# Patient Record
Sex: FEMALE | Race: BLACK OR AFRICAN AMERICAN | NOT HISPANIC OR LATINO | Employment: OTHER | URBAN - METROPOLITAN AREA
[De-identification: names, ages, dates, MRNs, and addresses within clinical notes are randomized per-mention and may not be internally consistent; named-entity substitution may affect disease eponyms.]

---

## 2017-01-09 DIAGNOSIS — E03.4 HYPOTHYROIDISM DUE TO ACQUIRED ATROPHY OF THYROID: ICD-10-CM

## 2017-01-09 DIAGNOSIS — I10 ESSENTIAL HYPERTENSION: ICD-10-CM

## 2017-01-10 RX ORDER — LEVOTHYROXINE SODIUM 100 UG/1
TABLET ORAL
Qty: 90 TABLET | Refills: 2 | Status: SHIPPED | OUTPATIENT
Start: 2017-01-10 | End: 2017-02-02 | Stop reason: SDUPTHER

## 2017-01-10 RX ORDER — METOPROLOL SUCCINATE 25 MG/1
25 TABLET, EXTENDED RELEASE ORAL DAILY
Qty: 90 TABLET | Refills: 2 | Status: SHIPPED | OUTPATIENT
Start: 2017-01-10 | End: 2017-02-02 | Stop reason: SDUPTHER

## 2017-02-02 ENCOUNTER — TELEPHONE (OUTPATIENT)
Dept: INTERNAL MEDICINE | Facility: CLINIC | Age: 72
End: 2017-02-02

## 2017-02-02 DIAGNOSIS — E03.4 HYPOTHYROIDISM DUE TO ACQUIRED ATROPHY OF THYROID: ICD-10-CM

## 2017-02-02 DIAGNOSIS — E78.5 HYPERLIPIDEMIA, UNSPECIFIED HYPERLIPIDEMIA TYPE: ICD-10-CM

## 2017-02-02 DIAGNOSIS — E55.9 VITAMIN D DEFICIENCY: ICD-10-CM

## 2017-02-02 DIAGNOSIS — Z29.9 PREVENTIVE MEASURE: Primary | ICD-10-CM

## 2017-02-02 DIAGNOSIS — I10 ESSENTIAL HYPERTENSION: ICD-10-CM

## 2017-02-02 RX ORDER — METOPROLOL SUCCINATE 25 MG/1
25 TABLET, EXTENDED RELEASE ORAL DAILY
Qty: 90 TABLET | Refills: 2 | Status: SHIPPED | OUTPATIENT
Start: 2017-02-02 | End: 2017-11-07 | Stop reason: SDUPTHER

## 2017-02-02 RX ORDER — VALSARTAN 160 MG/1
TABLET ORAL
Qty: 90 TABLET | Refills: 2 | Status: SHIPPED | OUTPATIENT
Start: 2017-02-02 | End: 2017-11-07 | Stop reason: SDUPTHER

## 2017-02-02 RX ORDER — HYDROCHLOROTHIAZIDE 12.5 MG/1
12.5 TABLET ORAL DAILY
Qty: 90 TABLET | Refills: 3 | Status: SHIPPED | OUTPATIENT
Start: 2017-02-02 | End: 2017-11-07 | Stop reason: SDUPTHER

## 2017-02-02 RX ORDER — ERGOCALCIFEROL 1.25 MG/1
50000 CAPSULE ORAL
Qty: 4 CAPSULE | Refills: 6 | Status: SHIPPED | OUTPATIENT
Start: 2017-02-02 | End: 2017-10-31 | Stop reason: SDUPTHER

## 2017-02-02 RX ORDER — SIMVASTATIN 40 MG/1
TABLET, FILM COATED ORAL
Qty: 90 TABLET | Refills: 2 | Status: SHIPPED | OUTPATIENT
Start: 2017-02-02 | End: 2017-10-30 | Stop reason: SDUPTHER

## 2017-02-02 RX ORDER — LEVOTHYROXINE SODIUM 100 UG/1
TABLET ORAL
Qty: 90 TABLET | Refills: 2 | Status: SHIPPED | OUTPATIENT
Start: 2017-02-02 | End: 2017-11-07 | Stop reason: SDUPTHER

## 2017-02-02 NOTE — TELEPHONE ENCOUNTER
----- Message from Liam Lea sent at 2/2/2017 11:08 AM CST -----  Contact: self/ [phone] home  Type: Rx    Name of medication(s): Hard copies of all medications    Is this a refill? New rx? Refill     Who prescribed medication? Dr. Powell    Pharmacy Name, Phone, & Location: Mailed to home address    Comments: Pt states that all of her prescriptions have gone up and is too expensive, so she would like to request hard copies of all of them to be mailed to her home and she will handle sending them to the pharmacy when it is time.  She would like a call back to discuss.  Please call and advise.    Thank you

## 2017-03-07 ENCOUNTER — TELEPHONE (OUTPATIENT)
Dept: INTERNAL MEDICINE | Facility: CLINIC | Age: 72
End: 2017-03-07

## 2017-03-07 DIAGNOSIS — Z12.39 BREAST SCREENING, UNSPECIFIED: Primary | ICD-10-CM

## 2017-03-07 DIAGNOSIS — Z12.31 ENCOUNTER FOR SCREENING MAMMOGRAM FOR MALIGNANT NEOPLASM OF BREAST: ICD-10-CM

## 2017-03-07 NOTE — TELEPHONE ENCOUNTER
----- Message from Yaneth Nieves sent at 3/7/2017 10:56 AM CST -----  Contact: Self/ [phone]   Type: Orders Request    What orders/ testing are being requested? Mammo     Is there a future appointment scheduled for the patient with PCP? Yes     When? 04/05/2017     Comments: pt is calling to have a order for a Mammo. Please call and advise     Thank you

## 2017-03-14 ENCOUNTER — LAB VISIT (OUTPATIENT)
Dept: LAB | Facility: HOSPITAL | Age: 72
End: 2017-03-14
Attending: INTERNAL MEDICINE
Payer: MEDICARE

## 2017-03-14 DIAGNOSIS — E78.00 PURE HYPERCHOLESTEROLEMIA: ICD-10-CM

## 2017-03-14 DIAGNOSIS — I10 ESSENTIAL HYPERTENSION: ICD-10-CM

## 2017-03-14 LAB
ALBUMIN SERPL BCP-MCNC: 3.6 G/DL
ALP SERPL-CCNC: 65 U/L
ALT SERPL W/O P-5'-P-CCNC: 14 U/L
ANION GAP SERPL CALC-SCNC: 7 MMOL/L
AST SERPL-CCNC: 26 U/L
BILIRUB DIRECT SERPL-MCNC: 0.2 MG/DL
BILIRUB SERPL-MCNC: 0.4 MG/DL
BUN SERPL-MCNC: 13 MG/DL
CALCIUM SERPL-MCNC: 9.6 MG/DL
CHLORIDE SERPL-SCNC: 104 MMOL/L
CHOLEST/HDLC SERPL: 3.2 {RATIO}
CO2 SERPL-SCNC: 30 MMOL/L
CREAT SERPL-MCNC: 0.9 MG/DL
EST. GFR  (AFRICAN AMERICAN): >60 ML/MIN/1.73 M^2
EST. GFR  (NON AFRICAN AMERICAN): >60 ML/MIN/1.73 M^2
GLUCOSE SERPL-MCNC: 86 MG/DL
HDL/CHOLESTEROL RATIO: 31.4 %
HDLC SERPL-MCNC: 169 MG/DL
HDLC SERPL-MCNC: 53 MG/DL
LDLC SERPL CALC-MCNC: 96.2 MG/DL
NONHDLC SERPL-MCNC: 116 MG/DL
POTASSIUM SERPL-SCNC: 4.2 MMOL/L
PROT SERPL-MCNC: 7.5 G/DL
SODIUM SERPL-SCNC: 141 MMOL/L
TRIGL SERPL-MCNC: 99 MG/DL

## 2017-03-14 PROCEDURE — 80048 BASIC METABOLIC PNL TOTAL CA: CPT

## 2017-03-14 PROCEDURE — 80061 LIPID PANEL: CPT

## 2017-03-14 PROCEDURE — 80076 HEPATIC FUNCTION PANEL: CPT

## 2017-03-14 PROCEDURE — 36415 COLL VENOUS BLD VENIPUNCTURE: CPT

## 2017-03-17 ENCOUNTER — HOSPITAL ENCOUNTER (OUTPATIENT)
Dept: RADIOLOGY | Facility: HOSPITAL | Age: 72
Discharge: HOME | End: 2017-03-17
Attending: INTERNAL MEDICINE
Payer: MEDICARE

## 2017-03-17 DIAGNOSIS — Z12.31 ENCOUNTER FOR SCREENING MAMMOGRAM FOR MALIGNANT NEOPLASM OF BREAST: ICD-10-CM

## 2017-03-17 DIAGNOSIS — Z12.39 BREAST SCREENING, UNSPECIFIED: ICD-10-CM

## 2017-03-17 PROCEDURE — 77067 SCR MAMMO BI INCL CAD: CPT | Mod: 26,,, | Performed by: RADIOLOGY

## 2017-03-17 PROCEDURE — 77067 SCR MAMMO BI INCL CAD: CPT | Mod: TC

## 2017-03-17 PROCEDURE — 77063 BREAST TOMOSYNTHESIS BI: CPT | Mod: 26,,, | Performed by: RADIOLOGY

## 2017-03-27 ENCOUNTER — OFFICE VISIT (OUTPATIENT)
Dept: SPORTS MEDICINE | Facility: CLINIC | Age: 72
End: 2017-03-27
Payer: MEDICARE

## 2017-03-27 VITALS
HEIGHT: 68 IN | WEIGHT: 205 LBS | SYSTOLIC BLOOD PRESSURE: 132 MMHG | DIASTOLIC BLOOD PRESSURE: 76 MMHG | HEART RATE: 65 BPM | BODY MASS INDEX: 31.07 KG/M2

## 2017-03-27 DIAGNOSIS — M67.911 DISORDER OF RIGHT ROTATOR CUFF: Primary | ICD-10-CM

## 2017-03-27 PROCEDURE — 1160F RVW MEDS BY RX/DR IN RCRD: CPT | Mod: S$GLB,,, | Performed by: ORTHOPAEDIC SURGERY

## 2017-03-27 PROCEDURE — 3075F SYST BP GE 130 - 139MM HG: CPT | Mod: S$GLB,,, | Performed by: ORTHOPAEDIC SURGERY

## 2017-03-27 PROCEDURE — 99214 OFFICE O/P EST MOD 30 MIN: CPT | Mod: S$GLB,,, | Performed by: ORTHOPAEDIC SURGERY

## 2017-03-27 PROCEDURE — 3078F DIAST BP <80 MM HG: CPT | Mod: S$GLB,,, | Performed by: ORTHOPAEDIC SURGERY

## 2017-03-27 PROCEDURE — 1159F MED LIST DOCD IN RCRD: CPT | Mod: S$GLB,,, | Performed by: ORTHOPAEDIC SURGERY

## 2017-03-27 PROCEDURE — 99999 PR PBB SHADOW E&M-EST. PATIENT-LVL III: CPT | Mod: PBBFAC,,, | Performed by: ORTHOPAEDIC SURGERY

## 2017-03-27 PROCEDURE — 1157F ADVNC CARE PLAN IN RCRD: CPT | Mod: S$GLB,,, | Performed by: ORTHOPAEDIC SURGERY

## 2017-03-27 PROCEDURE — 1125F AMNT PAIN NOTED PAIN PRSNT: CPT | Mod: S$GLB,,, | Performed by: ORTHOPAEDIC SURGERY

## 2017-03-27 RX ORDER — MELOXICAM 15 MG/1
15 TABLET ORAL DAILY
Qty: 30 TABLET | Refills: 2 | Status: SHIPPED | OUTPATIENT
Start: 2017-03-27 | End: 2017-06-13 | Stop reason: SDUPTHER

## 2017-03-27 NOTE — PROGRESS NOTES
CC: right shoulder pain referred by Dr. Powell     71 y.o. Female retired  reports that the pain is severe and not responding to any conservative care.  Had injection last visit in 11/2016 with relief until about a month ago.    She reports that the pain is worse with overhead activity. It also bothers her at night.    Is affecting ADLs.     Review of Systems   Constitution: Negative. Negative for chills, fever and night sweats.   HENT: Negative for congestion and headaches.    Eyes: Negative for blurred vision, left vision loss and right vision loss.   Cardiovascular: Negative for chest pain and syncope.   Respiratory: Negative for cough and shortness of breath.    Endocrine: Negative for polydipsia, polyphagia and polyuria.   Hematologic/Lymphatic: Negative for bleeding problem. Does not bruise/bleed easily.   Skin: Negative for dry skin, itching and rash.   Musculoskeletal: Negative for falls and muscle weakness.   Gastrointestinal: Negative for abdominal pain and bowel incontinence.   Genitourinary: Negative for bladder incontinence and nocturia.   Neurological: Negative for disturbances in coordination, loss of balance and seizures.   Psychiatric/Behavioral: Negative for depression. The patient does not have insomnia.    Allergic/Immunologic: Negative for hives and persistent infections.     PAST MEDICAL HISTORY:   Past Medical History:   Diagnosis Date    Back pain     Cataract     Central retinal vein occlusion of left eye     Degeneration of lumbar or lumbosacral intervertebral disc 4/16/2013    High cholesterol     Hypertension     Hypothyroidism     Impingement syndrome of left shoulder 7/29/2013    Obesity     Pneumonia     Posterior vitreous detachment of both eyes 1/17/2013    S/P partial hysterectomy 1/23/2013     PAST SURGICAL HISTORY:   Past Surgical History:   Procedure Laterality Date    COLONOSCOPY  01/22/2008    HYSTERECTOMY      PARTIAL HYSTERECTOMY       THYROIDECTOMY, PARTIAL      TONSILLECTOMY       FAMILY HISTORY:   Family History   Problem Relation Age of Onset    Diabetes Mother     Glaucoma Mother     Heart disease Mother     Blindness Mother     Heart attack Mother     Glaucoma Brother     Stroke Brother     Liver disease Brother     Heart disease Brother     Arthritis Brother     Alcohol abuse Brother     Drug abuse Brother     Glaucoma Brother     Diabetes Brother     Pemphigus vulgaris Brother     Blindness Brother     No Known Problems Father     Hypertension Maternal Grandmother     Diabetes Maternal Grandmother     No Known Problems Paternal Grandmother     No Known Problems Paternal Grandfather     Heart disease Brother     Diabetes Brother      Bilateral Knee amputation    Heart disease Brother     Liver disease Brother     Heart disease Son      Stent placement    Gout Son     No Known Problems Son     No Known Problems Maternal Grandfather     No Known Problems Maternal Aunt     No Known Problems Maternal Uncle     No Known Problems Paternal Aunt     No Known Problems Paternal Uncle     No Known Problems Sister     Amblyopia Neg Hx     Macular degeneration Neg Hx     Retinal detachment Neg Hx     Strabismus Neg Hx     Thyroid disease Neg Hx     Cataracts Neg Hx     Cancer Neg Hx      SOCIAL HISTORY:   Social History     Social History    Marital status:      Spouse name: N/A    Number of children: N/A    Years of education: N/A     Occupational History    Not on file.     Social History Main Topics    Smoking status: Never Smoker    Smokeless tobacco: Never Used    Alcohol use 1.8 oz/week     3 Cans of beer per week      Comment: Occasionally; beer once a week    Drug use: No    Sexual activity: Yes     Partners: Male      Comment: maybe once month     Other Topics Concern    Not on file     Social History Narrative       MEDICATIONS:   Current Outpatient Prescriptions:     aspirin 81 mg  "Tab, Take 81 mg by mouth. , Disp: , Rfl:     ergocalciferol (ERGOCALCIFEROL) 50,000 unit Cap, Take 1 capsule (50,000 Units total) by mouth every 7 days., Disp: 4 capsule, Rfl: 6    folic acid-vit B6-vit B12 (FOLBEE) 2.5-25-1 mg Tab, Take 1 tablet by mouth once daily., Disp: 90 each, Rfl: 3    hydrochlorothiazide (HYDRODIURIL) 12.5 MG Tab, Take 1 tablet (12.5 mg total) by mouth once daily. For Blood Pressure, Disp: 90 tablet, Rfl: 3    levothyroxine (SYNTHROID) 100 MCG tablet, TAKE ONE TABLET BY MOUTH BEFORE BREAKFAST FOR THYROID, Disp: 90 tablet, Rfl: 2    metoprolol succinate (TOPROL-XL) 25 MG 24 hr tablet, Take 1 tablet (25 mg total) by mouth once daily. For Blood Pressure, Disp: 90 tablet, Rfl: 2    simvastatin (ZOCOR) 40 MG tablet, TAKE 1 TABLET EVERY EVENING FOR CHOLESTEROL, Disp: 90 tablet, Rfl: 2    timolol maleate 0.5% (TIMOPTIC-XE) 0.5 % SolG, Place 1 drop into both eyes once daily., Disp: 10 mL, Rfl: 3    valsartan (DIOVAN) 160 MG tablet, TAKE 1 TABLET ONE TIME DAILY FOR BLOOD PRESSURE, Disp: 90 tablet, Rfl: 2  ALLERGIES:   Review of patient's allergies indicates:   Allergen Reactions    Tramadol Nausea And Vomiting       VITAL SIGNS:   /76  Pulse 65  Ht 5' 8" (1.727 m)  Wt 93 kg (205 lb)  BMI 31.17 kg/m2     PHYSICAL EXAMINATION:  General:  The patient is alert and oriented x 3.  Mood is pleasant.  Observation of ears, eyes and nose reveal no gross abnormalities.  No labored breathing observed.  Gait is coordinated. Patient can toe walk and heel walk without difficulty.      right Shoulder / Upper Extremity Exam    OBSERVATION:     Swelling  none  Deformity  none   Discoloration  none   Scapular winging none   Scars   none  Atrophy  none    TENDERNESS / CREPITUS (T/C):          T/C      T/C   Clavicle   -/-  SUPRAspinatus    -/-     AC Jt.    -/-  INFRAspinatus  -/-    SC Jt.    -/-  Deltoid    -/-      G. Tuberosity  -/-  LH BICEP groove  +/-   Acromion:  -/-  Midline " Neck   -/-     Scapular Spine -/-  Trapezium   -/-   SMA Scapula  -/-  GH jt. line - post  -/-     Scapulothoracic  -/-         ROM: (* = with pain)  Right shoulder   Left shoulder        AROM (PROM)   AROM (PROM)   FE    170° (175°)     170° (175°)     ER at 0°    60°  (65°)    60°  (65°)   ER at 90° ABD  90°  (90°)    90°  (90°)   IR at 90°  ABD   NA  (40°)     NA  (40°)      IR (spine level)   T10     T10    STRENGTH: (* = with pain) RIGHT SHOULDER  LEFT SHOULDER   SCAPTION   5/5    At 0: 4/5 at 30: 5-/5    IR    5/5    5/5   ER    5/5    5/5   BICEPS   5/5    5/5   Deltoid    5/5    5/5     SIGNS:  Painful side       NEER   +    OKENROYS  neg    TORRES   +    SPEEDS  neg     DROP ARM   neg   BELLY PRESS neg   Superior escape none    LIFT-OFF  neg   X-Body ADD    neg    MOVING VALGUS neg        STABILITY TESTING    RIGHT SHOULDER   LEFT SHOULDER     Translation     Anterior  up face     up face    Posterior  up face    up face    Sulcus   < 10mm    < 10 mm     Signs   Apprehension   neg      neg       Relocation   no change     no change      Jerk test  neg     neg    EXTREMITY NEURO-VASCULAR EXAM    Sensation grossly intact to light touch all dermatomal regions.    DTR 2+ Biceps, Triceps, BR and Negative Jean Pauls sign   Grossly intact motor function at Elbow, Wrist and Hand   Distal pulses radial and ulnar 2+, brisk cap refill, symmetric.      NECK:  Painless FROM and spinous processes non-tender. Negative Spurlings sign.      OTHER FINDINGS:  + scapular dyskinesia    XRAYS:  Shoulder trauma series right,  were obtained and reviewed  No convincing fracture or dislocation is noted. The osseous structures appear well mineralized and well aligned    MRI R Shoulder  1.  Tendinosis of the distal supraspinatus with a small interstitial tear at the myotendinous junction.  2.  Subacromial subdeltoid bursitis.  3.  Mild tendinosis of the distal subscapularis.  4.  Diminutive intra-articular long head of the  biceps which may indicate tear.  5.  Arthrosis of the acromioclavicular joint as above.        ASSESSMENT:   right:  1. Shoulder pain, impingement   2.  Scapular dyskinesia    PLAN:    1. New MRI right shoulder to evaluate cuff.  2. If no progression of tear then will reinject    All questions were answered, pt will contact us for questions or concerns in the interim.

## 2017-03-27 NOTE — MR AVS SNAPSHOT
[facility]  [address]  [facility] [address]  Phone: [phone]                  Elena Block   3/27/2017 11:45 AM   Appointment    Description:  Female : 1945   Provider:  Judy Canela MD   Department:  [facility]                To Do List           Future Appointments        Provider Department Dept Phone    3/27/2017 11:45 AM Judy Canela MD [facility] Medicine [phone]    2017 10:30 AM Sandra Lyon MD [facility] [address] [phone]    2017 10:30 AM HERZOG, VISUAL GONZALEZ [facility] - Ophthalmology [phone]    2017 11:00 AM MARTI Martin [facility] - Optometry [phone]      Goals (5 Years of Data)     None      [facility]     [facility] Line -  Assistance  Registered nurses in the [facility] provide clinical advisement, health education, appointment booking, and other advisory services.  Call for this free service at [phone].             Medications           Message regarding Medications     Verify the changes and/or additions to your medication regime listed below are the same as discussed with your clinician today.  If any of these changes or additions are incorrect, please notify your healthcare provider.             Verify that the below list of medications is an accurate representation of the medications you are currently taking.  If none reported, the list may be blank. If incorrect, please contact your healthcare provider. Carry this list with you in case of emergency.           Current Medications     aspirin 81 mg Tab Take 81 mg by mouth.     ergocalciferol (ERGOCALCIFEROL) 50,000 unit Cap Take 1 capsule (50,000 Units total) by mouth every 7 days.    folic acid-vit B6-vit B12 (FOLBEE) 2.5-25-1 mg Tab Take 1 tablet by mouth once daily.    hydrochlorothiazide (HYDRODIURIL) 12.5 MG Tab Take 1 tablet (12.5 mg total) by mouth once daily. For Blood  Pressure    levothyroxine (SYNTHROID) 100 MCG tablet TAKE ONE TABLET BY MOUTH BEFORE BREAKFAST FOR THYROID    metoprolol succinate (TOPROL-XL) 25 MG 24 hr tablet Take 1 tablet (25 mg total) by mouth once daily. For Blood Pressure    simvastatin (ZOCOR) 40 MG tablet TAKE 1 TABLET EVERY EVENING FOR CHOLESTEROL    timolol maleate 0.5% (TIMOPTIC-XE) 0.5 % SolG Place 1 drop into both eyes once daily.    valsartan (DIOVAN) 160 MG tablet TAKE 1 TABLET ONE TIME DAILY FOR BLOOD PRESSURE           Clinical Reference Information           Allergies as of 3/27/2017     Tramadol      Immunizations Administered on Date of Encounter - 3/27/2017     None      Language Assistance Services     ATTENTION: Language assistance services are available, free of charge. Please call [phone].      ATENCIÓN: Si cleveland omalley, tiene a haynes disposición servicios gratuitos de asistencia lingüística. Llame al [phone].     GINA Ý: N?u b?n nói Ti?ng Vi?t, có các d?ch v? h? tr? ngôn ng? mi?n phí dành cho b?n. G?i s? [phone].         [facility] complies with applicable Federal civil rights laws and does not discriminate on the basis of race, color, national origin, age, disability, or sex.

## 2017-04-05 ENCOUNTER — OFFICE VISIT (OUTPATIENT)
Dept: INTERNAL MEDICINE | Facility: CLINIC | Age: 72
End: 2017-04-05
Payer: MEDICARE

## 2017-04-05 ENCOUNTER — TELEPHONE (OUTPATIENT)
Dept: INTERNAL MEDICINE | Facility: CLINIC | Age: 72
End: 2017-04-05

## 2017-04-05 VITALS
BODY MASS INDEX: 31.3 KG/M2 | HEIGHT: 68 IN | WEIGHT: 206.5 LBS | DIASTOLIC BLOOD PRESSURE: 66 MMHG | HEART RATE: 62 BPM | SYSTOLIC BLOOD PRESSURE: 132 MMHG | OXYGEN SATURATION: 98 %

## 2017-04-05 DIAGNOSIS — E78.5 HYPERLIPIDEMIA, UNSPECIFIED HYPERLIPIDEMIA TYPE: ICD-10-CM

## 2017-04-05 DIAGNOSIS — I10 ESSENTIAL HYPERTENSION: Primary | ICD-10-CM

## 2017-04-05 DIAGNOSIS — E55.9 VITAMIN D DEFICIENCY: ICD-10-CM

## 2017-04-05 DIAGNOSIS — E03.4 HYPOTHYROIDISM DUE TO ACQUIRED ATROPHY OF THYROID: ICD-10-CM

## 2017-04-05 DIAGNOSIS — Z79.899 HIGH RISK MEDICATION USE: ICD-10-CM

## 2017-04-05 DIAGNOSIS — M71.9 DISORDER OF BURSAE AND TENDONS IN RIGHT SHOULDER REGION: ICD-10-CM

## 2017-04-05 DIAGNOSIS — M67.911 DISORDER OF BURSAE AND TENDONS IN RIGHT SHOULDER REGION: ICD-10-CM

## 2017-04-05 PROCEDURE — 3075F SYST BP GE 130 - 139MM HG: CPT | Mod: S$GLB,,, | Performed by: INTERNAL MEDICINE

## 2017-04-05 PROCEDURE — 99999 PR PBB SHADOW E&M-EST. PATIENT-LVL IV: CPT | Mod: PBBFAC,,, | Performed by: INTERNAL MEDICINE

## 2017-04-05 PROCEDURE — 3078F DIAST BP <80 MM HG: CPT | Mod: S$GLB,,, | Performed by: INTERNAL MEDICINE

## 2017-04-05 PROCEDURE — 1157F ADVNC CARE PLAN IN RCRD: CPT | Mod: S$GLB,,, | Performed by: INTERNAL MEDICINE

## 2017-04-05 PROCEDURE — 99214 OFFICE O/P EST MOD 30 MIN: CPT | Mod: S$GLB,,, | Performed by: INTERNAL MEDICINE

## 2017-04-05 PROCEDURE — 1159F MED LIST DOCD IN RCRD: CPT | Mod: S$GLB,,, | Performed by: INTERNAL MEDICINE

## 2017-04-05 PROCEDURE — 99499 UNLISTED E&M SERVICE: CPT | Mod: S$GLB,,, | Performed by: INTERNAL MEDICINE

## 2017-04-05 PROCEDURE — 1160F RVW MEDS BY RX/DR IN RCRD: CPT | Mod: S$GLB,,, | Performed by: INTERNAL MEDICINE

## 2017-04-05 PROCEDURE — 1126F AMNT PAIN NOTED NONE PRSNT: CPT | Mod: S$GLB,,, | Performed by: INTERNAL MEDICINE

## 2017-04-05 RX ORDER — OMEPRAZOLE 20 MG/1
20 CAPSULE, DELAYED RELEASE ORAL DAILY
Qty: 30 CAPSULE | Refills: 11 | Status: SHIPPED | OUTPATIENT
Start: 2017-04-05 | End: 2017-11-28 | Stop reason: SDUPTHER

## 2017-04-05 NOTE — TELEPHONE ENCOUNTER
----- Message from Bella Bravo sent at 4/5/2017 11:01 AM CDT -----  Contact[facility]  Refill    ergocalciferol (ERGOCALCIFEROL) 50,000 unit Cap   Sig - Route: Take 1 capsule (50,000 Units total) by mouth every 7 days. - Oral    [id number] [place], [place] - [address] [phone] (Phone) [phone] (Fax)    Thanks!

## 2017-04-05 NOTE — MR AVS SNAPSHOT
[facility] [address]  [address]  [address]  [facility] [address]  Phone: [phone]                  Elena Block   2017 10:30 AM   Office Visit    Description:  Female : 1945   Provider:  Sandra Lyon MD   Department:  [facility] [address]           Reason for Visit     Follow-up           Diagnoses this Visit        Comments    Essential hypertension    -  Primary     Hyperlipidemia, unspecified hyperlipidemia type         Disorder of bursae and tendons in right shoulder region         High risk medication use         Hypothyroidism due to acquired atrophy of thyroid         Vitamin D deficiency                To Do List           Future Appointments        Provider Department Dept Phone    2017 6:00 AM [facility] MRI1 [facility] [phone]    2017 10:30 AM HERZOG, VISUAL GONZALEZ [facility] - Ophthalmology [phone]    2017 11:00 AM Tim BARI Hamlin, OD [facility] - Optometry [phone]      Goals (5 Years of Data)     None      Follow-Up and Disposition     Return in about 6 months (around 10/5/2017).       These Medications        Disp Refills Start End    omeprazole (PRILOSEC) 20 MG capsule 30 capsule 11 2017    Take 1 capsule (20 mg total) by mouth once daily. 1 cap daily for stomach ulcer prevention while on Meloxicam - Oral    Pharmacy: [id number] - [place], [place] - [address] Ph #: [phone]         [facility]     [facility] Care Line - 24/ Assistance  Unless otherwise directed by your provider, please contact [facility], our nurse care line that is available for 24/7 assistance.     Registered nurses in the [facility] provide: appointment scheduling, clinical advisement, health education, and other advisory services.  Call: [phone] (toll free)               Medications           Message regarding Medications     Verify  the changes and/or additions to your medication regime listed below are the same as discussed with your clinician today.  If any of these changes or additions are incorrect, please notify your healthcare provider.        START taking these NEW medications        Refills    omeprazole (PRILOSEC) 20 MG capsule 11    Sig: Take 1 capsule (20 mg total) by mouth once daily. 1 cap daily for stomach ulcer prevention while on Meloxicam    Class: Normal    Route: Oral           Verify that the below list of medications is an accurate representation of the medications you are currently taking.  If none reported, the list may be blank. If incorrect, please contact your healthcare provider. Carry this list with you in case of emergency.           Current Medications     aspirin 81 mg Tab Take 81 mg by mouth.     ergocalciferol (ERGOCALCIFEROL) 50,000 unit Cap Take 1 capsule (50,000 Units total) by mouth every 7 days.    folic acid-vit B6-vit B12 (FOLBEE) 2.5-25-1 mg Tab Take 1 tablet by mouth once daily.    hydrochlorothiazide (HYDRODIURIL) 12.5 MG Tab Take 1 tablet (12.5 mg total) by mouth once daily. For Blood Pressure    levothyroxine (SYNTHROID) 100 MCG tablet TAKE ONE TABLET BY MOUTH BEFORE BREAKFAST FOR THYROID    meloxicam (MOBIC) 15 MG tablet Take 1 tablet (15 mg total) by mouth once daily.    metoprolol succinate (TOPROL-XL) 25 MG 24 hr tablet Take 1 tablet (25 mg total) by mouth once daily. For Blood Pressure    omeprazole (PRILOSEC) 20 MG capsule Take 1 capsule (20 mg total) by mouth once daily. 1 cap daily for stomach ulcer prevention while on Meloxicam    simvastatin (ZOCOR) 40 MG tablet TAKE 1 TABLET EVERY EVENING FOR CHOLESTEROL    timolol maleate 0.5% (TIMOPTIC-XE) 0.5 % SolG Place 1 drop into both eyes once daily.    valsartan (DIOVAN) 160 MG tablet TAKE 1 TABLET ONE TIME DAILY FOR BLOOD PRESSURE           Clinical Reference Information           Your Vitals Were     BP Pulse Height Weight SpO2 BMI    132/66 62  "5' 8" (1.727 m) 93.7 kg (206 lb 8 oz) 98% 31.4 kg/m2      Blood Pressure          Most Recent Value    BP  132/66      Allergies as of 4/5/2017     Tramadol      Immunizations Administered on Date of Encounter - 4/5/2017     None      Orders Placed During Today's Visit     Future Labs/Procedures Expected by Expires    Vitamin D  10/2/2017 (Approximate) 11/11/2017    CBC auto differential  10/6/2017 12/4/2017    Comprehensive metabolic panel  10/6/2017 4/5/2018    Lipid panel  10/6/2017 4/5/2018    TSH  10/6/2017 4/5/2018      Language Assistance Services     ATTENTION: Language assistance services are available, free of charge. Please call [phone].      ATENCIÓN: Si kimmiela shahram, tiene a haynes disposición servicios gratuitos de asistencia lingüística. Llame al [phone].     CHÚ Ý: N?u b?n nói Ti?ng Vi?t, có các d?ch v? h? tr? ngôn ng? mi?n phí dành cho b?n. G?i s? [phone].         [facility] [address] complies with applicable Federal civil rights laws and does not discriminate on the basis of race, color, national origin, age, disability, or sex.        "

## 2017-04-06 NOTE — PROGRESS NOTES
Ms. Block is a 71-year-old female, known to myself, who presents to clinic   today for routine followup of hypertension and hyperlipidemia.    HISTORY OF PRESENT ILLNESS:  Ms. Block presents for followup of the above.  She   notes that she had seen Dr. Canela about her shoulder and received an injection   back in November, which seemed to help.  She did have some recurrence of the   discomfort in the shoulder and went back to see Dr. Canela last month.  He   recommended that she consider repeat MRI, but she notes that since that time she   started Mobic 15 mg daily and is doing much better, she questions if she can   stay with this for now.  She would like to avoid surgery if possible.  She does   note her range of motion of the shoulder is seemingly improved and her pain   better.  Otherwise doing well, trying to stay active with regular exercise, no   chest pain, no shortness of breath.    PAST MEDICAL, SURGICAL, SOCIAL HISTORY:  Please see as thoroughly stated in EPIC   chart, which has been reviewed.    PHYSICAL EXAMINATION:  VITAL SIGNS:  Weight 206 pounds, blood pressure 140/80, pulse 62, recheck blood   pressure per M.D. is 132/66 sitting and of the left side.  GENERAL:  The patient looks comfortable.  HEENT:  Grossly normal.  NECK:  Supple.  Negative for masses or thyromegaly.  LUNGS:  Clear bilaterally.  HEART:  Regular rate and rhythm without arrhythmias.  BREASTS:  On gross visualization are normal.  On palpation, no masses or   discharge from the nipples.  ABDOMEN:  Soft, nontender, no masses or organomegaly.  EXTREMITIES:  Negative for edema.  Rheumatologic examination is with right   shoulder showing good range of motion with the exception of some mild subjective   pain with internal abduction.  No point tenderness with palpation of the   shoulder.    WORKUP:  Lab work done on 03/14/2017 showing basic chemistry unremarkable with   normal renal function.  Liver functions normal.  Cholesterol 169 with LDL  96.    ASSESSMENT AND PLAN:  1.  Essential hypertension, under excellent control on therapy, which includes   hydrochlorothiazide 12.5 mg one a day along with Toprol XL 25 mg daily and   Diovan 160 mg daily.  A. Continue above therapy and return to clinic in six months.  2.  Hyperlipidemia, controlled on Zocor 40 mg daily.  A. Return to clinic in six months with one week prior fasting liver and lipid   panel or see the patient sooner if needed.  3.  Right shoulder tendinitis with partial small tear by MRI back in November   with the patient deferring on repeat MRI or surgery at this point.  A. I have recommended the patient okay to continue on the meloxicam 15 mg daily,   but I have recommended Prilosec 20 mg daily for GI prophylaxis.  B. I have discussed range of motion exercises for shoulder daily.  C. If no improvement or worsening at any point, we will refer back to   Orthopedics with prior right shoulder MRI.  4.  Health maintenance.  Screening:  Last mammogram in March 2017 and normal.  Last bone density study in March 2016 and normal.  Last colonoscopy in January 2008 showing diverticular disease.  The patient is status post a partial hysterectomy with last gynecologic check in   October 2014.  A. Gynecologic check with return to clinic.  B. Full fasting lab work with return to clinic in six months or see the patient   sooner if needed.      FMS/HN  dd: 04/05/2017 20:32:32 (CDT)  td: 04/06/2017 11:32:38 (CDT)  Doc ID   #[id number]  Job ID #[id number]    CC:     This office note has been dictated.

## 2017-05-01 ENCOUNTER — OFFICE VISIT (OUTPATIENT)
Dept: OPTOMETRY | Facility: CLINIC | Age: 72
End: 2017-05-01
Payer: MEDICARE

## 2017-05-01 ENCOUNTER — CLINICAL SUPPORT (OUTPATIENT)
Dept: OPHTHALMOLOGY | Facility: CLINIC | Age: 72
End: 2017-05-01
Payer: MEDICARE

## 2017-05-01 DIAGNOSIS — Z13.5 SCREENING FOR OTHER EYE CONDITIONS: ICD-10-CM

## 2017-05-01 DIAGNOSIS — H52.203 HYPEROPIA WITH ASTIGMATISM AND PRESBYOPIA, BILATERAL: ICD-10-CM

## 2017-05-01 DIAGNOSIS — I10 ESSENTIAL HYPERTENSION: ICD-10-CM

## 2017-05-01 DIAGNOSIS — H40.013 OPEN ANGLE WITH BORDERLINE FINDINGS, LOW RISK, BILATERAL: ICD-10-CM

## 2017-05-01 DIAGNOSIS — H40.053 OCULAR HYPERTENSION, BILATERAL: ICD-10-CM

## 2017-05-01 DIAGNOSIS — H40.053 OCULAR HYPERTENSION, BILATERAL: Primary | ICD-10-CM

## 2017-05-01 DIAGNOSIS — H25.13 NUCLEAR SCLEROSIS, BILATERAL: ICD-10-CM

## 2017-05-01 DIAGNOSIS — H52.03 HYPEROPIA WITH ASTIGMATISM AND PRESBYOPIA, BILATERAL: ICD-10-CM

## 2017-05-01 DIAGNOSIS — H34.8192 CENTRAL VEIN OCCLUSION OF RETINA: ICD-10-CM

## 2017-05-01 DIAGNOSIS — H52.4 HYPEROPIA WITH ASTIGMATISM AND PRESBYOPIA, BILATERAL: ICD-10-CM

## 2017-05-01 DIAGNOSIS — H26.9 CORTICAL CATARACT OF BOTH EYES: ICD-10-CM

## 2017-05-01 PROCEDURE — 92083 EXTENDED VISUAL FIELD XM: CPT | Mod: S$GLB,,, | Performed by: OPTOMETRIST

## 2017-05-01 PROCEDURE — 92015 DETERMINE REFRACTIVE STATE: CPT | Mod: S$GLB,,, | Performed by: OPTOMETRIST

## 2017-05-01 PROCEDURE — 99999 PR PBB SHADOW E&M-EST. PATIENT-LVL III: CPT | Mod: PBBFAC,,, | Performed by: OPTOMETRIST

## 2017-05-01 PROCEDURE — 99499 UNLISTED E&M SERVICE: CPT | Mod: S$GLB,,, | Performed by: OPTOMETRIST

## 2017-05-01 PROCEDURE — 92014 COMPRE OPH EXAM EST PT 1/>: CPT | Mod: S$GLB,,, | Performed by: OPTOMETRIST

## 2017-05-01 NOTE — PATIENT INSTRUCTIONS
Nuclear sclerosis/peripheral cortical cataract in both eyes.  No need for cataract surgery.  Astigmatic refractive error in each eye, with good (and stable VA) in each eye.   Updated spectacle lens Rx issued.  Recommend full-time wear.      Prior diagnosis of bilateral ocular hypertension, and glaucoma suspect based on the finding of larger-than-average cup-to-disc ratio in each eye.  Using Timolol Maleate 0.5% gel-forming ophthalmic solution in both eyes every morning.  IOP, adjusted for central corneal thickness, within normal range, although high normal, in each eye.  Repeat  Schreiber Visual field done on today, without evidence of overtly glaucomatous defect in either eye.    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  Has been followed by Dr. Paz.  Recheck in six months, with repeat HVF (24-2 LUKAS Standard) at that time.   Orders for future HVF test entered.     In the interim, continue to use one drop Timolol Maleate 0.5% gel-forming ophthalmic solution in each eye every morning.   .

## 2017-05-01 NOTE — MR AVS SNAPSHOT
[facility]  [address]  [facility] [address]  Phone: [phone]  Fax: [phone]                  Elena Block   2017 11:00 AM   Office Visit    Description:  Female : 1945   Provider:  Tim Hamlin OD   Department:  [facility]           Reason for Visit     Concerns About Ocular Health           Diagnoses this Visit        Comments    Ocular hypertension, bilateral    -  Primary     Open angle with borderline findings, low risk, bilateral                To Do List           Future Appointments        Provider Department Dept Phone    5/15/2017 9:00 AM [facility], [facility] [phone]      Goals (5 Years of Data)     None      [facility]     [facility] Care Line -  Assistance  Unless otherwise directed by your provider, please contact [facility], our nurse care line that is available for  assistance.     Registered nurses in the [facility] On Call Center provide: appointment scheduling, clinical advisement, health education, and other advisory services.  Call: [phone] (toll free)               Medications           Message regarding Medications     Verify the changes and/or additions to your medication regime listed below are the same as discussed with your clinician today.  If any of these changes or additions are incorrect, please notify your healthcare provider.             Verify that the below list of medications is an accurate representation of the medications you are currently taking.  If none reported, the list may be blank. If incorrect, please contact your healthcare provider. Carry this list with you in case of emergency.           Current Medications     aspirin 81 mg Tab Take 81 mg by mouth.     ergocalciferol (ERGOCALCIFEROL) 50,000 unit Cap Take 1 capsule (50,000 Units total) by mouth every 7 days.    folic acid-vit B6-vit B12 (FOLBEE) 2.5-25-1 mg Tab Take 1 tablet by mouth once daily.     hydrochlorothiazide (HYDRODIURIL) 12.5 MG Tab Take 1 tablet (12.5 mg total) by mouth once daily. For Blood Pressure    levothyroxine (SYNTHROID) 100 MCG tablet TAKE ONE TABLET BY MOUTH BEFORE BREAKFAST FOR THYROID    meloxicam (MOBIC) 15 MG tablet Take 1 tablet (15 mg total) by mouth once daily.    metoprolol succinate (TOPROL-XL) 25 MG 24 hr tablet Take 1 tablet (25 mg total) by mouth once daily. For Blood Pressure    omeprazole (PRILOSEC) 20 MG capsule Take 1 capsule (20 mg total) by mouth once daily. 1 cap daily for stomach ulcer prevention while on Meloxicam    simvastatin (ZOCOR) 40 MG tablet TAKE 1 TABLET EVERY EVENING FOR CHOLESTEROL    timolol maleate 0.5% (TIMOPTIC-XE) 0.5 % SolG Place 1 drop into both eyes once daily.    valsartan (DIOVAN) 160 MG tablet TAKE 1 TABLET ONE TIME DAILY FOR BLOOD PRESSURE           Clinical Reference Information           Allergies as of 5/1/2017     Tramadol      Immunizations Administered on Date of Encounter - 5/1/2017     None      Orders Placed During Today's Visit     Future Labs/Procedures Expected by Expires    Schreiber Visual Field - OU - Extended - Both Eyes  11/1/2017 5/1/2018      Instructions    Nuclear sclerosis/peripheral cortical cataract in both eyes.  No need for cataract surgery.  Astigmatic refractive error in each eye, with good (and stable VA) in each eye.   Updated spectacle lens Rx issued.  Recommend full-time wear.      Prior diagnosis of bilateral ocular hypertension, and glaucoma suspect based on the finding of larger-than-average cup-to-disc ratio in each eye.  Using Timolol Maleate 0.5% gel-forming ophthalmic solution in both eyes every morning.  IOP, adjusted for central corneal thickness, within normal range, although high normal, in each eye.  Repeat  Schreiber Visual field done on today, without evidence of overtly glaucomatous defect in either eye.    History of central retinal vein occlusion in the left eye, with collateral vessels at disc  (secondarily).  Has been followed by Dr. Paz.  Recheck in six months, with repeat HVF (24-2 LUKAS Standard) at that time.   Orders for future HVF test entered.     In the interim, continue to use one drop Timolol Maleate 0.5% gel-forming ophthalmic solution in each eye every morning.   .            Language Assistance Services     ATTENTION: Language assistance services are available, free of charge. Please call [phone].      ATENCIÓN: Si kimmiela shahram, tiene a haynes disposición servicios gratuitos de asistencia lingüística. Llame al [phone].     GINA Ý: N?u b?n nói Ti?ng Vi?t, có các d?ch v? h? tr? ngôn ng? mi?n phí dành cho b?n. G?i s? [phone].         [facility] complies with applicable Federal civil rights laws and does not discriminate on the basis of race, color, national origin, age, disability, or sex.

## 2017-05-01 NOTE — PROGRESS NOTES
HPI     Concerns About Ocular Health    Additional comments: 6 month f/u, with repeat HVF test (24-2) done today.              Comments   Patient in for progress check.  Patient is in for a 6 month f/u   Being followed for (diagnosis):  Ocular Hypertension     Date last seen:  10/06/2016    Doctor last seen:  Dr. Hamlin     Prescribed eye medications(s) using:  Timolol 0.5 gel forming 1 x a day OU       OTC eye medication(s) using:  No    Signs/symptoms of condition resolved/better/stable/worse?:   Stable     Allergies/Medications reviewed today?  Yes         Blood pressure 142/80  Pulse = 62 BPM         Last edited by Tim Hamlin, OD on 5/1/2017 11:43 AM. (History)            Assessment /Plan     For exam results, see Encounter Report.    1. Ocular hypertension, bilateral  Schreiber Visual Field - OU - Extended - Both Eyes   2. Open angle with borderline findings, low risk, bilateral  Schreiber Visual Field - OU - Extended - Both Eyes   3. Central vein occlusion of retina     4. Nuclear sclerosis, bilateral     5. Cortical cataract of both eyes     6. Essential hypertension     7. Screening for other eye conditions     8. Hyperopia with astigmatism and presbyopia, bilateral                    Nuclear sclerosis/peripheral cortical cataract in both eyes.  No need for cataract surgery.  Astigmatic refractive error in each eye, with good (and stable VA) in each eye.   Updated spectacle lens Rx issued.  Recommend full-time wear.      Prior diagnosis of bilateral ocular hypertension, and glaucoma suspect based on the finding of larger-than-average cup-to-disc ratio in each eye.  Using Timolol Maleate 0.5% gel-forming ophthalmic solution in both eyes every morning.  IOP, adjusted for central corneal thickness, within normal range, although high normal, in each eye.  Repeat  Schreiber Visual field done on today, without evidence of overtly glaucomatous defect in either eye.    History of central retinal vein  occlusion in the left eye, with collateral vessels at disc (secondarily).  Has been followed by Dr. Paz.  Recheck in six months, with repeat HVF (24-2 LUKAS Standard) at that time.   Orders for future HVF test entered.     In the interim, continue to use one drop Timolol Maleate 0.5% gel-forming ophthalmic solution in each eye every morning.   .

## 2017-05-11 ENCOUNTER — TELEPHONE (OUTPATIENT)
Dept: OPTOMETRY | Facility: CLINIC | Age: 72
End: 2017-05-11

## 2017-05-15 ENCOUNTER — OFFICE VISIT (OUTPATIENT)
Dept: INTERNAL MEDICINE | Facility: CLINIC | Age: 72
End: 2017-05-15
Payer: MEDICARE

## 2017-05-15 VITALS
DIASTOLIC BLOOD PRESSURE: 68 MMHG | BODY MASS INDEX: 31.52 KG/M2 | RESPIRATION RATE: 18 BRPM | HEART RATE: 68 BPM | HEIGHT: 68 IN | SYSTOLIC BLOOD PRESSURE: 136 MMHG | WEIGHT: 208 LBS

## 2017-05-15 DIAGNOSIS — Z00.00 ENCOUNTER FOR PREVENTIVE HEALTH EXAMINATION: Primary | ICD-10-CM

## 2017-05-15 DIAGNOSIS — E78.5 HYPERLIPIDEMIA, UNSPECIFIED HYPERLIPIDEMIA TYPE: ICD-10-CM

## 2017-05-15 DIAGNOSIS — E89.0 POST-SURGICAL HYPOTHYROIDISM: ICD-10-CM

## 2017-05-15 DIAGNOSIS — I10 ESSENTIAL HYPERTENSION: ICD-10-CM

## 2017-05-15 DIAGNOSIS — H40.053 OCULAR HYPERTENSION, BILATERAL: ICD-10-CM

## 2017-05-15 DIAGNOSIS — E66.9 OBESITY (BMI 30.0-34.9): ICD-10-CM

## 2017-05-15 PROBLEM — E66.811 OBESITY (BMI 30.0-34.9): Status: ACTIVE | Noted: 2017-05-15

## 2017-05-15 PROCEDURE — 3075F SYST BP GE 130 - 139MM HG: CPT | Mod: S$GLB,,, | Performed by: NURSE PRACTITIONER

## 2017-05-15 PROCEDURE — G0439 PPPS, SUBSEQ VISIT: HCPCS | Mod: S$GLB,,, | Performed by: NURSE PRACTITIONER

## 2017-05-15 PROCEDURE — 3078F DIAST BP <80 MM HG: CPT | Mod: S$GLB,,, | Performed by: NURSE PRACTITIONER

## 2017-05-15 PROCEDURE — 99499 UNLISTED E&M SERVICE: CPT | Mod: S$GLB,,, | Performed by: NURSE PRACTITIONER

## 2017-05-15 PROCEDURE — 99999 PR PBB SHADOW E&M-EST. PATIENT-LVL III: CPT | Mod: PBBFAC,,, | Performed by: NURSE PRACTITIONER

## 2017-05-15 NOTE — PATIENT INSTRUCTIONS
Counseling and Referral of Other Preventative  (Italic type indicates deductible and co-insurance are waived)    Patient Name: Elena Block  Today's Date: 5/15/2017      SERVICE LIMITATIONS RECOMMENDATION    Vaccines    · Pneumococcal (once after 65)    · Influenza (annually)    · Hepatitis B (if medium/high risk)    · Prevnar 13  · Tdap  · Zoster      Hepatitis B medium/high risk factors:       - End-stage renal disease       - Hemophiliacs who received Factor VII or         IX concentrates       - Clients of institutions for the mentally             retarded       - Persons who live in the same house as          a HepB carrier       - Homosexual men       - Illicit injectable drug abusers     Pneumococcal: Done, no repeat [id number]/23/2013     Influenza: Done, repeat in one year09/09/2016     Hepatitis B: N/A     Prevnar 13: Done, no repeat [id number]/10/2015  Tdap: 10/01/2014  Zoster: 09/12/2013    Mammogram (biennial age 50-74)  Annually (age 40 or over)  Last done 03/17/2017, recommend to repeat every 1  years    Pap (up to age 70 and after 70 if unknown history or abnormal study last 10 years)    N/A     The USPSTF recommends against screening for cervical cancer in women older than age 65 years who have had adequate prior screening and are not otherwise at high risk for cervical cancer.      Colorectal cancer screening (to age 75)    · Fecal occult blood test (annual)  · Flexible sigmoidoscopy (5y)  · Screening colonoscopy (10y)  · Barium enema   Last done 01/22/2008, recommend to repeat every 10  years    Diabetes self-management training (no USPSTF recommendations)  Requires referral by treating physician for patient with diabetes or renal disease. 10 hours of initial DSMT sessions of no less than 30 minutes each in a continuous 12-month period. 2 hours of follow-up DSMT in subsequent years.  N/A    Bone mass measurements (age 65 & older, biennial)  Requires diagnosis related to osteoporosis or  estrogen deficiency. Biennial benefit unless patient has history of long-term glucocorticoid  Last done 03/21/2016, recommend to repeat as clinically indicated    Glaucoma screening (no USPSTF recommendation)  Diabetes mellitus, family history   , age 50 or over    American, age 65 or over  Last done 05/01/2017, recommend to repeat every 1  years    Medical nutrition therapy for diabetes or renal disease (no recommended schedule)  Requires referral by treating physician for patient with diabetes or renal disease or kidney transplant within the past 3 years.  Can be provided in same year as diabetes self-management training (DSMT), and CMS recommends medical nutrition therapy take place after DSMT. Up to 3 hours for initial year and 2 hours in subsequent years.  N/A    Cardiovascular screening blood tests (every 5 years)  · Fasting lipid panel  Order as a panel if possible  Last done 03/14/2017, recommend to repeat every 5  years    Diabetes screening tests (at least every 3 years, Medicare covers annually or at 6-month intervals for prediabetic patients)  · Fasting blood sugar (FBS) or glucose tolerance test (GTT)  Patient must be diagnosed with one of the following:       - Hypertension       - Dyslipidemia       - Obesity (BMI 30kg/m2)       - Previous elevated impaired FBS or GTT       ... or any two of the following:       - Overweight (BMI 25 but <30)       - Family history of diabetes       - Age 65 or older       - History of gestational diabetes or birth of baby weighing more than 9 pounds  Last done 03/14/2017, recommend to repeat every 1  years    Abdominal aortic aneurysm screening (once)  · Sonogram   Limited to patients who meet one of the following criteria:       - Men who are 65-75 years old and have smoked more than 100 cigarette in their lifetime       - Anyone with a family history of abdominal aortic aneurysm       - Anyone recommended for screening by the USPSTF  N/A     HIV screening (annually for increased risk patients)  · HIV-1 and HIV-2 by EIA, or JOSE, rapid antibody test or oral mucosa transudate  Patients must be at increased risk for HIV infection per USPSTF guidelines or pregnant. Tests covered annually for patient at increased risk or as requested by the patient. Pregnant patients may receive up to 3 tests during pregnancy.  Risks discussed, screening is not recommended    Smoking cessation counseling (up to 8 sessions per year)  Patients must be asymptomatic of tobacco-related conditions to receive as a preventative service.  Non-smoker    Subsequent annual wellness visit  At least 12 months since last AWV  Return in one year     The following information is provided to all patients.  This information is to help you find resources for any of the problems found today that may be affecting your health:                Living healthy guide: www.[facility].[place].[facility]      Understanding Diabetes: www.diabetes.org      Eating healthy: www.cdc.gov/healthyweight      [facility] home safety checklist: www.cdc.gov/steadi/patient.html      Agency on Aging: www.goea.[place].[facility]      Alcoholics anonymous (AA): www.aa.org      Physical Activity: www.pamela.nih.gov/kz3nsgs      Tobacco use: www.quitwithusla.org     Eating Heart-Healthy Food: Using the DASH Plan    Eating for your heart doesnt have to be hard or boring. You just need to know how to make healthier choices. The DASH eating plan has been developed to help you do just that. DASH stands for Dietary Approaches to Stop Hypertension. It is a plan that has been proven to be healthier for your heart and to lower your risk for high blood pressure. It can also help lower your risk for cancer, heart disease, osteoporosis, and diabetes.  Choosing from each food group  Choose foods from each of the food groups below each day. Try to get the recommended number of servings for each food group. The serving numbers are based on a diet of 2,000  calories a day. Talk to your doctor if youre unsure about your calorie needs. Along with getting the correct servings, the DASH plan also recommends a sodium intake less than 2,300 mg per day.        Grains  Servings: 6 to 8 a day  A serving is:  · 1 slice bread  · 1 ounce dry cereal  · Half a cup cooked rice, pasta or cereal  Best choices: Whole grains and any grains high in fiber. Vegetables  Servings: 4 to 5 a day  A serving is:  · 1 cup raw leafy vegetable  · Half a cup cut-up raw or cooked vegetable  · Half a cup vegetable juice  Best choices: Fresh or frozen vegetables prepared without added salt or fat.   Fruits  Servings: 4 to 5 a day  A serving is:  · 1 medium fruit  · One-quarter cup dried fruit  · Half a cup fresh, frozen, or canned fruit  · Half a cup of 100% fruit juices  Best choices: A variety of fresh fruits of different colors. Whole fruits are a better choice than fruit juices. Low-fat or fat-free dairy  Servings: 2 to 3 a day  A serving is:  · 1 cup milk  · 1 cup yogurt  · One and a half ounces cheese  Best choices: Skim or 1% milk, low-fat or fat-free yogurt or buttermilk, and low-fat cheeses.         Lean meats, poultry, fish  Servings: 6 or fewer a day  A serving is:  · 1 ounce cooked meats, poultry, or fish  · 1 egg  Best choices: Lean poultry and fish. Trim away visible fat. Broil, grill, roast, or boil instead of frying. Remove skin from poultry before eating. Limit how much red meat you eat.  Nuts, seeds, beans  Servings: 4 to 5 a week  A serving is:  · One-third cup nuts (one and a half ounces)  · 2 tablespoons nut butter or seeds  · Half a cup cooked dry beans or legumes  Best choices: Dry roasted nuts with no salt added, lentils, kidney beans, garbanzo beans, and whole mendoza beans.   Fats and oils  Servings: 2 to 3 a day  A serving is:  · 1 teaspoon vegetable oil  · 1 teaspoon soft margarine  · 1 tablespoon mayonnaise  · 2 tablespoons salad dressing  Best choices: Nut and vegetable  oils (nontropical vegetable oils), such as olive and canola oil. Sweets  Servings: 5 a week or fewer  A serving is:  · 1 tablespoon sugar, maple syrup, or honey  · 1 tablespoon jam or jelly  · 1 half-ounce jelly beans (about 15)  · 1 cup lemonade  Best choices: Dried fruit can be a satisfying sweet. Choose low-fat sweets. And watch your serving sizes!      For more on the DASH eating plan, visit:  www.nhlbi.nih.gov/health/health-topics/topics/dash   Date Last Reviewed: 6/1/2016 © 2000-2016 [employer]. [address] [place] [address], [place] [address]. All rights reserved. This information is not intended as a substitute for professional medical care. Always follow your healthcare professional's instructions.        Aerobic Exercise for a Healthy Heart  Exercise is a lot more than an energy booster and a stress reliever. It also strengthens your heart muscle, lowers your blood pressure and cholesterol, and burns calories. It can also improve your resting muscle tone, and your mood.     Remember, some activity is better than none.    Choose an aerobic activity  Choose an activity that makes your heart and lungs work harder than they do when you rest or walk normally. This aerobic exercise can improve the way your heart and other muscles use oxygen. Make it fun by exercising with a friend and choosing an activity you enjoy. Here are some ideas:  · Walking  · Swimming  · Bicycling  · Stair climbing  · Dancing  · Jogging  · Gardening  Exercise regularly  If you havent been exercising regularly,  get your doctors OK first. Then start slowly.  Here are some tips:  · Begin exercising 3 times a week for 5 to 10 minutes at a time.  · When you feel comfortable, add a few minutes each session.  · Slowly build up to exercising 3 to 4 times each week. Each session should last for 40 minutes, on average, and involve moderate- to vigorous-intensity physical activity.  · If you have been given nitroglycerin, be sure to  carry it when you exercise.  · If you get chest pain (angina) when youre exercising, stop what youre doing, take your nitroglycerin, and call your doctor.  Date Last Reviewed: 6/2/2016 © 2000-2016 [employer]. [address] [place] [address], [place] [address]. All rights reserved. This information is not intended as a substitute for professional medical care. Always follow your healthcare professional's instructions.

## 2017-05-15 NOTE — MR AVS SNAPSHOT
[facility]  [address]  [place] [address]  Phone: [phone]  Fax: [phone]                  Elena Block   5/15/2017 9:00 AM   Office Visit    Description:  Female : 1945   Provider:  CANDIDO FORD   Department:  [facility]           Reason for Visit     Health Risk Assessment           Diagnoses this Visit        Comments    Encounter for preventive health examination    -  Primary            To Do List           Goals (5 Years of Data)     None      Follow-Up and Disposition     Return in about 5 months (around 10/15/2017) for Follow up with PCP, SOONER IF NEEDED, HRA VISIT IN 1 YEAR.      [facility]     [facility] On Call Nurse Care Line -  Assistance  Unless otherwise directed by your provider, please contact [facility], our nurse care line that is available for  assistance.     Registered nurses in the [facility] Call Center provide: appointment scheduling, clinical advisement, health education, and other advisory services.  Call: [phone] (toll free)               Medications           Message regarding Medications     Verify the changes and/or additions to your medication regime listed below are the same as discussed with your clinician today.  If any of these changes or additions are incorrect, please notify your healthcare provider.             Verify that the below list of medications is an accurate representation of the medications you are currently taking.  If none reported, the list may be blank. If incorrect, please contact your healthcare provider. Carry this list with you in case of emergency.           Current Medications     aspirin 81 mg Tab Take 81 mg by mouth.     ergocalciferol (ERGOCALCIFEROL) 50,000 unit Cap Take 1 capsule (50,000 Units total) by mouth every 7 days.    folic acid-vit B6-vit B12 (FOLBEE) 2.5-25-1 mg Tab Take 1 tablet by mouth once daily.    hydrochlorothiazide (HYDRODIURIL) 12.5 MG  "Tab Take 1 tablet (12.5 mg total) by mouth once daily. For Blood Pressure    levothyroxine (SYNTHROID) 100 MCG tablet TAKE ONE TABLET BY MOUTH BEFORE BREAKFAST FOR THYROID    meloxicam (MOBIC) 15 MG tablet Take 1 tablet (15 mg total) by mouth once daily.    metoprolol succinate (TOPROL-XL) 25 MG 24 hr tablet Take 1 tablet (25 mg total) by mouth once daily. For Blood Pressure    omeprazole (PRILOSEC) 20 MG capsule Take 1 capsule (20 mg total) by mouth once daily. 1 cap daily for stomach ulcer prevention while on Meloxicam    simvastatin (ZOCOR) 40 MG tablet TAKE 1 TABLET EVERY EVENING FOR CHOLESTEROL    timolol maleate 0.5% (TIMOPTIC-XE) 0.5 % SolG Place 1 drop into both eyes once daily.    valsartan (DIOVAN) 160 MG tablet TAKE 1 TABLET ONE TIME DAILY FOR BLOOD PRESSURE           Clinical Reference Information           Your Vitals Were     BP Pulse Resp Height Weight BMI    136/68 (BP Location: Left arm, Patient Position: Sitting, BP Method: Manual) 68 18 5' 8" (1.727 m) 94.3 kg (208 lb 0.1 oz) 31.63 kg/m2      Blood Pressure          Most Recent Value    BP  136/68      Allergies as of 5/15/2017     Tramadol      Immunizations Administered on Date of Encounter - 5/15/2017     None      Instructions      Counseling and Referral of Other Preventative  (Italic type indicates deductible and co-insurance are waived)    Patient Name: Elena Block  Today's Date: 5/15/2017      SERVICE LIMITATIONS RECOMMENDATION    Vaccines    · Pneumococcal (once after 65)    · Influenza (annually)    · Hepatitis B (if medium/high risk)    · Prevnar 13  · Tdap  · Zoster      Hepatitis B medium/high risk factors:       - End-stage renal disease       - Hemophiliacs who received Factor VII or         IX concentrates       - Clients of institutions for the mentally             retarded       - Persons who live in the same house as          a HepB carrier       - Homosexual men       - Illicit injectable drug abusers     Pneumococcal: Done, " no repeat [id number]/23/2013     Influenza: Done, repeat in one year09/09/2016     Hepatitis B: N/A     Prevnar 13: Done, no repeat [id number]/10/2015  Tdap: 10/01/2014  Zoster: 09/12/2013    Mammogram (biennial age 50-74)  Annually (age 40 or over)  Last done 03/17/2017, recommend to repeat every 1  years    Pap (up to age 70 and after 70 if unknown history or abnormal study last 10 years)    N/A     The USPSTF recommends against screening for cervical cancer in women older than age 65 years who have had adequate prior screening and are not otherwise at high risk for cervical cancer.      Colorectal cancer screening (to age 75)    · Fecal occult blood test (annual)  · Flexible sigmoidoscopy (5y)  · Screening colonoscopy (10y)  · Barium enema   Last done 01/22/2008, recommend to repeat every 10  years    Diabetes self-management training (no USPSTF recommendations)  Requires referral by treating physician for patient with diabetes or renal disease. 10 hours of initial DSMT sessions of no less than 30 minutes each in a continuous 12-month period. 2 hours of follow-up DSMT in subsequent years.  N/A    Bone mass measurements (age 65 & older, biennial)  Requires diagnosis related to osteoporosis or estrogen deficiency. Biennial benefit unless patient has history of long-term glucocorticoid  Last done 03/21/2016, recommend to repeat as clinically indicated    Glaucoma screening (no USPSTF recommendation)  Diabetes mellitus, family history   , age 50 or over    American, age 65 or over  Last done 05/01/2017, recommend to repeat every 1  years    Medical nutrition therapy for diabetes or renal disease (no recommended schedule)  Requires referral by treating physician for patient with diabetes or renal disease or kidney transplant within the past 3 years.  Can be provided in same year as diabetes self-management training (DSMT), and CMS recommends medical nutrition therapy take place after DSMT. Up  to 3 hours for initial year and 2 hours in subsequent years.  N/A    Cardiovascular screening blood tests (every 5 years)  · Fasting lipid panel  Order as a panel if possible  Last done 03/14/2017, recommend to repeat every 5  years    Diabetes screening tests (at least every 3 years, Medicare covers annually or at 6-month intervals for prediabetic patients)  · Fasting blood sugar (FBS) or glucose tolerance test (GTT)  Patient must be diagnosed with one of the following:       - Hypertension       - Dyslipidemia       - Obesity (BMI 30kg/m2)       - Previous elevated impaired FBS or GTT       ... or any two of the following:       - Overweight (BMI 25 but <30)       - Family history of diabetes       - Age 65 or older       - History of gestational diabetes or birth of baby weighing more than 9 pounds  Last done 03/14/2017, recommend to repeat every 1  years    Abdominal aortic aneurysm screening (once)  · Sonogram   Limited to patients who meet one of the following criteria:       - Men who are 65-75 years old and have smoked more than 100 cigarette in their lifetime       - Anyone with a family history of abdominal aortic aneurysm       - Anyone recommended for screening by the USPSTF  N/A    HIV screening (annually for increased risk patients)  · HIV-1 and HIV-2 by EIA, or JOSE, rapid antibody test or oral mucosa transudate  Patients must be at increased risk for HIV infection per USPSTF guidelines or pregnant. Tests covered annually for patient at increased risk or as requested by the patient. Pregnant patients may receive up to 3 tests during pregnancy.  Risks discussed, screening is not recommended    Smoking cessation counseling (up to 8 sessions per year)  Patients must be asymptomatic of tobacco-related conditions to receive as a preventative service.  Non-smoker    Subsequent annual wellness visit  At least 12 months since last AWV  Return in one year     The following information is provided to all  patients.  This information is to help you find resources for any of the problems found today that may be affecting your health:                Living healthy guide: www.[facility].[place].[facility]      Understanding Diabetes: www.diabetes.org      Eating healthy: www.cdc.gov/healthyweight      CDC home safety checklist: www.cdc.gov/steadi/patient.html      Agency on Aging: www.goea.[place].[facility]      Alcoholics anonymous (AA): www.aa.org      Physical Activity: www.pamela.nih.gov/nm1rdcx      Tobacco use: www.quitwithusla.org     Eating Heart-Healthy Food: Using the DASH Plan    Eating for your heart doesnt have to be hard or boring. You just need to know how to make healthier choices. The DASH eating plan has been developed to help you do just that. DASH stands for Dietary Approaches to Stop Hypertension. It is a plan that has been proven to be healthier for your heart and to lower your risk for high blood pressure. It can also help lower your risk for cancer, heart disease, osteoporosis, and diabetes.  Choosing from each food group  Choose foods from each of the food groups below each day. Try to get the recommended number of servings for each food group. The serving numbers are based on a diet of 2,000 calories a day. Talk to your doctor if youre unsure about your calorie needs. Along with getting the correct servings, the DASH plan also recommends a sodium intake less than 2,300 mg per day.        Grains  Servings: 6 to 8 a day  A serving is:  · 1 slice bread  · 1 ounce dry cereal  · Half a cup cooked rice, pasta or cereal  Best choices: Whole grains and any grains high in fiber. Vegetables  Servings: 4 to 5 a day  A serving is:  · 1 cup raw leafy vegetable  · Half a cup cut-up raw or cooked vegetable  · Half a cup vegetable juice  Best choices: Fresh or frozen vegetables prepared without added salt or fat.   Fruits  Servings: 4 to 5 a day  A serving is:  · 1 medium fruit  · One-quarter cup dried fruit  · Half a cup  fresh, frozen, or canned fruit  · Half a cup of 100% fruit juices  Best choices: A variety of fresh fruits of different colors. Whole fruits are a better choice than fruit juices. Low-fat or fat-free dairy  Servings: 2 to 3 a day  A serving is:  · 1 cup milk  · 1 cup yogurt  · One and a half ounces cheese  Best choices: Skim or 1% milk, low-fat or fat-free yogurt or buttermilk, and low-fat cheeses.         Lean meats, poultry, fish  Servings: 6 or fewer a day  A serving is:  · 1 ounce cooked meats, poultry, or fish  · 1 egg  Best choices: Lean poultry and fish. Trim away visible fat. Broil, grill, roast, or boil instead of frying. Remove skin from poultry before eating. Limit how much red meat you eat.  Nuts, seeds, beans  Servings: 4 to 5 a week  A serving is:  · One-third cup nuts (one and a half ounces)  · 2 tablespoons nut butter or seeds  · Half a cup cooked dry beans or legumes  Best choices: Dry roasted nuts with no salt added, lentils, kidney beans, garbanzo beans, and whole mendoza beans.   Fats and oils  Servings: 2 to 3 a day  A serving is:  · 1 teaspoon vegetable oil  · 1 teaspoon soft margarine  · 1 tablespoon mayonnaise  · 2 tablespoons salad dressing  Best choices: Nut and vegetable oils (nontropical vegetable oils), such as olive and canola oil. Sweets  Servings: 5 a week or fewer  A serving is:  · 1 tablespoon sugar, maple syrup, or honey  · 1 tablespoon jam or jelly  · 1 half-ounce jelly beans (about 15)  · 1 cup lemonade  Best choices: Dried fruit can be a satisfying sweet. Choose low-fat sweets. And watch your serving sizes!      For more on the DASH eating plan, visit:  www.nhlbi.nih.gov/health/health-topics/topics/dash   Date Last Reviewed: 6/1/2016  © 7995-8348 [employer]. [address] [place] [address], [place] [address]. All rights reserved. This information is not intended as a substitute for professional medical care. Always follow your healthcare professional's  instructions.        Aerobic Exercise for a Healthy Heart  Exercise is a lot more than an energy booster and a stress reliever. It also strengthens your heart muscle, lowers your blood pressure and cholesterol, and burns calories. It can also improve your resting muscle tone, and your mood.     Remember, some activity is better than none.    Choose an aerobic activity  Choose an activity that makes your heart and lungs work harder than they do when you rest or walk normally. This aerobic exercise can improve the way your heart and other muscles use oxygen. Make it fun by exercising with a friend and choosing an activity you enjoy. Here are some ideas:  · Walking  · Swimming  · Bicycling  · Stair climbing  · Dancing  · Jogging  · Gardening  Exercise regularly  If you havent been exercising regularly,  get your doctors OK first. Then start slowly.  Here are some tips:  · Begin exercising 3 times a week for 5 to 10 minutes at a time.  · When you feel comfortable, add a few minutes each session.  · Slowly build up to exercising 3 to 4 times each week. Each session should last for 40 minutes, on average, and involve moderate- to vigorous-intensity physical activity.  · If you have been given nitroglycerin, be sure to carry it when you exercise.  · If you get chest pain (angina) when youre exercising, stop what youre doing, take your nitroglycerin, and call your doctor.  Date Last Reviewed: 6/2/2016  © 3050-7186 [employer]. [address] [place] [address]. All rights reserved. This information is not intended as a substitute for professional medical care. Always follow your healthcare professional's instructions.             Language Assistance Services     ATTENTION: Language assistance services are available, free of charge. Please call [phone].      ATENCIÓN: Si habla español, tiene a haynes disposición servicios gratuitos de asistencia lingüística. Llame al [phone].     GINA Ý:  N?u b?n nói Ti?ng Vi?t, có các d?ch v? h? tr? ngôn ng? mi?n phí dành cho b?n. G?i s? [phone].         [facility] complies with applicable Federal civil rights laws and does not discriminate on the basis of race, color, national origin, age, disability, or sex.

## 2017-05-15 NOTE — PROGRESS NOTES
"Elena Block presented for a  Medicare AWV and comprehensive Health Risk Assessment today. The following components were reviewed and updated:    · Medical history  · Family History  · Social history  · Allergies and Current Medications  · Health Risk Assessment  · Health Maintenance  · Care Team     ** See Completed Assessments for Annual Wellness Visit within the encounter summary.**       The following assessments were completed:  · Living Situation  · Depression Screening  · Timed Get Up and Go  · Whisper Test  · Cognitive Function Screening      · Nutrition Screening  · ADL Screening  · PAQ Screening    Vitals:    05/15/17 0855   BP: 136/68   BP Location: Left arm   Patient Position: Sitting   BP Method: Manual   Pulse: 68   Resp: 18   Weight: 94.3 kg (208 lb 0.1 oz)   Height: 5' 8" (1.727 m)     Body mass index is 31.63 kg/(m^2).  Physical Exam   Constitutional: She is oriented to person, place, and time. She appears well-developed and well-nourished.   HENT:   Head: Normocephalic and atraumatic.   Mouth/Throat: Oropharynx is clear and moist.   Eyes: Pupils are equal, round, and reactive to light.   Neck: Normal range of motion. Neck supple.   Cardiovascular: Normal rate, regular rhythm, normal heart sounds and intact distal pulses.  Exam reveals no gallop and no friction rub.    No murmur heard.  Pulmonary/Chest: Effort normal and breath sounds normal. No respiratory distress. She has no wheezes.   Abdominal: Soft. Bowel sounds are normal. She exhibits no distension. There is no tenderness.   Musculoskeletal: Normal range of motion.   Neurological: She is alert and oriented to person, place, and time.   Skin: Skin is warm and dry.   Psychiatric: She has a normal mood and affect. Her behavior is normal.   Nursing note and vitals reviewed.        Diagnoses and health risks identified today and associated recommendations/orders:    1. Encounter for preventive health examination      2. Essential " hypertension  Stable and controlled. Continue current treatment plan as previously prescribed by PCP.       3. Hyperlipidemia, unspecified hyperlipidemia type  Stable and controlled. Continue current treatment plan as previously prescribed by PCP.       4. Post-surgical hypothyroidism  Stable and controlled. Continue current treatment plan as previously prescribed by PCP.       5. Obesity (BMI 30.0-34.9)  Stable and controlled.   Discussed with patient importance of proper diet and exercise.  Continue current treatment plan as previously prescribed by PCP.       6. Ocular hypertension, bilateral  Stable and controlled. Continue current treatment plan as previously prescribed by Optometry.     Health Maintenance: Due for colonoscopy since 2015 but states that she plans to schedule for next year.        Provided Elena with a 5-10 year written screening schedule and personal prevention plan. Recommendations were developed using the USPSTF age appropriate recommendations. Education, counseling, and referrals were provided as needed. After Visit Summary printed and given to patient which includes a list of additional screenings\tests needed.    Return in about 5 months (around 10/15/2017) for Follow up with PCP, SOONER IF NEEDED, HRA VISIT IN 1 YEAR.    MARKO CarsonC

## 2017-06-13 DIAGNOSIS — M67.911 DISORDER OF RIGHT ROTATOR CUFF: ICD-10-CM

## 2017-06-13 RX ORDER — MELOXICAM 15 MG/1
15 TABLET ORAL DAILY
Qty: 30 TABLET | Refills: 3 | Status: SHIPPED | OUTPATIENT
Start: 2017-06-13 | End: 2019-02-18

## 2017-07-10 ENCOUNTER — OFFICE VISIT (OUTPATIENT)
Dept: INTERNAL MEDICINE | Facility: CLINIC | Age: 72
End: 2017-07-10
Payer: MEDICARE

## 2017-07-10 VITALS
HEIGHT: 68 IN | TEMPERATURE: 98 F | DIASTOLIC BLOOD PRESSURE: 80 MMHG | WEIGHT: 207.19 LBS | HEART RATE: 80 BPM | SYSTOLIC BLOOD PRESSURE: 130 MMHG | BODY MASS INDEX: 31.4 KG/M2

## 2017-07-10 DIAGNOSIS — J00 COMMON COLD VIRUS: Primary | ICD-10-CM

## 2017-07-10 DIAGNOSIS — I10 ESSENTIAL HYPERTENSION: ICD-10-CM

## 2017-07-10 PROCEDURE — 1159F MED LIST DOCD IN RCRD: CPT | Mod: S$GLB,,, | Performed by: INTERNAL MEDICINE

## 2017-07-10 PROCEDURE — 1126F AMNT PAIN NOTED NONE PRSNT: CPT | Mod: S$GLB,,, | Performed by: INTERNAL MEDICINE

## 2017-07-10 PROCEDURE — 99213 OFFICE O/P EST LOW 20 MIN: CPT | Mod: S$GLB,,, | Performed by: INTERNAL MEDICINE

## 2017-07-10 PROCEDURE — 99999 PR PBB SHADOW E&M-EST. PATIENT-LVL III: CPT | Mod: PBBFAC,,, | Performed by: INTERNAL MEDICINE

## 2017-07-10 PROCEDURE — 99499 UNLISTED E&M SERVICE: CPT | Mod: S$GLB,,, | Performed by: INTERNAL MEDICINE

## 2017-07-10 RX ORDER — BENZONATATE 100 MG/1
100-200 CAPSULE ORAL EVERY 8 HOURS PRN
Qty: 30 CAPSULE | Refills: 0 | Status: SHIPPED | OUTPATIENT
Start: 2017-07-10 | End: 2017-07-13

## 2017-07-10 NOTE — PROGRESS NOTES
Subjective:       Patient ID: Elena Block is a 71 y.o. female.    Chief Complaint: Cough    Patient of Dr. Lyon presents for an urgent visit c/o upper respiratory symptoms. Onset of illness 4 days ago with a scratchy sore throat, mild headache and body aches, followed by runny nose and cough producing scant clear mucus. No fever, chills, sweats, facial pain/pressure, purulent nasal discharge, earache, severe throat pain, pleuritic chest pain, wheezing, SOB, N/V or diarrhea. Using Claritin and Robitussin DM with temporary relief of her symptoms. Here concerned that it isn't going away yet!    PMH: includes Hypertension. No lung disease, no allergic rhinitis.   Social: Non-smoker.   Allergies: Tramadol.  Medications: reviewed.          Review of Systems    Objective:    /80, Pulse 80, Temp 98.3  Physical Exam   Constitutional: She is oriented to person, place, and time. She appears well-developed and well-nourished. No distress.   Rare brief cough during the visit, otherwise not ill-appearing.    HENT:   Right Ear: External ear normal.   Left Ear: External ear normal.   Nose: Nose normal.   Mouth/Throat: Oropharynx is clear and moist. No oropharyngeal exudate.   Eyes: Conjunctivae are normal. Right eye exhibits no discharge. Left eye exhibits no discharge.   Neck: Normal range of motion. Neck supple.   Cardiovascular: Normal rate, regular rhythm and normal heart sounds.    Pulmonary/Chest: Effort normal and breath sounds normal. No accessory muscle usage. No tachypnea. No respiratory distress. She has no decreased breath sounds. She has no wheezes. She has no rhonchi. She has no rales.   Lymphadenopathy:     She has no cervical adenopathy.   Neurological: She is alert and oriented to person, place, and time.   Skin: Skin is warm and dry. She is not diaphoretic.       Assessment:       1. Common cold virus    2. Essential hypertension        Plan:       Common cold virus        -     Continue  antihistamine for nasal symptoms, this will help reduce the cough due to postnasal drip  -     benzonatate (TESSALON) 100 MG capsule; Take 1-2 capsules (100-200 mg total) by mouth every 8 (eight) hours as needed for Cough.  Dispense: 30 capsule; Refill: 0  -     Mucinex for thick secretions.  -     Expect 7-14 days for resolution.    Essential hypertension        -     Avoid decongestants.

## 2017-07-12 ENCOUNTER — HOSPITAL ENCOUNTER (EMERGENCY)
Facility: HOSPITAL | Age: 72
Discharge: HOME | End: 2017-07-12
Attending: EMERGENCY MEDICINE | Admitting: EMERGENCY MEDICINE
Payer: MEDICARE

## 2017-07-12 VITALS — TEMPERATURE: 100 F | RESPIRATION RATE: 22 BRPM | HEART RATE: 98 BPM | WEIGHT: 200 LBS | BODY MASS INDEX: 30.41 KG/M2

## 2017-07-12 DIAGNOSIS — J20.9 ACUTE PURULENT BRONCHITIS: Primary | ICD-10-CM

## 2017-07-12 DIAGNOSIS — R05.9 COUGH: ICD-10-CM

## 2017-07-12 PROCEDURE — 99284 EMERGENCY DEPT VISIT MOD MDM: CPT | Mod: 25

## 2017-07-12 PROCEDURE — 63600175 PHARM REV CODE 636 W HCPCS: Performed by: EMERGENCY MEDICINE

## 2017-07-12 PROCEDURE — 25000242 PHARM REV CODE 250 ALT 637 W/ HCPCS: Performed by: EMERGENCY MEDICINE

## 2017-07-12 PROCEDURE — 99284 EMERGENCY DEPT VISIT MOD MDM: CPT | Mod: ,,, | Performed by: EMERGENCY MEDICINE

## 2017-07-12 PROCEDURE — 96372 THER/PROPH/DIAG INJ SC/IM: CPT

## 2017-07-12 PROCEDURE — 25000003 PHARM REV CODE 250: Performed by: EMERGENCY MEDICINE

## 2017-07-12 PROCEDURE — 94640 AIRWAY INHALATION TREATMENT: CPT

## 2017-07-12 RX ORDER — ALBUTEROL SULFATE 90 UG/1
2 AEROSOL, METERED RESPIRATORY (INHALATION)
Qty: 6.7 G | Refills: 0 | Status: SHIPPED | OUTPATIENT
Start: 2017-07-12 | End: 2018-05-15 | Stop reason: ALTCHOICE

## 2017-07-12 RX ORDER — AZITHROMYCIN 250 MG/1
250 TABLET, FILM COATED ORAL DAILY
Qty: 4 TABLET | Refills: 0 | Status: SHIPPED | OUTPATIENT
Start: 2017-07-12 | End: 2017-07-16

## 2017-07-12 RX ORDER — IPRATROPIUM BROMIDE AND ALBUTEROL SULFATE 2.5; .5 MG/3ML; MG/3ML
3 SOLUTION RESPIRATORY (INHALATION)
Status: COMPLETED | OUTPATIENT
Start: 2017-07-12 | End: 2017-07-12

## 2017-07-12 RX ORDER — TRIAMCINOLONE ACETONIDE 40 MG/ML
80 INJECTION, SUSPENSION INTRA-ARTICULAR; INTRAMUSCULAR
Status: COMPLETED | OUTPATIENT
Start: 2017-07-12 | End: 2017-07-12

## 2017-07-12 RX ORDER — AZITHROMYCIN 250 MG/1
500 TABLET, FILM COATED ORAL
Status: COMPLETED | OUTPATIENT
Start: 2017-07-12 | End: 2017-07-12

## 2017-07-12 RX ADMIN — TRIAMCINOLONE ACETONIDE 80 MG: 40 INJECTION, SUSPENSION INTRA-ARTICULAR; INTRAMUSCULAR at 07:07

## 2017-07-12 RX ADMIN — IPRATROPIUM BROMIDE AND ALBUTEROL SULFATE 3 ML: .5; 3 SOLUTION RESPIRATORY (INHALATION) at 07:07

## 2017-07-12 RX ADMIN — AZITHROMYCIN 500 MG: 250 TABLET, FILM COATED ORAL at 07:07

## 2017-07-12 NOTE — ED PROVIDER NOTES
"Encounter Date: 7/12/2017    SCRIBE #1 NOTE: I, Vivi Stockton, am scribing for, and in the presence of, Dr. Hill.       History     Chief Complaint   Patient presents with    Cough     pt was dx with sancho on monday      Time patient was seen by the provider: 6:57 AM    The patient is a 71 y.o. female with hx of:HTN, PNA, and hypothyroidism that presents to the ED with a complaint of a cough productive of green mucus x 6 days. States her throat is "scratchy." She reports a low grade fever of 99.9°F and states her symptoms are worse at night. Denies diarrhea, vomiting, CP, or LE edema. She reports going to Urgent Care 3 days ago, was prescribed benzonatate, and states this has not improved her symptoms. She states her symptoms are getting worse. Patient is not a smoker. No significant sick contact.       The history is provided by the patient and medical records.     Review of patient's allergies indicates:   Allergen Reactions    Tramadol Nausea And Vomiting     Past Medical History:   Diagnosis Date    Back pain     Cataract     Central retinal vein occlusion of left eye     Degeneration of lumbar or lumbosacral intervertebral disc 4/16/2013    High cholesterol     Hypertension     Hypothyroidism     Impingement syndrome of left shoulder 7/29/2013    Obesity     Pneumonia     Posterior vitreous detachment of both eyes 1/17/2013    S/P partial hysterectomy 1/23/2013    Vitamin D insufficiency 2016     Past Surgical History:   Procedure Laterality Date    COLONOSCOPY  01/22/2008    HYSTERECTOMY      Partial     PARTIAL HYSTERECTOMY      THYROIDECTOMY, PARTIAL      TONSILLECTOMY       Family History   Problem Relation Age of Onset    Diabetes Mother     Glaucoma Mother     Heart disease Mother     Blindness Mother     Heart attack Mother     Glaucoma Brother     Stroke Brother     Liver disease Brother     Heart disease Brother     Arthritis Brother     Alcohol abuse Brother     " Drug abuse Brother     Glaucoma Brother     Diabetes Brother     Pemphigus vulgaris Brother     Blindness Brother     No Known Problems Father     Hypertension Maternal Grandmother     Diabetes Maternal Grandmother     Blindness Maternal Grandmother     No Known Problems Paternal Grandmother     No Known Problems Paternal Grandfather     Heart disease Brother     Diabetes Brother      Bilateral Knee amputation    Heart disease Brother     Liver disease Brother     Gout Son     Heart disease Son 48     stent placement    Heart attack Son     Heart attacks under age 50 Son     No Known Problems Son     No Known Problems Maternal Grandfather     No Known Problems Maternal Aunt     No Known Problems Maternal Uncle     No Known Problems Paternal Aunt     No Known Problems Paternal Uncle     Amblyopia Neg Hx     Macular degeneration Neg Hx     Retinal detachment Neg Hx     Strabismus Neg Hx     Thyroid disease Neg Hx     Cataracts Neg Hx     Cancer Neg Hx      Social History   Substance Use Topics    Smoking status: Never Smoker    Smokeless tobacco: Never Used    Alcohol use 0.6 oz/week     1 Cans of beer per week      Comment: Occasionally; beer once a  week (socially)     Review of Systems   Constitutional: Positive for fever (low grade fever).   HENT:        (+) Scratchy throat   Respiratory: Positive for cough.    Cardiovascular: Negative for chest pain and leg swelling.   Gastrointestinal: Negative for diarrhea and vomiting.       Physical Exam     Initial Vitals [07/12/17 0526]   BP Pulse Resp Temp SpO2   -- 98 (!) 22 99.9 °F (37.7 °C) --      MAP       --         Physical Exam    Vitals reviewed.  Constitutional: She appears well-developed and well-nourished.   71 y.o.  woman in no acute distress.   HENT:   Head: Normocephalic and atraumatic.   Mouth/Throat: No oral lesions.   Redundant soft palate noted.   Eyes: Pupils are equal, round, and reactive to light.    Neck: No tracheal deviation present.   Cardiovascular: Normal rate, regular rhythm and intact distal pulses.   Pulmonary/Chest: Breath sounds normal. No stridor. No respiratory distress.   Abdominal: Soft. She exhibits no distension. There is no tenderness.   Obese.   Musculoskeletal: She exhibits no edema.   Moving all 4 extremities.   Neurological: She is alert and oriented to person, place, and time.   Psychiatric: Her behavior is normal. Thought content normal.         ED Course   Procedures  Labs Reviewed - No data to display       X-Rays:   Independently Interpreted Readings:   Chest X-Ray: No anuel evidence of consolidation noted.     Imaging Results          X-Ray Chest PA And Lateral (Final result)  Result time 07/12/17 07:07:16    Final result by Chaparrita Andrew MD (07/12/17 07:07:16)                 Impression:        No radiographic evidence of acute intra-thoracic process.      Electronically signed by: CHAPARRITA ANDREW  Date:     07/12/17  Time:    07:07              Narrative:    Comparison: 2/2/2015    Technique: PA and lateral radiographs of the chest.    Findings: The cardiomediastinal silhouette is within normal limits. There is mild atherosclerotic change of the thoracic aorta. The visualized airway is unremarkable.  The lungs appear symmetrically aerated without definite focal alveolar consolidation. No large pleural effusion or pneumothorax is appreciated. There are age-appropriate degenerative changes of the visualized osseous structures.                              Medical Decision Making:   History:   Old Medical Records: I decided to obtain old medical records.  Differential Diagnosis:   Pleural effusion, bronchitis and PNA.  Independently Interpreted Test(s):   I have ordered and independently interpreted X-rays - see prior notes.  Clinical Tests:   Radiological Study: Ordered and Reviewed            Scribe Attestation:   Scribe #1: I performed the above scribed service and the  documentation accurately describes the services I performed. I attest to the accuracy of the note.    Attending Attestation:           Physician Attestation for Scribe:  Physician Attestation Statement for Scribe #1: I, Dr. Hill, reviewed documentation, as scribed by Vivi Stockton in my presence, and it is both accurate and complete.         Attending ED Notes:   Chest x-ray does not reveal evidence of anuel consolidation or space-occupying lesion in this patient presenting with worsening productive cough associated with an illness for the past 6 days.  She will be administered a nebulizer treatment as well as a single dose of intramuscular Kenalog for her shortness of breath and cough; and I will initiate a 5 day course of azithromycin to be continued as an outpatient.  She'll be discharged with a prescription for a pro-air inhaler to be used every 6 hours while awake for the next 6 hours to diminish her cough response.  I have recommended close outpatient follow-up with her primary care physician to monitor her improvement with therapy.  I have discussed with the patient and accompanying spouse indications to return to the emergency department including worsening shortness of breath, fever, intractable nausea, or other urgent concerns.          ED Course     Clinical Impression:   The primary encounter diagnosis was Acute purulent bronchitis. A diagnosis of Cough was also pertinent to this visit.    Disposition:   Disposition: Discharged  Condition: Stable                        Jamin Hill MD  07/12/17 0726

## 2017-07-12 NOTE — ED TRIAGE NOTES
71 year old female presents to the ED c/o productive cough x several days. Saw PCP and was given prescription for Tessalon Perles. Symptoms have not improved.

## 2017-07-13 ENCOUNTER — TELEPHONE (OUTPATIENT)
Dept: INTERNAL MEDICINE | Facility: CLINIC | Age: 72
End: 2017-07-13

## 2017-07-13 DIAGNOSIS — R05.9 COUGH: Primary | ICD-10-CM

## 2017-07-13 RX ORDER — GUAIFENESIN 1200 MG/1
TABLET, EXTENDED RELEASE ORAL
Qty: 24 TABLET | Status: SHIPPED | OUTPATIENT
Start: 2017-07-13 | End: 2018-05-15 | Stop reason: ALTCHOICE

## 2017-07-13 RX ORDER — CODEINE PHOSPHATE AND GUAIFENESIN 10; 100 MG/5ML; MG/5ML
SOLUTION ORAL
Qty: 118 ML | Refills: 0 | Status: SHIPPED | OUTPATIENT
Start: 2017-07-13 | End: 2018-05-15 | Stop reason: ALTCHOICE

## 2017-07-13 NOTE — TELEPHONE ENCOUNTER
Tione, please call pt to see how she's doing with her Bronchitis dx'd/tx'd in ER yesterday. Would she like to come in for a f/u next week? Let me know/thanks

## 2017-07-13 NOTE — TELEPHONE ENCOUNTER
Spoke with pt and pt state she is doing a little better, but still is feeling bad. Pt would like to be seen next week. Please advise when she will be able to come in.    Thanks Tibarb:)

## 2017-07-13 NOTE — TELEPHONE ENCOUNTER
Spoke with pt who is still congested and has had nocturnal coughing which has been difficult and no better with tessalon. I have recommended Mucinex 1200mg BID and Robitussin with Codeine at Bedtime and will fax into pharmacy now.  She will call in AM and if no better, I will see her tomorrow.  Tione, please fax in the Robitussin with Codeine.  Thanks

## 2017-07-13 NOTE — TELEPHONE ENCOUNTER
Spoke with pt and pt confirmed appointment. Pt states she is taking the albuterol every six hours. Pt states when she gets congested and has trouble breathing can she use the pump again. Even though she has already used the albuterol as directed.    Please advise thanks.

## 2017-07-14 ENCOUNTER — OFFICE VISIT (OUTPATIENT)
Dept: INTERNAL MEDICINE | Facility: CLINIC | Age: 72
End: 2017-07-14
Payer: MEDICARE

## 2017-07-14 VITALS
TEMPERATURE: 98 F | HEART RATE: 69 BPM | DIASTOLIC BLOOD PRESSURE: 70 MMHG | OXYGEN SATURATION: 96 % | HEIGHT: 68 IN | WEIGHT: 207 LBS | SYSTOLIC BLOOD PRESSURE: 124 MMHG | BODY MASS INDEX: 31.37 KG/M2

## 2017-07-14 DIAGNOSIS — I10 ESSENTIAL HYPERTENSION: Primary | ICD-10-CM

## 2017-07-14 DIAGNOSIS — J40 BRONCHITIS: ICD-10-CM

## 2017-07-14 PROCEDURE — 94640 AIRWAY INHALATION TREATMENT: CPT | Mod: S$GLB,,, | Performed by: INTERNAL MEDICINE

## 2017-07-14 PROCEDURE — 1126F AMNT PAIN NOTED NONE PRSNT: CPT | Mod: S$GLB,,, | Performed by: INTERNAL MEDICINE

## 2017-07-14 PROCEDURE — 99999 PR PBB SHADOW E&M-EST. PATIENT-LVL V: CPT | Mod: PBBFAC,,, | Performed by: INTERNAL MEDICINE

## 2017-07-14 PROCEDURE — 99499 UNLISTED E&M SERVICE: CPT | Mod: S$GLB,,, | Performed by: INTERNAL MEDICINE

## 2017-07-14 PROCEDURE — 99213 OFFICE O/P EST LOW 20 MIN: CPT | Mod: 25,S$GLB,, | Performed by: INTERNAL MEDICINE

## 2017-07-14 PROCEDURE — 1159F MED LIST DOCD IN RCRD: CPT | Mod: S$GLB,,, | Performed by: INTERNAL MEDICINE

## 2017-07-14 RX ORDER — ALBUTEROL SULFATE 0.83 MG/ML
2.5 SOLUTION RESPIRATORY (INHALATION)
Status: COMPLETED | OUTPATIENT
Start: 2017-07-14 | End: 2017-07-14

## 2017-07-14 RX ORDER — METHYLPREDNISOLONE 4 MG/1
TABLET ORAL
Qty: 1 PACKAGE | Refills: 0 | Status: SHIPPED | OUTPATIENT
Start: 2017-07-14 | End: 2017-08-04

## 2017-07-14 RX ADMIN — ALBUTEROL SULFATE 2.5 MG: 0.83 SOLUTION RESPIRATORY (INHALATION) at 12:07

## 2017-07-14 NOTE — PROGRESS NOTES
Verified patient name and date of birth.  Patient receiving 1 unit dose of Albuterol 2.5 mg/3 ml neb treatment. Patient tolerated well.

## 2017-07-14 NOTE — PROGRESS NOTES
"Subjective:       Patient ID: Elena Block is a 71 y.o. female.    Chief Complaint: Follow-up (Bronchitis)    HPI Mrs Block presents with c/o cough and chest congestion; "feels that she needs to use the Albuterol inhaler  more often than Q6 hours.  She was seen in clinic 7/10 by Dr Montero and given tessalon/mucinex but worsened  And went to ER 7/12 where she had a neb treatment, steroid shot, and was started on anbx. She has had tough cough but   Did get relief QHS last night with the Robitussin with Codeine.    Current Outpatient Prescriptions   Medication Sig Dispense Refill    albuterol 90 mcg/actuation inhaler Inhale 2 puffs into the lungs every 6 (six) hours while awake. Rescue 6.7 g 0    aspirin 81 mg Tab Take 81 mg by mouth.       azithromycin (Z-DEONNA) 250 MG tablet Take 1 tablet (250 mg total) by mouth once daily. Take first 2 tablets together, then 1 every day until finished. 4 tablet 0    ergocalciferol (ERGOCALCIFEROL) 50,000 unit Cap Take 1 capsule (50,000 Units total) by mouth every 7 days. 4 capsule 6    folic acid-vit B6-vit B12 (FOLBEE) 2.5-25-1 mg Tab Take 1 tablet by mouth once daily. 90 each 3    guaifenesin (MUCINEX) 1,200 mg Ta12 1 cap 2x/day for congestion/cough 24 tablet prn    guaifenesin-codeine 100-10 mg/5 ml (TUSSI-ORGANIDIN NR)  mg/5 mL syrup 1-2 tsp  at bedtime as needed for cough 118 mL 0    hydrochlorothiazide (HYDRODIURIL) 12.5 MG Tab Take 1 tablet (12.5 mg total) by mouth once daily. For Blood Pressure 90 tablet 3    levothyroxine (SYNTHROID) 100 MCG tablet TAKE ONE TABLET BY MOUTH BEFORE BREAKFAST FOR THYROID 90 tablet 2    meloxicam (MOBIC) 15 MG tablet Take 1 tablet (15 mg total) by mouth once daily. 30 tablet 3    metoprolol succinate (TOPROL-XL) 25 MG 24 hr tablet Take 1 tablet (25 mg total) by mouth once daily. For Blood Pressure 90 tablet 2    simvastatin (ZOCOR) 40 MG tablet TAKE 1 TABLET EVERY EVENING FOR CHOLESTEROL 90 tablet 2    timolol maleate " "0.5% (TIMOPTIC-XE) 0.5 % SolG Place 1 drop into both eyes once daily. 10 mL 3    valsartan (DIOVAN) 160 MG tablet TAKE 1 TABLET ONE TIME DAILY FOR BLOOD PRESSURE 90 tablet 2    methylPREDNISolone (MEDROL DOSEPACK) 4 mg tablet use as directed 1 Package 0    omeprazole (PRILOSEC) 20 MG capsule Take 1 capsule (20 mg total) by mouth once daily. 1 cap daily for stomach ulcer prevention while on Meloxicam 30 capsule 11     Current Facility-Administered Medications   Medication Dose Route Frequency Provider Last Rate Last Dose    albuterol nebulizer solution 2.5 mg  2.5 mg Nebulization 1 time in Clinic/HOD Sandra Lyon MD           Review of Systems   Respiratory: Positive for cough, chest tightness and wheezing.         See HPI-Chest pain is actually "Congestion" not pain   Cardiovascular: Negative for chest pain and leg swelling.       Objective:      Lab Results   Component Value Date    WBC 5.91 08/31/2016    HGB 14.4 08/31/2016    HCT 42.1 08/31/2016     08/31/2016    CHOL 169 03/14/2017    TRIG 99 03/14/2017    HDL 53 03/14/2017    ALT 14 03/14/2017    AST 26 03/14/2017     03/14/2017    K 4.2 03/14/2017     03/14/2017    CREATININE 0.9 03/14/2017    BUN 13 03/14/2017    CO2 30 (H) 03/14/2017    TSH 1.574 08/31/2016    HGBA1C 5.3 05/19/2006     Physical Exam   Constitutional: She appears well-developed and well-nourished.   HENT:   Head: Normocephalic and atraumatic.   + Retropharyngeal erythema/no exudates   Neck: Normal range of motion. Neck supple.   + Shotty anterior cervical lyphadenopathy   Cardiovascular: Normal rate, regular rhythm and normal heart sounds.    Pulmonary/Chest:   + Decreased respiratory excursions with upper airway congestion, no wheezes on ausculation   Musculoskeletal: She exhibits no edema.   Lymphadenopathy:     She has cervical adenopathy.   Skin: Skin is warm and dry.   Psychiatric: She has a normal mood and affect. Her behavior is normal. Judgment and thought " content normal.       Assessment:       1. Essential hypertension    2. Bronchitis        Plan:     Health Maintenance       Date Due Completion Date    Colonoscopy 01/22/2015 1/22/2008    Influenza Vaccine 08/01/2017 9/9/2016    Lipid Panel 03/14/2018 3/14/2017    Mammogram 03/20/2018 3/20/2017    TETANUS VACCINE 10/01/2024 10/1/2014        Elena was seen today for follow-up.    Diagnoses and all orders for this visit:    Essential hypertension    Bronchitis       -      Complete Azithromax        -     Mucinex 1200mg BID, Robitussin with Codiene prn  -     albuterol nebulizer solution 2.5 mg; Take 3 mLs (2.5 mg total) by nebulization one time.  -     methylPREDNISolone (MEDROL DOSEPACK) 4 mg tablet; use as directed  -     RTC prn no bettr/resolution    Addendum  Recheck  Peak Flow=250

## 2017-07-20 ENCOUNTER — TELEPHONE (OUTPATIENT)
Dept: INTERNAL MEDICINE | Facility: CLINIC | Age: 72
End: 2017-07-20

## 2017-07-20 DIAGNOSIS — I10 ESSENTIAL HYPERTENSION: Primary | ICD-10-CM

## 2017-07-20 DIAGNOSIS — E55.9 VITAMIN D DEFICIENCY: ICD-10-CM

## 2017-07-20 NOTE — TELEPHONE ENCOUNTER
----- Message from Elizabeth Sharp sent at 7/20/2017  8:12 AM CDT -----  Lab Orders Needed    I have scheduled the above patients annual physical and lab appointments. Lab orders need to be placed and linked.    Date of Annual Physical: 11/07/2017    Date of Lab appt: 11/01/2017    Thank You

## 2017-09-21 ENCOUNTER — IMMUNIZATION (OUTPATIENT)
Dept: INTERNAL MEDICINE | Facility: CLINIC | Age: 72
End: 2017-09-21
Payer: MEDICARE

## 2017-09-21 PROCEDURE — 90662 IIV NO PRSV INCREASED AG IM: CPT | Mod: S$GLB,,, | Performed by: INTERNAL MEDICINE

## 2017-09-21 PROCEDURE — G0008 ADMIN INFLUENZA VIRUS VAC: HCPCS | Mod: S$GLB,,, | Performed by: INTERNAL MEDICINE

## 2017-10-30 DIAGNOSIS — E78.5 HYPERLIPIDEMIA, UNSPECIFIED HYPERLIPIDEMIA TYPE: ICD-10-CM

## 2017-10-30 RX ORDER — SIMVASTATIN 40 MG/1
TABLET, FILM COATED ORAL
Qty: 90 TABLET | Refills: 2 | Status: SHIPPED | OUTPATIENT
Start: 2017-10-30 | End: 2018-06-13 | Stop reason: SDUPTHER

## 2017-10-31 DIAGNOSIS — E55.9 VITAMIN D DEFICIENCY: ICD-10-CM

## 2017-10-31 RX ORDER — ERGOCALCIFEROL 1.25 MG/1
CAPSULE ORAL
Qty: 4 CAPSULE | Refills: 6 | Status: SHIPPED | OUTPATIENT
Start: 2017-10-31 | End: 2018-06-04 | Stop reason: SDUPTHER

## 2017-11-01 ENCOUNTER — LAB VISIT (OUTPATIENT)
Dept: LAB | Facility: HOSPITAL | Age: 72
End: 2017-11-01
Payer: MEDICARE

## 2017-11-01 DIAGNOSIS — I10 ESSENTIAL HYPERTENSION: ICD-10-CM

## 2017-11-01 DIAGNOSIS — E55.9 VITAMIN D DEFICIENCY: ICD-10-CM

## 2017-11-01 LAB
25(OH)D3+25(OH)D2 SERPL-MCNC: 31 NG/ML
ALBUMIN SERPL BCP-MCNC: 3.6 G/DL
ALP SERPL-CCNC: 68 U/L
ALT SERPL W/O P-5'-P-CCNC: 16 U/L
ANION GAP SERPL CALC-SCNC: 10 MMOL/L
AST SERPL-CCNC: 25 U/L
BASOPHILS # BLD AUTO: 0.03 K/UL
BASOPHILS NFR BLD: 0.6 %
BILIRUB SERPL-MCNC: 0.5 MG/DL
BUN SERPL-MCNC: 12 MG/DL
CALCIUM SERPL-MCNC: 9.5 MG/DL
CHLORIDE SERPL-SCNC: 107 MMOL/L
CHOLEST SERPL-MCNC: 175 MG/DL
CHOLEST/HDLC SERPL: 3.3 {RATIO}
CO2 SERPL-SCNC: 25 MMOL/L
CREAT SERPL-MCNC: 0.8 MG/DL
DIFFERENTIAL METHOD: NORMAL
EOSINOPHIL # BLD AUTO: 0.1 K/UL
EOSINOPHIL NFR BLD: 1.3 %
ERYTHROCYTE [DISTWIDTH] IN BLOOD BY AUTOMATED COUNT: 13.2 %
EST. GFR  (AFRICAN AMERICAN): >60 ML/MIN/1.73 M^2
EST. GFR  (NON AFRICAN AMERICAN): >60 ML/MIN/1.73 M^2
GLUCOSE SERPL-MCNC: 91 MG/DL
HCT VFR BLD AUTO: 43 %
HDLC SERPL-MCNC: 53 MG/DL
HDLC SERPL: 30.3 %
HGB BLD-MCNC: 14.4 G/DL
IMM GRANULOCYTES # BLD AUTO: 0.01 K/UL
IMM GRANULOCYTES NFR BLD AUTO: 0.2 %
LDLC SERPL CALC-MCNC: 99.2 MG/DL
LYMPHOCYTES # BLD AUTO: 2.2 K/UL
LYMPHOCYTES NFR BLD: 41.2 %
MCH RBC QN AUTO: 29.4 PG
MCHC RBC AUTO-ENTMCNC: 33.5 G/DL
MCV RBC AUTO: 88 FL
MONOCYTES # BLD AUTO: 0.4 K/UL
MONOCYTES NFR BLD: 8.2 %
NEUTROPHILS # BLD AUTO: 2.6 K/UL
NEUTROPHILS NFR BLD: 48.5 %
NONHDLC SERPL-MCNC: 122 MG/DL
NRBC BLD-RTO: 0 /100 WBC
PLATELET # BLD AUTO: 283 K/UL
PMV BLD AUTO: 9.4 FL
POTASSIUM SERPL-SCNC: 3.8 MMOL/L
PROT SERPL-MCNC: 7.5 G/DL
RBC # BLD AUTO: 4.89 M/UL
SODIUM SERPL-SCNC: 142 MMOL/L
TRIGL SERPL-MCNC: 114 MG/DL
TSH SERPL DL<=0.005 MIU/L-ACNC: 2.35 UIU/ML
WBC # BLD AUTO: 5.39 K/UL

## 2017-11-01 PROCEDURE — 85025 COMPLETE CBC W/AUTO DIFF WBC: CPT

## 2017-11-01 PROCEDURE — 84443 ASSAY THYROID STIM HORMONE: CPT

## 2017-11-01 PROCEDURE — 82306 VITAMIN D 25 HYDROXY: CPT

## 2017-11-01 PROCEDURE — 80053 COMPREHEN METABOLIC PANEL: CPT

## 2017-11-01 PROCEDURE — 80061 LIPID PANEL: CPT

## 2017-11-01 PROCEDURE — 36415 COLL VENOUS BLD VENIPUNCTURE: CPT

## 2017-11-07 ENCOUNTER — OFFICE VISIT (OUTPATIENT)
Dept: INTERNAL MEDICINE | Facility: CLINIC | Age: 72
End: 2017-11-07
Payer: MEDICARE

## 2017-11-07 VITALS
OXYGEN SATURATION: 98 % | SYSTOLIC BLOOD PRESSURE: 140 MMHG | HEART RATE: 65 BPM | HEIGHT: 68 IN | WEIGHT: 206.13 LBS | BODY MASS INDEX: 31.24 KG/M2 | DIASTOLIC BLOOD PRESSURE: 68 MMHG

## 2017-11-07 DIAGNOSIS — E03.4 HYPOTHYROIDISM DUE TO ACQUIRED ATROPHY OF THYROID: ICD-10-CM

## 2017-11-07 DIAGNOSIS — E55.9 VITAMIN D DEFICIENCY: Primary | ICD-10-CM

## 2017-11-07 DIAGNOSIS — Z29.9 PREVENTIVE MEASURE: ICD-10-CM

## 2017-11-07 DIAGNOSIS — J30.9 ALLERGIC RHINITIS, UNSPECIFIED CHRONICITY, UNSPECIFIED SEASONALITY, UNSPECIFIED TRIGGER: ICD-10-CM

## 2017-11-07 DIAGNOSIS — I10 ESSENTIAL HYPERTENSION: ICD-10-CM

## 2017-11-07 PROCEDURE — 99999 PR PBB SHADOW E&M-EST. PATIENT-LVL V: CPT | Mod: PBBFAC,,, | Performed by: INTERNAL MEDICINE

## 2017-11-07 PROCEDURE — 99397 PER PM REEVAL EST PAT 65+ YR: CPT | Mod: S$GLB,,, | Performed by: INTERNAL MEDICINE

## 2017-11-07 PROCEDURE — 99499 UNLISTED E&M SERVICE: CPT | Mod: S$GLB,,, | Performed by: INTERNAL MEDICINE

## 2017-11-07 RX ORDER — LORATADINE 10 MG/1
10 TABLET ORAL DAILY
Qty: 30 TABLET | COMMUNITY
Start: 2017-11-07 | End: 2019-04-02

## 2017-11-07 RX ORDER — HYDROCHLOROTHIAZIDE 12.5 MG/1
12.5 TABLET ORAL DAILY
Qty: 90 TABLET | Refills: 2 | Status: SHIPPED | OUTPATIENT
Start: 2017-11-07 | End: 2018-06-04 | Stop reason: SDUPTHER

## 2017-11-07 RX ORDER — VALSARTAN 160 MG/1
TABLET ORAL
Qty: 90 TABLET | Refills: 2 | Status: SHIPPED | OUTPATIENT
Start: 2017-11-07 | End: 2018-06-13 | Stop reason: SDUPTHER

## 2017-11-07 RX ORDER — LEVOTHYROXINE SODIUM 100 UG/1
TABLET ORAL
Qty: 90 TABLET | Refills: 2 | Status: SHIPPED | OUTPATIENT
Start: 2017-11-07 | End: 2018-11-29 | Stop reason: SDUPTHER

## 2017-11-07 RX ORDER — METOPROLOL SUCCINATE 25 MG/1
25 TABLET, EXTENDED RELEASE ORAL DAILY
Qty: 90 TABLET | Refills: 2 | Status: SHIPPED | OUTPATIENT
Start: 2017-11-07 | End: 2018-05-15 | Stop reason: SDUPTHER

## 2017-11-07 NOTE — PROGRESS NOTES
Ms. Block is a 71-year-old female, known to myself, who presents today for   annual followup of hypertension and health maintenance issues.    HISTORY OF PRESENT ILLNESS:  Ms. Block presents for annual followup of the   above.  She notes she has been doing well with the exception of some mild   allergy symptoms.  She has had a little congestion, little nasal drip, some   sneezing and no purulence.  No fevers or chills.  She has completely recovered   from her episode of bronchitis earlier this summer.  Otherwise, she is doing   fine.  No complaints.  She does want to make sure she is up-to-date on her   immunizations.    PAST MEDICAL, SURGICAL, SOCIAL HISTORY:  Please see as thoroughly stated in EPIC   chart, which has been reviewed.    REVIEW OF SYSTEMS:  HEENT:  Positive for allergy symptoms with congestion and sneezing.  CARDIOPULMONARY:  No chest pain, no shortness of breath.  GASTROINTESTINAL:  No change in bowel habits.  No blood per rectum.  EXTREMITIES:  Negative for pain.  RHEUMATOLOGIC:  Positive occasional left shoulder pain.  Rest of review of systems is noncontributory.    PHYSICAL EXAMINATION:  VITAL SIGNS:  Weight 206 pounds, blood pressure 144/76, pulse 65, recheck blood   pressure per M.D. is 140/68.  HEENT:  Sinuses nontender.  Retropharynx clear.  TMs clear.  NECK:  Supple, negative for masses, thyromegaly, negative for lymphadenopathy.  LUNGS:  Clear.  HEART:  Regular rate and rhythm without arrhythmias, murmurs or gallops.  ABDOMEN:  Soft, nontender.  EXTREMITIES:  Negative edema.  GYNECOLOGIC:  External genitalia within normal limits.  Vaginal vault normal.    Bimanual examination is unremarkable.    WORKUP:  Lab work done on 11/01/2017 showing comprehensive chemistry and CBC to   be unremarkable.  Cholesterol 175 with LDL 99.  TSH is normal.  Vitamin D   improved at 31, still low normal.    ASSESSMENT AND PLAN:  1.  Essential hypertension, stable on therapy, which includes Diovan 160 mg one    a day, hydrochlorothiazide 12.5 mg one a day, and metoprolol XL 25 mg one a day.  A.  Return to clinic in six months.  2.  Hypercholesterolemia, controlled on Zocor 40 mg daily.  A.  Check liver and lipid panel with return to clinic by six months.  3.  Allergic rhinitis.  A.  Claritin 10 mg daily and if no improvement, the patient will let us know.  4.  Hypothyroidism, stable on Synthroid 100 mcg daily.  A.  Annual TSH and physical examination of thyroid.  5.  Vitamin D deficiency, much improved.  A.  For now, continue with ergocalciferol 50,000 unit cap once weekly.  B.  Check vitamin D with return to clinic in six months, at which point, we will   transition over to a multivitamin.  6.  Health maintenance.  Screening:  The patient is up-to-date on mammogram, bone density, status post gynecologic   check today and is not due for colonoscopy until next year.  A.  Return to clinic in six months with fasting prior lab work to include   comprehensive chemistry, lipid panel, vitamin D, go from there or see the   patient sooner if needed.      FMS/HN  dd: 11/07/2017 08:50:41 (CST)  td: 11/08/2017 01:28:01 (CST)  Doc ID   #[id number]  Job ID #[id number]    CC:     This office note has been dictated.

## 2017-11-13 ENCOUNTER — CLINICAL SUPPORT (OUTPATIENT)
Dept: OPHTHALMOLOGY | Facility: CLINIC | Age: 72
End: 2017-11-13
Payer: MEDICARE

## 2017-11-13 ENCOUNTER — OFFICE VISIT (OUTPATIENT)
Dept: OPTOMETRY | Facility: CLINIC | Age: 72
End: 2017-11-13
Payer: MEDICARE

## 2017-11-13 DIAGNOSIS — H25.13 NUCLEAR SCLEROSIS, BILATERAL: ICD-10-CM

## 2017-11-13 DIAGNOSIS — H40.053 OCULAR HYPERTENSION, BILATERAL: Primary | ICD-10-CM

## 2017-11-13 DIAGNOSIS — H26.9 CORTICAL CATARACT OF BOTH EYES: ICD-10-CM

## 2017-11-13 DIAGNOSIS — H52.223 REGULAR ASTIGMATISM OF BOTH EYES: ICD-10-CM

## 2017-11-13 DIAGNOSIS — Z13.5 SCREENING FOR EYE CONDITION: ICD-10-CM

## 2017-11-13 DIAGNOSIS — I10 ESSENTIAL HYPERTENSION: ICD-10-CM

## 2017-11-13 DIAGNOSIS — H40.003 GLAUCOMA SUSPECT OF BOTH EYES: ICD-10-CM

## 2017-11-13 DIAGNOSIS — H40.013 OPEN ANGLE WITH BORDERLINE FINDINGS, LOW RISK, BILATERAL: ICD-10-CM

## 2017-11-13 DIAGNOSIS — H52.4 PRESBYOPIA OF BOTH EYES: ICD-10-CM

## 2017-11-13 DIAGNOSIS — H34.8192 CENTRAL VEIN OCCLUSION OF RETINA: ICD-10-CM

## 2017-11-13 DIAGNOSIS — H40.053 OCULAR HYPERTENSION, BILATERAL: ICD-10-CM

## 2017-11-13 PROCEDURE — 92014 COMPRE OPH EXAM EST PT 1/>: CPT | Mod: S$GLB,,, | Performed by: OPTOMETRIST

## 2017-11-13 PROCEDURE — 92083 EXTENDED VISUAL FIELD XM: CPT | Mod: S$GLB,,, | Performed by: OPTOMETRIST

## 2017-11-13 PROCEDURE — 99499 UNLISTED E&M SERVICE: CPT | Mod: S$GLB,,, | Performed by: OPTOMETRIST

## 2017-11-13 PROCEDURE — 92015 DETERMINE REFRACTIVE STATE: CPT | Mod: S$GLB,,, | Performed by: OPTOMETRIST

## 2017-11-13 PROCEDURE — 99999 PR PBB SHADOW E&M-EST. PATIENT-LVL II: CPT | Mod: PBBFAC,,, | Performed by: OPTOMETRIST

## 2017-11-13 NOTE — PATIENT INSTRUCTIONS
Nuclear sclerosis/peripheral cortical cataract in both eyes.  No need for cataract surgery.  Astigmatic refractive error in each eye, with satisfactory best-corrected VA in each eye.  Updated spectacle lens Rx issued.  Recommend full-time wear.       Prior diagnosis of bilateral ocular hypertension, and glaucoma suspect based on the finding of larger-than-average cup-to-disc ratio in each eye.  Using Timolol Maleate 0.5% gel-forming ophthalmic solution in both eyes every morning.  IOP high-normal in each eye.  Repeat  Schreiber Visual field done today, without evidence of glaucomatous defect in either eye.     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  Has been followed by Dr. Paz.  Recheck in six months, with repeat HVF (24-2 LUKAS Standard) at that time.   Orders for future HVF test entered.      In the interim, continue to use one drop Timolol Maleate 0.5% gel-forming ophthalmic solution in each eye every morning.   .

## 2017-11-28 DIAGNOSIS — Z79.899 HIGH RISK MEDICATION USE: ICD-10-CM

## 2017-11-28 RX ORDER — OMEPRAZOLE 20 MG/1
20 CAPSULE, DELAYED RELEASE ORAL DAILY
Qty: 90 CAPSULE | Refills: 2 | Status: SHIPPED | OUTPATIENT
Start: 2017-11-28 | End: 2019-02-18

## 2018-03-15 ENCOUNTER — TELEPHONE (OUTPATIENT)
Dept: INTERNAL MEDICINE | Facility: CLINIC | Age: 73
End: 2018-03-15

## 2018-03-15 DIAGNOSIS — E78.5 HYPERLIPIDEMIA, UNSPECIFIED HYPERLIPIDEMIA TYPE: Primary | ICD-10-CM

## 2018-03-15 DIAGNOSIS — Z12.39 BREAST CANCER SCREENING: ICD-10-CM

## 2018-03-15 DIAGNOSIS — E55.9 VITAMIN D DEFICIENCY: ICD-10-CM

## 2018-03-15 NOTE — TELEPHONE ENCOUNTER
----- Message from Vivi Cheng sent at 3/15/2018  9:44 AM CDT -----  Contact: Patient [phone] or [phone] cell  Sooner appointment than the  can schedule.  Did you offer to schedule the next available appointment and put the patient on the wait list?:    When is the first available appointment: nothing available  What is the nature of the appointment: follow up  What visit type:   Patient preference of timeframe to be scheduled:    Comments: needs mammogram orders    Please call and advise.    Thank You

## 2018-03-18 NOTE — TELEPHONE ENCOUNTER
Siria,  I can see pt June 13th at 10AM If she can come then.  I' placed fasting lab orders to be scheduled 1 week prior to her RTC Appt. Also, I have placed pt's MMG order so that can be scheduled.  Thanks

## 2018-03-19 ENCOUNTER — TELEPHONE (OUTPATIENT)
Dept: INTERNAL MEDICINE | Facility: CLINIC | Age: 73
End: 2018-03-19

## 2018-03-19 NOTE — TELEPHONE ENCOUNTER
----- Message from Narda Fraser sent at 3/19/2018 10:36 AM CDT -----  Patient is returning a phone call.  Who left a message for the patient: Siria  Does patient know what this is regarding:  Yes. I told her of her appointments and she said ok.

## 2018-03-29 DIAGNOSIS — I10 ESSENTIAL HYPERTENSION: ICD-10-CM

## 2018-03-29 RX ORDER — METOPROLOL SUCCINATE 25 MG/1
25 TABLET, EXTENDED RELEASE ORAL DAILY
Qty: 90 TABLET | Refills: 1 | Status: SHIPPED | OUTPATIENT
Start: 2018-03-29 | End: 2018-06-13 | Stop reason: SDUPTHER

## 2018-04-06 ENCOUNTER — TELEPHONE (OUTPATIENT)
Dept: INTERNAL MEDICINE | Facility: CLINIC | Age: 73
End: 2018-04-06

## 2018-04-06 NOTE — TELEPHONE ENCOUNTER
Spoke with pt who has had a cough productive of clear mucus x 1 week.  She does have allergy sx and stuffiness.  I have recommended ok to take Nyquil gel caps at bedtime x 3 days and then start Claritin 10mg daily.  If no better or worse, will call in anbx.

## 2018-04-06 NOTE — TELEPHONE ENCOUNTER
Spoke to pt and pt states coughing, runny nose, thick clear mucus she wants to know what she can take. Pt also states its been going on for 1 week.

## 2018-04-06 NOTE — TELEPHONE ENCOUNTER
----- Message from Narda Fraser sent at 4/6/2018  8:30 AM CDT -----  Contact: Patient [phone]  She requested a prescription for symptoms:  Coughing and runny nose with mucous.  For 7 days    Pharmacy: [facility]/pharmacy #[id number] - [place], [place] - [address][phone] (phone) [phone] (Fax)

## 2018-05-01 DIAGNOSIS — H40.053 OCULAR HYPERTENSION, BILATERAL: ICD-10-CM

## 2018-05-01 DIAGNOSIS — H40.013 OPEN ANGLE WITH BORDERLINE FINDINGS, LOW RISK, BILATERAL: ICD-10-CM

## 2018-05-01 RX ORDER — TIMOLOL MALEATE 5 MG/ML
1 SOLUTION OPHTHALMIC DAILY
Qty: 10 ML | Refills: 3 | Status: SHIPPED | OUTPATIENT
Start: 2018-05-01 | End: 2019-12-22

## 2018-05-15 ENCOUNTER — OFFICE VISIT (OUTPATIENT)
Dept: INTERNAL MEDICINE | Facility: CLINIC | Age: 73
End: 2018-05-15
Payer: MEDICARE

## 2018-05-15 VITALS
HEIGHT: 68 IN | BODY MASS INDEX: 31.34 KG/M2 | HEART RATE: 62 BPM | SYSTOLIC BLOOD PRESSURE: 158 MMHG | WEIGHT: 206.81 LBS | OXYGEN SATURATION: 98 % | DIASTOLIC BLOOD PRESSURE: 78 MMHG

## 2018-05-15 DIAGNOSIS — H40.053 BILATERAL OCULAR HYPERTENSION: ICD-10-CM

## 2018-05-15 DIAGNOSIS — E89.0 POST-SURGICAL HYPOTHYROIDISM: ICD-10-CM

## 2018-05-15 DIAGNOSIS — Z00.00 ENCOUNTER FOR PREVENTIVE HEALTH EXAMINATION: Primary | ICD-10-CM

## 2018-05-15 DIAGNOSIS — I10 ESSENTIAL HYPERTENSION: ICD-10-CM

## 2018-05-15 DIAGNOSIS — I70.0 ATHEROSCLEROSIS OF AORTA: ICD-10-CM

## 2018-05-15 DIAGNOSIS — H43.813 POSTERIOR VITREOUS DETACHMENT OF BOTH EYES: ICD-10-CM

## 2018-05-15 DIAGNOSIS — E78.5 HYPERLIPIDEMIA, UNSPECIFIED HYPERLIPIDEMIA TYPE: ICD-10-CM

## 2018-05-15 DIAGNOSIS — E66.9 OBESITY (BMI 30.0-34.9): ICD-10-CM

## 2018-05-15 PROCEDURE — 99499 UNLISTED E&M SERVICE: CPT | Mod: S$PBB,,, | Performed by: NURSE PRACTITIONER

## 2018-05-15 PROCEDURE — 99999 PR PBB SHADOW E&M-EST. PATIENT-LVL IV: CPT | Mod: PBBFAC,,, | Performed by: NURSE PRACTITIONER

## 2018-05-15 PROCEDURE — 3078F DIAST BP <80 MM HG: CPT | Mod: CPTII,S$GLB,, | Performed by: NURSE PRACTITIONER

## 2018-05-15 PROCEDURE — G0439 PPPS, SUBSEQ VISIT: HCPCS | Mod: S$GLB,,, | Performed by: NURSE PRACTITIONER

## 2018-05-15 PROCEDURE — 3077F SYST BP >= 140 MM HG: CPT | Mod: CPTII,S$GLB,, | Performed by: NURSE PRACTITIONER

## 2018-05-15 NOTE — PROGRESS NOTES
"Elena Block presented for a  Medicare AWV and comprehensive Health Risk Assessment today. The following components were reviewed and updated:    · Medical history  · Family History  · Social history  · Allergies and Current Medications  · Health Risk Assessment  · Health Maintenance  · Care Team     ** See Completed Assessments for Annual Wellness Visit within the encounter summary.**       The following assessments were completed:  · Living Situation  · CAGE  · Depression Screening  · Timed Get Up and Go  · Whisper Test  · Cognitive Function Screening  ·   ·   · Nutrition Screening  · ADL Screening  · PAQ Screening    Vitals:    05/15/18 0856   BP: (!) 158/78   Pulse: 62   SpO2: 98%   Weight: 93.8 kg (206 lb 12.7 oz)   Height: 5' 8" (1.727 m)     Body mass index is 31.44 kg/m².  Physical Exam   Constitutional: She is oriented to person, place, and time. She appears well-developed.   obese   HENT:   Head: Normocephalic and atraumatic.   Nose: Nose normal.   Eyes: Conjunctivae and EOM are normal.   Cardiovascular: Normal rate, regular rhythm, normal heart sounds and intact distal pulses.    No murmur heard.  Pulmonary/Chest: Effort normal and breath sounds normal.   Musculoskeletal: Normal range of motion.   Neurological: She is alert and oriented to person, place, and time.   Skin: Skin is warm and dry.   Psychiatric: She has a normal mood and affect. Her behavior is normal. Judgment and thought content normal.   Nursing note and vitals reviewed.        Diagnoses and health risks identified today and associated recommendations/orders:    1. Encounter for preventive health examination  Assessment performed. Health maintenance updated. Chart review completed.  We discussed the importance of a colonoscopy. Declined to schedule today. Plans to discuss with PCP at next visit.    2. Atherosclerosis of aorta  Noted on imaging. Stable on current regimen. Followed by PCP.    3. Essential hypertension  Chronic. Not at goal " today. Followed by PCP.    4. Hyperlipidemia, unspecified hyperlipidemia type  Chronic. Stable on current regimen. Followed by PCP.    5. Obesity (BMI 30.0-34.9)  Chronic. Discussed diet and exercise. Followed by PCP.    6. Post-surgical hypothyroidism  Chronic. Stable on current regimen. Followed by PCP.    7. Posterior vitreous detachment of both eyes  Chronic. Stable. Followed by Opthalmology.    8. Bilateral ocular hypertension  Chronic. Stable. Followed by Opthalmology.        Provided Elena with a 5-10 year written screening schedule and personal prevention plan. Recommendations were developed using the USPSTF age appropriate recommendations. Education, counseling, and referrals were provided as needed. After Visit Summary printed and given to patient which includes a list of additional screenings\tests needed.    Follow-up for follow up with Primary Care Provider as instructed, ;sooner if problems, HRA in 1 year.    JYOTSNA Bellamy

## 2018-05-15 NOTE — PATIENT INSTRUCTIONS
Counseling and Referral of Other Preventative  (Italic type indicates deductible and co-insurance are waived)    Patient Name: Elena Block  Today's Date: 5/15/2018    Health Maintenance       Date Due Completion Date    Colonoscopy 01/22/2018 1/22/2008    Mammogram 03/20/2018 3/20/2017    Influenza Vaccine 08/01/2018 9/21/2017    Lipid Panel 11/01/2018 11/1/2017    TETANUS VACCINE 10/01/2024 10/1/2014        No orders of the defined types were placed in this encounter.    The following information is provided to all patients.  This information is to help you find resources for any of the problems found today that may be affecting your health:                Living healthy guide: www.[facility].[place].gov      Understanding Diabetes: www.diabetes.org      Eating healthy: www.cdc.gov/healthyweight      CDC home safety checklist: www.cdc.gov/steadi/patient.html      Agency on Aging: www.goea.[place].[facility]      Alcoholics anonymous (AA): www.aa.org      Physical Activity: www.pamela.nih.gov/fv6zfly      Tobacco use: www.quitwithusla.org

## 2018-05-22 ENCOUNTER — OFFICE VISIT (OUTPATIENT)
Dept: OPTOMETRY | Facility: CLINIC | Age: 73
End: 2018-05-22
Payer: MEDICARE

## 2018-05-22 ENCOUNTER — CLINICAL SUPPORT (OUTPATIENT)
Dept: OPHTHALMOLOGY | Facility: CLINIC | Age: 73
End: 2018-05-22
Payer: MEDICARE

## 2018-05-22 DIAGNOSIS — H26.9 CORTICAL CATARACT OF BOTH EYES: ICD-10-CM

## 2018-05-22 DIAGNOSIS — H25.13 NUCLEAR SCLEROSIS, BILATERAL: ICD-10-CM

## 2018-05-22 DIAGNOSIS — H40.053 OCULAR HYPERTENSION, BILATERAL: Primary | ICD-10-CM

## 2018-05-22 DIAGNOSIS — H40.003 GLAUCOMA SUSPECT OF BOTH EYES: ICD-10-CM

## 2018-05-22 DIAGNOSIS — H34.8122 CENTRAL RETINAL VEIN OCCLUSION OF LEFT EYE, UNSPECIFIED COMPLICATION STATUS: ICD-10-CM

## 2018-05-22 DIAGNOSIS — H53.452 OTHER LOCALIZED VISUAL FIELD DEFECT, LEFT EYE: ICD-10-CM

## 2018-05-22 PROCEDURE — 92083 EXTENDED VISUAL FIELD XM: CPT | Mod: S$GLB,,, | Performed by: OPTOMETRIST

## 2018-05-22 PROCEDURE — 99999 PR PBB SHADOW E&M-EST. PATIENT-LVL II: CPT | Mod: PBBFAC,,, | Performed by: OPTOMETRIST

## 2018-05-22 PROCEDURE — 92014 COMPRE OPH EXAM EST PT 1/>: CPT | Mod: S$GLB,,, | Performed by: OPTOMETRIST

## 2018-05-22 PROCEDURE — 99499 UNLISTED E&M SERVICE: CPT | Mod: S$PBB,,, | Performed by: OPTOMETRIST

## 2018-05-22 NOTE — PATIENT INSTRUCTIONS
Nuclear sclerosis/peripheral cortical cataract in both eyes.    VA with glasses made to the last Rx issued appears comparable to the VA achieved with last refraction in each eye.   No change made to the last spectacle lens Rx issued.      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  Has been followed in retina clinic    Prior diagnosis of bilateral ocular hypertension.   Repeat HVF test done today.  Reviewed test results and discussed with Ms. Block.  Normal visual field in the right eye.  Non-specific visual field defect in the left eye, but note apparent enlargement of blind spot in the left eye, and surperior paracentral visual field defect in the superonasal quadrant of visual field of the left eye.   Currently using Timolol Maleate 0.5% gel-forming ophthalmic solution in each eye every evening for IOP control.    Continue drops in both eyes as noted above.  Return in six months for recheck of HVF test (24-2 LUKAS standard), and follow up visit with me (Dr. Hamlin) on the same date.  Suggest do left eye first on future HVF test.  Orders for future HVF test entered.

## 2018-06-04 ENCOUNTER — LAB VISIT (OUTPATIENT)
Dept: LAB | Facility: HOSPITAL | Age: 73
End: 2018-06-04
Attending: INTERNAL MEDICINE
Payer: MEDICARE

## 2018-06-04 DIAGNOSIS — E55.9 VITAMIN D DEFICIENCY: ICD-10-CM

## 2018-06-04 DIAGNOSIS — E78.5 HYPERLIPIDEMIA, UNSPECIFIED HYPERLIPIDEMIA TYPE: ICD-10-CM

## 2018-06-04 DIAGNOSIS — I10 ESSENTIAL HYPERTENSION: ICD-10-CM

## 2018-06-04 LAB
25(OH)D3+25(OH)D2 SERPL-MCNC: 32 NG/ML
ALBUMIN SERPL BCP-MCNC: 3.8 G/DL
ALP SERPL-CCNC: 68 U/L
ALT SERPL W/O P-5'-P-CCNC: 18 U/L
ANION GAP SERPL CALC-SCNC: 7 MMOL/L
AST SERPL-CCNC: 26 U/L
BILIRUB SERPL-MCNC: 0.4 MG/DL
BUN SERPL-MCNC: 15 MG/DL
CALCIUM SERPL-MCNC: 9.7 MG/DL
CHLORIDE SERPL-SCNC: 107 MMOL/L
CHOLEST SERPL-MCNC: 151 MG/DL
CHOLEST/HDLC SERPL: 3.1 {RATIO}
CO2 SERPL-SCNC: 26 MMOL/L
CREAT SERPL-MCNC: 0.9 MG/DL
EST. GFR  (AFRICAN AMERICAN): >60 ML/MIN/1.73 M^2
EST. GFR  (NON AFRICAN AMERICAN): >60 ML/MIN/1.73 M^2
GLUCOSE SERPL-MCNC: 87 MG/DL
HDLC SERPL-MCNC: 49 MG/DL
HDLC SERPL: 32.5 %
LDLC SERPL CALC-MCNC: 82.6 MG/DL
NONHDLC SERPL-MCNC: 102 MG/DL
POTASSIUM SERPL-SCNC: 4 MMOL/L
PROT SERPL-MCNC: 7.6 G/DL
SODIUM SERPL-SCNC: 140 MMOL/L
TRIGL SERPL-MCNC: 97 MG/DL

## 2018-06-04 PROCEDURE — 80053 COMPREHEN METABOLIC PANEL: CPT

## 2018-06-04 PROCEDURE — 80061 LIPID PANEL: CPT

## 2018-06-04 PROCEDURE — 36415 COLL VENOUS BLD VENIPUNCTURE: CPT

## 2018-06-04 PROCEDURE — 82306 VITAMIN D 25 HYDROXY: CPT

## 2018-06-05 RX ORDER — HYDROCHLOROTHIAZIDE 12.5 MG/1
TABLET ORAL
Qty: 90 TABLET | Refills: 1 | Status: SHIPPED | OUTPATIENT
Start: 2018-06-05 | End: 2019-03-18 | Stop reason: SDUPTHER

## 2018-06-05 RX ORDER — ERGOCALCIFEROL 1.25 MG/1
CAPSULE ORAL
Qty: 4 CAPSULE | Refills: 6 | Status: SHIPPED | OUTPATIENT
Start: 2018-06-05 | End: 2019-02-18 | Stop reason: SDUPTHER

## 2018-06-11 ENCOUNTER — HOSPITAL ENCOUNTER (OUTPATIENT)
Dept: RADIOLOGY | Facility: HOSPITAL | Age: 73
Discharge: HOME | End: 2018-06-11
Attending: INTERNAL MEDICINE
Payer: MEDICARE

## 2018-06-11 DIAGNOSIS — Z12.39 BREAST CANCER SCREENING: ICD-10-CM

## 2018-06-11 PROCEDURE — 77067 SCR MAMMO BI INCL CAD: CPT | Mod: TC

## 2018-06-11 PROCEDURE — 77063 BREAST TOMOSYNTHESIS BI: CPT | Mod: 26,,, | Performed by: RADIOLOGY

## 2018-06-11 PROCEDURE — 77067 SCR MAMMO BI INCL CAD: CPT | Mod: 26,,, | Performed by: RADIOLOGY

## 2018-06-13 ENCOUNTER — OFFICE VISIT (OUTPATIENT)
Dept: INTERNAL MEDICINE | Facility: CLINIC | Age: 73
End: 2018-06-13
Payer: MEDICARE

## 2018-06-13 VITALS
BODY MASS INDEX: 31.34 KG/M2 | DIASTOLIC BLOOD PRESSURE: 70 MMHG | SYSTOLIC BLOOD PRESSURE: 130 MMHG | OXYGEN SATURATION: 99 % | WEIGHT: 206.81 LBS | HEIGHT: 68 IN | HEART RATE: 62 BPM

## 2018-06-13 DIAGNOSIS — E78.5 HYPERLIPIDEMIA, UNSPECIFIED HYPERLIPIDEMIA TYPE: ICD-10-CM

## 2018-06-13 DIAGNOSIS — R07.89 ATYPICAL CHEST PAIN: ICD-10-CM

## 2018-06-13 DIAGNOSIS — I10 ESSENTIAL HYPERTENSION: Primary | ICD-10-CM

## 2018-06-13 DIAGNOSIS — Z12.11 COLON CANCER SCREENING: ICD-10-CM

## 2018-06-13 PROCEDURE — 99499 UNLISTED E&M SERVICE: CPT | Mod: S$PBB,,, | Performed by: INTERNAL MEDICINE

## 2018-06-13 PROCEDURE — 93005 ELECTROCARDIOGRAM TRACING: CPT | Mod: S$GLB,,, | Performed by: INTERNAL MEDICINE

## 2018-06-13 PROCEDURE — 99214 OFFICE O/P EST MOD 30 MIN: CPT | Mod: S$GLB,,, | Performed by: INTERNAL MEDICINE

## 2018-06-13 PROCEDURE — 3075F SYST BP GE 130 - 139MM HG: CPT | Mod: CPTII,S$GLB,, | Performed by: INTERNAL MEDICINE

## 2018-06-13 PROCEDURE — 3078F DIAST BP <80 MM HG: CPT | Mod: CPTII,S$GLB,, | Performed by: INTERNAL MEDICINE

## 2018-06-13 PROCEDURE — 99999 PR PBB SHADOW E&M-EST. PATIENT-LVL III: CPT | Mod: PBBFAC,,, | Performed by: INTERNAL MEDICINE

## 2018-06-13 PROCEDURE — 93010 ELECTROCARDIOGRAM REPORT: CPT | Mod: S$GLB,,, | Performed by: INTERNAL MEDICINE

## 2018-06-13 RX ORDER — VALSARTAN 160 MG/1
TABLET ORAL
Qty: 90 TABLET | Refills: 2 | Status: SHIPPED | OUTPATIENT
Start: 2018-06-13 | End: 2018-08-31 | Stop reason: SDUPTHER

## 2018-06-13 RX ORDER — SIMVASTATIN 40 MG/1
40 TABLET, FILM COATED ORAL NIGHTLY
Qty: 90 TABLET | Refills: 2 | Status: SHIPPED | OUTPATIENT
Start: 2018-06-13 | End: 2018-12-06 | Stop reason: SDUPTHER

## 2018-06-13 RX ORDER — METOPROLOL SUCCINATE 25 MG/1
25 TABLET, EXTENDED RELEASE ORAL DAILY
Qty: 90 TABLET | Refills: 2 | Status: SHIPPED | OUTPATIENT
Start: 2018-06-13 | End: 2018-12-18 | Stop reason: SDUPTHER

## 2018-06-13 RX ORDER — ERGOCALCIFEROL 1.25 MG/1
50000 CAPSULE ORAL
Qty: 12 CAPSULE | Refills: 3 | Status: SHIPPED | OUTPATIENT
Start: 2018-06-13 | End: 2020-04-24

## 2018-06-14 NOTE — PROGRESS NOTES
Ms. Block is a 72-year-old female, known to myself, who presented to clinic   yesterday on June 13th, at which time clinic billing was performed.  Her note is   being dictated today, June 14th.    CHIEF COMPLAINT:  Followup of hypertension, hyperlipidemia and complaints of   recent shoulder/chest pain.    HISTORY OF PRESENT ILLNESS:  Ms. Block presents noting that for the most part   she has been well.  She did, however, have an episode of shoulder and left-sided   breast/chest pain that was disconcerting to her.  This occurred yesterday.  She   notes that the pain started when she was standing cooking.  She notes the pain   was a crampy like quality, seemed to begin in the upper neck/shoulder area   wrapped around her scapula and then radiated towards the left breast.  She notes   the pain lasted most of the day.  She does note that movement seemed to change   the pain.  She had no nausea or vomiting, no diaphoresis.  She took two Aleve   and after about 4-6 hours, noticed some relief.  She notes that she walks in the   park 2 miles every morning and feels good, has no chest pain, no shortness of   breath.  She has had no cough, fevers or chills.    PAST MEDICAL, SURGICAL, SOCIAL HISTORY:  Please see as thoroughly stated in EPIC   chart, which has been reviewed.    PHYSICAL EXAMINATION:  VITAL SIGNS:  Weight 206 pounds, blood pressure 130/70, pulse 62.  GENERAL:  The patient looks well, appears comfortable.  HEENT:  Grossly normal.  NECK:  Supple, negative for masses, thyromegaly, negative for lymphadenopathy.  LUNGS:  Clear bilaterally.  HEART:  Regular rate and rhythm without arrhythmias.  Anterior chest wall shows   no reproducible pain on palpation.  BREASTS:  On gross visualization are normal.  On palpation, no masses or   discharge from the nipples.  ABDOMEN:  Substernal area, nontender, no masses or organomegaly.  EXTREMITIES:  Negative edema.  MUSCULOSKELETAL:  Rheumatologic examination with left shoulder  showing excellent   range of motion and no point tenderness.  Cervical spine is nontender.  There   is no pain with range of motion.    WORKUP:  Lab work on 06/04/2018 showing vitamin D improved to 32, still low   normal.  Cholesterol excellent at 151 with LDL 82.  Comprehensive chemistry is   unremarkable.    ASSESSMENT AND PLAN:  1.  Atypical chest pain, seems musculoskeletal, but should rule out other.  A. EKG and then we will schedule, last exercise stress echo.  2.  Essential hypertension, controlled.  A. Continue hydrochlorothiazide 12.5 mg daily along with Toprol-XL 25 mg one a   day and valsartan 160 mg daily.  3.  Hyperlipidemia, controlled.  A. Continue simvastatin 40 mg daily.  B. Return to clinic by six months with fasting prior chemistry and lipid panel.  4.  Health maintenance.  Screening:  Last mammogram in January 2018 and normal.  Last bone density study in March 2016 and normal.  Last colonoscopy in 2008 and normal.  A. We will order the FIT for colon screening purposes and have recommended the   patient consider repeat colonoscopy, which we will further discuss on return to   clinic as she defers on it at this moment.  B. Phone review after stress test.    ADDENDUM:  EKG shows normal sinus rhythm.  No acute changes.      FMS/HN  dd: 06/14/2018 08:19:20 (CDT)  td: 06/14/2018 23:09:28 (CDT)  Doc ID   #[id number]  Job ID #[id number]    CC:     This office note has been dictated.

## 2018-06-18 ENCOUNTER — PATIENT MESSAGE (OUTPATIENT)
Dept: INTERNAL MEDICINE | Facility: CLINIC | Age: 73
End: 2018-06-18

## 2018-06-18 ENCOUNTER — TELEPHONE (OUTPATIENT)
Dept: INTERNAL MEDICINE | Facility: CLINIC | Age: 73
End: 2018-06-18

## 2018-06-18 ENCOUNTER — LAB VISIT (OUTPATIENT)
Dept: LAB | Facility: HOSPITAL | Age: 73
End: 2018-06-18
Attending: INTERNAL MEDICINE
Payer: MEDICARE

## 2018-06-18 DIAGNOSIS — Z12.11 COLON CANCER SCREENING: ICD-10-CM

## 2018-06-18 LAB — HEMOCCULT STL QL IA: NEGATIVE

## 2018-06-18 PROCEDURE — 82274 ASSAY TEST FOR BLOOD FECAL: CPT

## 2018-06-18 NOTE — TELEPHONE ENCOUNTER
Spoke with Mrs Block ie EKG(6/13)showed NSR/no change from last EKG 2/2015. She has decided to hold off on stress echo unless pain recurs, which it has not.

## 2018-08-12 ENCOUNTER — NURSE TRIAGE (OUTPATIENT)
Dept: ADMINISTRATIVE | Facility: CLINIC | Age: 73
End: 2018-08-12

## 2018-08-12 NOTE — TELEPHONE ENCOUNTER
"    Reason for Disposition   Requesting regular office appointment    Answer Assessment - Initial Assessment Questions  1. REASON FOR CALL or QUESTION: "What is your reason for calling today?" or "How can I best help you?" or "What question do you have that I can help answer?"      appointment    Protocols used: ST INFORMATION ONLY CALL-A-AH      "

## 2018-08-13 ENCOUNTER — TELEPHONE (OUTPATIENT)
Dept: INTERNAL MEDICINE | Facility: CLINIC | Age: 73
End: 2018-08-13

## 2018-08-13 ENCOUNTER — OFFICE VISIT (OUTPATIENT)
Dept: INTERNAL MEDICINE | Facility: CLINIC | Age: 73
End: 2018-08-13
Attending: FAMILY MEDICINE
Payer: MEDICARE

## 2018-08-13 VITALS
HEART RATE: 76 BPM | OXYGEN SATURATION: 96 % | WEIGHT: 208.56 LBS | HEIGHT: 68 IN | DIASTOLIC BLOOD PRESSURE: 60 MMHG | SYSTOLIC BLOOD PRESSURE: 142 MMHG | BODY MASS INDEX: 31.61 KG/M2

## 2018-08-13 DIAGNOSIS — M72.2 PLANTAR FASCIITIS, LEFT: Primary | ICD-10-CM

## 2018-08-13 PROCEDURE — 3077F SYST BP >= 140 MM HG: CPT | Mod: CPTII,S$GLB,, | Performed by: FAMILY MEDICINE

## 2018-08-13 PROCEDURE — 99999 PR PBB SHADOW E&M-EST. PATIENT-LVL IV: CPT | Mod: PBBFAC,,, | Performed by: FAMILY MEDICINE

## 2018-08-13 PROCEDURE — 3078F DIAST BP <80 MM HG: CPT | Mod: CPTII,S$GLB,, | Performed by: FAMILY MEDICINE

## 2018-08-13 PROCEDURE — 99213 OFFICE O/P EST LOW 20 MIN: CPT | Mod: S$GLB,,, | Performed by: FAMILY MEDICINE

## 2018-08-13 NOTE — PROGRESS NOTES
Subjective:       Patient ID: Elena Block is a 72 y.o. female.    Chief Complaint: Pain (left foot pain)    HPI  Review of Systems   Constitutional: Negative for chills, fatigue, fever and unexpected weight change.   HENT: Negative for congestion and trouble swallowing.    Eyes: Negative for redness and visual disturbance.   Respiratory: Negative for cough, chest tightness and shortness of breath.    Cardiovascular: Negative for chest pain, palpitations and leg swelling.   Gastrointestinal: Negative for abdominal pain and blood in stool.   Genitourinary: Negative for difficulty urinating, hematuria and menstrual problem.   Musculoskeletal: Positive for arthralgias and gait problem. Negative for back pain, joint swelling, myalgias and neck pain.   Skin: Negative for color change and rash.   Neurological: Negative for tremors, speech difficulty, weakness, numbness and headaches.   Hematological: Negative for adenopathy. Does not bruise/bleed easily.   Psychiatric/Behavioral: Negative for behavioral problems, confusion and sleep disturbance. The patient is not nervous/anxious.        Objective:      Physical Exam   Constitutional: She is oriented to person, place, and time. She appears well-developed and well-nourished. No distress.   Neck: Neck supple.   Pulmonary/Chest: Effort normal.   Musculoskeletal: She exhibits no edema.        Right lower leg: She exhibits no edema.        Left lower leg: She exhibits no edema.        Left foot: There is normal range of motion, no bony tenderness, no swelling, normal capillary refill and no deformity.        Feet:    Neurological: She is alert and oriented to person, place, and time.   Skin: Skin is warm and dry. No rash noted.   Psychiatric: She has a normal mood and affect. Her behavior is normal. Judgment and thought content normal.   Nursing note and vitals reviewed.      Assessment:       1. Plantar fasciitis, left        Plan:   Elena was seen today for  pain.    Diagnoses and all orders for this visit:    Plantar fasciitis, left      See meds, orders, follow up, routing and instructions sections of encounter.  A 72-year-old new patient with foot pain, left x3-4 days.  He states it is   dramatically improved today.  She did not note any definite change of foot wear   or activity pattern.  She does walk what sounds like a track, reports no   injuries.  Location of pain is to the plantar mid foot, left with no numbness,   tingling, weakness.  No claudication.  She has no calf swelling or definite calf   pain.    On examination, she has full range of motion to both ankles.  There is no   evidence of DVT on physical examination with no edema, redness or swelling and a   negative Homans sign bilaterally.    She had tenderness to the mid foot plantar aspect left along the course of the   plantar fascia.  She had appropriate, pain-free motion at the mid foot, both   with varus and valgus stressing and with anterior and posterior stressing.    There was no redness, warmth or swelling.    IMPRESSION:  Plantar fasciitis.    RECOMMENDATIONS:  Foot and heel cord stretching was recommended.  Caution to   foot wear.  If symptoms recur, would refer her to Podiatry and ask her to follow   up with her PCP.      YARI/HN  dd: 08/13/2018 11:20:46 (CDT)  td: 08/13/2018 16:52:10 (CDT)  Doc ID   #[id number]  Job ID #[id number]    CC:

## 2018-08-13 NOTE — PATIENT INSTRUCTIONS
Ankle Dorsiflexion/Plantarflexion (Flexibility)    1. Sit on the floor or in bed with your legs straight in front of you.  2. Point both feet. Then flex both feet.  3. Do this 10 to 30 times in a row.  4. Repeat this exercise 2 times a day, or as instructed.  Date Last Reviewed: 5/1/2016 © 2000-2017 [employer]. [address] [place] [address]. All rights reserved. This information is not intended as a substitute for professional medical care. Always follow your healthcare professional's instructions.        Plantar Fasciitis  Plantar fasciitis is a painful swelling of the plantar fascia. The plantar fascia is a thick, fibrous layer of tissue. It covers the bones on the bottom of your foot. And it supports the foot bones in an arched position.  This can happen gradually or suddenly. It usually affects one foot at a time. Heel pain can be sharp, like a knife sticking into the bottom of your foot. You may feel pain after exercising, long-distance jogging, stair climbing, long periods of standing, or after standing up.  Risk factors include: non-active lifestyle, arthritis, diabetes, obesity or recent weight gain, flat foot, high arch. Wearing high heels, loose shoes, or shoes with poor arch support for long periods of time adds to the risk. This problem is commonly found in runners and dancers. It also found in people who stand on hard surfaces for long periods of time.  Foot pain from this condition is usually worse in the morning. But it improves with walking. By the end of the day there may be a dull aching. Treatment requires short-term rest and controlling swelling. It may take up to 9 months before all symptoms go away. Rarely, a steroid injection into the foot, or surgery, may be needed.  Home care  · If you are overweight, lose weight to help healing.  · Choose supportive shoes with good arch support and shock absorbency. Replace athletic shoes when they become worn out. Dont walk  or run barefoot.  · Premade or custom-fitted shoe inserts may be helpful. Inserts made of silicone seem to be the most effective. Custom-made inserts can be provided by a podiatrist or foot specialist, physical therapist, or orthopedist.  · Premade or custom-made night splints keep the heel stretched out while you sleep. They may prevent morning pain.  · Avoid activities that stress the feet: jogging, prolonged standing or walking, contact sports, etc.  · First thing in the morning and before sports, stretch the bottom of your feet. Gently flex your ankle so the toes move toward your knee.  · Icing may help control heel pain. Apply an ice pack to the heel for 10-20 minutes as a preventive. Or ice your heel after a severe flare-up of symptoms. You may repeat this every 1-2 hours as needed.  · You may use over-the-counter pain medicine to control pain, unless another medicine was prescribed. Anti-inflammatory pain medicines, such as ibuprofen or naproxen, may work better than acetaminophen. If you have chronic liver or kidney disease or ever had a stomach ulcer or GI bleeding, talk with your healthcare provider before using these medicines.  Follow-up care  Follow up with your healthcare provider, physical therapist, or podiatrist or foot specialist as advised.  Call for an appointment if pain worsens or there is no relief after a few weeks of home treatment. Shoe inserts, a night splint, or a special boot may be required.  If X-rays were taken, you will be told of any new findings that may affect your care.  When to seek medical advice  Call your healthcare provider right away if any of these occur:  · Foot swelling  · Redness with increasing pain  Date Last Reviewed: 11/21/2015  © 8376-3138 [employer]. [address] [place] [address], [place] [address]. All rights reserved. This information is not intended as a substitute for professional medical care. Always follow your healthcare professional's  instructions.        Treating Plantar Fasciitis  First, your healthcare provider tries to determine the cause of your problem in order to suggest ways to relieve pain. If your pain is due to poor foot mechanics, custom-made shoe inserts (orthoses) may help.    Reduce symptoms  · To relieve mild symptoms, try aspirin, ibuprofen, or other medicines as directed. Rubbing ice on the affected area may also help.  · To reduce severe pain and swelling, your healthcare provider may prescribe pills or injections or a walking cast in some instances. Physical therapy, such as ultrasound or a daily stretching program, may also be recommended. Surgery is rarely required.  · To reduce symptoms caused by poor foot mechanics, your foot may be taped. This supports the arch and temporarily controls movement. Night splints may also help by stretching the fascia.  Control movement  If taping helps, your healthcare provider may prescribe orthoses. Built from plaster casts of your feet, these inserts control the way your foot moves. As a result, your symptoms should go away.  Reduce overuse  Every time your foot strikes the ground, the plantar fascia is stretched. You can reduce the strain on the plantar fascia and the possibility of overuse by following these suggestions:  · Lose any excess weight.  · Avoid running on hard or uneven ground.  · Use orthoses at all times in your shoes and house slippers.  If surgery is needed  Your healthcare provider may consider surgery if other types of treatment don't control your pain. During surgery, the plantar fascia is partially cut to release tension. As you heal, fibrous tissue fills the space between the heel bone and the plantar fascia.   Date Last Reviewed: 10/14/2015  © 1508-9747 [employer]. [address] [place] [address], [place] [address]. All rights reserved. This information is not intended as a substitute for professional medical care. Always follow your healthcare professional's  instructions.        Toe Extension (Flexibility)    These instructions are for your right foot. Switch sides for your left foot.  5. Sit in a chair. Rest your right ankle on your left knee.  6. Hold your toes with your right hand. Gently bend the toes backward. Feel a stretch in the undersides of the toes and ball of the foot. Hold for 30 to 60 seconds.  7. Then gently bend the toes in the other direction. Gently press on them until your foot is pointed. Hold for 30 to 60 seconds.  8. Repeat 5 times, or as instructed.  Date Last Reviewed: 5/1/2016  © 1709-9088 [employer]. [address] [place] [address], [place] [address]. All rights reserved. This information is not intended as a substitute for professional medical care. Always follow your healthcare professional's instructions.

## 2018-08-31 DIAGNOSIS — I10 ESSENTIAL HYPERTENSION: ICD-10-CM

## 2018-08-31 RX ORDER — IBUPROFEN 800 MG/1
TABLET ORAL
Qty: 90 TABLET | Refills: 1 | OUTPATIENT
Start: 2018-08-31

## 2018-08-31 RX ORDER — VALSARTAN 160 MG/1
TABLET ORAL
Qty: 90 TABLET | Refills: 2 | Status: SHIPPED | OUTPATIENT
Start: 2018-08-31 | End: 2018-12-18

## 2018-08-31 RX ORDER — IBUPROFEN 800 MG/1
TABLET ORAL
Qty: 90 TABLET | Refills: 0 | Status: SHIPPED | OUTPATIENT
Start: 2018-08-31 | End: 2019-02-02 | Stop reason: SDUPTHER

## 2018-08-31 NOTE — TELEPHONE ENCOUNTER
Message Contents   Nicole GUERRA Larsen Sandra M Staff   Caller: [phone] (Today,  4:22 PM)             Type: Rx     Name of medication(s): ibuprofen (ADVIL,MOTRIN) 800 MG tablet 90 tablet     Is this a refill? New rx? New prescription     Who prescribed medication? Larsen     Pharmacy Name, Phone, & Location:[facility]/pharmacy #[id number] [place] [address]       Comments:  Patient would like to know if the doctor can call in this new prescription for her.   Please advise, thank you.      Please advise  Thank you

## 2018-08-31 NOTE — TELEPHONE ENCOUNTER
----- Message from Nicole Paz sent at 8/31/2018  4:22 PM CDT -----  Contact: [phone]  Type: Rx    Name of medication(s): ibuprofen (ADVIL,MOTRIN) 800 MG tablet 90 tablet     Is this a refill? New rx? New prescription    Who prescribed medication? Saline    Pharmacy Name, Phone, & Location:[facility]/pharmacy #[id number] [place] [address]      Comments:  Patient would like to know if the doctor can call in this new prescription for her.  Please advise, thank you.

## 2018-09-02 DIAGNOSIS — E78.5 HYPERLIPIDEMIA, UNSPECIFIED HYPERLIPIDEMIA TYPE: ICD-10-CM

## 2018-09-02 RX ORDER — SIMVASTATIN 40 MG/1
TABLET, FILM COATED ORAL
Qty: 90 TABLET | Refills: 2 | Status: SHIPPED | OUTPATIENT
Start: 2018-09-02 | End: 2019-05-21

## 2018-09-21 ENCOUNTER — IMMUNIZATION (OUTPATIENT)
Dept: INTERNAL MEDICINE | Facility: CLINIC | Age: 73
End: 2018-09-21
Payer: MEDICARE

## 2018-09-21 PROCEDURE — 90662 IIV NO PRSV INCREASED AG IM: CPT | Mod: PBBFAC

## 2018-11-29 DIAGNOSIS — Z29.9 PREVENTIVE MEASURE: ICD-10-CM

## 2018-11-29 DIAGNOSIS — E03.4 HYPOTHYROIDISM DUE TO ACQUIRED ATROPHY OF THYROID: ICD-10-CM

## 2018-11-29 DIAGNOSIS — E78.5 HYPERLIPIDEMIA, UNSPECIFIED HYPERLIPIDEMIA TYPE: Primary | ICD-10-CM

## 2018-11-29 RX ORDER — LEVOTHYROXINE SODIUM 100 UG/1
TABLET ORAL
Qty: 90 TABLET | Refills: 0 | Status: SHIPPED | OUTPATIENT
Start: 2018-11-29 | End: 2019-02-22 | Stop reason: SDUPTHER

## 2018-11-30 ENCOUNTER — OFFICE VISIT (OUTPATIENT)
Dept: INTERNAL MEDICINE | Facility: CLINIC | Age: 73
End: 2018-11-30
Payer: MEDICARE

## 2018-11-30 ENCOUNTER — NURSE TRIAGE (OUTPATIENT)
Dept: ADMINISTRATIVE | Facility: CLINIC | Age: 73
End: 2018-11-30

## 2018-11-30 VITALS
DIASTOLIC BLOOD PRESSURE: 90 MMHG | OXYGEN SATURATION: 97 % | HEIGHT: 68 IN | HEART RATE: 69 BPM | SYSTOLIC BLOOD PRESSURE: 168 MMHG | BODY MASS INDEX: 31.28 KG/M2 | WEIGHT: 206.38 LBS

## 2018-11-30 DIAGNOSIS — R07.9 CHEST PAIN, UNSPECIFIED TYPE: Primary | ICD-10-CM

## 2018-11-30 DIAGNOSIS — I10 HYPERTENSION, UNSPECIFIED TYPE: ICD-10-CM

## 2018-11-30 PROCEDURE — 3080F DIAST BP >= 90 MM HG: CPT | Mod: CPTII,S$GLB,, | Performed by: INTERNAL MEDICINE

## 2018-11-30 PROCEDURE — 99214 OFFICE O/P EST MOD 30 MIN: CPT | Mod: S$GLB,,, | Performed by: INTERNAL MEDICINE

## 2018-11-30 PROCEDURE — 99999 PR PBB SHADOW E&M-EST. PATIENT-LVL III: CPT | Mod: PBBFAC,,, | Performed by: INTERNAL MEDICINE

## 2018-11-30 PROCEDURE — 93010 ELECTROCARDIOGRAM REPORT: CPT | Mod: S$GLB,,, | Performed by: INTERNAL MEDICINE

## 2018-11-30 PROCEDURE — 3077F SYST BP >= 140 MM HG: CPT | Mod: CPTII,S$GLB,, | Performed by: INTERNAL MEDICINE

## 2018-11-30 PROCEDURE — 1101F PT FALLS ASSESS-DOCD LE1/YR: CPT | Mod: CPTII,S$GLB,, | Performed by: INTERNAL MEDICINE

## 2018-11-30 PROCEDURE — 93005 ELECTROCARDIOGRAM TRACING: CPT | Mod: S$GLB,,, | Performed by: INTERNAL MEDICINE

## 2018-11-30 RX ORDER — VALSARTAN 320 MG/1
320 TABLET ORAL DAILY
Qty: 90 TABLET | Refills: 3 | Status: SHIPPED | OUTPATIENT
Start: 2018-11-30 | End: 2019-08-26 | Stop reason: SDUPTHER

## 2018-11-30 NOTE — TELEPHONE ENCOUNTER
Reason for Disposition   Patient wants to be seen    Protocols used:  HIGH BLOOD PRESSURE-A-OH    Elena called with concerns that for the last few days her BP, normally 140'/60's-70's, is now 187/72, and 177/83 (taken just before this call), with pulse 64.  She states she has slight dizziness when standing up, but is intermittent and fleeting.  All other triage questions negative.  She does take HCTZ 12.5 mg daily.  Home care advice given; encouraged low sodium diet, hydration with water. She would like to be evaluated in clinic; no appt available with Dr Lyon and she requests Dr Queen.  Appt made with Dr Queen at 1840 this evening. Message to Dr Queen and Sandra Lyon MD, pcp.  Please contact caller directly with any additional care advice.

## 2018-11-30 NOTE — TELEPHONE ENCOUNTER
Izaiah, please call pt to schedule fasting lab prior to her RTC Appt; I've placed the lab orders.  Thanks

## 2018-12-01 NOTE — PROGRESS NOTES
Subjective:       Patient ID: Elena Block is a 73 y.o. female.    Chief Complaint: Hypertension    Complains of episodic dizziness and headache and has found that this occurs when her BP is up.  At home BP is 170's/90's tonight in clinic 168/90.  Takes her meds religiously.  No increased salt or weight gain.  Also has some chest discomfort when BP up      Review of Systems   Constitutional: Negative for activity change, chills, fatigue and fever.   HENT: Negative for congestion, ear pain, nosebleeds, postnasal drip, sinus pressure and sore throat.    Eyes: Negative.  Negative for visual disturbance.   Respiratory: Negative for cough, chest tightness, shortness of breath and wheezing.    Cardiovascular: Negative for chest pain.   Gastrointestinal: Negative for abdominal pain, diarrhea, nausea and vomiting.   Genitourinary: Negative for difficulty urinating, dysuria, frequency and urgency.   Musculoskeletal: Negative for arthralgias and neck stiffness.   Skin: Negative for rash.   Neurological: Negative for dizziness, weakness and headaches.   Psychiatric/Behavioral: Negative for sleep disturbance. The patient is not nervous/anxious.        Objective:      Physical Exam   Constitutional: She is oriented to person, place, and time. She appears well-developed and well-nourished.  Non-toxic appearance. No distress.   HENT:   Head: Normocephalic and atraumatic.   Right Ear: Tympanic membrane, external ear and ear canal normal.   Left Ear: Tympanic membrane, external ear and ear canal normal.   Eyes: EOM are normal. Pupils are equal, round, and reactive to light. No scleral icterus.   Neck: Normal range of motion. Neck supple. No thyromegaly present.   Cardiovascular: Normal rate, regular rhythm and normal heart sounds.   Pulmonary/Chest: Effort normal and breath sounds normal.   Abdominal: Soft. Bowel sounds are normal. She exhibits no mass. There is no tenderness. There is no rebound.   Musculoskeletal: Normal range  of motion.   Lymphadenopathy:     She has no cervical adenopathy.   Neurological: She is alert and oriented to person, place, and time. She has normal reflexes. She displays normal reflexes. No cranial nerve deficit. She exhibits normal muscle tone. Coordination normal.   Skin: Skin is warm and dry.   Psychiatric: She has a normal mood and affect. Her behavior is normal.       Assessment:       1. Chest pain, unspecified type    2. Hypertension, unspecified type        Plan:   Elena was seen today for hypertension.    Diagnoses and all orders for this visit:    Chest pain, unspecified type  -     EKG 12-lead    Hypertension, unspecified type    Other orders  -     valsartan (DIOVAN) 320 MG tablet; Take 1 tablet (320 mg total) by mouth once daily.    Increasing Diovan to 320 mg.  Patient to send BP readings to Dr Lyon for follow up

## 2018-12-03 ENCOUNTER — TELEPHONE (OUTPATIENT)
Dept: INTERNAL MEDICINE | Facility: CLINIC | Age: 73
End: 2018-12-03

## 2018-12-03 DIAGNOSIS — R07.9 CHEST PAIN, UNSPECIFIED TYPE: Primary | ICD-10-CM

## 2018-12-03 NOTE — TELEPHONE ENCOUNTER
Izaiah, pt's BPs are better on the 320mg Valsartan, which is good.  Because of her chest pain which she had in June and again with Dr Queen recently, I would like her to do the Stress ECHO we discussed. Actually she needs fasting lab and the Stress ECHO before her RTC in January. Orders are in so you can schedule such.  Thanks Izaiah.

## 2018-12-03 NOTE — TELEPHONE ENCOUNTER
----- Message from Maryanne Stratton sent at 12/3/2018  9:16 AM CST -----  Contact: Patient [phone]  Patient was seen in urgent care. Urgent care changed patients Medication. Medication(valsartan (DIOVAN) changed from 160mg  to 320mg.    Client is reporting the following  BP readings  Sat. 160/83 pulse 77  Sunday 120/74 pulse 79  Today 146/78 pulse 61    Patient request call back    Please call and advise  Thank you

## 2018-12-05 ENCOUNTER — TELEPHONE (OUTPATIENT)
Dept: OPTOMETRY | Facility: CLINIC | Age: 73
End: 2018-12-05

## 2018-12-05 ENCOUNTER — HOSPITAL ENCOUNTER (OUTPATIENT)
Dept: CARDIOLOGY | Facility: CLINIC | Age: 73
Discharge: HOME | End: 2018-12-05
Attending: INTERNAL MEDICINE
Payer: MEDICARE

## 2018-12-05 VITALS — HEIGHT: 68 IN | WEIGHT: 206 LBS | BODY MASS INDEX: 31.22 KG/M2

## 2018-12-05 DIAGNOSIS — R07.9 CHEST PAIN, UNSPECIFIED TYPE: ICD-10-CM

## 2018-12-05 LAB
ASCENDING AORTA: 2.59 CM
AV INDEX (PROSTH): 0.76
AV MEAN GRADIENT: 4.34 MMHG
AV PEAK GRADIENT: 10.89 MMHG
AV VALVE AREA: 2.36 CM2
BSA FOR ECHO PROCEDURE: 2.12 M2
CV ECHO LV RWT: 0.42 CM
CV STRESS BASE HR: 66
DIASTOLIC BLOOD PRESSURE: 71
DOP CALC AO PEAK VEL: 1.65 M/S
DOP CALC AO VTI: 27.39 CM
DOP CALC LVOT AREA: 3.11 CM2
DOP CALC LVOT DIAMETER: 1.99 CM
DOP CALC LVOT STROKE VOLUME: 64.51 CM3
DOP CALCLVOT PEAK VEL VTI: 20.75 CM
E WAVE DECELERATION TIME: 244.36 MSEC
E/A RATIO: 0.71
E/E' RATIO: 5.65
ECHO LV POSTERIOR WALL: 0.86 CM (ref 0.6–1.1)
FRACTIONAL SHORTENING: 32 % (ref 28–44)
INTERVENTRICULAR SEPTUM: 0.9 CM (ref 0.6–1.1)
IVRT: 0.11 MSEC
LA MAJOR: 5.02 CM
LA MINOR: 5.34 CM
LA WIDTH: 2.98 CM
LEFT ATRIUM SIZE: 2.83 CM
LEFT ATRIUM VOLUME INDEX: 17.9 ML/M2
LEFT ATRIUM VOLUME: 37.1 CM3
LEFT INTERNAL DIMENSION IN SYSTOLE: 2.76 CM (ref 2.1–4)
LEFT VENTRICLE DIASTOLIC VOLUME INDEX: 35.33 ML/M2
LEFT VENTRICLE DIASTOLIC VOLUME: 73.12 ML
LEFT VENTRICLE MASS INDEX: 52.8 G/M2
LEFT VENTRICLE SYSTOLIC VOLUME INDEX: 13.7 ML/M2
LEFT VENTRICLE SYSTOLIC VOLUME: 28.44 ML
LEFT VENTRICULAR INTERNAL DIMENSION IN DIASTOLE: 4.07 CM (ref 3.5–6)
LEFT VENTRICULAR MASS: 109.37 G
LV LATERAL E/E' RATIO: 4.8
LV SEPTAL E/E' RATIO: 6.86
MV PEAK A VEL: 0.68 M/S
MV PEAK E VEL: 0.48 M/S
OHS CV CPX 1 MINUTE RECOVERY HEART RATE: 105 BPM
OHS CV CPX 85 PERCENT MAX PREDICTED HEART RATE MALE: 121
OHS CV CPX ESTIMATED METS: 6
OHS CV CPX MAX PREDICTED HEART RATE: 143
OHS CV CPX PATIENT IS FEMALE: 1
OHS CV CPX PATIENT IS MALE: 0
OHS CV CPX PEAK DIASTOLIC BLOOD PRESSURE: 103 MMHG
OHS CV CPX PEAK HEAR RATE: 137
OHS CV CPX PEAK RATE PRESSURE PRODUCT: NORMAL
OHS CV CPX PEAK SYSTOLIC BLOOD PRESSURE: 159
OHS CV CPX PERCENT MAX PREDICTED HEART RATE ACHIEVED: 96
OHS CV CPX PERCENT TARGET HEART RATE ACHIEVED: 113.22
OHS CV CPX RATE PRESSURE PRODUCT PRESENTING: 9834
OHS CV CPX TARGET HEART RATE: 121
PISA TR MAX VEL: 2.71 M/S
PULM VEIN S/D RATIO: 1.3
PV PEAK D VEL: 0.4 M/S
PV PEAK S VEL: 0.52 M/S
RA MAJOR: 4.89 CM
RA WIDTH: 3.19 CM
RIGHT VENTRICULAR END-DIASTOLIC DIMENSION: 2.84 CM
RV TISSUE DOPPLER FREE WALL SYSTOLIC VELOCITY 1 (APICAL 4 CHAMBER VIEW): 10.72 M/S
SINUS: 2.93 CM
STJ: 2.07 CM
STRESS ECHO POST EXERCISE DUR MIN: 3 MIN
STRESS ECHO POST EXERCISE DUR SEC: 43
STRESS ST DEPRESSION: 0.5 MM
SYSTOLIC BLOOD PRESSURE: 149
TDI LATERAL: 0.1
TDI SEPTAL: 0.07
TDI: 0.09
TR MAX PG: 29.38 MMHG
TRICUSPID ANNULAR PLANE SYSTOLIC EXCURSION: 1.98 CM

## 2018-12-05 PROCEDURE — 93351 STRESS TTE COMPLETE: CPT | Mod: S$GLB,,, | Performed by: INTERNAL MEDICINE

## 2018-12-12 ENCOUNTER — TELEPHONE (OUTPATIENT)
Dept: INTERNAL MEDICINE | Facility: CLINIC | Age: 73
End: 2018-12-12

## 2018-12-12 NOTE — TELEPHONE ENCOUNTER
Spoke with pt ie her Stress ECHO(12/5)-showed normal LVF and no evidence of ischemia.  She is finally a little better on the Valsartan 320mg increased per Dr Queen 11/30; her BP today was 146/70. Today she has had no CP or SOB.  Izaiah, please call pt to schedule a nurse BP check next Wednesday.  Thanks

## 2018-12-12 NOTE — TELEPHONE ENCOUNTER
"----- Message from Rach Babcock LPN sent at 12/10/2018  2:00 PM CST -----  Patient requesting stress echo results, also stated she's still having chest heaviness, flutters, and "feels like her is racing".  Please advise.    Rach  "

## 2018-12-18 ENCOUNTER — TELEPHONE (OUTPATIENT)
Dept: INTERNAL MEDICINE | Facility: CLINIC | Age: 73
End: 2018-12-18

## 2018-12-18 ENCOUNTER — CLINICAL SUPPORT (OUTPATIENT)
Dept: INTERNAL MEDICINE | Facility: CLINIC | Age: 73
End: 2018-12-18
Payer: MEDICARE

## 2018-12-18 VITALS — SYSTOLIC BLOOD PRESSURE: 177 MMHG | DIASTOLIC BLOOD PRESSURE: 92 MMHG

## 2018-12-18 DIAGNOSIS — I10 ESSENTIAL HYPERTENSION: ICD-10-CM

## 2018-12-18 PROCEDURE — 99999 PR PBB SHADOW E&M-EST. PATIENT-LVL I: CPT | Mod: PBBFAC,,,

## 2018-12-18 RX ORDER — METOPROLOL SUCCINATE 50 MG/1
50 TABLET, EXTENDED RELEASE ORAL DAILY
Qty: 90 TABLET | Refills: 1 | Status: SHIPPED | OUTPATIENT
Start: 2018-12-18 | End: 2019-05-28 | Stop reason: SDUPTHER

## 2018-12-18 RX ORDER — METOPROLOL SUCCINATE 25 MG/1
50 TABLET, EXTENDED RELEASE ORAL DAILY
Qty: 90 TABLET | Refills: 1 | Status: SHIPPED | OUTPATIENT
Start: 2018-12-18 | End: 2018-12-18

## 2018-12-18 NOTE — TELEPHONE ENCOUNTER
Patient came in for b/p check. Blood pressure reading 160/80 lt arm (manual)  177/92 lt arm( patient's own b/p machine)

## 2018-12-18 NOTE — PROGRESS NOTES
Patient came in for b/p check. Blood pressure reading 160/80 lt arm (manual)  177/92 lt arm( patient's own b/p machine) Dr Lyon notified of blood pressure.

## 2018-12-18 NOTE — TELEPHONE ENCOUNTER
Spoke with pt ie her BPs running high and will increase Toprol XL to 50mg QD.  Pt will exchange new BP cuff for a another one as it seems to be reading high.  Izaiah, please call pt to schedule fasting lab 1 week prior to pt's RTC Appt in January.  Thanks

## 2018-12-19 ENCOUNTER — TELEPHONE (OUTPATIENT)
Dept: INTERNAL MEDICINE | Facility: CLINIC | Age: 73
End: 2018-12-19

## 2018-12-19 NOTE — TELEPHONE ENCOUNTER
----- Message from Divya Muller sent at 12/19/2018  9:27 AM CST -----  Contact: [phone]   Patient is returning a phone call.  Who left a message for the patient: Sushil  Does patient know what this is regarding:  no  Comments: please advise, thanks

## 2018-12-29 NOTE — PROGRESS NOTES
I offered to discuss end of life issues, including information on how to make advance directives that the patient could use to name someone who would make medical decisions on their behalf if they became too ill to make themselves.    ___Patient declined  _X_Patient is interested, I provided paper work and offered to discuss.   PATIENT WAS TRIAGED FOR FEVER WITH CALL PCP DISPOSITION . CALLER WHO IS THE PATIENT'S MOTHER STAN STATED THAT THE PATIENT WAS SEEN YESTERDAY BY THE PROVIDER FOR PINK EYE, COUGH AND RUNNY NOSE AND NOW HE IS HAVING FEVER. PLEASE CALL FOR ADVICE. [phone] [address],  2015 AND CONTACT # [phone]. ROGELIO ACC VICENTE.    DR. COTO BUT THE CORRECT CONTACT # FOR THE PATIENT Abiola العراقي -[phone]. PLEASE DISREGARD THE PREVIOUS # THAT I'VE SENT EARLIER. THANK YOU. ROGELIO ACC RN.

## 2019-01-09 ENCOUNTER — PES CALL (OUTPATIENT)
Dept: ADMINISTRATIVE | Facility: CLINIC | Age: 74
End: 2019-01-09

## 2019-01-15 ENCOUNTER — LAB VISIT (OUTPATIENT)
Dept: LAB | Facility: HOSPITAL | Age: 74
End: 2019-01-15
Attending: INTERNAL MEDICINE
Payer: MEDICARE

## 2019-01-15 DIAGNOSIS — E78.5 HYPERLIPIDEMIA, UNSPECIFIED HYPERLIPIDEMIA TYPE: ICD-10-CM

## 2019-01-15 DIAGNOSIS — E03.4 HYPOTHYROIDISM DUE TO ACQUIRED ATROPHY OF THYROID: ICD-10-CM

## 2019-01-15 LAB
ALBUMIN SERPL BCP-MCNC: 3.5 G/DL
ALP SERPL-CCNC: 75 U/L
ALT SERPL W/O P-5'-P-CCNC: 12 U/L
ANION GAP SERPL CALC-SCNC: 9 MMOL/L
AST SERPL-CCNC: 23 U/L
BASOPHILS # BLD AUTO: 0.03 K/UL
BASOPHILS NFR BLD: 0.3 %
BILIRUB SERPL-MCNC: 0.6 MG/DL
BUN SERPL-MCNC: 15 MG/DL
CALCIUM SERPL-MCNC: 9.6 MG/DL
CHLORIDE SERPL-SCNC: 102 MMOL/L
CHOLEST SERPL-MCNC: 148 MG/DL
CHOLEST/HDLC SERPL: 3.3 {RATIO}
CO2 SERPL-SCNC: 26 MMOL/L
CREAT SERPL-MCNC: 0.8 MG/DL
DIFFERENTIAL METHOD: NORMAL
EOSINOPHIL # BLD AUTO: 0.1 K/UL
EOSINOPHIL NFR BLD: 0.8 %
ERYTHROCYTE [DISTWIDTH] IN BLOOD BY AUTOMATED COUNT: 13.1 %
EST. GFR  (AFRICAN AMERICAN): >60 ML/MIN/1.73 M^2
EST. GFR  (NON AFRICAN AMERICAN): >60 ML/MIN/1.73 M^2
GLUCOSE SERPL-MCNC: 88 MG/DL
HCT VFR BLD AUTO: 41.7 %
HDLC SERPL-MCNC: 45 MG/DL
HDLC SERPL: 30.4 %
HGB BLD-MCNC: 13.7 G/DL
IMM GRANULOCYTES # BLD AUTO: 0.02 K/UL
IMM GRANULOCYTES NFR BLD AUTO: 0.2 %
LDLC SERPL CALC-MCNC: 80.6 MG/DL
LYMPHOCYTES # BLD AUTO: 2.7 K/UL
LYMPHOCYTES NFR BLD: 29.5 %
MCH RBC QN AUTO: 28.5 PG
MCHC RBC AUTO-ENTMCNC: 32.9 G/DL
MCV RBC AUTO: 87 FL
MONOCYTES # BLD AUTO: 0.7 K/UL
MONOCYTES NFR BLD: 8 %
NEUTROPHILS # BLD AUTO: 5.5 K/UL
NEUTROPHILS NFR BLD: 61.2 %
NONHDLC SERPL-MCNC: 103 MG/DL
NRBC BLD-RTO: 0 /100 WBC
PLATELET # BLD AUTO: 285 K/UL
PMV BLD AUTO: 9.9 FL
POTASSIUM SERPL-SCNC: 3.7 MMOL/L
PROT SERPL-MCNC: 7.6 G/DL
RBC # BLD AUTO: 4.81 M/UL
SODIUM SERPL-SCNC: 137 MMOL/L
TRIGL SERPL-MCNC: 112 MG/DL
TSH SERPL DL<=0.005 MIU/L-ACNC: 0.79 UIU/ML
WBC # BLD AUTO: 9.02 K/UL

## 2019-01-15 PROCEDURE — 85025 COMPLETE CBC W/AUTO DIFF WBC: CPT

## 2019-01-15 PROCEDURE — 36415 COLL VENOUS BLD VENIPUNCTURE: CPT | Mod: PO

## 2019-01-15 PROCEDURE — 80061 LIPID PANEL: CPT

## 2019-01-15 PROCEDURE — 80053 COMPREHEN METABOLIC PANEL: CPT

## 2019-01-15 PROCEDURE — 84443 ASSAY THYROID STIM HORMONE: CPT

## 2019-02-03 RX ORDER — IBUPROFEN 800 MG/1
TABLET ORAL
Qty: 90 TABLET | Refills: 0 | Status: SHIPPED | OUTPATIENT
Start: 2019-02-03 | End: 2019-03-04 | Stop reason: SDUPTHER

## 2019-02-18 ENCOUNTER — OFFICE VISIT (OUTPATIENT)
Dept: INTERNAL MEDICINE | Facility: CLINIC | Age: 74
End: 2019-02-18
Payer: MEDICARE

## 2019-02-18 VITALS
WEIGHT: 207.44 LBS | DIASTOLIC BLOOD PRESSURE: 82 MMHG | HEIGHT: 68 IN | HEART RATE: 72 BPM | BODY MASS INDEX: 31.44 KG/M2 | SYSTOLIC BLOOD PRESSURE: 140 MMHG

## 2019-02-18 DIAGNOSIS — H40.059 OCULAR HYPERTENSION, UNSPECIFIED LATERALITY: ICD-10-CM

## 2019-02-18 DIAGNOSIS — I10 ESSENTIAL HYPERTENSION: ICD-10-CM

## 2019-02-18 DIAGNOSIS — E66.9 OBESITY (BMI 30.0-34.9): ICD-10-CM

## 2019-02-18 DIAGNOSIS — E78.5 HYPERLIPIDEMIA, UNSPECIFIED HYPERLIPIDEMIA TYPE: ICD-10-CM

## 2019-02-18 DIAGNOSIS — E89.0 POST-SURGICAL HYPOTHYROIDISM: ICD-10-CM

## 2019-02-18 DIAGNOSIS — I70.0 ATHEROSCLEROSIS OF AORTA: ICD-10-CM

## 2019-02-18 DIAGNOSIS — Z00.00 ENCOUNTER FOR PREVENTIVE HEALTH EXAMINATION: Primary | ICD-10-CM

## 2019-02-18 PROCEDURE — G0439 PPPS, SUBSEQ VISIT: HCPCS | Mod: HCNC,S$GLB,, | Performed by: NURSE PRACTITIONER

## 2019-02-18 PROCEDURE — 3077F SYST BP >= 140 MM HG: CPT | Mod: HCNC,CPTII,S$GLB, | Performed by: NURSE PRACTITIONER

## 2019-02-18 PROCEDURE — 99499 UNLISTED E&M SERVICE: CPT | Mod: HCNC,S$GLB,, | Performed by: NURSE PRACTITIONER

## 2019-02-18 PROCEDURE — 3079F DIAST BP 80-89 MM HG: CPT | Mod: HCNC,CPTII,S$GLB, | Performed by: NURSE PRACTITIONER

## 2019-02-18 PROCEDURE — G0439 PR MEDICARE ANNUAL WELLNESS SUBSEQUENT VISIT: ICD-10-PCS | Mod: HCNC,S$GLB,, | Performed by: NURSE PRACTITIONER

## 2019-02-18 PROCEDURE — 3077F PR MOST RECENT SYSTOLIC BLOOD PRESSURE >= 140 MM HG: ICD-10-PCS | Mod: HCNC,CPTII,S$GLB, | Performed by: NURSE PRACTITIONER

## 2019-02-18 PROCEDURE — 3079F PR MOST RECENT DIASTOLIC BLOOD PRESSURE 80-89 MM HG: ICD-10-PCS | Mod: HCNC,CPTII,S$GLB, | Performed by: NURSE PRACTITIONER

## 2019-02-18 PROCEDURE — 99999 PR PBB SHADOW E&M-EST. PATIENT-LVL IV: CPT | Mod: PBBFAC,HCNC,, | Performed by: NURSE PRACTITIONER

## 2019-02-18 PROCEDURE — 99499 RISK ADDL DX/OHS AUDIT: ICD-10-PCS | Mod: HCNC,S$GLB,, | Performed by: NURSE PRACTITIONER

## 2019-02-18 PROCEDURE — 99999 PR PBB SHADOW E&M-EST. PATIENT-LVL IV: ICD-10-PCS | Mod: PBBFAC,HCNC,, | Performed by: NURSE PRACTITIONER

## 2019-02-18 NOTE — PROGRESS NOTES
"Elena Block presented for a  Medicare AWV and comprehensive Health Risk Assessment today. The following components were reviewed and updated:    · Medical history  · Family History  · Social history  · Allergies and Current Medications  · Health Risk Assessment  · Health Maintenance  · Care Team     ** See Completed Assessments for Annual Wellness Visit within the encounter summary.**       The following assessments were completed:  · Living Situation  · CAGE  · Depression Screening  · Timed Get Up and Go  · Whisper Test  · Cognitive Function Screening  ·   ·   ·   · Nutrition Screening  · ADL Screening  · PAQ Screening    Vitals:    02/18/19 0853   BP: (!) 140/82   BP Location: Left arm   Pulse: 72   Weight: 94.1 kg (207 lb 7.3 oz)   Height: 5' 8" (1.727 m)     Body mass index is 31.54 kg/m².  Physical Exam   Constitutional: She is oriented to person, place, and time.   Overweight   HENT:   Head: Normocephalic.   Cardiovascular: Normal rate and regular rhythm.   Pulmonary/Chest: Effort normal and breath sounds normal.   Abdominal: Soft. Bowel sounds are normal.   Musculoskeletal: Normal range of motion. She exhibits no edema.   Neurological: She is alert and oriented to person, place, and time. She exhibits normal muscle tone.   Skin: Skin is warm and dry.   Psychiatric: She has a normal mood and affect.   Nursing note and vitals reviewed.        Diagnoses and health risks identified today and associated recommendations/orders:    1. Encounter for preventive health examination  Here for Health Risk Assessment/Annual Wellness Visit.  Health maintenance reviewed and updated. Follow up in one year.  Prescription given for Shingrix.    2. Essential hypertension  Chronic, B/P mildly elevated today. Reports home readings 130-140s. Followed by PCP.  - Hypertension Digital Medicine (HDMP) Enrollment Order    3. Atherosclerosis of aorta  Chronic, stable on current medications. Noted CXR 7/12/17. Followed by PCP.    4. " Hyperlipidemia, unspecified hyperlipidemia type  Chronic, stable on current medication. Followed by PCP.    5. Post-surgical hypothyroidism  Chronic, stable on current medication. Followed by PCP.    6. Obesity (BMI 30.0-34.9)  Chronic, weight stable. Walking 3 times weekly. Followed by PCP.    7. Ocular hypertension, unspecified laterality  Chronic, stable. Followed by Optometry.       Provided Elena with a 5-10 year written screening schedule and personal prevention plan. Recommendations were developed using the USPSTF age appropriate recommendations. Education, counseling, and referrals were provided as needed. After Visit Summary printed and given to patient which includes a list of additional screenings\tests needed.    Follow-up in 3 weeks (on 3/13/2019).with PCP    Sujey Medina NP

## 2019-02-18 NOTE — PATIENT INSTRUCTIONS
Counseling and Referral of Other Preventative  (Italic type indicates deductible and co-insurance are waived)    Patient Name: Elena Block  Today's Date: 2/18/2019    Health Maintenance       Date Due Completion Date    Mammogram 06/11/2019 6/11/2018    Fecal Occult Blood Test (FOBT)/FitKit 06/18/2019 6/18/2018    High Dose Statin 09/02/2019 9/2/2018    Lipid Panel 01/15/2020 1/15/2019    TETANUS VACCINE 10/01/2024 10/1/2014        No orders of the defined types were placed in this encounter.    The following information is provided to all patients.  This information is to help you find resources for any of the problems found today that may be affecting your health:                Living healthy guide: www.[facility].[place].gov      Understanding Diabetes: www.diabetes.org      Eating healthy: www.cdc.gov/healthyweight      [facility] home safety checklist: www.cdc.gov/steadi/patient.html      Agency on Aging: www.goea.[place].gov      Alcoholics anonymous (AA): www.aa.org      Physical Activity: www.pamela.nih.gov/ab0awmv      Tobacco use: www.quitwithusla.org

## 2019-02-22 DIAGNOSIS — E03.4 HYPOTHYROIDISM DUE TO ACQUIRED ATROPHY OF THYROID: ICD-10-CM

## 2019-02-23 RX ORDER — LEVOTHYROXINE SODIUM 100 UG/1
TABLET ORAL
Qty: 90 TABLET | Refills: 0 | Status: SHIPPED | OUTPATIENT
Start: 2019-02-23 | End: 2019-06-22 | Stop reason: SDUPTHER

## 2019-02-28 ENCOUNTER — PATIENT MESSAGE (OUTPATIENT)
Dept: ADMINISTRATIVE | Facility: OTHER | Age: 74
End: 2019-02-28

## 2019-03-04 RX ORDER — IBUPROFEN 800 MG/1
TABLET ORAL
Qty: 90 TABLET | Refills: 0 | Status: SHIPPED | OUTPATIENT
Start: 2019-03-04 | End: 2019-04-02

## 2019-03-06 ENCOUNTER — PATIENT OUTREACH (OUTPATIENT)
Dept: OTHER | Facility: OTHER | Age: 74
End: 2019-03-06

## 2019-03-06 NOTE — PROGRESS NOTES
Pt returned my call while I was out of the office. Returned pt's call. Pt states that she's driving. Request to return my call later today to complete enrollment.         1st attempt for enrollment call. Left voicemail.       Last 5 Patient Entered Readings                                      Current 30 Day Average: 158/85     Recent Readings 3/5/2019 3/4/2019 3/3/2019 3/2/2019 3/1/2019    SBP (mmHg) 142 147 160 154 188    DBP (mmHg) 73 88 82 81 80    Pulse 63 56 61 58 -

## 2019-03-06 NOTE — LETTER
March 7, 2019     Elena Block  [address] [address]       Dear Elena,    [facility]! Our goal is to make care effective, proactive and convenient by using data you send us from home to better treat your chronic conditions.          My name is Laxmi Chowdary, and I am your dedicated Digital Medicine clinician. As an expert in medication management, I will help ensure that the medications you are taking continue to provide the intended benefits and help you reach your goals. You can reach me directly at [phone] or by sending me a message directly through your [facility] account.        I am Lillian Ferrell and I will be your health . My job is to help you identify lifestyle changes to improve your disease control. We will talk about nutrition, exercise, and other ways you may be able to adjust your current habits to better your health. Additionally, we will help ensure you are completing the tests and screenings that are necessary to help manage your conditions. You can reach me directly at [phone] or by sending me a message directly through your [facility] account.    Most importantly, YOU are at the center of this team. Together, we will work to improve your overall health and encourage you to meet your goals for a healthier lifestyle.     What we expect from YOU:  · Please take frequent home blood pressure measurements. We ask that you take at least 1 blood pressure reading per week, but more information will better help us get you know you. Be sure you rest for a few minutes before taking the reading in a quiet, comfortable place.     Be available to receive phone calls or [facility] messages, when appropriate, from your care team. Please let us know if there are any specific days or times that work best for us to reach you via phone.     Complete routine tests and screenings. Dont worry, we will help keep you on track!           What you  should expect from your Digital Medicine Care Team:   We will work with you to create a personalized plan of care and provide you with encouragement and education, including regarding lifestyle changes, that could help you manage your disease states.     We will adjust your current medications, if needed, and continue to monitor your long-term progress.     We will provide you and your physician with monthly progress reports after you have been in the program for more than 30 days.     We will send you reminders through [facility] and text messages to help ensure you do not miss any testing deadlines to help manage your disease states.    You will be able to reach us by phone or through your [facility] account by clicking our names under Care Team on the right side of the home screen.    I look forward to working with you to achieve your blood pressure goals!    We look forward to working with you to help manage your health,    Sincerely,    Your Digital Medicine Team    Please visit our websites to learn more:   · Hypertension: [email or web address]      Remember, we are not available for emergencies. If you have an emergency, please contact your doctors office directly or call [facility] on-call ([phone] or [phone]) or [phone].

## 2019-03-07 NOTE — PROGRESS NOTES
Digital Medicine Enrollment Call    Introduced Mrs. Elena Block to Digital Medicine.     Discussed program expectations and requirements.    Introduced digital medicine care team.     Reviewed the importance of self-monitoring for digital medicine participation.     Reviewed that the Digital Medicine team is not available for emergencies and instructed the patient to call 911 or [facility] ([phone] or [phone]) if one arises.    Pt reports that she sits on the sofa while taking her BP. Pt advised of the importance of the proper BP technique. She states that she will sit at the kitchen table while taking her BP going forward.          Last 5 Patient Entered Readings                                      Current 30 Day Average: 164/84     Recent Readings 3/7/2019 3/6/2019 3/5/2019 3/4/2019 3/3/2019    SBP (mmHg) 163 193 142 147 160    DBP (mmHg) 76 81 73 88 82    Pulse 57 64 63 56 61

## 2019-03-12 ENCOUNTER — PATIENT OUTREACH (OUTPATIENT)
Dept: OTHER | Facility: OTHER | Age: 74
End: 2019-03-12

## 2019-03-12 NOTE — PROGRESS NOTES
"Last 5 Patient Entered Readings                                      Current 30 Day Average: 161/80     Recent Readings 3/12/2019 3/10/2019 3/10/2019 3/9/2019 3/8/2019    SBP (mmHg) 157 143 161 165 155    DBP (mmHg) 77 69 77 75 70    Pulse 61 57 56 59 60          Digital Medicine: Health  Introduction    Introduced Mrs. Elena Block to Digital Medicine. Discussed health  role and recommended lifestyle modifications.    Patient reports she has an appointment with PCP tomorrow (3/13/19). Patient reports she has shoulder pain "every now and then".      Lifestyle Assessment:  Current Dietary Habits(i.e. low sodium, food labels, dining out): Patient reports she has a "good diet". Patient reports she has egg whites, banana, and 1 sausage eddie for breakfast. Patient reports she ate a homemade creole that consist of okra, tomato sauce, shrimp and sausage. Patient reports she used 2 tablespoons of white rice. Patient reports she does not eat dinner. Patient reports she looks at food labels. I would like to further discuss with patient next outreach. Patient reports she doesn't add any salt. Patient reports she is cooking at home 3x/wk.      Exercise: Patient reports she stretches and does sit up every morning. Patient reports she walks in [employer] [employer] [facility] 2x/wk with her . Encouraged patient to keep up the great work!     Alcohol/Tobacco: No tobacco use. Patient reports she was drinking beer but was giving her a headache. She reports she hasn't had a beer in a month.     Medication Adherence: has been compliant with the medicaiton regimen. Patient reports she has headaches when blood pressure is "high".       Reviewed AHA/AACE recommendations:  Limit sodium intake to <2000mg/day  Perform 150 minutes of physical activity per week    Reviewed the importance of self-monitoring, medication adherence, and that the health  can be used as a resource for lifestyle modifications to help reduce or maintain a " healthy lifestyle.  Reviewed that the Digital Medicine team is not available for emergencies and instructed the patient to call 911 or [facility] ([phone] or [phone]) if one arises.

## 2019-03-13 ENCOUNTER — OFFICE VISIT (OUTPATIENT)
Dept: INTERNAL MEDICINE | Facility: CLINIC | Age: 74
End: 2019-03-13
Payer: MEDICARE

## 2019-03-13 VITALS
SYSTOLIC BLOOD PRESSURE: 128 MMHG | DIASTOLIC BLOOD PRESSURE: 68 MMHG | WEIGHT: 205 LBS | BODY MASS INDEX: 32.18 KG/M2 | HEIGHT: 67 IN | OXYGEN SATURATION: 99 % | TEMPERATURE: 98 F | HEART RATE: 61 BPM

## 2019-03-13 DIAGNOSIS — Z12.11 COLON CANCER SCREENING: ICD-10-CM

## 2019-03-13 DIAGNOSIS — Z12.39 BREAST CANCER SCREENING: ICD-10-CM

## 2019-03-13 DIAGNOSIS — I10 ESSENTIAL HYPERTENSION: Primary | ICD-10-CM

## 2019-03-13 DIAGNOSIS — I70.0 ATHEROSCLEROSIS OF AORTA: ICD-10-CM

## 2019-03-13 DIAGNOSIS — Z00.00 ANNUAL PHYSICAL EXAM: ICD-10-CM

## 2019-03-13 DIAGNOSIS — E78.5 HYPERLIPIDEMIA, UNSPECIFIED HYPERLIPIDEMIA TYPE: ICD-10-CM

## 2019-03-13 DIAGNOSIS — E89.0 POST-SURGICAL HYPOTHYROIDISM: ICD-10-CM

## 2019-03-13 PROCEDURE — 3078F PR MOST RECENT DIASTOLIC BLOOD PRESSURE < 80 MM HG: ICD-10-PCS | Mod: HCNC,CPTII,S$GLB, | Performed by: INTERNAL MEDICINE

## 2019-03-13 PROCEDURE — 3074F SYST BP LT 130 MM HG: CPT | Mod: HCNC,CPTII,S$GLB, | Performed by: INTERNAL MEDICINE

## 2019-03-13 PROCEDURE — 99999 PR PBB SHADOW E&M-EST. PATIENT-LVL V: CPT | Mod: PBBFAC,HCNC,, | Performed by: INTERNAL MEDICINE

## 2019-03-13 PROCEDURE — 99397 PER PM REEVAL EST PAT 65+ YR: CPT | Mod: HCNC,S$GLB,, | Performed by: INTERNAL MEDICINE

## 2019-03-13 PROCEDURE — 99999 PR PBB SHADOW E&M-EST. PATIENT-LVL V: ICD-10-PCS | Mod: PBBFAC,HCNC,, | Performed by: INTERNAL MEDICINE

## 2019-03-13 PROCEDURE — 99499 RISK ADDL DX/OHS AUDIT: ICD-10-PCS | Mod: HCNC,S$GLB,, | Performed by: INTERNAL MEDICINE

## 2019-03-13 PROCEDURE — 3074F PR MOST RECENT SYSTOLIC BLOOD PRESSURE < 130 MM HG: ICD-10-PCS | Mod: HCNC,CPTII,S$GLB, | Performed by: INTERNAL MEDICINE

## 2019-03-13 PROCEDURE — 99397 PR PREVENTIVE VISIT,EST,65 & OVER: ICD-10-PCS | Mod: HCNC,S$GLB,, | Performed by: INTERNAL MEDICINE

## 2019-03-13 PROCEDURE — 3078F DIAST BP <80 MM HG: CPT | Mod: HCNC,CPTII,S$GLB, | Performed by: INTERNAL MEDICINE

## 2019-03-13 PROCEDURE — 99499 UNLISTED E&M SERVICE: CPT | Mod: HCNC,S$GLB,, | Performed by: INTERNAL MEDICINE

## 2019-03-13 NOTE — PATIENT INSTRUCTIONS
Medication Notes:  #1-For BP >160 or >90., Ok to take an extra Metoprolol tab  #2-For Blood Pressure:       AM-Take HCTZ and Valsartan       Lunch-Take Metoprolol

## 2019-03-14 NOTE — PROGRESS NOTES
Ms. Block is a 73-year-old female, known to myself, who presented to clinic   yesterday, 03/13/2019 at which time clinic billing was performed.  Her note is   being dictated today, 03/13/2019.    CHIEF COMPLAINT:  Annual followup of hypertension and hyperlipidemia.    HISTORY OF PRESENT ILLNESS:  Ms. Block presents noting that she has been doing   much better.  She does note that her blood pressures have been hectic at times,   noting that she occasionally will have a blood pressure that runs as high as   166/82.  She is following in the [facility]pertension [facility], which she recently   got started with and is happy with this, is hoping for some assistance with   controlling her pressure.  She has had no recurrent chest pain or shortness of   breath.  Once again, I have reviewed her stress echocardiogram from December,   which we have reviewed by phone, but now reviewed in person that showed   excellent cardiac function and no evidence of ischemia.  She notes that her   shoulder seemed to be doing better.  She has had improved range of motion and   only mild discomfort, does not wish to pursue any surgery or other acute   intervention.    PAST MEDICAL, SURGICAL, SOCIAL HISTORY:  Please see as thoroughly stated in EPIC   chart, which has been reviewed.    REVIEW OF SYSTEMS:  HEENT:  Positive for occasional headaches, possibly associated with elevated   blood pressures.  No change in vision, nothing significant.  No worrisome.  CARDIOPULMONARY:  No chest pain, no shortness of breath.  GASTROINTESTINAL:  No change in bowel habits.  No blood per rectum.  RHEUMATOLOGIC:  Positive for mild shoulder aching, but actually improved range   of motion and decreased discomfort.  NEUROLOGIC:  No headaches.  No focal neurological abnormalities.  Rest of review of systems is noncontributory.    PHYSICAL EXAMINATION:  VITAL SIGNS:  Weight 205 pounds, blood pressure 128/68, pulse 61, and   temperature 98.3.  GENERAL:  The patient  looks well, appears comfortable.  HEENT:  Grossly normal.  NECK:  Supple, negative for masses or thyromegaly, negative for lymphadenopathy.  LUNGS:  Clear.  HEART:  Regular rate and rhythm without arrhythmias, murmurs or gallops.  ABDOMEN:  Positive bowel sounds, soft, nontender, no masses or organomegaly.  BREASTS:  On gross visualization are normal.  On palpation, no masses, no   discharge from the nipples.  EXTREMITIES:  Negative for edema.    WORKUP:  Lab work done from 01/15/2019 showing TSH to be normal, cholesterol   148, comprehensive chemistry unremarkable, and CBC unremarkable.    ASSESSMENT AND PLAN:  1.  Essential hypertension with some occasionally volatile pressures.  Could   this be secondary to dietary issues versus other.  A.  Discussed the importance of maintaining a low-sodium diet.  B.  Continue with digital hypertension following.  C.  For now, continue on same therapy, which includes hydrochlorothiazide 12.5   mg one tablet daily along with metoprolol XL 50 mg one a day and Valsartan 320   mg daily.  D.  I have recommended to patient if she has an elevated blood pressure greater   than 160 over greater than 90-95, she is okay to take an extra Toprol-XL 50 mg   and follow up this with blood pressure checks to avoid having to go to the   Emergency Room.  E.  Return to clinic in four months for followup or see sooner if needed.  2.  Hyperlipidemia, controlled.  A.  Continue on Zocor 40 mg daily.  3.  Shoulder rotator cuff tendinitis, markedly improved with excellent range of   motion.  A.  We will continue to follow with the patient to use ibuprofen 800 mg only as   needed with food and if any worsening or recurrence, the patient will let us   know.  4.  Health maintenance.  A.  Schedule mammogram.  B.  I have placed a referral for colonoscopy.  C.  I have recommended the patient get the Shingrix vaccination.  D.  Return to clinic for blood pressure check in four months, address other   health  maintenance issues then, go from there or see sooner if needed.      FMS/HN  dd: 03/14/2019 11:20:39 (CDT)  td: 03/15/2019 00:50:54 (CDT)  Doc ID   #[id number]  Job ID #[id number]    CC:     This office note has been dictated.

## 2019-03-15 ENCOUNTER — OFFICE VISIT (OUTPATIENT)
Dept: OPTOMETRY | Facility: CLINIC | Age: 74
End: 2019-03-15
Payer: MEDICARE

## 2019-03-15 ENCOUNTER — CLINICAL SUPPORT (OUTPATIENT)
Dept: OPHTHALMOLOGY | Facility: CLINIC | Age: 74
End: 2019-03-15
Payer: MEDICARE

## 2019-03-15 DIAGNOSIS — H40.053 OCULAR HYPERTENSION, BILATERAL: Primary | ICD-10-CM

## 2019-03-15 DIAGNOSIS — H52.03 HYPEROPIA OF BOTH EYES WITH ASTIGMATISM: ICD-10-CM

## 2019-03-15 DIAGNOSIS — H52.4 PRESBYOPIA OF BOTH EYES: ICD-10-CM

## 2019-03-15 DIAGNOSIS — H25.13 NUCLEAR SCLEROSIS, BILATERAL: ICD-10-CM

## 2019-03-15 DIAGNOSIS — H25.011 CORTICAL AGE-RELATED CATARACT, RIGHT EYE: ICD-10-CM

## 2019-03-15 DIAGNOSIS — H52.203 HYPEROPIA OF BOTH EYES WITH ASTIGMATISM: ICD-10-CM

## 2019-03-15 DIAGNOSIS — H40.003 GLAUCOMA SUSPECT OF BOTH EYES: ICD-10-CM

## 2019-03-15 DIAGNOSIS — H34.8122 CENTRAL RETINAL VEIN OCCLUSION OF LEFT EYE, UNSPECIFIED COMPLICATION STATUS: ICD-10-CM

## 2019-03-15 DIAGNOSIS — H40.053 OCULAR HYPERTENSION, BILATERAL: ICD-10-CM

## 2019-03-15 DIAGNOSIS — H53.452 OTHER LOCALIZED VISUAL FIELD DEFECT, LEFT EYE: ICD-10-CM

## 2019-03-15 PROCEDURE — 92015 PR REFRACTION: ICD-10-PCS | Mod: HCNC,S$GLB,, | Performed by: OPTOMETRIST

## 2019-03-15 PROCEDURE — 92014 COMPRE OPH EXAM EST PT 1/>: CPT | Mod: HCNC,S$GLB,, | Performed by: OPTOMETRIST

## 2019-03-15 PROCEDURE — 92015 DETERMINE REFRACTIVE STATE: CPT | Mod: HCNC,S$GLB,, | Performed by: OPTOMETRIST

## 2019-03-15 PROCEDURE — 92014 PR EYE EXAM, EST PATIENT,COMPREHESV: ICD-10-PCS | Mod: HCNC,S$GLB,, | Performed by: OPTOMETRIST

## 2019-03-15 PROCEDURE — 92083 EXTENDED VISUAL FIELD XM: CPT | Mod: HCNC,S$GLB,, | Performed by: OPTOMETRIST

## 2019-03-15 PROCEDURE — 92083 HUMPHREY VISUAL FIELD - OU - BOTH EYES: ICD-10-PCS | Mod: HCNC,S$GLB,, | Performed by: OPTOMETRIST

## 2019-03-15 PROCEDURE — 99999 PR PBB SHADOW E&M-EST. PATIENT-LVL III: ICD-10-PCS | Mod: PBBFAC,HCNC,, | Performed by: OPTOMETRIST

## 2019-03-15 PROCEDURE — 99999 PR PBB SHADOW E&M-EST. PATIENT-LVL III: CPT | Mod: PBBFAC,HCNC,, | Performed by: OPTOMETRIST

## 2019-03-15 NOTE — PATIENT INSTRUCTIONS
Nuclear sclerosis of lens of both eyes, consistent with age, and peripheral cortical cataract in the right eye.  No need for cataract surgery in either eye.  Monitor.  Hyperopia with astigmatism in each eye.  Presbyopia consistent with age.  New spectacle lens Rx issued.      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  Has been followed in retina clinic     Prior diagnosis of bilateral ocular hypertension.   Repeat HVF test done today.  Reviewed test results and discussed with Ms. Block.  No evidence of glaucomatous visual field defect in either eye.     Currently using Timolol Maleate 0.5% gel-forming ophthalmic solution in each eye every evening for IOP control.   IOP high-normal in each eye.   Continue drops in both eyes as noted above.    Recheck in one year with recheck of HVF test (24-2 LUKAS standard) on the same date.       Standing orders entered for future HVF test

## 2019-03-15 NOTE — PROGRESS NOTES
INITIAL VISIT NOTE           HISTORY OF PRESENT ILLNESS       Anthony Ernandez is a 41 year old male presenting for a chief complaint of right arm weakness. He reports that he's been having trouble for about 2 months. Initially he had pain which radiated down to his hand. The pain is gone but now he has weakness in the arm. He's having trouble with pushups and lifting. He gets some intermittent numbness in the index and middle finger. No loss of bowel or bladder control.  Nursing notes reviewed and accepted.    PAST MEDICAL, FAMILY AND SOCIAL HISTORY      Past Medical History:   Diagnosis Date   • Foot pain    • Personal history of colonic polyps 11/11/15   • S/P colonoscopy with polypectomy     2007      Past Surgical History:   Procedure Laterality Date   • ARTHROSCOPY KNEE MEDIAL OR LAT  8-    PLM   • COLONOSCOPY DIAGNOSTIC  4/30/12   • COLONOSCOPY DIAGNOSTIC  11/11/15    Dr Maki   • KNEE SCOPE,ABRASN ARTHROPLASTY  8-    [facility] L [facility]   • NASAL SEPTUM SURGERY        Social History     Occupational History   • [employer]      Social History Main Topics   • Smoking status: Never Smoker   • Smokeless tobacco: Current User   • Alcohol use 0.0 oz/week      Comment: social   • Drug use: No   • Sexual activity: Yes     Partners: Female     Birth control/ protection: None, Condom      Current Outpatient Prescriptions   Medication Sig Dispense Refill   • Lactobacillus (PROBIOTIC ACIDOPHILUS PO) Take by mouth daily.     • ivermectin 1 % cream Apply topically daily. 45 g 3   • VITAMIN D, CHOLECALCIFEROL, PO Take 2,000 Int'l Units by mouth.     • Omega-3 Fatty Acids (OMEGA 3 PO) Take 1,000 mg by mouth.       No current facility-administered medications for this visit.       ALLERGIES:  No Known Allergies    REVIEW OF SYSTEMS          PHYSICAL EXAM      Visit Vitals  Pulse 68   Resp 12   Ht 5' 8\" (1.727 m)   Wt 100.9 kg Comment: with shoes   BMI 33.82 kg/m²     hvf done;rel/fix/coop. Good ou-h     General:  Well-groomed, alert and oriented with normal affect.      Musculoskeletal: Skin is intact . cap refill is brisk. Compartments are soft. Light touch is intact. There is weakness of the first dorsal osseous and wrist dorsiflexors biceps and triceps. There is some mild global shoulder weakness. He has full shoulder motion in flexion abduction rotation with negligible discomfort. There is some stiffness with neck range of motion.    X-RAY INTERPRETATION: Films of the shoulder are unremarkable. Films of the neck demonstrate degenerative disc disease     ASSESSMENT/PLAN      IMPRESSION: Right arm weakness probably secondary to cervical radiculopathy    TREATMENT AND RECOMMENDATIONS: The diagnosis and treatment options were discussed. X-rays were reviewed with him. I would recommend further workup with MRI scanning of the neck. If this is positive we will refer him to the spine service for further evaluation and treatment.    This was a 20 minute visit over 50% consultative.  Thank you for this referral.

## 2019-03-16 NOTE — PROGRESS NOTES
HPI     Ocular Hypertension      Additional comments: Follow up on bilateral ocular hypertension.  Repeat HVF test done today.  No apparent changes in VA.                Comments     Patient's age: 73 y.o.  Occupation: retired   Approximate date of last eye examination:  05/22/2018  Name of last eye doctor seen:   City/State: [facility]  Wears glasses? Yes      If yes, wears  Full-time or part-time?  Full time   Present glasses are: Bifocal, SV Distance, SV Reading?  Bifocals   Approximate age of present glasses:  2 yrs    Got new glasses following last exam, or subsequently?:  No     Any problem with VA with glasses?  Pt has no complaints with vision.  Wears CLs?:  no  Headaches?  Rarely   Eye pain/discomfort?  No                                                                                      Flashes?  No   Floaters?  No   Diplopia/Double vision?  No   Patient's Ocular History:         Any eye surgeries? No          Any eye injury?  No          Any treatment for eye disease?  Ocular hypertension   Family history of eye disease?  Mother and two brothers: glaucoma  Significant patient medical history:         1. Diabetes?  No        If yes, IDDM or NIDDM?  n/a   2. HBP?  Yes              3. Other (describe):  None    ! OTC eyedrops currently using:  No    ! Prescription eye meds currently using:  Timolol Maleate 0.5% gel   forming solution qAM OU   ! Any history of allergy/adverse reaction to any eye meds used   previously?  Yes, Timolol but it was by a different company - had burning   sensation    ! Any history of allergy/adverse reaction to eyedrops used during prior   eye exam(s)? Burning    ! Any history of allergy/adverse reaction to Novacaine or similar meds?   No    ! Any history of allergy/adverse reaction to Epinephrine or similar meds?   No     ! Patient okay with use of anesthetic eyedrops to check eye pressure?    Yes         ! Patient okay with use of eyedrops to dilate pupils today?  Yes     "!  Allergies/Medications/Medical History/Family History reviewed today?    yes      PD =   66/63  Desired reading distance =  17"                                                                     Last edited by Tim Hamlin, OD on 3/15/2019  9:50 AM. (History)            Assessment /Plan     For exam results, see Encounter Report.    1. Ocular hypertension, bilateral  Schreiber Visual Field - OU - Extended - Both Eyes   2. Glaucoma suspect of both eyes  Schreiber Visual Field - OU - Extended - Both Eyes   3. Central retinal vein occlusion of left eye, unspecified complication status     4. Nuclear sclerosis, bilateral     5. Cortical age-related cataract, right eye     6. Hyperopia of both eyes with astigmatism     7. Presbyopia of both eyes                    Nuclear sclerosis of lens of both eyes, consistent with age, and peripheral cortical cataract in the right eye.  No need for cataract surgery in either eye.  Monitor.  Hyperopia with astigmatism in each eye.  Presbyopia consistent with age.  New spectacle lens Rx issued.      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  Has been followed in retina clinic     Prior diagnosis of bilateral ocular hypertension.   Repeat HVF test done today.  Reviewed test results and discussed with Ms. Block.  No evidence of glaucomatous visual field defect in either eye.     Currently using Timolol Maleate 0.5% gel-forming ophthalmic solution in each eye every evening for IOP control.   IOP high-normal in each eye.   Continue drops in both eyes as noted above.    Recheck in one year with recheck of HVF test (24-2 LUKAS standard) on the same date.            "

## 2019-03-17 PROBLEM — J40 BRONCHITIS: Status: RESOLVED | Noted: 2017-07-14 | Resolved: 2019-03-17

## 2019-03-18 DIAGNOSIS — I10 ESSENTIAL HYPERTENSION: ICD-10-CM

## 2019-03-18 RX ORDER — HYDROCHLOROTHIAZIDE 12.5 MG/1
TABLET ORAL
Qty: 90 TABLET | Refills: 1 | Status: SHIPPED | OUTPATIENT
Start: 2019-03-18 | End: 2019-05-09

## 2019-03-29 ENCOUNTER — PATIENT OUTREACH (OUTPATIENT)
Dept: OTHER | Facility: OTHER | Age: 74
End: 2019-03-29

## 2019-03-29 NOTE — PROGRESS NOTES
Last 5 Patient Entered Readings                                      Current 30 Day Average: 154/78     Recent Readings 3/26/2019 3/20/2019 3/18/2019 3/14/2019 3/13/2019    SBP (mmHg) 137 142 141 149 129    DBP (mmHg) 72 74 68 74 70    Pulse 55 63 56 65 61        Hypertension Medications     hydroCHLOROthiazide (HYDRODIURIL) 12.5 MG Tab TAKE 1 TABLET BY MOUTH EVERY DAY FOR BLOOD PRESSURE    metoprolol succinate (TOPROL-XL) 50 MG 24 hr tablet Take 1 tablet (50 mg total) by mouth once daily.    valsartan (DIOVAN) 320 MG tablet Take 1 tablet (320 mg total) by mouth once daily.     Called patient for digital medicine clinical pharmacist introduction. No answer. Left message for call back    Depression/ JESSICA- patient has not yet completed on boarding questionnaire.     Last CMP WNL.     Laxmi Tsai, Pharm.D.   Digital Medicine Clinical Pharmacist  [phone]

## 2019-04-02 NOTE — PROGRESS NOTES
Last 5 Patient Entered Readings                                      Current 30 Day Average: 152/75     Recent Readings 4/2/2019 4/2/2019 3/26/2019 3/20/2019 3/18/2019    SBP (mmHg) 120 172 137 142 141    DBP (mmHg) 80 78 72 74 68    Pulse 80 56 55 63 56        Hypertension Medications     hydroCHLOROthiazide (HYDRODIURIL) 12.5 MG Tab TAKE 1 TABLET BY MOUTH EVERY DAY FOR BLOOD PRESSURE    metoprolol succinate (TOPROL-XL) 50 MG 24 hr tablet Take 1 tablet (50 mg total) by mouth once daily.    valsartan (DIOVAN) 320 MG tablet Take 1 tablet (320 mg total) by mouth once daily.     Patient returned my call for clinical pharmacist introduction. She is doing well with no complaints. She is concerned because BP was 172/78 mmHg. She says she will retake her BP this afternoon.     Depression and JESSICA- patient has not yet completed on boarding questionnaires.     1) BP exceeds goal of <130/<80. Continue current BP medications. Last CMP WNL  2) Discussed proper BP monitoring technique and BP goal  3) Patient knows to contact me with any non-urgent clinical changes or concerns. Go to ED or call [facility] for emergencies.   4) Will defer lifestyle counseling to digital medicine health .   5) Will continue to follow up.     Laxmi Chowdary, Pharm.D.   Digital Medicine Clinical Pharmacist  [phone]

## 2019-04-25 ENCOUNTER — PATIENT OUTREACH (OUTPATIENT)
Dept: OTHER | Facility: OTHER | Age: 74
End: 2019-04-25

## 2019-04-25 NOTE — PROGRESS NOTES
Last 5 Patient Entered Readings                                      Current 30 Day Average: 144/74     Recent Readings 4/24/2019 4/16/2019 4/11/2019 4/8/2019 4/2/2019    SBP (mmHg) 159 160 144 143 120    DBP (mmHg) 75 73 73 69 80    Pulse 67 68 63 62 80            Digital Medicine: Health  Follow Up    Left voicemail to follow up with Mrs. Elena Block.  Current BP average 144/74 mmHg is not at goal, [130/80 mmHg]. SBP elevated.

## 2019-05-08 NOTE — PROGRESS NOTES
Last 5 Patient Entered Readings                                      Current 30 Day Average: 158/88     Recent Readings 5/6/2019 5/6/2019 5/1/2019 4/30/2019 4/24/2019    SBP (mmHg) 176 191 163 160 159    DBP (mmHg) 81 173 84 76 75    Pulse 68 65 76 72 67            Digital Medicine: Health  Follow Up    Left voicemail to follow up with Mrs. Elena Block.  Current BP average 158/88 mmHg is not at goal, [130/80 mmHg].  Calling to address high BP readings on 5/6/19 (191/173). Patient took blood pressure 15 minutes later and received (176/81).

## 2019-05-09 ENCOUNTER — PATIENT OUTREACH (OUTPATIENT)
Dept: OTHER | Facility: OTHER | Age: 74
End: 2019-05-09

## 2019-05-09 DIAGNOSIS — I10 ESSENTIAL HYPERTENSION: ICD-10-CM

## 2019-05-09 RX ORDER — HYDROCHLOROTHIAZIDE 25 MG/1
25 TABLET ORAL DAILY
Qty: 30 TABLET | Refills: 5 | Status: SHIPPED | OUTPATIENT
Start: 2019-05-09 | End: 2019-06-25 | Stop reason: SDUPTHER

## 2019-05-09 NOTE — PROGRESS NOTES
"Last 5 Patient Entered Readings                                      Current 30 Day Average: 160/91     Recent Readings 5/6/2019 5/6/2019 5/1/2019 4/30/2019 4/24/2019    SBP (mmHg) 176 191 163 160 159    DBP (mmHg) 81 173 84 76 75    Pulse 68 65 76 72 67        Hypertension Medications     hydroCHLOROthiazide (HYDRODIURIL) 12.5 MG Tab TAKE 1 TABLET BY MOUTH EVERY DAY FOR BLOOD PRESSURE    metoprolol succinate (TOPROL-XL) 50 MG 24 hr tablet Take 1 tablet (50 mg total) by mouth once daily.    valsartan (DIOVAN) 320 MG tablet Take 1 tablet (320 mg total) by mouth once daily.     Called patient for BP follow up. She complains of a headache a few times per week. She contributes this to stress and says it resolves with tylenol and rest. Otherwise, no complaints. Patient says that she has never charged BP machine. BP was 191/173 mmHg on 5/6/19 and patient contributes this to moving around and "messing with the BP machine". She reports missing her dose of HCTZ about 1-2 times per week because it causes her to run to the restroom. She says that her morning BP medications "stop working" around 3 pm and her BP is high at this time. After taking BP around 3 pm, she then takes metoprolol.     1) BP exceeds goal of <130/<80. Continue current BP medications. Trial of HCTZ to 25 mg QAM. Charge BP machine  2) Patient knows to contact me with any non-urgent clinical changes or concerns. Go to ED or call [facility] on [facility] for emergencies.   3) Will defer lifestyle counseling to digital medicine health .   4) Will continue to follow up. BMP 5/24/19      Laxmi Tsai, Pharm.D.   Digital Medicine Clinical Pharmacist  [phone]        "

## 2019-05-09 NOTE — PROGRESS NOTES
Last 5 Patient Entered Readings                                      Current 30 Day Average: 160/91     Recent Readings 5/6/2019 5/6/2019 5/1/2019 4/30/2019 4/24/2019    SBP (mmHg) 176 191 163 160 159    DBP (mmHg) 81 173 84 76 75    Pulse 68 65 76 72 67        Digital Medicine: Health  Follow Up    Patient reports readings on 5/6/19 with readings of 191/173 was inaccurate due to moving around. Patient retook blood pressure 15 minutes later and received (176/81).     Lifestyle Modifications:    1.Dietary Modifications (Sodium intake <2,000mg/day, food labels, dining out): Patient reports she cooks with salt but does not add any extra. Patient states she uses about 1 teaspoon in a big pot. Patient reports she doesn't snack throughout the day on cookies. Patient reports she recently has avoided sausage patties. Dietary recall: eggs, grits with cheese, toast, pork chops, chicken, etc. Patient reports she has not logged sodium intake in the past. Patient and I dicussed ways to reduce sodium intake, resource sent via e-mail. Patient is going to track sodium intake for a couple days. I will review with patient and f/u next outreach.     2.Physical Activity: Patient is still continuing to do stretches and sit up. Patient has currently walking 3x/wk in the park. Praised patient for progress thus far.     3.Medication Therapy: Patient has been compliant with the medication regimen. Patent reports her blood pressure spikes around 3p. Patient reports she will take her medication (Metoprolol) at 3 when it spikes rather than 5p.      4.Patient has the following medication side effects/concerns: Patient reports she is experiencing headaches 2-3/wk for 2-3 hours. Patient reports she will take 2 tylenol and rest then headache with subside. Notified clinician, Laxmi.     Follow up with Mrs. Elena Block completed. No further questions or concerns. Will continue to follow up to achieve health goals.

## 2019-05-14 ENCOUNTER — PATIENT OUTREACH (OUTPATIENT)
Dept: OTHER | Facility: OTHER | Age: 74
End: 2019-05-14

## 2019-05-14 ENCOUNTER — TELEPHONE (OUTPATIENT)
Dept: INTERNAL MEDICINE | Facility: CLINIC | Age: 74
End: 2019-05-14

## 2019-05-14 NOTE — TELEPHONE ENCOUNTER
----- Message from Davonte Hernandez sent at 5/14/2019 11:22 AM CDT -----  Contact: Patient [phone]  Patient would like to get medical advice.  Symptoms (please be specific):  Blood pressure high/headaches  How long has patient had these symptoms:  Off and on  Pharmacy name and phone #:  [id number] - [place], [place] - [address] [phone] (Phone) [phone] (Fax)    Comments: patient would like to know if another Rx BP can be prescribed to bring down the numbers? No symptoms today, but BP still a little high yesterday 177/78, but did not take today.    Please call an advise  Thank you

## 2019-05-14 NOTE — TELEPHONE ENCOUNTER
Spoke with Mrs Umair franklin her BPs which have been high at 160s-191/systolic.  Laxmi in [facility] increased her HCTZ to 25mg daily just 3 days ago.  BP today better at 144/78.  She will continue Toprol XL50QD,HCTZ 25mgQD, and Valsartan 320mg QD and f/u with [facility] and myself as needed.

## 2019-05-14 NOTE — PROGRESS NOTES
"Last 5 Patient Entered Readings                                      Current 30 Day Average: 161/88     Recent Readings 5/14/2019 5/13/2019 5/12/2019 5/6/2019 5/6/2019    SBP (mmHg) 144 163 161 176 191    DBP (mmHg) 78 78 78 81 173    Pulse 67 70 61 68 65        Hypertension Medications     hydroCHLOROthiazide (HYDRODIURIL) 25 MG tablet Take 1 tablet (25 mg total) by mouth once daily.    metoprolol succinate (TOPROL-XL) 50 MG 24 hr tablet Take 1 tablet (50 mg total) by mouth once daily.    valsartan (DIOVAN) 320 MG tablet Take 1 tablet (320 mg total) by mouth once daily.     Patient called and left voicemail stating that she is concerned that BP is still elevated. She is doing well today with no complaintsShe says today is a good day and she doesn't have a headache. She says when she does get a headache, it's not severe. She describes it as "just a little nagging headache that makes me want to lay down". She received information from her health  this past Thursday and has since started to monitor sodium. She reports reading food labels and remaining < 1500 mg/ day. She also reports switching to Mrs. QUACH seasoning.     1) BP exceeds goal of <130/<80. Continue current BP medications. HCTZ was increased to 25 mg < 1 week ago. Patient has only submitted 3 BP readings since increasing HCTZ.  2) Patient knows to contact me with any non-urgent clinical changes or concerns. Go to ED or call [facility] for emergencies.   3) Will defer lifestyle counseling to digital medicine health . Continue lifestyle changes  4) Will continue to follow up. Will consider adding CCB    Laxmi Tsai, Pharm.D.  [facility] Clinical Pharmacist  [phone]            "

## 2019-05-17 ENCOUNTER — PATIENT OUTREACH (OUTPATIENT)
Dept: OTHER | Facility: OTHER | Age: 74
End: 2019-05-17

## 2019-05-17 NOTE — PROGRESS NOTES
Last 5 Patient Entered Readings                                      Current 30 Day Average: 159/86     Recent Readings 5/17/2019 5/16/2019 5/15/2019 5/14/2019 5/13/2019    SBP (mmHg) 163 145 156 144 163    DBP (mmHg) 81 72 72 78 78    Pulse 64 55 62 67 70        Hypertension Medications     hydroCHLOROthiazide (HYDRODIURIL) 25 MG tablet Take 1 tablet (25 mg total) by mouth once daily.    metoprolol succinate (TOPROL-XL) 50 MG 24 hr tablet Take 1 tablet (50 mg total) by mouth once daily.    valsartan (DIOVAN) 320 MG tablet Take 1 tablet (320 mg total) by mouth once daily.     Reviewed BP readings. BP trending down slightly since increasing HCTZ to 25 mg . Will follow up early next week and consider adding amlodipine. Patient knows to contact me with any non-urgent clinical changes or concerns. Go to ED or call [facility] for emergencies.     Laxmi Tsai, Pharm.D.   [facility] Clinical Pharmacist  [phone]

## 2019-05-20 ENCOUNTER — HOSPITAL ENCOUNTER (EMERGENCY)
Facility: HOSPITAL | Age: 74
Discharge: HOME | End: 2019-05-20
Attending: EMERGENCY MEDICINE
Payer: MEDICARE

## 2019-05-20 VITALS
TEMPERATURE: 98 F | DIASTOLIC BLOOD PRESSURE: 79 MMHG | HEIGHT: 68 IN | BODY MASS INDEX: 31.07 KG/M2 | SYSTOLIC BLOOD PRESSURE: 188 MMHG | OXYGEN SATURATION: 98 % | HEART RATE: 56 BPM | RESPIRATION RATE: 20 BRPM | WEIGHT: 205 LBS

## 2019-05-20 DIAGNOSIS — G89.29 CHRONIC NONINTRACTABLE HEADACHE, UNSPECIFIED HEADACHE TYPE: ICD-10-CM

## 2019-05-20 DIAGNOSIS — R51.9 CHRONIC NONINTRACTABLE HEADACHE, UNSPECIFIED HEADACHE TYPE: ICD-10-CM

## 2019-05-20 DIAGNOSIS — I10 HYPERTENSION, UNSPECIFIED TYPE: Primary | ICD-10-CM

## 2019-05-20 PROCEDURE — 25000003 PHARM REV CODE 250: Mod: HCNC | Performed by: PHYSICIAN ASSISTANT

## 2019-05-20 PROCEDURE — 99284 PR EMERGENCY DEPT VISIT,LEVEL IV: ICD-10-PCS | Mod: HCNC,,, | Performed by: PHYSICIAN ASSISTANT

## 2019-05-20 PROCEDURE — 99284 EMERGENCY DEPT VISIT MOD MDM: CPT | Mod: HCNC,,, | Performed by: PHYSICIAN ASSISTANT

## 2019-05-20 PROCEDURE — 99283 EMERGENCY DEPT VISIT LOW MDM: CPT | Mod: HCNC

## 2019-05-20 RX ORDER — ACETAMINOPHEN 500 MG
1000 TABLET ORAL
Status: COMPLETED | OUTPATIENT
Start: 2019-05-20 | End: 2019-05-20

## 2019-05-20 RX ORDER — AMLODIPINE BESYLATE 5 MG/1
5 TABLET ORAL
Status: COMPLETED | OUTPATIENT
Start: 2019-05-20 | End: 2019-05-20

## 2019-05-20 RX ADMIN — AMLODIPINE BESYLATE 5 MG: 5 TABLET ORAL at 09:05

## 2019-05-20 RX ADMIN — ACETAMINOPHEN 1000 MG: 500 TABLET ORAL at 09:05

## 2019-05-21 ENCOUNTER — TELEPHONE (OUTPATIENT)
Dept: INTERNAL MEDICINE | Facility: CLINIC | Age: 74
End: 2019-05-21

## 2019-05-21 ENCOUNTER — PATIENT OUTREACH (OUTPATIENT)
Dept: OTHER | Facility: OTHER | Age: 74
End: 2019-05-21

## 2019-05-21 DIAGNOSIS — I10 ESSENTIAL HYPERTENSION: Primary | ICD-10-CM

## 2019-05-21 DIAGNOSIS — E78.5 HYPERLIPIDEMIA, UNSPECIFIED HYPERLIPIDEMIA TYPE: ICD-10-CM

## 2019-05-21 RX ORDER — NIFEDIPINE 30 MG/1
30 TABLET, EXTENDED RELEASE ORAL DAILY
Qty: 30 TABLET | Refills: 5 | Status: SHIPPED | OUTPATIENT
Start: 2019-05-21 | End: 2019-06-25

## 2019-05-21 RX ORDER — SIMVASTATIN 40 MG/1
40 TABLET, FILM COATED ORAL NIGHTLY
Qty: 90 TABLET | Refills: 0
Start: 2019-05-21 | End: 2020-07-14

## 2019-05-21 RX ORDER — AMLODIPINE BESYLATE 5 MG/1
5 TABLET ORAL DAILY
Qty: 30 TABLET | Refills: 5 | Status: SHIPPED | OUTPATIENT
Start: 2019-05-21 | End: 2019-05-21 | Stop reason: ALTCHOICE

## 2019-05-21 NOTE — PROGRESS NOTES
"Last 5 Patient Entered Readings                                      Current 30 Day Average: 163/86     Recent Readings 5/21/2019 5/20/2019 5/20/2019 5/17/2019 5/16/2019    SBP (mmHg) 153 211 196 163 145    DBP (mmHg) 78 92 86 81 72    Pulse 59 66 64 64 55        Hypertension Medications     hydroCHLOROthiazide (HYDRODIURIL) 25 MG tablet Take 1 tablet (25 mg total) by mouth once daily.    metoprolol succinate (TOPROL-XL) 50 MG 24 hr tablet Take 1 tablet (50 mg total) by mouth once daily.    valsartan (DIOVAN) 320 MG tablet Take 1 tablet (320 mg total) by mouth once daily.     Called patient for BP follow up. She says she took a break from taking her BP over the weekend and didn't take BP until yesterday evening. She says BP "skyrocketed" and she felt dizzy. She went to the ED and was given amlodipine 5 mg and tylenol for a headache. She says that she has been mindful of sodium intake but yesterday she may have eaten a little more than usual.    1) 30-day BP average exceeds goal of <130/<80. Continue current BP medications. Start amlodipine 5 mg QD  2) Patient knows to contact me with any non-urgent clinical changes or concerns. Go to ED or call [facility] for emergencies.   3) Will defer lifestyle counseling to digital medicine health . Lifestyle changes. Sodium restriction.   4) Will continue to follow up.     Laxmi Tsai, Pharm.D.   Digital Medicine Clinical Pharmacist  [phone]          "

## 2019-05-21 NOTE — ED PROVIDER NOTES
Encounter Date: 5/20/2019       History     Chief Complaint   Patient presents with    Hypertension     was dizzy for 10 min spinning stopped,sat down , bp was 196/86     Patient is a 73-year-old  female with a PMH of hypertension, hyperlipidemia, hypothyroidism and lumbar degeneration who presents to the ED for urgent evaluation of hypertension.  Patient reports that between 5 and 6:00 p.m. this evening (3 hours PTA), she was toasting some bread in her kitchen when she had acute onset of nausea, dizziness (room spinning sensation), and unsteady gait. She then ambulated to her couch and her symptoms subsided after about 10-15 minutes. She took her BP at that time and she reported it to be 196/82, prompting her to present to the ED. She states dizziness remains resolved, but she does endorse a frontal headache. She states she has been having daily headaches for years; she denies new pattern or sudden onset.  She is currently enrolled in the hypertension digital medicine program and her HCTZ was increased from 12.5-25 mcg 5 days ago due to consistently elevated BP.  She denies fever/chills, blurry vision, neck pain/stiffness, nausea/vomiting, chest pain, shortness of breath, abdominal pain, bowel changes, numbness/tingling, or extremity weakness.    The history is provided by the patient.     Review of patient's allergies indicates:   Allergen Reactions    Tramadol Nausea And Vomiting     Past Medical History:   Diagnosis Date    Back pain     Cataract     Central retinal vein occlusion of left eye     Degeneration of lumbar or lumbosacral intervertebral disc 4/16/2013    High cholesterol     Hypertension     Hypothyroidism     Impingement syndrome of left shoulder 7/29/2013    Obesity     Pneumonia     Posterior vitreous detachment of both eyes 1/17/2013    S/P partial hysterectomy 1/23/2013    Vitamin D insufficiency 2016     Past Surgical History:   Procedure Laterality Date     COLONOSCOPY  01/22/2008    HYSTERECTOMY      Partial     PARTIAL HYSTERECTOMY      THYROIDECTOMY, PARTIAL      TONSILLECTOMY       Family History   Problem Relation Age of Onset    Diabetes Mother     Glaucoma Mother     Heart disease Mother     Blindness Mother     Heart attack Mother     Glaucoma Brother     Stroke Brother     Liver disease Brother     Heart disease Brother     Arthritis Brother     Alcohol abuse Brother     Drug abuse Brother     Glaucoma Brother     Diabetes Brother     Pemphigus vulgaris Brother     Blindness Brother     No Known Problems Father     Hypertension Maternal Grandmother     Diabetes Maternal Grandmother     Blindness Maternal Grandmother     No Known Problems Paternal Grandmother     No Known Problems Paternal Grandfather     Heart disease Brother     Diabetes Brother         Bilateral Knee amputation    Heart disease Brother     Liver disease Brother     Gout Son     Heart disease Son 48        stent placement    Heart attack Son     Heart attacks under age 50 Son     No Known Problems Son     No Known Problems Maternal Grandfather     No Known Problems Maternal Aunt     No Known Problems Maternal Uncle     No Known Problems Paternal Aunt     No Known Problems Paternal Uncle     Amblyopia Neg Hx     Macular degeneration Neg Hx     Retinal detachment Neg Hx     Strabismus Neg Hx     Thyroid disease Neg Hx     Cataracts Neg Hx     Cancer Neg Hx      Social History     Tobacco Use    Smoking status: Never Smoker    Smokeless tobacco: Never Used   Substance Use Topics    Alcohol use: Yes     Alcohol/week: 0.6 oz     Types: 1 Cans of beer per week     Comment: Occasionally; beer once a  week (socially)    Drug use: No     Review of Systems   Constitutional: Negative for chills and fever.   HENT: Negative for sore throat.    Eyes: Negative for visual disturbance.   Respiratory: Negative for shortness of breath.    Cardiovascular:  Negative for chest pain.   Gastrointestinal: Negative for abdominal pain, constipation, diarrhea, nausea and vomiting.   Genitourinary: Negative for dysuria.   Musculoskeletal: Negative for neck pain.   Skin: Negative for rash.   Neurological: Positive for headaches. Negative for dizziness, syncope, weakness and numbness.   Psychiatric/Behavioral: Negative for dysphoric mood.       Physical Exam     Initial Vitals [05/20/19 1829]   BP Pulse Resp Temp SpO2   (!) 191/91 74 18 98.1 °F (36.7 °C) 100 %      MAP       --         Physical Exam    Nursing note and vitals reviewed.  Constitutional: She appears well-developed and well-nourished. She is not diaphoretic. No distress.   HENT:   Head: Normocephalic and atraumatic.   Mouth/Throat: Oropharynx is clear and moist. No oropharyngeal exudate.   Eyes: Conjunctivae and EOM are normal. Pupils are equal, round, and reactive to light.   Neck: Normal range of motion. Neck supple.   Cardiovascular: Normal rate, regular rhythm, normal heart sounds and intact distal pulses.   Pulmonary/Chest: Breath sounds normal. No respiratory distress. She has no wheezes. She has no rhonchi. She has no rales.   Abdominal: Soft. Bowel sounds are normal. There is no tenderness. There is no rebound and no guarding.   Musculoskeletal: Normal range of motion. She exhibits no edema or tenderness.   Neurological: She is alert and oriented to person, place, and time. She has normal strength. No cranial nerve deficit or sensory deficit.   Cranial nerves grossly intact. No pronator drift.  Finger-to-nose and heel-to-shin testing normal. Full range of motion and strength of all 4 extremities. Normal gait.  Negative Romberg.   Skin: Skin is warm and dry.   Psychiatric: She has a normal mood and affect. Thought content normal.         ED Course   Procedures  Labs Reviewed - No data to display       Imaging Results    None          Medical Decision Making:   History:   Old Medical Records: I decided to  obtain old medical records.  Old Records Summarized: records from clinic visits.       <> Summary of Records: Patient follows with Laxmi Tsai, Pharm.D. In [facility] medicine. HCTZ recently increased from 12.5 mg to 25 mg.  Differential Diagnosis:   DDx includes but is not limited to: hypertensive emergency, malignant hypertension, primary HA, dehydration, BPPV.  I considered but do not suspect: CVA.       APC / Resident Notes:   Patient is a 73-year-old  female with a PMH of hypertension, hyperlipidemia, hypothyroidism and lumbar degeneration who presents to the ED for urgent evaluation of hypertension.  -/91>227/100 in ED   -Based on my eval today, no evidence of end organ damage from hypertension.  -Performed HINTs exam--no intolerance to head impulse testing, no nystagmus, no skew deviation.  -no chest pain/sob, neuro exam non-focal, HA consistent with daily HA pattern.  -Will give Amlodipine 5 mg and Tylenol 1 g for HA.    Upon reassessment, patient reports complete resolution of HA. Repeat /79.    Recommendations for BP management:   -The pt likely suffers from essential hypertension. She takes HCTZ, Metoprolol, and Valsartan daily and the addition of Amlodipine is under consideration per chart review.  -In the absence of a hypertensive emergency, which the pt does not have, there is no indication to aggressively treat her elevated blood pressure, even when it approaches the systolic ~180 range.   -The patient needs long term management of her blood pressure    Discussed plan of care with the patient, who verbalized understanding. ED return precautions given. I have discussed patient case with my supervisory physician, who is in agreement with my assessment and plan.                   Attending Attestation:     Physician Attestation Statement for NP/PA:   I discussed this assessment and plan of this patient with the NP/PA, but I did not personally examine the patient. The face  to face encounter was performed by the NP/PA.                     Clinical Impression:       ICD-10-CM ICD-9-CM   1. Hypertension, unspecified type I10 401.9   2. Chronic nonintractable headache, unspecified headache type R51 784.0         Disposition:   Disposition: Discharged  Condition: Stable                        Lizzie Blancas PA-C  05/21/19 1532       Daryl Shelton III, MD  05/22/19 1117

## 2019-05-21 NOTE — DISCHARGE INSTRUCTIONS
Please follow-up with your PCP for further management of your high blood pressure.  Take your medications daily as prescribed.  Return to the ED for any concerning symptoms.    Our goal in the emergency department is to always give you outstanding care and exceptional service. You may receive a survey by mail or e-mail in the next week regarding your experience in our ED. We would greatly appreciate your completing and returning the survey. Your feedback provides us with a way to recognize our staff who give very good care and it helps us learn how to improve when your experience was below our aspiration of excellence.

## 2019-05-21 NOTE — PROGRESS NOTES
Last 5 Patient Entered Readings                                      Current 30 Day Average: 163/86     Recent Readings 5/21/2019 5/20/2019 5/20/2019 5/17/2019 5/16/2019    SBP (mmHg) 153 211 196 163 145    DBP (mmHg) 78 92 86 81 72    Pulse 59 66 64 64 55        Hypertension Medications     hydroCHLOROthiazide (HYDRODIURIL) 25 MG tablet Take 1 tablet (25 mg total) by mouth once daily.    metoprolol succinate (TOPROL-XL) 50 MG 24 hr tablet Take 1 tablet (50 mg total) by mouth once daily.    valsartan (DIOVAN) 320 MG tablet Take 1 tablet (320 mg total) by mouth once daily.     Drug interaction with amlodipine and simvastatin. Substituted amlodipine with nifedipine. Patient knows to contact me with any non-urgent clinical changes or concerns. Go to ED or call [facility] for emergencies. I will continue to follow up.     Laxmi Tsai, Pharm.D.   [facility] Clinical Pharmacist  [phone]

## 2019-05-21 NOTE — TELEPHONE ENCOUNTER
----- Message from Elizabeth Sharp sent at 5/21/2019  7:50 AM CDT -----  Contact: Patient [phone]  BP was high had to go to the ER last night. Was 211/92. Wants to speak with you about a new BP Rx.    Please call and advise.    Thank You

## 2019-05-21 NOTE — TELEPHONE ENCOUNTER
Spoke with Ms. Umair she was treated in the ER for HBP. Her BP today was 153/78. She was given Amlodipine 5mg in the ER. Laxmi with [facility] program called in an rx for the Amlodipine but there is a drug interaction with Simvastatin.  Please Advise.

## 2019-05-21 NOTE — TELEPHONE ENCOUNTER
Spoke with pt ie her BPs.  Laxmi in [facility] changed the Amlodipine 5mg to Nidfedipine XL30 mg due to Simvastatin/Amlodipine interaction, which is fine.  For BP, she is on: Diovan 320mg, Toprol XL 50mg, and Nifedipine XL 30mg.

## 2019-05-21 NOTE — ED NOTES
Pt ao4, ambulatory and in NAD. Opt restated discharge instructions in her own words and demonstrated an understanding of-follow up care/calling 911/returning to ED if s/s become worse. Provider at bedside providing education to pt. Provider and ERMD aware of VS. Pt and  exited ED w/o further incident.

## 2019-05-21 NOTE — ED TRIAGE NOTES
Elena Block, an 73 y.o. female presents to the ED with complaints of dizziness and elevated BP.  Patient reports taking her BP meds religiously but that she was making a sandwich and was walking to the microwave when she became lightheaded.      Review of patient's allergies indicates:   Allergen Reactions    Tramadol Nausea And Vomiting     Chief Complaint   Patient presents with    Hypertension     was dizzy for 10 min spinning stopped,sat down , bp was 196/86     Past Medical History:   Diagnosis Date    Back pain     Cataract     Central retinal vein occlusion of left eye     Degeneration of lumbar or lumbosacral intervertebral disc 4/16/2013    High cholesterol     Hypertension     Hypothyroidism     Impingement syndrome of left shoulder 7/29/2013    Obesity     Pneumonia     Posterior vitreous detachment of both eyes 1/17/2013    S/P partial hysterectomy 1/23/2013    Vitamin D insufficiency 2016

## 2019-05-24 ENCOUNTER — LAB VISIT (OUTPATIENT)
Dept: LAB | Facility: HOSPITAL | Age: 74
End: 2019-05-24
Payer: MEDICARE

## 2019-05-24 DIAGNOSIS — I10 ESSENTIAL HYPERTENSION: ICD-10-CM

## 2019-05-24 LAB
ANION GAP SERPL CALC-SCNC: 10 MMOL/L (ref 8–16)
BUN SERPL-MCNC: 15 MG/DL (ref 8–23)
CALCIUM SERPL-MCNC: 10.1 MG/DL (ref 8.7–10.5)
CHLORIDE SERPL-SCNC: 96 MMOL/L (ref 95–110)
CO2 SERPL-SCNC: 29 MMOL/L (ref 23–29)
CREAT SERPL-MCNC: 0.8 MG/DL (ref 0.5–1.4)
EST. GFR  (AFRICAN AMERICAN): >60 ML/MIN/1.73 M^2
EST. GFR  (NON AFRICAN AMERICAN): >60 ML/MIN/1.73 M^2
GLUCOSE SERPL-MCNC: 90 MG/DL (ref 70–110)
POTASSIUM SERPL-SCNC: 3.5 MMOL/L (ref 3.5–5.1)
SODIUM SERPL-SCNC: 135 MMOL/L (ref 136–145)

## 2019-05-24 PROCEDURE — 36415 COLL VENOUS BLD VENIPUNCTURE: CPT | Mod: HCNC

## 2019-05-24 PROCEDURE — 80048 BASIC METABOLIC PNL TOTAL CA: CPT | Mod: HCNC

## 2019-05-28 RX ORDER — METOPROLOL SUCCINATE 50 MG/1
TABLET, EXTENDED RELEASE ORAL
Qty: 90 TABLET | Refills: 1 | Status: SHIPPED | OUTPATIENT
Start: 2019-05-28 | End: 2019-06-25 | Stop reason: ALTCHOICE

## 2019-06-03 ENCOUNTER — PATIENT MESSAGE (OUTPATIENT)
Dept: INTERNAL MEDICINE | Facility: CLINIC | Age: 74
End: 2019-06-03

## 2019-06-04 NOTE — PROGRESS NOTES
Last 5 Patient Entered Readings                                      Current 30 Day Average: 157/80     Recent Readings 6/3/2019 6/1/2019 5/30/2019 5/29/2019 5/27/2019    SBP (mmHg) 152 150 154 135 148    DBP (mmHg) 72 69 78 65 76    Pulse 63 61 59 61 64        Hypertension Medications     hydroCHLOROthiazide (HYDRODIURIL) 25 MG tablet Take 1 tablet (25 mg total) by mouth once daily.    metoprolol succinate (TOPROL-XL) 50 MG 24 hr tablet TAKE 1 TABLET BY MOUTH EVERY DAY    NIFEdipine (PROCARDIA-XL) 30 MG (OSM) 24 hr tablet Take 1 tablet (30 mg total) by mouth once daily.    valsartan (DIOVAN) 320 MG tablet Take 1 tablet (320 mg total) by mouth once daily.     Called patient for follow up. She says that she feels a little sluggish. Otherwise, she is doing well. She denies GRIS with nifedipine. She says that she no longer experiences headaches. She confirms that she is exercising and monitoring sodium intake. She says that tries to limit sodium to < 2000 mg/day and sometimes she consumed < 1500 mg/ day. Patient asks what OTC pain medications are safe to take for a patient with high BP.     1) 30-day BP average exceeds goal of <130/<80. Continue current BP medications, except increase nifedipine to 60 mg QD  2) Recommended acetaminophen over NSAIDs for pain.   3) Patient knows to contact me with any non-urgent clinical changes or concerns. Go to ED or call [facility] for emergencies.   4) Will defer lifestyle counseling to digital medicine health .   5) Will continue to follow up. Will consider switching metoprolol to carvedilol at future encounter.     Laxmi Tsai, Pharm.D.   Digital Medicine Clinical Pharmacist  [phone]

## 2019-06-13 ENCOUNTER — HOSPITAL ENCOUNTER (OUTPATIENT)
Dept: RADIOLOGY | Facility: HOSPITAL | Age: 74
Discharge: HOME | End: 2019-06-13
Attending: INTERNAL MEDICINE
Payer: MEDICARE

## 2019-06-13 DIAGNOSIS — Z12.39 BREAST CANCER SCREENING: ICD-10-CM

## 2019-06-13 PROCEDURE — 77063 BREAST TOMOSYNTHESIS BI: CPT | Mod: 26,HCNC,, | Performed by: RADIOLOGY

## 2019-06-13 PROCEDURE — 77067 SCR MAMMO BI INCL CAD: CPT | Mod: TC,HCNC

## 2019-06-13 PROCEDURE — 77067 SCR MAMMO BI INCL CAD: CPT | Mod: 26,HCNC,, | Performed by: RADIOLOGY

## 2019-06-13 PROCEDURE — 77067 MAMMO DIGITAL SCREENING BILAT WITH TOMOSYNTHESIS_CAD: ICD-10-PCS | Mod: 26,HCNC,, | Performed by: RADIOLOGY

## 2019-06-13 PROCEDURE — 77063 MAMMO DIGITAL SCREENING BILAT WITH TOMOSYNTHESIS_CAD: ICD-10-PCS | Mod: 26,HCNC,, | Performed by: RADIOLOGY

## 2019-06-17 PROBLEM — Z00.00 ANNUAL PHYSICAL EXAM: Status: RESOLVED | Noted: 2019-03-13 | Resolved: 2019-06-17

## 2019-06-18 ENCOUNTER — PATIENT OUTREACH (OUTPATIENT)
Dept: OTHER | Facility: OTHER | Age: 74
End: 2019-06-18

## 2019-06-18 DIAGNOSIS — I10 ESSENTIAL HYPERTENSION: ICD-10-CM

## 2019-06-18 NOTE — PROGRESS NOTES
Last 5 Patient Entered Readings                                      Current 30 Day Average: 152/74     Recent Readings 6/16/2019 6/14/2019 6/12/2019 6/12/2019 6/10/2019    SBP (mmHg) 148 162 120 160 153    DBP (mmHg) 67 72 80 72 74    Pulse 69 75 80 65 77          Called patient for BP follow up after increasing nifedipine to 60 mg QD. No answer. Left message for call back      Abdelrahman Jean.D.   [employer] [facility] Clinical Pharmacist  [phone]

## 2019-06-22 DIAGNOSIS — E03.4 HYPOTHYROIDISM DUE TO ACQUIRED ATROPHY OF THYROID: ICD-10-CM

## 2019-06-24 RX ORDER — LEVOTHYROXINE SODIUM 100 UG/1
TABLET ORAL
Qty: 90 TABLET | Refills: 0 | Status: SHIPPED | OUTPATIENT
Start: 2019-06-24 | End: 2019-09-24 | Stop reason: SDUPTHER

## 2019-06-25 ENCOUNTER — TELEPHONE (OUTPATIENT)
Dept: INTERNAL MEDICINE | Facility: CLINIC | Age: 74
End: 2019-06-25

## 2019-06-25 RX ORDER — CARVEDILOL 6.25 MG/1
6.25 TABLET ORAL 2 TIMES DAILY WITH MEALS
Qty: 60 TABLET | Refills: 5 | Status: SHIPPED | OUTPATIENT
Start: 2019-06-25 | End: 2019-12-19

## 2019-06-25 RX ORDER — NIFEDIPINE 60 MG/1
60 TABLET, EXTENDED RELEASE ORAL DAILY
Qty: 30 TABLET | Refills: 5 | Status: SHIPPED | OUTPATIENT
Start: 2019-06-25 | End: 2019-12-17 | Stop reason: SDUPTHER

## 2019-06-25 RX ORDER — HYDROCHLOROTHIAZIDE 25 MG/1
25 TABLET ORAL DAILY
Qty: 30 TABLET | Refills: 5 | Status: SHIPPED | OUTPATIENT
Start: 2019-06-25 | End: 2019-07-16

## 2019-06-25 NOTE — PROGRESS NOTES
Last 5 Patient Entered Readings                                      Current 30 Day Average: 149/72     Recent Readings 6/24/2019 6/24/2019 6/23/2019 6/23/2019 6/22/2019    SBP (mmHg) 163 166 147 157 155    DBP (mmHg) 77 77 71 68 78    Pulse 63 73 65 78 70     Hypertension Medications     hydroCHLOROthiazide (HYDRODIURIL) 25 MG tablet Take 1 tablet (25 mg total) by mouth once daily.    metoprolol succinate (TOPROL-XL) 50 MG 24 hr tablet TAKE 1 TABLET BY MOUTH EVERY DAY    NIFEdipine (PROCARDIA-XL) 30 MG (OSM) 24 hr tablet Take 1 tablet (30 mg total) by mouth once daily.    valsartan (DIOVAN) 320 MG tablet Take 1 tablet (320 mg total) by mouth once daily.     Called patient for BP follow up. She is doing well today with no complaints. She denies GRIS since starting nifedipine. She is still concerned that BP is elevated. She says that BP is a lot lower if taken on another BP machine. Patient still believes her BP is high because she says she occasionally feels dizzy/ light-headed. She says she feels this way when BP is high.     1) 30-day BP average exceeds goal of <130/<80. Continue current BP medications. Stop metoprolol succinate 50 mg QD. Start carvedilol 6.25 mg BID  2) Patient knows to contact me with any non-urgent clinical changes or concerns. Go to ED or call [facility] for emergencies.   3) Will defer lifestyle counseling to digital medicine health .   4) Will continue to follow up. Messaged PCP's staff to set up BP check in clinic. Pending results, will have patient go to the [facility] and have machine assessed/ replaced.     Laxmi Tsai, Pharm.D.   Digital Medicine Clinical Pharmacist  [phone]

## 2019-06-25 NOTE — TELEPHONE ENCOUNTER
----- Message from Laxmi Tsai PharmD sent at 6/25/2019  9:32 AM CDT -----  Regarding: BP check  Good morning,     Would you mind calling this patient to set up a BP check? She will bring in her ihealth BP machine and if you wouldn't mind, you can compare the BP taken on his machine and BP taken in clinic.     If possible, can you send me a note with the results?     Thanks!    Laxmi Tsai, Pharm.D.   [employer] [facility] Clinical Pharmacist  [phone]

## 2019-06-26 ENCOUNTER — CLINICAL SUPPORT (OUTPATIENT)
Dept: INTERNAL MEDICINE | Facility: CLINIC | Age: 74
End: 2019-06-26
Payer: MEDICARE

## 2019-06-26 VITALS — DIASTOLIC BLOOD PRESSURE: 80 MMHG | HEART RATE: 62 BPM | SYSTOLIC BLOOD PRESSURE: 148 MMHG

## 2019-06-26 PROCEDURE — 99999 PR PBB SHADOW E&M-EST. PATIENT-LVL I: CPT | Mod: PBBFAC,HCNC,,

## 2019-06-26 PROCEDURE — 99999 PR PBB SHADOW E&M-EST. PATIENT-LVL I: ICD-10-PCS | Mod: PBBFAC,HCNC,,

## 2019-06-26 NOTE — TELEPHONE ENCOUNTER
Spoke with Mrs Block ie her Home Digital monitor reading high; she will take it back to [facility] and exchange it for a new one.  Her present BP meds; #1-HCTZ 25mg QD, #2-Nifedipine 60mg QD, #3-Diovan 320mg QD, and #4-Coreg 6.25mg BID to start today. Pt to continue f/u with Digital HTN

## 2019-06-26 NOTE — PROGRESS NOTES
Patient came in for blood pressure check. Blood pressure reading 148/80(manual) pulse 62. Patient brought in digital b/p machine, blood pressure reading 166/81. Dr gonsalez notified

## 2019-06-26 NOTE — TELEPHONE ENCOUNTER
Patient came in for blood pressure check. Blood pressure reading 148/80(manual) pulse 62. Patient brought in digital b/p machine, blood pressure reading 166/81.

## 2019-06-29 DIAGNOSIS — E55.9 VITAMIN D DEFICIENCY: ICD-10-CM

## 2019-06-30 RX ORDER — ERGOCALCIFEROL 1.25 MG/1
CAPSULE ORAL
Qty: 4 CAPSULE | Refills: 0 | Status: SHIPPED | OUTPATIENT
Start: 2019-06-30 | End: 2019-08-21 | Stop reason: SDUPTHER

## 2019-07-02 RX ORDER — IBUPROFEN 800 MG/1
TABLET ORAL
Qty: 90 TABLET | Refills: 0 | OUTPATIENT
Start: 2019-07-02

## 2019-07-09 ENCOUNTER — PATIENT OUTREACH (OUTPATIENT)
Dept: OTHER | Facility: OTHER | Age: 74
End: 2019-07-09

## 2019-07-09 NOTE — PROGRESS NOTES
Last 5 Patient Entered Readings                                      Current 30 Day Average: 144/72     Recent Readings 7/9/2019 7/8/2019 7/7/2019 7/5/2019 7/4/2019    SBP (mmHg) 149 139 132 151 142    DBP (mmHg) 69 67 65 78 68    Pulse 64 67 69 65 69        Hypertension Medications     carvedilol (COREG) 6.25 MG tablet Take 1 tablet (6.25 mg total) by mouth 2 (two) times daily with meals.    hydroCHLOROthiazide (HYDRODIURIL) 25 MG tablet Take 1 tablet (25 mg total) by mouth once daily.    NIFEdipine (PROCARDIA-XL) 60 MG (OSM) 24 hr tablet Take 1 tablet (60 mg total) by mouth once daily.    valsartan (DIOVAN) 320 MG tablet Take 1 tablet (320 mg total) by mouth once daily.     Called patient for BP follow up since switching metoprolol to carvedilol. Patient says that she made this medication change this past Friday. Overall, she is doing well. She says she has not been having headaches like before. She says that a few days ago she developed some lower back pain and wonders if the HCTZ affects her kidneys. She denies fever, hematuria, painful urination. She says that sometimes in the morning she feels unsteady. She reports drinking at least 64 oz of water per day and also drinks a glass or two of sprite. She says she has a PCP appointment next week.     1) 30-day BP average exceeds goal of <130/<80. Continue current BP medications.   2) Monitor back pain. Discuss with PCP if worsens or fails to improve.   3) Patient knows to contact me with any non-urgent clinical changes or concerns. Go to ED or call [facility] for emergencies.   4) Will defer lifestyle counseling to digital medicine health .   5) Will continue to follow up.     Laxmi Tsai, Pharm.D.   Digital Medicine Clinical Pharmacist  [phone]

## 2019-07-10 NOTE — PROGRESS NOTES
Last 5 Patient Entered Readings                                      Current 30 Day Average: 144/72     Recent Readings 7/9/2019 7/8/2019 7/7/2019 7/5/2019 7/4/2019    SBP (mmHg) 149 139 132 151 142    DBP (mmHg) 69 67 65 78 68    Pulse 64 67 69 65 69          Patient spoke with PharmD yesterday and recently had a medication change. Pushing call back 3-4 weeks.

## 2019-07-11 ENCOUNTER — TELEPHONE (OUTPATIENT)
Dept: INTERNAL MEDICINE | Facility: CLINIC | Age: 74
End: 2019-07-11

## 2019-07-11 DIAGNOSIS — K59.00 CONSTIPATION, UNSPECIFIED CONSTIPATION TYPE: Primary | ICD-10-CM

## 2019-07-11 RX ORDER — POLYETHYLENE GLYCOL 3350 17 G/17G
POWDER, FOR SOLUTION ORAL
Qty: 289 G | Refills: 6 | Status: SHIPPED | OUTPATIENT
Start: 2019-07-11 | End: 2022-08-17

## 2019-07-11 NOTE — TELEPHONE ENCOUNTER
Spoke with pt, she has been constipated for 2 days, she has given herself 2 enemas as well as milk of magnesia, no relief at all. Please Advise

## 2019-07-11 NOTE — TELEPHONE ENCOUNTER
----- Message from Siria Saleem sent at 7/11/2019  8:28 AM CDT -----  Contact: self/[phone]  .1 Patient would like to get medical advice.  Symptoms (please be specific):constipation  How long has patient had these symptoms:  Pharmacy name and phone#:[id number] - [place], [place] - [address] [phone] (Phone)  [phone] (Fax)  Any drug allergies:07/10/19  Comments: patient have tried - milk of magnesium, enema and didn't work either Patient would like to get medical advice.

## 2019-07-11 NOTE — TELEPHONE ENCOUNTER
Spoke with Mrs Block who has been constipated and failed 2 enemas and MOM.  She has no N/V,F/C, or acute pain.  I've recommended she take 1 bottle Magnesium Citrate now and then start Miralx 1 cap full BID x 3 days then QD thereafter. She never scheduled her Colonoscopy, so I have placed a new referral so she can get this scheduled when Endoscopy nurse calls her. She will call with report later today.

## 2019-07-16 ENCOUNTER — OFFICE VISIT (OUTPATIENT)
Dept: INTERNAL MEDICINE | Facility: CLINIC | Age: 74
End: 2019-07-16
Payer: MEDICARE

## 2019-07-16 ENCOUNTER — TELEPHONE (OUTPATIENT)
Dept: INTERNAL MEDICINE | Facility: CLINIC | Age: 74
End: 2019-07-16

## 2019-07-16 VITALS
WEIGHT: 203.69 LBS | OXYGEN SATURATION: 99 % | HEART RATE: 73 BPM | BODY MASS INDEX: 30.87 KG/M2 | HEIGHT: 68 IN | DIASTOLIC BLOOD PRESSURE: 70 MMHG | SYSTOLIC BLOOD PRESSURE: 120 MMHG | TEMPERATURE: 98 F

## 2019-07-16 DIAGNOSIS — E78.5 HYPERLIPIDEMIA, UNSPECIFIED HYPERLIPIDEMIA TYPE: ICD-10-CM

## 2019-07-16 DIAGNOSIS — K59.00 CONSTIPATION, UNSPECIFIED CONSTIPATION TYPE: Primary | ICD-10-CM

## 2019-07-16 DIAGNOSIS — I10 ESSENTIAL HYPERTENSION: ICD-10-CM

## 2019-07-16 PROCEDURE — 3078F DIAST BP <80 MM HG: CPT | Mod: HCNC,CPTII,S$GLB, | Performed by: INTERNAL MEDICINE

## 2019-07-16 PROCEDURE — 3074F SYST BP LT 130 MM HG: CPT | Mod: HCNC,CPTII,S$GLB, | Performed by: INTERNAL MEDICINE

## 2019-07-16 PROCEDURE — 99499 UNLISTED E&M SERVICE: CPT | Mod: S$GLB,,, | Performed by: INTERNAL MEDICINE

## 2019-07-16 PROCEDURE — 3074F PR MOST RECENT SYSTOLIC BLOOD PRESSURE < 130 MM HG: ICD-10-PCS | Mod: HCNC,CPTII,S$GLB, | Performed by: INTERNAL MEDICINE

## 2019-07-16 PROCEDURE — 1101F PT FALLS ASSESS-DOCD LE1/YR: CPT | Mod: HCNC,CPTII,S$GLB, | Performed by: INTERNAL MEDICINE

## 2019-07-16 PROCEDURE — 99499 RISK ADDL DX/OHS AUDIT: ICD-10-PCS | Mod: S$GLB,,, | Performed by: INTERNAL MEDICINE

## 2019-07-16 PROCEDURE — 99999 PR PBB SHADOW E&M-EST. PATIENT-LVL III: CPT | Mod: PBBFAC,HCNC,, | Performed by: INTERNAL MEDICINE

## 2019-07-16 PROCEDURE — 99214 OFFICE O/P EST MOD 30 MIN: CPT | Mod: HCNC,S$GLB,, | Performed by: INTERNAL MEDICINE

## 2019-07-16 PROCEDURE — 1101F PR PT FALLS ASSESS DOC 0-1 FALLS W/OUT INJ PAST YR: ICD-10-PCS | Mod: HCNC,CPTII,S$GLB, | Performed by: INTERNAL MEDICINE

## 2019-07-16 PROCEDURE — 99999 PR PBB SHADOW E&M-EST. PATIENT-LVL III: ICD-10-PCS | Mod: PBBFAC,HCNC,, | Performed by: INTERNAL MEDICINE

## 2019-07-16 PROCEDURE — 3078F PR MOST RECENT DIASTOLIC BLOOD PRESSURE < 80 MM HG: ICD-10-PCS | Mod: HCNC,CPTII,S$GLB, | Performed by: INTERNAL MEDICINE

## 2019-07-16 PROCEDURE — 99214 PR OFFICE/OUTPT VISIT, EST, LEVL IV, 30-39 MIN: ICD-10-PCS | Mod: HCNC,S$GLB,, | Performed by: INTERNAL MEDICINE

## 2019-07-16 RX ORDER — HYDROCHLOROTHIAZIDE 12.5 MG/1
12.5 TABLET ORAL DAILY
Qty: 90 TABLET | Refills: 1 | Status: SHIPPED | OUTPATIENT
Start: 2019-07-16 | End: 2020-09-24

## 2019-07-16 NOTE — TELEPHONE ENCOUNTER
Laxmi,  I wanted to let you know that I decreased Mrs Block's HCTZ to 12.5mg daily due to recent episodes of severe constipation.  If BPs go up, will have to increase the coreg.  I'm referring her for C-ascope as well.  Thanks, Sandra Banks

## 2019-07-16 NOTE — PATIENT INSTRUCTIONS
Constipation:  #1-Miralax 1 cap in 4-6 oz liquid daily  #2-MOM only for severe constipation(no sooner than Friday; >2-3 days without BM)    For Blood Pressure:  #1-Decrease Hydrochlorthiazide to 12.5 mg daily

## 2019-07-16 NOTE — PROGRESS NOTES
Subjective:       Patient ID: Elena Block is a 73 y.o. female.    Chief Complaint:   Follow-up    HPI: Mrs Block for f/u BP and constipation.  She took an additional dose MOM yesterday due to recurrent constipation.  Today she has diarrhea/skipped Miralax. She skipped Miralax today. She wonders if any of meds can be causing constipation.  Last C-scope>10 years/needs to schedule such. She has no BPR    Past Medical, Surgical, Social History: Please see as stated in Epic chart which has been reviewed.    Current Outpatient Medications   Medication Sig Dispense Refill    aspirin 81 mg Tab Take 81 mg by mouth.       carvedilol (COREG) 6.25 MG tablet Take 1 tablet (6.25 mg total) by mouth 2 (two) times daily with meals. 60 tablet 5    ergocalciferol (ERGOCALCIFEROL) 50,000 unit Cap Take 1 capsule (50,000 Units total) by mouth every 7 days. 12 capsule 3    folic acid-vit B6-vit B12 (FOLBEE) 2.5-25-1 mg Tab Take 1 tablet by mouth once daily. 90 each 3    hydroCHLOROthiazide (HYDRODIURIL) 12.5 MG Tab Take 1 tablet (12.5 mg total) by mouth once daily. 90 tablet 1    levothyroxine (SYNTHROID) 100 MCG tablet TAKE ONE TABLET BY MOUTH BEFORE BREAKFAST FOR THYROID 90 tablet 0    NIFEdipine (PROCARDIA-XL) 60 MG (OSM) 24 hr tablet Take 1 tablet (60 mg total) by mouth once daily. 30 tablet 5    polyethylene glycol (GLYCOLAX) 17 gram/dose powder 1 cap in 4-6 oz liquid : Take 2x/day x 3 days then go to once daily thereafter for constipation 289 g 6    simvastatin (ZOCOR) 40 MG tablet Take 1 tablet (40 mg total) by mouth every evening. 90 tablet 0    timolol maleate 0.5% (TIMOPTIC-XE) 0.5 % SolG Place 1 drop into both eyes once daily. 10 mL 3    valsartan (DIOVAN) 320 MG tablet Take 1 tablet (320 mg total) by mouth once daily. 90 tablet 3    VITAMIN D2 50,000 unit capsule TAKE ONE CAPSULE BY MOUTH EVERY 7 DAYS 4 capsule 0     No current facility-administered medications for this visit.        Review of Systems  "  Respiratory: Negative.  Negative for apnea and shortness of breath.    Cardiovascular: Negative.  Negative for chest pain.   Gastrointestinal: Positive for constipation and diarrhea.   Psychiatric/Behavioral: Negative.        Objective:      Lab Results   Component Value Date    WBC 9.02 01/15/2019    HGB 13.7 01/15/2019    HCT 41.7 01/15/2019     01/15/2019    CHOL 148 01/15/2019    TRIG 112 01/15/2019    HDL 45 01/15/2019    ALT 12 01/15/2019    AST 23 01/15/2019     (L) 05/24/2019    K 3.5 05/24/2019    CL 96 05/24/2019    CREATININE 0.8 05/24/2019    BUN 15 05/24/2019    CO2 29 05/24/2019    TSH 0.793 01/15/2019    HGBA1C 5.3 05/19/2006     Physical Exam   Constitutional: She appears well-developed and well-nourished.   Cardiovascular: Normal rate, regular rhythm and normal heart sounds.   Pulmonary/Chest: Effort normal and breath sounds normal.   Abdominal: Soft. Bowel sounds are normal. She exhibits no mass. There is no tenderness.   Musculoskeletal: She exhibits no edema.   Skin: Skin is warm and dry.   Psychiatric: She has a normal mood and affect. Her behavior is normal. Judgment and thought content normal.   Vitals reviewed.        Vital Signs  Temp: 97.7 °F (36.5 °C)  Temp src: Oral  Pulse: 73  SpO2: 99 %  BP: 120/70  Pain Score: 0-No pain  Height and Weight  Height: 5' 8" (172.7 cm)  Weight: 92.4 kg (203 lb 11.3 oz)  BSA (Calculated - sq m): 2.11 sq meters  BMI (Calculated): 31  Weight in (lb) to have BMI = 25: 164.1]    Assessment:       1. Constipation, unspecified constipation type    2. Essential hypertension    3. Hyperlipidemia, unspecified hyperlipidemia type        Plan:     Health Maintenance   Topic Date Due    Colonoscopy  01/22/2018    Fecal Occult Blood Test (FOBT)/[employer]  06/18/2019    Influenza Vaccine  08/01/2019    Lipid Panel  01/15/2020    High Dose Statin  05/21/2020    Mammogram  06/13/2020    TETANUS VACCINE  10/01/2024    Hepatitis C Screening  Completed    " Pneumococcal Vaccine (65+ Low/Medium Risk)  Completed    DEXA SCAN  Discontinued        Elena was seen today for follow-up.    Diagnoses and all orders for this visit:    Constipation, unspecified constipation type       -     Colonoscopy due/referral in/Pt to call today to schedule       -     Miralax 1 cap DAILY       -     Avoid Laxatives(MOM or Mag Citrate) unless no BM>3 days    Essential hypertension/controlled but HCTZ possibly causing constipation  -     Decrease hydroCHLOROthiazide (HYDRODIURIL) 12.5 MG Tab; Take 1 tablet (12.5 mg total) by mouth once daily.  -     Cancel: Basic metabolic panel; Future  -     Comprehensive metabolic panel; Future        -      RTC x 4 months with prior lab    Hyperlipidemia, unspecified hyperlipidemia type        -     Continue Zocor 40mg  -     Comprehensive metabolic panel; Future  -     Lipid panel; Future

## 2019-07-17 DIAGNOSIS — Z12.11 SPECIAL SCREENING FOR MALIGNANT NEOPLASMS, COLON: Primary | ICD-10-CM

## 2019-07-17 RX ORDER — POLYETHYLENE GLYCOL 3350, SODIUM SULFATE ANHYDROUS, SODIUM BICARBONATE, SODIUM CHLORIDE, POTASSIUM CHLORIDE 236; 22.74; 6.74; 5.86; 2.97 G/4L; G/4L; G/4L; G/4L; G/4L
4 POWDER, FOR SOLUTION ORAL ONCE
Qty: 4000 ML | Refills: 0 | Status: SHIPPED | OUTPATIENT
Start: 2019-07-17 | End: 2019-07-17

## 2019-07-23 ENCOUNTER — PATIENT OUTREACH (OUTPATIENT)
Dept: OTHER | Facility: OTHER | Age: 74
End: 2019-07-23

## 2019-07-23 NOTE — PROGRESS NOTES
Last 5 Patient Entered Readings                                      Current 30 Day Average: 140/71     Recent Readings 7/22/2019 7/19/2019 7/18/2019 7/17/2019 7/16/2019    SBP (mmHg) 134 137 135 148 132    DBP (mmHg) 63 65 63 71 68    Pulse 61 60 61 60 64        Hypertension Medications     carvedilol (COREG) 6.25 MG tablet Take 1 tablet (6.25 mg total) by mouth 2 (two) times daily with meals.    hydroCHLOROthiazide (HYDRODIURIL) 12.5 MG Tab Take 1 tablet (12.5 mg total) by mouth once daily.    NIFEdipine (PROCARDIA-XL) 60 MG (OSM) 24 hr tablet Take 1 tablet (60 mg total) by mouth once daily.    valsartan (DIOVAN) 320 MG tablet Take 1 tablet (320 mg total) by mouth once daily.     Called patient for BP follow up. She is doing well today with no complaints. She is pleased with her recent BP readings. Patient saw PCP on 7/16/19 for constipation and PCP decreased HCTZ. Patient says the constipation is better. She says that she thinks the constipation started after changes in BP medications but she can't associate which agent precipitated it. She requests to decrease HTN therapy because she doesn't like to take so many medications. She says she feels great (no headache and more energy).     1) 30-day BP average exceeds goal of <130/<80, but trending down. Continue current BP medications.   2) Discussed with patient that BP well controlled, but still above goal with medications on board. Patient understood.   3) Patient knows to contact me with any non-urgent clinical changes or concerns. Go to ED or call [facility] for emergencies.   4) Will defer lifestyle counseling to digital medicine health . Continue lifestyle changes  5) Will continue to follow up. Will consider decreasing nifedipine at future encounter if constipation persists. Will consider other adjustments as necessary    Laxmi Tsai, Pharm.D.   Digital Medicine Clinical Pharmacist  [phone]

## 2019-08-08 ENCOUNTER — ANESTHESIA EVENT (OUTPATIENT)
Dept: ENDOSCOPY | Facility: HOSPITAL | Age: 74
End: 2019-08-08
Payer: MEDICARE

## 2019-08-08 ENCOUNTER — HOSPITAL ENCOUNTER (OUTPATIENT)
Facility: HOSPITAL | Age: 74
Discharge: HOME | End: 2019-08-08
Attending: COLON & RECTAL SURGERY | Admitting: COLON & RECTAL SURGERY
Payer: MEDICARE

## 2019-08-08 ENCOUNTER — ANESTHESIA (OUTPATIENT)
Dept: ENDOSCOPY | Facility: HOSPITAL | Age: 74
End: 2019-08-08
Payer: MEDICARE

## 2019-08-08 VITALS
SYSTOLIC BLOOD PRESSURE: 134 MMHG | DIASTOLIC BLOOD PRESSURE: 67 MMHG | BODY MASS INDEX: 30.46 KG/M2 | WEIGHT: 201 LBS | TEMPERATURE: 97 F | OXYGEN SATURATION: 100 % | HEART RATE: 57 BPM | HEIGHT: 68 IN | RESPIRATION RATE: 17 BRPM

## 2019-08-08 DIAGNOSIS — Z12.11 SCREENING FOR MALIGNANT NEOPLASM OF COLON: Primary | ICD-10-CM

## 2019-08-08 DIAGNOSIS — Z98.890 S/P COLONOSCOPY: ICD-10-CM

## 2019-08-08 PROCEDURE — 45380 COLONOSCOPY AND BIOPSY: CPT | Mod: HCNC | Performed by: COLON & RECTAL SURGERY

## 2019-08-08 PROCEDURE — 88305 TISSUE SPECIMEN TO PATHOLOGY - SURGERY: ICD-10-PCS | Mod: 26,HCNC,, | Performed by: PATHOLOGY

## 2019-08-08 PROCEDURE — 27201012 HC FORCEPS, HOT/COLD, DISP: Mod: HCNC | Performed by: COLON & RECTAL SURGERY

## 2019-08-08 PROCEDURE — 45380 COLONOSCOPY AND BIOPSY: CPT | Mod: 59,HCNC,, | Performed by: COLON & RECTAL SURGERY

## 2019-08-08 PROCEDURE — 63600175 PHARM REV CODE 636 W HCPCS: Mod: HCNC | Performed by: COLON & RECTAL SURGERY

## 2019-08-08 PROCEDURE — E9220 PRA ENDO ANESTHESIA: ICD-10-PCS | Mod: PT,HCNC,, | Performed by: NURSE ANESTHETIST, CERTIFIED REGISTERED

## 2019-08-08 PROCEDURE — 37000008 HC ANESTHESIA 1ST 15 MINUTES: Mod: HCNC | Performed by: COLON & RECTAL SURGERY

## 2019-08-08 PROCEDURE — E9220 PRA ENDO ANESTHESIA: HCPCS | Mod: PT,HCNC,, | Performed by: NURSE ANESTHETIST, CERTIFIED REGISTERED

## 2019-08-08 PROCEDURE — 45385 COLONOSCOPY W/LESION REMOVAL: CPT | Mod: HCNC | Performed by: COLON & RECTAL SURGERY

## 2019-08-08 PROCEDURE — 63600175 PHARM REV CODE 636 W HCPCS: Mod: HCNC | Performed by: NURSE ANESTHETIST, CERTIFIED REGISTERED

## 2019-08-08 PROCEDURE — 45385 PR COLONOSCOPY,REMV LESN,SNARE: ICD-10-PCS | Mod: PT,HCNC,, | Performed by: COLON & RECTAL SURGERY

## 2019-08-08 PROCEDURE — 45385 COLONOSCOPY W/LESION REMOVAL: CPT | Mod: PT,HCNC,, | Performed by: COLON & RECTAL SURGERY

## 2019-08-08 PROCEDURE — 45380 PR COLONOSCOPY,BIOPSY: ICD-10-PCS | Mod: 59,HCNC,, | Performed by: COLON & RECTAL SURGERY

## 2019-08-08 PROCEDURE — 88305 TISSUE EXAM BY PATHOLOGIST: CPT | Mod: 26,HCNC,, | Performed by: PATHOLOGY

## 2019-08-08 PROCEDURE — 37000009 HC ANESTHESIA EA ADD 15 MINS: Mod: HCNC | Performed by: COLON & RECTAL SURGERY

## 2019-08-08 PROCEDURE — 27201089 HC SNARE, DISP (ANY): Mod: HCNC | Performed by: COLON & RECTAL SURGERY

## 2019-08-08 PROCEDURE — 88305 TISSUE EXAM BY PATHOLOGIST: CPT | Mod: HCNC | Performed by: PATHOLOGY

## 2019-08-08 RX ORDER — PROPOFOL 10 MG/ML
VIAL (ML) INTRAVENOUS
Status: DISCONTINUED | OUTPATIENT
Start: 2019-08-08 | End: 2019-08-08

## 2019-08-08 RX ORDER — PROPOFOL 10 MG/ML
VIAL (ML) INTRAVENOUS CONTINUOUS PRN
Status: DISCONTINUED | OUTPATIENT
Start: 2019-08-08 | End: 2019-08-08

## 2019-08-08 RX ORDER — SODIUM CHLORIDE 9 MG/ML
INJECTION, SOLUTION INTRAVENOUS CONTINUOUS
Status: DISCONTINUED | OUTPATIENT
Start: 2019-08-08 | End: 2019-08-08 | Stop reason: HOSPADM

## 2019-08-08 RX ADMIN — PROPOFOL 150 MCG/KG/MIN: 10 INJECTION, EMULSION INTRAVENOUS at 09:08

## 2019-08-08 RX ADMIN — SODIUM CHLORIDE: 0.9 INJECTION, SOLUTION INTRAVENOUS at 08:08

## 2019-08-08 RX ADMIN — PROPOFOL 70 MG: 10 INJECTION, EMULSION INTRAVENOUS at 09:08

## 2019-08-08 NOTE — [employer] PATIENT INSTRUCTIONS
Discharge Summary/Instructions after an Endoscopic Procedure  Patient Name: Elena Block  Patient MRN: [id number]  Patient YOB: 1945 Thursday, August 08, 2019  Allie Preston MD  RESTRICTIONS:  During your procedure today, you received medications for sedation.  These   medications may affect your judgment, balance and coordination.  Therefore,   for 24 hours, you have the following restrictions:   - DO NOT drive a car, operate machinery, make legal/financial decisions,   sign important papers or drink alcohol.    ACTIVITY:  Today: no heavy lifting, straining or running due to procedural   sedation/anesthesia.  The following day: return to full activity including work.  DIET:  Eat and drink normally unless instructed otherwise.     TREATMENT FOR COMMON SIDE EFFECTS:  - Mild abdominal pain, nausea, belching, bloating or excessive gas:  rest,   eat lightly and use a heating pad.  - Sore Throat: treat with throat lozenges and/or gargle with warm salt   water.  - Because air was used during the procedure, expelling large amounts of air   from your rectum or belching is normal.  - If a bowel prep was taken, you may not have a bowel movement for 1-3 days.    This is normal.  SYMPTOMS TO WATCH FOR AND REPORT TO YOUR PHYSICIAN:  1. Abdominal pain or bloating, other than gas cramps.  2. Chest pain.  3. Back pain.  4. Signs of infection such as: chills or fever occurring within 24 hours   after the procedure.  5. Rectal bleeding, which would show as bright red, maroon, or black stools.   (A tablespoon of blood from the rectum is not serious, especially if   hemorrhoids are present.)  6. Vomiting.  7. Weakness or dizziness.  GO DIRECTLY TO THE NEAREST EMERGENCY ROOM IF YOU HAVE ANY OF THE FOLLOWING:      Difficulty breathing              Chills and/or fever over 101 F   Persistent vomiting and/or vomiting blood   Severe abdominal pain   Severe chest pain   Black, tarry stools   Bleeding- more than one  tablespoon   Any other symptom or condition that you feel may need urgent attention  Your doctor recommends these additional instructions:  If any biopsies were taken, your doctors clinic will contact you in 1 to 2   weeks with any results.  - Discharge patient to home.   - Resume previous diet.   - Continue present medications.   - Await pathology results.   - Repeat colonoscopy date to be determined after pending pathology results   are reviewed for surveillance.   - Return to referring physician.   - Written discharge instructions were provided to the patient.   - The signs and symptoms of potential delayed complications were discussed   with the patient.   - Patient has a contact number available for emergencies.   - Return to normal activities tomorrow.  For questions, problems or results please call your physician - Allie Preston MD at Work:  [phone].  [facility], EMERGENCY ROOM PHONE NUMBER: [phone]  IF A COMPLICATION OR EMERGENCY SITUATION ARISES AND YOU ARE UNABLE TO REACH   YOUR PHYSICIAN - GO DIRECTLY TO THE EMERGENCY ROOM.  Allie Preston MD  8/8/2019 9:34:54 AM  This report has been verified and signed electronically.  [employer]

## 2019-08-08 NOTE — PLAN OF CARE
Patient states ready for discharge. Patient educated on discharge instructions. Patient verbalizes understanding.

## 2019-08-08 NOTE — PLAN OF CARE
Patient verbalizes understanding of care plan   Patient with fever starting last night, brother is sick at home. No +URI symptoms, no vomiting/diarrhea, tolerating PO well and making good wet diapers.

## 2019-08-08 NOTE — TRANSFER OF CARE
"Anesthesia Transfer of Care Note    Patient: Elena Block    Procedure(s) Performed: Procedure(s) (LRB):  COLONOSCOPY (N/A)    Patient location: PACU    Anesthesia Type: general    Transport from OR: Transported from OR on 2-3 L/min O2 by NC with adequate spontaneous ventilation    Post pain: adequate analgesia    Post assessment: no apparent anesthetic complications and tolerated procedure well    Post vital signs: stable    Level of consciousness: awake    Nausea/Vomiting: no nausea/vomiting    Complications: none    Transfer of care protocol was followed      Last vitals:   Visit Vitals  /60   Pulse (!) 56   Temp 36.2 °C (97.2 °F)   Resp 20   Ht 5' 8" (1.727 m)   Wt 91.2 kg (201 lb)   SpO2 98%   Breastfeeding? No   BMI 30.56 kg/m²     "

## 2019-08-08 NOTE — ANESTHESIA PREPROCEDURE EVALUATION
08/08/2019  Elena Block is a 73 y.o., female.    Past Medical History:   Diagnosis Date    Back pain     Cataract     Central retinal vein occlusion of left eye     Degeneration of lumbar or lumbosacral intervertebral disc 4/16/2013    High cholesterol     Hypertension     Hypothyroidism     Impingement syndrome of left shoulder 7/29/2013    Obesity     Pneumonia     Posterior vitreous detachment of both eyes 1/17/2013    S/P partial hysterectomy 1/23/2013    Vitamin D insufficiency 2016       Anesthesia Evaluation    I have reviewed the Patient Summary Reports.     I have reviewed the Medications.     Review of Systems  Cardiovascular:   Hypertension    Musculoskeletal:   Arthritis     Endocrine:   Hypothyroidism        Physical Exam  General:  Well nourished    Airway/Jaw/Neck:  Airway Findings: Mouth Opening: Normal Tongue: Normal  General Airway Assessment: Adult  Mallampati: II  TM Distance: Normal, at least 6 cm      Dental:  Dental Findings: Upper Dentures   Chest/Lungs:  Chest/Lungs Findings: Normal Respiratory Rate     Heart/Vascular:  Heart Findings: Rate: Normal  Rhythm: Regular Rhythm  Sounds: Normal     Abdomen:  Abdomen Findings:  Normal     Musculoskeletal:  Musculoskeletal Findings:    Skin:  Skin Findings:     Mental Status:  Mental Status Findings:  Cooperative, Alert and Oriented         Anesthesia Plan  Type of Anesthesia, risks & benefits discussed:  Anesthesia Type:  general  Patient's Preference: general  Intra-op Monitoring Plan:   Intra-op Monitoring Plan Comments:   Post Op Pain Control Plan:   Post Op Pain Control Plan Comments:   Induction:   IV  Beta Blocker:  Patient is on a Beta-Blocker and has received one dose within the past 24 hours (No further documentation required).       Informed Consent: Patient understands risks and agrees with Anesthesia plan.   Questions answered. Anesthesia consent signed with patient.  ASA Score: 2     Day of Surgery Review of History & Physical: I have interviewed and examined the patient. I have reviewed the patient's H&P dated:  There are no significant changes.          Ready For Surgery From Anesthesia Perspective.

## 2019-08-08 NOTE — ANESTHESIA POSTPROCEDURE EVALUATION
Anesthesia Post Evaluation    Patient: Elena Block    Procedure(s) Performed: Procedure(s) (LRB):  COLONOSCOPY (N/A)    Final Anesthesia Type: general  Patient location during evaluation: PACU  Patient participation: Yes- Able to Participate  Level of consciousness: awake and alert and oriented  Post-procedure vital signs: reviewed and stable  Pain management: adequate  Airway patency: patent  PONV status at discharge: No PONV  Anesthetic complications: no      Cardiovascular status: stable  Respiratory status: unassisted, spontaneous ventilation and room air  Hydration status: euvolemic  Follow-up not needed.          Vitals Value Taken Time   /61 8/8/2019  9:42 AM   Temp 36.2 °C (97.2 °F) 8/8/2019  9:40 AM   Pulse 54 8/8/2019  9:42 AM   Resp 20 8/8/2019  9:42 AM   SpO2 97 % 8/8/2019  9:42 AM         No case tracking events are documented in the log.      Pain/Chari Score: Chari Score: 8 (8/8/2019  9:40 AM)

## 2019-08-08 NOTE — H&P
COLONOSCOPY HISTORY AND PHYSICAL EXAM    Procedure : Colonoscopy      INDICATIONS: asymptomatic screening exam    Family Hx of CRC: None    Last Colonoscopy:  2008  Findings: normal       Past Medical History:   Diagnosis Date    Back pain     Cataract     Central retinal vein occlusion of left eye     Degeneration of lumbar or lumbosacral intervertebral disc 4/16/2013    High cholesterol     Hypertension     Hypothyroidism     Impingement syndrome of left shoulder 7/29/2013    Obesity     Pneumonia     Posterior vitreous detachment of both eyes 1/17/2013    S/P partial hysterectomy 1/23/2013    Vitamin D insufficiency 2016     Sedation Problems: NO  Family History   Problem Relation Age of Onset    Diabetes Mother     Glaucoma Mother     Heart disease Mother     Blindness Mother     Heart attack Mother     Glaucoma Brother     Stroke Brother     Liver disease Brother     Heart disease Brother     Arthritis Brother     Alcohol abuse Brother     Drug abuse Brother     Glaucoma Brother     Diabetes Brother     Pemphigus vulgaris Brother     Blindness Brother     No Known Problems Father     Hypertension Maternal Grandmother     Diabetes Maternal Grandmother     Blindness Maternal Grandmother     No Known Problems Paternal Grandmother     No Known Problems Paternal Grandfather     Heart disease Brother     Diabetes Brother         Bilateral Knee amputation    Heart disease Brother     Liver disease Brother     Gout Son     Heart disease Son 48        stent placement    Heart attack Son     Heart attacks under age 50 Son     No Known Problems Son     No Known Problems Maternal Grandfather     No Known Problems Maternal Aunt     No Known Problems Maternal Uncle     No Known Problems Paternal Aunt     No Known Problems Paternal Uncle     Amblyopia Neg Hx     Macular degeneration Neg Hx     Retinal detachment Neg Hx     Strabismus Neg Hx     Thyroid disease Neg Hx      "Cataracts Neg Hx     Cancer Neg Hx      Fam Hx of Sedation Problems: NO  Social History     Socioeconomic History    Marital status:      Spouse name: Not on file    Number of children: Not on file    Years of education: Not on file    Highest education level: Not on file   Occupational History    Not on file   Social Needs    Financial resource strain: Not on file    Food insecurity:     Worry: Not on file     Inability: Not on file    Transportation needs:     Medical: Not on file     Non-medical: Not on file   Tobacco Use    Smoking status: Never Smoker    Smokeless tobacco: Never Used   Substance and Sexual Activity    Alcohol use: Yes     Alcohol/week: 0.6 oz     Types: 1 Cans of beer per week     Comment: Occasionally; beer once a  week (socially)    Drug use: No    Sexual activity: Not Currently     Partners: Male     Comment: maybe once month   Lifestyle    Physical activity:     Days per week: Not on file     Minutes per session: Not on file    Stress: Not on file   Relationships    Social connections:     Talks on phone: Not on file     Gets together: Not on file     Attends Mandaeism service: Not on file     Active member of club or organization: Not on file     Attends meetings of clubs or organizations: Not on file     Relationship status: Not on file   Other Topics Concern    Not on file   Social History Narrative    Not on file       Review of Systems - Negative except   Respiratory ROS: no dyspnea  Cardiovascular ROS: no exertional chest pain  Gastrointestinal ROS: NO abdominal discomfort,  NO rectal bleeding  Musculoskeletal ROS: no muscular pain  Neurological ROS: no recent stroke    Physical Exam:  BP (!) 138/90 (BP Location: Left arm, Patient Position: Lying)   Pulse 64   Temp 97.2 °F (36.2 °C) (Temporal)   Resp 17   Ht 5' 8" (1.727 m)   Wt 91.2 kg (201 lb)   SpO2 99%   Breastfeeding? No   BMI 30.56 kg/m²   General: no distress  Head: normocephalic  Mallampati " Score   Neck: supple, symmetrical, trachea midline  Lungs:  clear to auscultation bilaterally and normal respiratory effort  Heart: regular rate and rhythm and no murmur  Abdomen: soft, non-tender non-distented; bowel sounds normal; no masses,  no organomegaly  Extremities: no cyanosis or edema, or clubbing    ASA:  II    PLAN  COLONOSCOPY.    SedationPlan :MAC    The details of the procedure, the possible need for biopsy or polypectomy and the potential risks including bleeding, perforation, missed polyps were discussed in detail.

## 2019-08-14 ENCOUNTER — PATIENT OUTREACH (OUTPATIENT)
Dept: OTHER | Facility: OTHER | Age: 74
End: 2019-08-14

## 2019-08-14 NOTE — PROGRESS NOTES
Last 5 Patient Entered Readings                                      Current 30 Day Average: 139/70     Recent Readings 8/12/2019 8/11/2019 8/9/2019 8/7/2019 8/5/2019    SBP (mmHg) 148 120 159 120 141    DBP (mmHg) 70 80 72 80 68    Pulse 64 80 64 80 66            Digital Medicine: Health  Follow Up    Left voicemail to follow up with Mrs. Elena Block.  Current BP average 139/70 mmHg is not at goal, [130/80 mmHg].

## 2019-08-15 ENCOUNTER — TELEPHONE (OUTPATIENT)
Dept: ENDOSCOPY | Facility: HOSPITAL | Age: 74
End: 2019-08-15

## 2019-08-16 DIAGNOSIS — E78.5 HYPERLIPIDEMIA, UNSPECIFIED HYPERLIPIDEMIA TYPE: ICD-10-CM

## 2019-08-18 RX ORDER — SIMVASTATIN 40 MG/1
TABLET, FILM COATED ORAL
Qty: 90 TABLET | Refills: 2 | Status: SHIPPED | OUTPATIENT
Start: 2019-08-18 | End: 2019-10-16 | Stop reason: SDUPTHER

## 2019-08-21 DIAGNOSIS — E55.9 VITAMIN D DEFICIENCY: ICD-10-CM

## 2019-08-21 RX ORDER — ERGOCALCIFEROL 1.25 MG/1
50000 CAPSULE ORAL
Qty: 12 CAPSULE | Refills: 3 | Status: SHIPPED | OUTPATIENT
Start: 2019-08-21 | End: 2021-01-05 | Stop reason: CLARIF

## 2019-08-21 RX ORDER — IBUPROFEN 800 MG/1
TABLET ORAL
Qty: 90 TABLET | Refills: 0 | Status: SHIPPED | OUTPATIENT
Start: 2019-08-21 | End: 2019-11-30 | Stop reason: SDUPTHER

## 2019-08-27 ENCOUNTER — PATIENT OUTREACH (OUTPATIENT)
Dept: OTHER | Facility: OTHER | Age: 74
End: 2019-08-27

## 2019-08-27 RX ORDER — VALSARTAN 320 MG/1
320 TABLET ORAL DAILY
Qty: 90 TABLET | Refills: 3 | Status: SHIPPED | OUTPATIENT
Start: 2019-08-27 | End: 2020-08-21

## 2019-08-27 NOTE — PROGRESS NOTES
Last 5 Patient Entered Readings                                      Current 30 Day Average: 136/73     Recent Readings 8/26/2019 8/25/2019 8/23/2019 8/22/2019 8/22/2019    SBP (mmHg) 138 125 123 144 154    DBP (mmHg) 67 65 67 72 82    Pulse 60 63 69 63 66        Hypertension Medications     carvedilol (COREG) 6.25 MG tablet Take 1 tablet (6.25 mg total) by mouth 2 (two) times daily with meals.    hydroCHLOROthiazide (HYDRODIURIL) 12.5 MG Tab Take 1 tablet (12.5 mg total) by mouth once daily.    NIFEdipine (PROCARDIA-XL) 60 MG (OSM) 24 hr tablet Take 1 tablet (60 mg total) by mouth once daily.    valsartan (DIOVAN) 320 MG tablet TAKE 1 TABLET (320 MG TOTAL) BY MOUTH ONCE DAILY.     Called patient for BP follow up. No answer. Left message for call back      Laxmi Tsai, Pharm.D.  [facility] Clinical Pharmacist  [phone]

## 2019-08-28 NOTE — PROGRESS NOTES
"Last 5 Patient Entered Readings                                      Current 30 Day Average: 135/73     Recent Readings 8/28/2019 8/26/2019 8/25/2019 8/23/2019 8/22/2019    SBP (mmHg) 124 138 125 123 144    DBP (mmHg) 68 67 65 67 72    Pulse 65 60 63 69 63        Hypertension Medications     carvedilol (COREG) 6.25 MG tablet Take 1 tablet (6.25 mg total) by mouth 2 (two) times daily with meals.    hydroCHLOROthiazide (HYDRODIURIL) 12.5 MG Tab Take 1 tablet (12.5 mg total) by mouth once daily.    NIFEdipine (PROCARDIA-XL) 60 MG (OSM) 24 hr tablet Take 1 tablet (60 mg total) by mouth once daily.    valsartan (DIOVAN) 320 MG tablet TAKE 1 TABLET (320 MG TOTAL) BY MOUTH ONCE DAILY.     Patient returned my call for follow up and left voicemail. I called to follow up. She says that her constipation has resolved. She takes miralax and avoids eating foods that can cause constipation. Patient says she doesn't have headaches like she used to. She denies light-headedness. She says she is "really feeling good".     1) 30-day BP average exceeds goal of <130/<80. Continue current BP medications.   2) Patient knows to contact me with any non-urgent clinical changes or concerns. Go to ED or call [facility] for emergencies.   3) Will defer lifestyle counseling to digital medicine health . Continue lifestyle changes.   4) Will continue to follow up.       Laxmi Tsai, Pharm.D.   Digital Medicine Clinical Pharmacist  [phone]        "

## 2019-08-28 NOTE — PROGRESS NOTES
Last 5 Patient Entered Readings                                      Current 30 Day Average: 135/73     Recent Readings 8/28/2019 8/26/2019 8/25/2019 8/23/2019 8/22/2019    SBP (mmHg) 124 138 125 123 144    DBP (mmHg) 68 67 65 67 72    Pulse 65 60 63 69 63          Contact deferred. PharmD attempted to contact patient yesterday, no answer. Pushing follow-up call 3-4 weeks.

## 2019-09-16 ENCOUNTER — IMMUNIZATION (OUTPATIENT)
Dept: PHARMACY | Facility: CLINIC | Age: 74
End: 2019-09-16
Payer: MEDICARE

## 2019-09-18 ENCOUNTER — PATIENT OUTREACH (OUTPATIENT)
Dept: OTHER | Facility: OTHER | Age: 74
End: 2019-09-18

## 2019-09-18 NOTE — PROGRESS NOTES
Digital Medicine: Health  Follow-Up    The history is provided by the patient. No  was used.     Follow Up  Follow-up reason(s): routine education      Routine Education Topics: eating patterns and physical activity          Diet:   Patient reports eating or drinking the following: fruitShe has the following dietary restrictions: constipation     Patient reports she has increased fruit intake daily to help with digestive system. Patient reports she is eating a lot better and is pleased with changes.     Intervention(s): reading food labels    Assigning the following patient goals: maintain low sodium diet    Physical Activity:   When asked if exercising, patient responded: yes    She exercises for 45 minutes per day 2 day(s) a week.  Her level of intensity when exercising is moderate.    Patient participates in the following activities: walking    She identified the following barriers to physical activity: no barriers to being active    Patient reports she is getting back on track with exercise. Patient's goal is to walk at least 2x/wk with  around [place].     Intervention(s): goal setting     Assigning the following patient goal(s): increase physical activity and participate in exercise weekly      [facility]    INTERVENTION(S)  recommend physical activity, encouragement/support and goal setting    PLAN  patient verbalizes understanding and patient amenable to changes      There are no preventive care reminders to display for this patient.    Last 5 Patient Entered Readings                                      Current 30 Day Average: 135/71     Recent Readings 9/13/2019 9/13/2019 9/11/2019 9/9/2019 9/8/2019    SBP (mmHg) 147 154 139 134 135    DBP (mmHg) 78 75 67 63 67    Pulse 59 60 56 66 64             Patient asked: Patient has question about timing of medication and concerns about blood pressure being elevated in the evening. Transferred call to PharmD to complete task.      Patient  asked:     Patient asked:

## 2019-09-18 NOTE — PROGRESS NOTES
Digital Medicine: Clinician Follow-Up    Patient called me with a medication related question. She says that she takes valsartan and carvedilol around 6:30- 7:00 am and takes her BP a few hours after. She reports that BP is usually at goal in the mornings. She says she takes the second dose of carvedilol and nifedipine around 1 pm and notices that her BP is more elevated in the afternoons. She is doing well today with no complaints.     The history is provided by the patient.     Follow Up  Follow-up reason(s): responding to task              Sleep Apnea  Patient not previously diagnosed with JESSICA and           INTERVENTION(S)  reviewed appropriate dose schedule    PLAN  patient verbalizes understanding    30-day BP average exceeds goal of <130/<80 mmHg, but trending down. Continue current medications.   Take carvedilol every 12 hours with food. Take nifedipine around 1 pm on empty stomach.       There are no preventive care reminders to display for this patient.    Last 5 Patient Entered Readings                                      Current 30 Day Average: 135/71     Recent Readings 9/13/2019 9/13/2019 9/11/2019 9/9/2019 9/8/2019    SBP (mmHg) 147 154 139 134 135    DBP (mmHg) 78 75 67 63 67    Pulse 59 60 56 66 64        Hypertension Medications     carvedilol (COREG) 6.25 MG tablet Take 1 tablet (6.25 mg total) by mouth 2 (two) times daily with meals.    hydroCHLOROthiazide (HYDRODIURIL) 12.5 MG Tab Take 1 tablet (12.5 mg total) by mouth once daily.    NIFEdipine (PROCARDIA-XL) 60 MG (OSM) 24 hr tablet Take 1 tablet (60 mg total) by mouth once daily.    valsartan (DIOVAN) 320 MG tablet TAKE 1 TABLET (320 MG TOTAL) BY MOUTH ONCE DAILY.

## 2019-09-24 ENCOUNTER — TELEPHONE (OUTPATIENT)
Dept: INTERNAL MEDICINE | Facility: CLINIC | Age: 74
End: 2019-09-24

## 2019-09-24 DIAGNOSIS — E03.4 HYPOTHYROIDISM DUE TO ACQUIRED ATROPHY OF THYROID: ICD-10-CM

## 2019-09-24 RX ORDER — LEVOTHYROXINE SODIUM 100 UG/1
TABLET ORAL
Qty: 90 TABLET | Refills: 0 | Status: SHIPPED | OUTPATIENT
Start: 2019-09-24 | End: 2019-11-30 | Stop reason: SDUPTHER

## 2019-10-02 ENCOUNTER — PATIENT OUTREACH (OUTPATIENT)
Dept: OTHER | Facility: OTHER | Age: 74
End: 2019-10-02

## 2019-10-02 NOTE — PROGRESS NOTES
Called patient for BP follow up after directing patient to adjust administration times of carvedilol and nifedipine. No answer. Left message for call back

## 2019-10-16 NOTE — PROGRESS NOTES
Digital Medicine: Clinician Follow-Up    Called patient for follow up after directing her to adjust administration times of carvedilol and nifedipine. Patient reports that she has been under stress related to illness and death in her family. Otherwise, she has no complaints today. She confirms that she has been taking the carvedilol with food and nifedipine on an empty stomach. After discussion, she admits that for last couple of months she has not been taking HCTZ consistently as prescribed. PCP decreased HCTZ from 25 to 12.5 mg QD this past July in an effort to decrease constipation. Patient reports that she stopped taking HCTZ all together because she was worried the constipation would return.     The history is provided by the patient.           Sleep Apnea  Patient not previously diagnosed with JESSICA and           INTERVENTION(S)  reviewed appropriate dose schedule, recommended physical activity and recommended med change    PLAN  patient verbalizes understanding and patient amenable to changes    30-day BP average exceeds goal of <130/<80 mmHg. Continue current medications. Resume HCTZ 12.5 mg QD.   Recommended fiber, fluid, and exercise to help with constipation.       There are no preventive care reminders to display for this patient.    Last 5 Patient Entered Readings                                      Current 30 Day Average: 142/70     Recent Readings 10/14/2019 10/11/2019 10/11/2019 10/10/2019 10/10/2019    SBP (mmHg) 136 149 143 155 158    DBP (mmHg) 66 70 75 75 71    Pulse 60 66 66 68 65        Hypertension Medications     carvedilol (COREG) 6.25 MG tablet Take 1 tablet (6.25 mg total) by mouth 2 (two) times daily with meals.    hydroCHLOROthiazide (HYDRODIURIL) 12.5 MG Tab Take 1 tablet (12.5 mg total) by mouth once daily.    NIFEdipine (PROCARDIA-XL) 60 MG (OSM) 24 hr tablet Take 1 tablet (60 mg total) by mouth once daily.    valsartan (DIOVAN) 320 MG tablet TAKE 1 TABLET (320 MG TOTAL) BY MOUTH ONCE  DAILY.

## 2019-10-25 ENCOUNTER — PATIENT OUTREACH (OUTPATIENT)
Dept: OTHER | Facility: OTHER | Age: 74
End: 2019-10-25

## 2019-10-25 NOTE — PROGRESS NOTES
"Digital Medicine: Health  Follow-Up    Patient reports she is making progress. Patient is seeing PCP in November. Patient states, "I am taking it day by day everything is going well".     The history is provided by the patient. No  was used.     Follow Up  Follow-up reason(s): reading review and routine education      Routine Education Topics: eating patterns and physical activity          Diet:   She has the following dietary restrictions: low sodium diet    Patient reports no changes in dietary habits. Patient did not want to discuss dietary habits at this time.     Assigning the following patient goals: maintain low sodium diet    Physical Activity:   When asked if exercising, patient responded: yes1 day(s) a week.  Her level of intensity when exercising is low.    Patient participates in the following activities: walking    Patient reports she has cut back on walking due to being "unstable". Patient did not go into detail about feeling unstable. Encouraged patient to listen to her body. Patient is going to work on increasing the amount of times she exercises per week.     Assigning the following patient goal(s): participate in exercise weekly    Medication Adherence:   She misses doses: never      INTERVENTION(S)  encouragement/support    PLAN  patient verbalizes understanding and patient amenable to changes      There are no preventive care reminders to display for this patient.    Last 5 Patient Entered Readings                                      Current 30 Day Average: 141/69     Recent Readings 10/21/2019 10/17/2019 10/16/2019 10/14/2019 10/11/2019    SBP (mmHg) 145 139 119 136 149    DBP (mmHg) 67 62 61 66 70    Pulse 66 62 59 60 66                "

## 2019-10-31 ENCOUNTER — PATIENT OUTREACH (OUTPATIENT)
Dept: OTHER | Facility: OTHER | Age: 74
End: 2019-10-31

## 2019-10-31 NOTE — PROGRESS NOTES
Left voicemail requesting call back  Follow up resumption of hctz at 12.5 mg daily, advised 10/16/19

## 2019-11-12 ENCOUNTER — PATIENT OUTREACH (OUTPATIENT)
Dept: ADMINISTRATIVE | Facility: HOSPITAL | Age: 74
End: 2019-11-12

## 2019-11-19 ENCOUNTER — LAB VISIT (OUTPATIENT)
Dept: LAB | Facility: HOSPITAL | Age: 74
End: 2019-11-19
Attending: INTERNAL MEDICINE
Payer: MEDICARE

## 2019-11-19 DIAGNOSIS — I10 ESSENTIAL HYPERTENSION: ICD-10-CM

## 2019-11-19 DIAGNOSIS — E03.4 HYPOTHYROIDISM DUE TO ACQUIRED ATROPHY OF THYROID: ICD-10-CM

## 2019-11-19 DIAGNOSIS — E78.5 HYPERLIPIDEMIA, UNSPECIFIED HYPERLIPIDEMIA TYPE: ICD-10-CM

## 2019-11-19 LAB
ALBUMIN SERPL BCP-MCNC: 3.9 G/DL (ref 3.5–5.2)
ALP SERPL-CCNC: 81 U/L (ref 55–135)
ALT SERPL W/O P-5'-P-CCNC: 13 U/L (ref 10–44)
ANION GAP SERPL CALC-SCNC: 9 MMOL/L (ref 8–16)
AST SERPL-CCNC: 24 U/L (ref 10–40)
BILIRUB SERPL-MCNC: 0.5 MG/DL (ref 0.1–1)
BUN SERPL-MCNC: 12 MG/DL (ref 8–23)
CALCIUM SERPL-MCNC: 9.9 MG/DL (ref 8.7–10.5)
CHLORIDE SERPL-SCNC: 104 MMOL/L (ref 95–110)
CHOLEST SERPL-MCNC: 159 MG/DL (ref 120–199)
CHOLEST/HDLC SERPL: 3.2 {RATIO} (ref 2–5)
CO2 SERPL-SCNC: 28 MMOL/L (ref 23–29)
CREAT SERPL-MCNC: 0.9 MG/DL (ref 0.5–1.4)
EST. GFR  (AFRICAN AMERICAN): >60 ML/MIN/1.73 M^2
EST. GFR  (NON AFRICAN AMERICAN): >60 ML/MIN/1.73 M^2
GLUCOSE SERPL-MCNC: 90 MG/DL (ref 70–110)
HDLC SERPL-MCNC: 49 MG/DL (ref 40–75)
HDLC SERPL: 30.8 % (ref 20–50)
LDLC SERPL CALC-MCNC: 83.2 MG/DL (ref 63–159)
NONHDLC SERPL-MCNC: 110 MG/DL
POTASSIUM SERPL-SCNC: 4 MMOL/L (ref 3.5–5.1)
PROT SERPL-MCNC: 7.9 G/DL (ref 6–8.4)
SODIUM SERPL-SCNC: 141 MMOL/L (ref 136–145)
TRIGL SERPL-MCNC: 134 MG/DL (ref 30–150)
TSH SERPL DL<=0.005 MIU/L-ACNC: 2.07 UIU/ML (ref 0.4–4)

## 2019-11-19 PROCEDURE — 84443 ASSAY THYROID STIM HORMONE: CPT | Mod: HCNC

## 2019-11-19 PROCEDURE — 36415 COLL VENOUS BLD VENIPUNCTURE: CPT | Mod: HCNC

## 2019-11-19 PROCEDURE — 80061 LIPID PANEL: CPT | Mod: HCNC

## 2019-11-19 PROCEDURE — 80053 COMPREHEN METABOLIC PANEL: CPT | Mod: HCNC

## 2019-11-20 NOTE — PROGRESS NOTES
Digital Medicine: Clinician Follow-Up    She asks if she should take her BP medications at night  Changed medication schedule this week to following:  Carvedilol, Nifedipine & hctz - 7 am  Valsartan - 12 pm (previously taking valsartan in morning & nifedipine at noon)  Carvedilol - 7 pm  Takes carvedilol with food  She reports difference between 2 different BP cuffs. Charging cuff today for first time.  Reports constipation in past with hctz 25 mg    The history is provided by the patient.     Follow Up  Follow-up reason(s): reading review and medication change follow-up      Patient started new medication.    Is patient tolerating med change?:  Yes      INTERVENTION(S)  reviewed appropriate dose schedule    PLAN  patient verbalizes understanding and continue monitoring    Change medication schedule to following:  Valsartan & carvedilol ~ 7 am  Nifedipine & carvedilol ~ 7 pm    Charge BP cuff today and monthly  Take [facility] cuff to upcoming PCP appointment 11/26/19 to review measurement technique and accuracy. Advised her she can exchange cuff at [facility] if experiencing > 10-15 mmHg variation.        There are no preventive care reminders to display for this patient.    Last 5 Patient Entered Readings                                      Current 30 Day Average: 143/70     Recent Readings 11/18/2019 11/14/2019 11/14/2019 11/13/2019 11/11/2019    SBP (mmHg) 152 149 161 148 135    DBP (mmHg) 73 66 69 73 70    Pulse 66 66 66 63 61             Hypertension Medications             carvedilol (COREG) 6.25 MG tablet Take 1 tablet (6.25 mg total) by mouth 2 (two) times daily with meals.    hydroCHLOROthiazide (HYDRODIURIL) 12.5 MG Tab Take 1 tablet (12.5 mg total) by mouth once daily.    NIFEdipine (PROCARDIA-XL) 60 MG (OSM) 24 hr tablet Take 1 tablet (60 mg total) by mouth once daily.    valsartan (DIOVAN) 320 MG tablet TAKE 1 TABLET (320 MG TOTAL) BY MOUTH ONCE DAILY.                             Medication Adherence Screening      She does not wonder if medications are working.  She knows purpose of medications.

## 2019-11-26 ENCOUNTER — PATIENT OUTREACH (OUTPATIENT)
Dept: OTHER | Facility: OTHER | Age: 74
End: 2019-11-26

## 2019-11-26 ENCOUNTER — OFFICE VISIT (OUTPATIENT)
Dept: INTERNAL MEDICINE | Facility: CLINIC | Age: 74
End: 2019-11-26
Payer: MEDICARE

## 2019-11-26 VITALS
HEART RATE: 71 BPM | SYSTOLIC BLOOD PRESSURE: 120 MMHG | OXYGEN SATURATION: 98 % | HEIGHT: 68 IN | BODY MASS INDEX: 30.5 KG/M2 | DIASTOLIC BLOOD PRESSURE: 60 MMHG | WEIGHT: 201.25 LBS

## 2019-11-26 DIAGNOSIS — I10 ESSENTIAL HYPERTENSION: ICD-10-CM

## 2019-11-26 DIAGNOSIS — D12.6 ADENOMATOUS POLYP OF COLON, UNSPECIFIED PART OF COLON: ICD-10-CM

## 2019-11-26 DIAGNOSIS — E78.5 HYPERLIPIDEMIA, UNSPECIFIED HYPERLIPIDEMIA TYPE: Primary | ICD-10-CM

## 2019-11-26 DIAGNOSIS — E55.9 VITAMIN D DEFICIENCY: ICD-10-CM

## 2019-11-26 PROCEDURE — 99499 RISK ADDL DX/OHS AUDIT: ICD-10-PCS | Mod: HCNC,S$GLB,, | Performed by: INTERNAL MEDICINE

## 2019-11-26 PROCEDURE — 1126F PR PAIN SEVERITY QUANTIFIED, NO PAIN PRESENT: ICD-10-PCS | Mod: HCNC,S$GLB,, | Performed by: INTERNAL MEDICINE

## 2019-11-26 PROCEDURE — 99999 PR PBB SHADOW E&M-EST. PATIENT-LVL IV: ICD-10-PCS | Mod: PBBFAC,HCNC,, | Performed by: INTERNAL MEDICINE

## 2019-11-26 PROCEDURE — 99214 OFFICE O/P EST MOD 30 MIN: CPT | Mod: HCNC,S$GLB,, | Performed by: INTERNAL MEDICINE

## 2019-11-26 PROCEDURE — 1126F AMNT PAIN NOTED NONE PRSNT: CPT | Mod: HCNC,S$GLB,, | Performed by: INTERNAL MEDICINE

## 2019-11-26 PROCEDURE — 1101F PT FALLS ASSESS-DOCD LE1/YR: CPT | Mod: HCNC,CPTII,S$GLB, | Performed by: INTERNAL MEDICINE

## 2019-11-26 PROCEDURE — 3074F SYST BP LT 130 MM HG: CPT | Mod: HCNC,CPTII,S$GLB, | Performed by: INTERNAL MEDICINE

## 2019-11-26 PROCEDURE — 3078F PR MOST RECENT DIASTOLIC BLOOD PRESSURE < 80 MM HG: ICD-10-PCS | Mod: HCNC,CPTII,S$GLB, | Performed by: INTERNAL MEDICINE

## 2019-11-26 PROCEDURE — 1159F MED LIST DOCD IN RCRD: CPT | Mod: HCNC,S$GLB,, | Performed by: INTERNAL MEDICINE

## 2019-11-26 PROCEDURE — 1159F PR MEDICATION LIST DOCUMENTED IN MEDICAL RECORD: ICD-10-PCS | Mod: HCNC,S$GLB,, | Performed by: INTERNAL MEDICINE

## 2019-11-26 PROCEDURE — 1101F PR PT FALLS ASSESS DOC 0-1 FALLS W/OUT INJ PAST YR: ICD-10-PCS | Mod: HCNC,CPTII,S$GLB, | Performed by: INTERNAL MEDICINE

## 2019-11-26 PROCEDURE — 99214 PR OFFICE/OUTPT VISIT, EST, LEVL IV, 30-39 MIN: ICD-10-PCS | Mod: HCNC,S$GLB,, | Performed by: INTERNAL MEDICINE

## 2019-11-26 PROCEDURE — 3074F PR MOST RECENT SYSTOLIC BLOOD PRESSURE < 130 MM HG: ICD-10-PCS | Mod: HCNC,CPTII,S$GLB, | Performed by: INTERNAL MEDICINE

## 2019-11-26 PROCEDURE — 99499 UNLISTED E&M SERVICE: CPT | Mod: HCNC,S$GLB,, | Performed by: INTERNAL MEDICINE

## 2019-11-26 PROCEDURE — 99999 PR PBB SHADOW E&M-EST. PATIENT-LVL IV: CPT | Mod: PBBFAC,HCNC,, | Performed by: INTERNAL MEDICINE

## 2019-11-26 PROCEDURE — 3078F DIAST BP <80 MM HG: CPT | Mod: HCNC,CPTII,S$GLB, | Performed by: INTERNAL MEDICINE

## 2019-11-26 NOTE — PROGRESS NOTES
Subjective:       Patient ID: Elena Block is a 74 y.o. female.    Chief Complaint:   Follow-up (BP Check/Labs) and Hypertension    HPI: Mrs Block for f/u HTN.  She is having eradic BPs at home with the Digital HTN monitor.  BPs are running 125/60 to 166/72 on Digital machine, but lower in office: today at 120/60(Nagi)  And 144/66(Digital) She is having no CP/SOB/Headches or dizziness    Past Medical, Surgical, Social History: Please see as stated in Epic chart which has been reviewed.    Current Outpatient Medications   Medication Sig Dispense Refill    carvedilol (COREG) 6.25 MG tablet Take 1 tablet (6.25 mg total) by mouth 2 (two) times daily with meals. 60 tablet 5    ergocalciferol (ERGOCALCIFEROL) 50,000 unit Cap Take 1 capsule (50,000 Units total) by mouth every 7 days. 12 capsule 3    folic acid-vit B6-vit B12 (FOLBEE) 2.5-25-1 mg Tab Take 1 tablet by mouth once daily. 90 each 3    hydroCHLOROthiazide (HYDRODIURIL) 12.5 MG Tab Take 1 tablet (12.5 mg total) by mouth once daily. 90 tablet 1    levothyroxine (SYNTHROID) 100 MCG tablet TAKE ONE TABLET BY MOUTH BEFORE BREAKFAST FOR THYROID 90 tablet 0    NIFEdipine (PROCARDIA-XL) 60 MG (OSM) 24 hr tablet Take 1 tablet (60 mg total) by mouth once daily. 30 tablet 5    simvastatin (ZOCOR) 40 MG tablet Take 1 tablet (40 mg total) by mouth every evening. 90 tablet 0    timolol maleate 0.5% (TIMOPTIC-XE) 0.5 % SolG Place 1 drop into both eyes once daily. 10 mL 3    valsartan (DIOVAN) 320 MG tablet TAKE 1 TABLET (320 MG TOTAL) BY MOUTH ONCE DAILY. 90 tablet 3    ergocalciferol (VITAMIN D2) 50,000 unit Cap Take 1 capsule (50,000 Units total) by mouth every 7 days. (Patient not taking: Reported on 11/26/2019) 12 capsule 3     mg tablet TAKE 1 TABLET BY MOUTH EVERY 8 HOURS WITH FOOD AS NEEDED FOR BACK/JOINT PAIN (Patient not taking: Reported on 11/26/2019) 90 tablet 0    polyethylene glycol (GLYCOLAX) 17 gram/dose powder 1 cap in 4-6 oz liquid :  "Take 2x/day x 3 days then go to once daily thereafter for constipation (Patient not taking: Reported on 11/26/2019) 289 g 6     No current facility-administered medications for this visit.        Review of Systems   Respiratory: Negative for cough, chest tightness, shortness of breath and wheezing.    Cardiovascular: Negative for chest pain, palpitations and leg swelling.   Neurological: Negative for dizziness, weakness, light-headedness and headaches.   Psychiatric/Behavioral: Negative.        Objective:      Lab Results   Component Value Date    WBC 9.02 01/15/2019    HGB 13.7 01/15/2019    HCT 41.7 01/15/2019     01/15/2019    CHOL 159 11/19/2019    TRIG 134 11/19/2019    HDL 49 11/19/2019    ALT 13 11/19/2019    AST 24 11/19/2019     11/19/2019    K 4.0 11/19/2019     11/19/2019    CREATININE 0.9 11/19/2019    BUN 12 11/19/2019    CO2 28 11/19/2019    TSH 2.069 11/19/2019    HGBA1C 5.3 05/19/2006     Physical Exam   Constitutional: She appears well-developed and well-nourished.   HENT:   Head: Normocephalic and atraumatic.   Neck: Normal range of motion. Neck supple. No thyromegaly present.   Cardiovascular: Normal rate, regular rhythm and normal heart sounds.   Pulmonary/Chest: Effort normal and breath sounds normal. No respiratory distress. She has no wheezes. She exhibits no tenderness.   Abdominal: Soft. Bowel sounds are normal. She exhibits no mass. There is no tenderness.   Musculoskeletal: She exhibits no edema.   Lymphadenopathy:     She has no cervical adenopathy.   Psychiatric: She has a normal mood and affect.   Vitals reviewed.        Vital Signs  Pulse: 71  SpO2: 98 %  BP: 120/60  Pain Score: 0-No pain  Height and Weight  Height: 5' 8" (172.7 cm)  Weight: 91.3 kg (201 lb 4.5 oz)  BSA (Calculated - sq m): 2.09 sq meters  BMI (Calculated): 30.6  Weight in (lb) to have BMI = 25: 164.1]    Assessment:       1. Hyperlipidemia, unspecified hyperlipidemia type    2. Essential " hypertension    3. Vitamin D deficiency    4. Adenomatous polyp of colon, unspecified part of colon        Plan:     Health Maintenance   Topic Date Due    Mammogram  06/13/2020    Lipid Panel  11/19/2020    High Dose Statin  11/26/2020    TETANUS VACCINE  10/01/2024    Colonoscopy  08/08/2029    Hepatitis C Screening  Completed    Pneumococcal Vaccine (65+ Low/Medium Risk)  Completed    DEXA SCAN  Discontinued        Elena was seen today for follow-up and hypertension.    Diagnoses and all orders for this visit:    Hyperlipidemia, unspecified hyperlipidemia type/controlled        -     continue Zocor 40 mg q.day  -     Lipid panel; Future  -     Comprehensive metabolic panel; Future    Essential hypertension/controlled but with possible Digital monitor malfunctioning        -     continue Procardia XL 60 mg QD, HCTZ 12.5 mg q.day, valsartan 320 mg q.day, and Coreg 6.25 mg b.i.d.  -     Comprehensive metabolic panel; Future    Vitamin D deficiency        -     continue Ergocalciferol 75412 unit cap weekly  -     Vitamin D; Future    Adenomatous colon polyps        -      repeat colonoscopy due August 2024    Health maintenance        -     patient to get the shingles/shingrix vaccination        -     return to clinic x5 months with 1 week prior fasting lab work to include lipid panel chemistry vitamin-D

## 2019-11-26 NOTE — PROGRESS NOTES
"Digital Medicine: Health  Follow-Up    Patient reports she went to the doctor today. Per chart: "She is having eradic BPs at home with the Digital HTN machine. BPs are running 125/60 to 166/72 on Digital machine".  Patient is concerned about accuracy. Patient asked if she needed to get a new blood pressure machine. Will discuss issue with PharmD. Patient joined the program in February of 2019.     The history is provided by the patient and medical records.     Follow Up  Follow-up reason(s): reading review        INTERVENTION(S)  reviewed monitoring technique    PLAN  demonstrates understanding via teach back, Clinician follow-up and continue monitoring      There are no preventive care reminders to display for this patient.    Last 5 Patient Entered Readings                                      Current 30 Day Average: 143/70     Recent Readings 11/26/2019 11/26/2019 11/22/2019 11/20/2019 11/20/2019    SBP (mmHg) 144 164 125 146 166    DBP (mmHg) 66 85 60 64 73    Pulse 59 67 61 66 65            "

## 2019-11-30 DIAGNOSIS — E03.4 HYPOTHYROIDISM DUE TO ACQUIRED ATROPHY OF THYROID: ICD-10-CM

## 2019-12-01 RX ORDER — IBUPROFEN 800 MG/1
TABLET ORAL
Qty: 90 TABLET | Refills: 0 | Status: SHIPPED | OUTPATIENT
Start: 2019-12-01 | End: 2020-01-14

## 2019-12-01 RX ORDER — LEVOTHYROXINE SODIUM 100 UG/1
TABLET ORAL
Qty: 90 TABLET | Refills: 0 | Status: SHIPPED | OUTPATIENT
Start: 2019-12-01 | End: 2020-04-13

## 2019-12-09 NOTE — PROGRESS NOTES
Digital Medicine: Health  Follow-Up    Patient reports she would like to keep monitoring. No headaches or dizziness. Patient states she is feeling good.     The history is provided by the patient. No  was used.     Follow Up  Follow-up reason(s): reading review      Readings are trending down due to medication adherence and improved technique.    Routine Education Topics: eating patterns and physical activity      INTERVENTION(S)  encouragement/support, denied resources and denied questions    PLAN  patient verbalizes understanding, patient amenable to changes and continue monitoring      There are no preventive care reminders to display for this patient.    Last 5 Patient Entered Readings                                      Current 30 Day Average: 141/70     Recent Readings 12/7/2019 12/3/2019 12/1/2019 11/26/2019 11/26/2019    SBP (mmHg) 138 130 147 144 164    DBP (mmHg) 71 63 71 66 85    Pulse 61 65 66 59 67              Diet Screening   No change to diet.  She has the following dietary restrictions: low sodium diet    Barriers to a Healthy Diet: no barriers to healthy eating    Assigning the following patient goals: maintain low sodium diet    Physical Activity Screening   When asked if exercising, patient responded: yesHer level of intensity when exercising is low.    Patient participates in the following activities: walking    Patient is starting to walk. I will follow-up with patient on frequency (set SMG) next outreach.     Intervention(s): goal setting     Assigning the following patient goal(s): increase physical activity

## 2019-12-17 ENCOUNTER — TELEPHONE (OUTPATIENT)
Dept: INTERNAL MEDICINE | Facility: CLINIC | Age: 74
End: 2019-12-17

## 2019-12-17 ENCOUNTER — NURSE TRIAGE (OUTPATIENT)
Dept: ADMINISTRATIVE | Facility: CLINIC | Age: 74
End: 2019-12-17

## 2019-12-17 DIAGNOSIS — I10 ESSENTIAL HYPERTENSION: ICD-10-CM

## 2019-12-17 RX ORDER — NIFEDIPINE 90 MG/1
90 TABLET, EXTENDED RELEASE ORAL DAILY
Qty: 90 TABLET | Refills: 1 | Status: SHIPPED | OUTPATIENT
Start: 2019-12-17 | End: 2020-06-16 | Stop reason: SDUPTHER

## 2019-12-17 NOTE — TELEPHONE ENCOUNTER
Reason for Disposition   Systolic BP  >= 160 OR Diastolic >= 100    Additional Information   Negative: Difficult to awaken or acting confused (e.g., disoriented, slurred speech)   Negative: Severe difficulty breathing (e.g., struggling for each breath, speaks in single words)   Negative: [1] Weakness of the face, arm or leg on one side of the body AND [2] new onset   Negative: [1] Numbness (i.e., loss of sensation) of the face, arm or leg on one side of the body AND [2] new onset   Negative: [1] Chest pain lasts > 5 minutes AND [2] history of heart disease  (i.e., heart attack, bypass surgery, angina, angioplasty, CHF)   Negative: [1] Chest pain AND [2] took nitrogylcerin AND [3] pain was not relieved   Negative: Sounds like a life-threatening emergency to the triager   Negative: Symptom is main concern  (e.g., headache, chest pain)   Negative: Low blood pressure is main concern   Negative: [1] Systolic BP  >= 160 OR Diastolic >= 100 AND [2] cardiac or neurologic symptoms (e.g., chest pain, difficulty breathing, unsteady gait, blurred vision)   Negative: [1] Pregnant > 20 weeks AND [2] new hand or face swelling   Negative: [1] Pregnant > 20 weeks AND [2] BP Systolic BP  >= 140 OR Diastolic >= 90   Negative: [1] Systolic BP  >= 200 OR Diastolic >= 120  AND [2] having NO cardiac or neurologic symptoms   Negative: [1] Postpartum < 6 weeks AND [2] BP Systolic BP  >= 140 OR Diastolic >= 90   Negative: [1] Systolic BP  >= 180 OR Diastolic >= 110 AND [2] missed most recent dose of blood pressure medication   Negative: Systolic BP  >= 180 OR Diastolic >= 110   Negative: Ran out of BP medications    Protocols used: HIGH BLOOD PRESSURE-A-    Pt stated her blood pressure is 162/74. Stated she takes her blood pressure medication as prescribed. Pt wants to discuss BP with her PCP. Message sent to Provider's office to contact Pt.

## 2019-12-18 ENCOUNTER — PATIENT OUTREACH (OUTPATIENT)
Dept: OTHER | Facility: OTHER | Age: 74
End: 2019-12-18

## 2019-12-18 NOTE — TELEPHONE ENCOUNTER
Spoke with Ms Umair who c/o occasionally elevated BPs into the 160s.  She c/o H/As when her BP is high.  I have recommended and erx'd in increased dose of Nifedipine at 90mg QD. She will f/u in next 2-3 weeks and continue to f/u with Digital HTN as well.  Lillian, I wanted to let you know ie the medication change on pt as above.  Thanks, Sandra Banks

## 2019-12-18 NOTE — PROGRESS NOTES
Patient states at the [facility] her cuff was exchanged for larger size. States she was advised to lay cuff flat instead of wrapping cuff up in between uses.  Advised patient to continue with nifedipine 60 mg daily until able to evaluate readings with proper cuff size  She verbalized understanding  Reminded patient to rest 5 minutes prior to measurement and to charge cuff monthly

## 2019-12-18 NOTE — PROGRESS NOTES
Digital Medicine: Clinician Follow-Up    Patient states she is going to the [facility] today to have BP cuff assessed   She was advised by Dr. Powell to increase nifedipine to 90 mg daily but wants to ensure elevated readings are accurate  She confirms charging BP cuff since last outreach    Current medication schedule:  Valsartan and carvedilol and hctz - morning  Nifedipine - 1 pm  Carvedilol - evening  BP readings taken 2pm or later     The history is provided by the patient.     Follow Up  Follow-up reason(s): reading review          INTERVENTION(S)  reviewed appropriate dose schedule and encouragement/support    PLAN  patient verbalizes understanding and continue monitoring    Agreed with patient to take cuff to [facility] today  Patient states she will call me to advise of outcome from [facility] visit  Discussed increasing nifedipine to 90 mg daily if SBP readings in 160s are accurate      There are no preventive care reminders to display for this patient.    Last 5 Patient Entered Readings                                      Current 30 Day Average: 148/72     Recent Readings 12/17/2019 12/17/2019 12/14/2019 12/13/2019 12/13/2019    SBP (mmHg) 167 162 145 160 163    DBP (mmHg) 75 74 70 72 72    Pulse 67 61 68 70 64             Hypertension Medications             carvedilol (COREG) 6.25 MG tablet Take 1 tablet (6.25 mg total) by mouth 2 (two) times daily with meals.    hydroCHLOROthiazide (HYDRODIURIL) 12.5 MG Tab Take 1 tablet (12.5 mg total) by mouth once daily.    NIFEdipine (PROCARDIA-XL) 90 MG (OSM) 24 hr tablet Take 1 tablet (90 mg total) by mouth once daily.    valsartan (DIOVAN) 320 MG tablet TAKE 1 TABLET (320 MG TOTAL) BY MOUTH ONCE DAILY.                             Medication Adherence Screening     She does not wonder if medications are working.  She knows purpose of medications.

## 2019-12-19 DIAGNOSIS — I10 ESSENTIAL HYPERTENSION: ICD-10-CM

## 2019-12-19 RX ORDER — CARVEDILOL 6.25 MG/1
TABLET ORAL
Qty: 180 TABLET | Refills: 1 | Status: SHIPPED | OUTPATIENT
Start: 2019-12-19 | End: 2020-07-14

## 2019-12-22 DIAGNOSIS — Z29.9 PREVENTIVE MEASURE: ICD-10-CM

## 2019-12-22 DIAGNOSIS — H40.013 OPEN ANGLE WITH BORDERLINE FINDINGS, LOW RISK, BILATERAL: ICD-10-CM

## 2019-12-22 DIAGNOSIS — H40.053 OCULAR HYPERTENSION, BILATERAL: ICD-10-CM

## 2019-12-22 RX ORDER — TIMOLOL MALEATE 5 MG/ML
SOLUTION OPHTHALMIC
Qty: 10 ML | Refills: 3 | Status: SHIPPED | OUTPATIENT
Start: 2019-12-22 | End: 2023-04-05

## 2019-12-23 ENCOUNTER — PATIENT OUTREACH (OUTPATIENT)
Dept: OTHER | Facility: OTHER | Age: 74
End: 2019-12-23

## 2019-12-23 RX ORDER — CYANOCOBALAMIN/FOLIC AC/VIT B6 1-2.5-25MG
TABLET ORAL
Qty: 90 TABLET | Refills: 3 | Status: SHIPPED | OUTPATIENT
Start: 2019-12-23 | End: 2021-01-06

## 2019-12-23 NOTE — PROGRESS NOTES
Digital Medicine: Clinician Follow-Up    Patient reports taking extra valsartan x 1 12/22/19    The history is provided by the patient.     Follow Up  Follow-up reason(s): reading review and medication change      Medication Change: dose increase        INTERVENTION(S)  reviewed appropriate dose schedule, recommended med change and encouragement/support    PLAN  patient verbalizes understanding, patient amenable to changes and continue monitoring    Discussed max recommended dose of valsartan and recommended not to exceed. Advised to proceed with nifedipine dose increase to 90 mg daily due to continued elevated BP readings since [facility] visit. Patient states she will start new dose today. Reviewed that nifedipine increase may not be seen immediately and to allow time for dose to stabilize.      There are no preventive care reminders to display for this patient.    Last 5 Patient Entered Readings                                      Current 30 Day Average: 151/73     Recent Readings 12/23/2019 12/21/2019 12/19/2019 12/18/2019 12/17/2019    SBP (mmHg) 147 165 150 154 167    DBP (mmHg) 71 82 73 72 75    Pulse 66 67 66 63 67             Hypertension Medications             carvedilol (COREG) 6.25 MG tablet TAKE 1 TABLET BY MOUTH TWICE A DAY WITH FOOD    hydroCHLOROthiazide (HYDRODIURIL) 12.5 MG Tab Take 1 tablet (12.5 mg total) by mouth once daily.    NIFEdipine (PROCARDIA-XL) 90 MG (OSM) 24 hr tablet Take 1 tablet (90 mg total) by mouth once daily.    valsartan (DIOVAN) 320 MG tablet TAKE 1 TABLET (320 MG TOTAL) BY MOUTH ONCE DAILY.                             Medication Adherence Screening     She does not wonder if medications are working.  She knows purpose of medications.

## 2019-12-30 ENCOUNTER — PES CALL (OUTPATIENT)
Dept: ADMINISTRATIVE | Facility: CLINIC | Age: 74
End: 2019-12-30

## 2020-01-06 ENCOUNTER — PATIENT OUTREACH (OUTPATIENT)
Dept: OTHER | Facility: OTHER | Age: 75
End: 2020-01-06

## 2020-01-06 NOTE — PROGRESS NOTES
Digital Medicine: Clinician Follow-Up    Taking nifedipine, hctz, and carvedilol in morning  Takes valsartan around noon  Takes second carvedilol in evening  Takes carvedilol with food  Denies GRIS, headaches or dizziness with nifedipine increase    The history is provided by the patient.     Follow Up  Follow-up reason(s): reading review and medication change follow-up      Patient started new medication.    Is patient tolerating med change?:  Yes      INTERVENTION(S)  reviewed appropriate dose schedule, encouragement/support and denied questions    PLAN  patient verbalizes understanding and continue monitoring    BP appears to be improving with nifedipine increase  Continue current regimen  Requested more readings  Reminded patient to login to [facility]      There are no preventive care reminders to display for this patient.    Last 5 Patient Entered Readings                                      Current 30 Day Average: 152/74     Recent Readings 12/30/2019 12/28/2019 12/27/2019 12/27/2019 12/26/2019    SBP (mmHg) 135 141 158 168 158    DBP (mmHg) 66 72 81 84 74    Pulse 69 68 70 73 69             Hypertension Medications             carvedilol (COREG) 6.25 MG tablet TAKE 1 TABLET BY MOUTH TWICE A DAY WITH FOOD    hydroCHLOROthiazide (HYDRODIURIL) 12.5 MG Tab Take 1 tablet (12.5 mg total) by mouth once daily.    NIFEdipine (PROCARDIA-XL) 90 MG (OSM) 24 hr tablet Take 1 tablet (90 mg total) by mouth once daily.    valsartan (DIOVAN) 320 MG tablet TAKE 1 TABLET (320 MG TOTAL) BY MOUTH ONCE DAILY.                             Medication Adherence Screening     She does not wonder if medications are working.  She knows purpose of medications.

## 2020-01-07 DIAGNOSIS — I10 ESSENTIAL HYPERTENSION: ICD-10-CM

## 2020-01-07 RX ORDER — NIFEDIPINE 60 MG/1
60 TABLET, EXTENDED RELEASE ORAL DAILY
Qty: 90 TABLET | Refills: 1 | Status: SHIPPED | OUTPATIENT
Start: 2020-01-07 | End: 2020-02-14 | Stop reason: DRUGHIGH

## 2020-01-14 ENCOUNTER — TELEPHONE (OUTPATIENT)
Dept: INTERNAL MEDICINE | Facility: CLINIC | Age: 75
End: 2020-01-14

## 2020-01-14 ENCOUNTER — PATIENT OUTREACH (OUTPATIENT)
Dept: OTHER | Facility: OTHER | Age: 75
End: 2020-01-14

## 2020-01-14 DIAGNOSIS — M19.90 OSTEOARTHRITIS, UNSPECIFIED OSTEOARTHRITIS TYPE, UNSPECIFIED SITE: Primary | ICD-10-CM

## 2020-01-14 RX ORDER — MELOXICAM 15 MG/1
15 TABLET ORAL DAILY
Qty: 30 TABLET | Refills: 0 | Status: SHIPPED | OUTPATIENT
Start: 2020-01-14 | End: 2020-02-12

## 2020-01-14 NOTE — TELEPHONE ENCOUNTER
Izaiah, please ask Ms Block if she takes Ibuprofen 800mg or Meloxicam 15mg tab? She shouldn't take both.  Thanks

## 2020-01-14 NOTE — TELEPHONE ENCOUNTER
Pt requesting a new rx for Meloxicam, please Advise.     [facility]   [address] [place] [address]  [phone]

## 2020-01-14 NOTE — TELEPHONE ENCOUNTER
I sent in the Meloxicam 15mg up to 1 tab daily as needed. If she stays on this long term, I will plan to decrease the does to 7.5mg-FYI.  Thanks

## 2020-01-29 ENCOUNTER — IMMUNIZATION (OUTPATIENT)
Dept: PHARMACY | Facility: CLINIC | Age: 75
End: 2020-01-29
Payer: MEDICARE

## 2020-01-29 ENCOUNTER — OFFICE VISIT (OUTPATIENT)
Dept: INTERNAL MEDICINE | Facility: CLINIC | Age: 75
End: 2020-01-29
Payer: MEDICARE

## 2020-01-29 ENCOUNTER — PATIENT MESSAGE (OUTPATIENT)
Dept: PHARMACY | Facility: CLINIC | Age: 75
End: 2020-01-29

## 2020-01-29 VITALS
BODY MASS INDEX: 30.4 KG/M2 | HEART RATE: 55 BPM | OXYGEN SATURATION: 100 % | SYSTOLIC BLOOD PRESSURE: 122 MMHG | WEIGHT: 199.94 LBS | DIASTOLIC BLOOD PRESSURE: 70 MMHG

## 2020-01-29 DIAGNOSIS — E78.5 HYPERLIPIDEMIA, UNSPECIFIED HYPERLIPIDEMIA TYPE: ICD-10-CM

## 2020-01-29 DIAGNOSIS — Z00.00 ENCOUNTER FOR PREVENTIVE HEALTH EXAMINATION: Primary | ICD-10-CM

## 2020-01-29 DIAGNOSIS — I10 ESSENTIAL HYPERTENSION: ICD-10-CM

## 2020-01-29 DIAGNOSIS — E89.0 POST-SURGICAL HYPOTHYROIDISM: ICD-10-CM

## 2020-01-29 DIAGNOSIS — I70.0 ATHEROSCLEROSIS OF AORTA: ICD-10-CM

## 2020-01-29 DIAGNOSIS — E66.9 OBESITY (BMI 30.0-34.9): ICD-10-CM

## 2020-01-29 DIAGNOSIS — H40.053 BILATERAL OCULAR HYPERTENSION: ICD-10-CM

## 2020-01-29 PROCEDURE — 99999 PR PBB SHADOW E&M-EST. PATIENT-LVL III: CPT | Mod: PBBFAC,HCNC,, | Performed by: PHYSICIAN ASSISTANT

## 2020-01-29 PROCEDURE — 3078F PR MOST RECENT DIASTOLIC BLOOD PRESSURE < 80 MM HG: ICD-10-PCS | Mod: HCNC,CPTII,S$GLB, | Performed by: PHYSICIAN ASSISTANT

## 2020-01-29 PROCEDURE — 3074F SYST BP LT 130 MM HG: CPT | Mod: HCNC,CPTII,S$GLB, | Performed by: PHYSICIAN ASSISTANT

## 2020-01-29 PROCEDURE — 3078F DIAST BP <80 MM HG: CPT | Mod: HCNC,CPTII,S$GLB, | Performed by: PHYSICIAN ASSISTANT

## 2020-01-29 PROCEDURE — G0439 PPPS, SUBSEQ VISIT: HCPCS | Mod: HCNC,S$GLB,, | Performed by: PHYSICIAN ASSISTANT

## 2020-01-29 PROCEDURE — 99499 UNLISTED E&M SERVICE: CPT | Mod: HCNC,S$GLB,, | Performed by: PHYSICIAN ASSISTANT

## 2020-01-29 PROCEDURE — 99499 RISK ADDL DX/OHS AUDIT: ICD-10-PCS | Mod: HCNC,S$GLB,, | Performed by: PHYSICIAN ASSISTANT

## 2020-01-29 PROCEDURE — G0439 PR MEDICARE ANNUAL WELLNESS SUBSEQUENT VISIT: ICD-10-PCS | Mod: HCNC,S$GLB,, | Performed by: PHYSICIAN ASSISTANT

## 2020-01-29 PROCEDURE — 3074F PR MOST RECENT SYSTOLIC BLOOD PRESSURE < 130 MM HG: ICD-10-PCS | Mod: HCNC,CPTII,S$GLB, | Performed by: PHYSICIAN ASSISTANT

## 2020-01-29 PROCEDURE — 99999 PR PBB SHADOW E&M-EST. PATIENT-LVL III: ICD-10-PCS | Mod: PBBFAC,HCNC,, | Performed by: PHYSICIAN ASSISTANT

## 2020-01-29 NOTE — PATIENT INSTRUCTIONS
Counseling and Referral of Other Preventative  (Italic type indicates deductible and co-insurance are waived)    Patient Name: Elena Block  Today's Date: 1/29/2020    Health Maintenance       Date Due Completion Date    Shingles Vaccine (2 of 3) 11/07/2013 (check with our pharmacy) 9/12/2013    Mammogram 06/13/2020 6/13/2019    Lipid Panel 11/19/2020 11/19/2019    High Dose Statin 01/29/2021 1/29/2020    TETANUS VACCINE 10/01/2024 10/1/2014    Colonoscopy 08/08/2029 8/8/2019        No orders of the defined types were placed in this encounter.    The following information is provided to all patients.  This information is to help you find resources for any of the problems found today that may be affecting your health:                Living healthy guide: www.[facility].[place].gov      Understanding Diabetes: www.diabetes.org      Eating healthy: www.cdc.gov/healthyweight      [facility] home safety checklist: www.cdc.gov/steadi/patient.html      Agency on Aging: www.goea.[place].gov      Alcoholics anonymous (AA): www.aa.org      Physical Activity: www.pamela.nih.gov/iy2emdl      Tobacco use: www.quitwithusla.org

## 2020-01-29 NOTE — PROGRESS NOTES
Elena Block presented for a  Medicare AWV and comprehensive Health Risk Assessment today. The following components were reviewed and updated:    · Medical history  · Family History  · Social history  · Allergies and Current Medications  · Health Risk Assessment  · Health Maintenance  · Care Team     ** See Completed Assessments for Annual Wellness Visit within the encounter summary.**       The following assessments were completed:  · Living Situation  · CAGE  · Depression Screening  · Timed Get Up and Go  · Whisper Test  · Cognitive Function Screening    · Nutrition Screening  · ADL Screening  · PAQ Screening    Vitals:    01/29/20 0855   BP: 122/70   Pulse: (!) 55   SpO2: 100%   Weight: 90.7 kg (199 lb 15.3 oz)     Body mass index is 30.4 kg/m².  Physical Exam   Constitutional: She appears well-developed and well-nourished. No distress.   HENT:   Head: Normocephalic and atraumatic.   Right Ear: Tympanic membrane, external ear and ear canal normal.   Left Ear: Tympanic membrane, external ear and ear canal normal.   Mouth/Throat: Oropharynx is clear and moist.   Cardiovascular: Normal rate and regular rhythm. Exam reveals no friction rub.   No murmur heard.  Pulmonary/Chest: Effort normal and breath sounds normal. She has no wheezes. She has no rales.   Abdominal: Soft. Bowel sounds are normal. There is no tenderness.   Lymphadenopathy:     She has no cervical adenopathy.   Neurological: She is alert.   Skin: Skin is warm and dry. No rash noted.   Vitals reviewed.        Diagnoses and health risks identified today and associated recommendations/orders:    1. Encounter for preventive health examination  Exam and Assessments performed  Chart Review Complete  Health Maintenance Reviewed and updated (Shingles vaccine Card given to patient)      2. Post-surgical hypothyroidism  Lab Results   Component Value Date    TSH 2.069 11/19/2019   stable  Followed by PCP    3. Atherosclerosis of aorta  Noted on prior  imaging  Stable  On statin therapy  Followed by PCP    4. Hyperlipidemia, unspecified hyperlipidemia type  Stable  On Statin therapy  Followed by PCP    5. Essential hypertension  Well controlled on current meds  Followed by PCP and Digital Medicine    6. Obesity (BMI 30.0-34.9)  BMI reviewed  Has lost a few lbs  Lifestyle modifications encouraged.     7. Bilateral ocular hypertension  Stable  Followed by Optometry      Provided Elena with a 5-10 year written screening schedule and personal prevention plan. Recommendations were developed using the USPSTF age appropriate recommendations. Education, counseling, and referrals were provided as needed. After Visit Summary printed and given to patient which includes a list of additional screenings\tests needed.    Follow up in about 3 months (around 4/28/2020) for PCP follow up and 1 year for next Medicare AWV.    ROSETTA AnsariC

## 2020-02-05 ENCOUNTER — PATIENT OUTREACH (OUTPATIENT)
Dept: OTHER | Facility: OTHER | Age: 75
End: 2020-02-05

## 2020-02-05 NOTE — PROGRESS NOTES
Called patient for follow up. No answer. Left message for call back    Will confirm nifedipine dose and discuss accuracy of BP readings.

## 2020-02-11 ENCOUNTER — PATIENT OUTREACH (OUTPATIENT)
Dept: OTHER | Facility: OTHER | Age: 75
End: 2020-02-11

## 2020-02-11 NOTE — PROGRESS NOTES
Digital Medicine: Health  Follow-Up    Patient reports she has been feeling great. Patient reports no headaches.  Patient reports she received the shingles vaccination in her left arm.     The history is provided by the patient. No  was used.     Follow Up  Follow-up reason(s): reading review, routine education and goal follow-up      Routine Education Topics: eating patterns and physical activity      INTERVENTION(S)  encouragement/support, goal setting, denied resources and denied questions    PLAN  patient verbalizes understanding, patient amenable to changes and continue monitoring      There are no preventive care reminders to display for this patient.    Last 5 Patient Entered Readings                                      Current 30 Day Average: 144/73     Recent Readings 2/10/2020 2/6/2020 2/4/2020 2/4/2020 2/2/2020    SBP (mmHg) 123 145 146 122 142    DBP (mmHg) 79 69 75 64 78    Pulse 65 63 65 63 65            Diet Screening   No change to diet.  She has the following dietary restrictions: low sodium diet    Barriers to a Healthy Diet: no barriers to healthy eating    Patient reports she recently lost weight (about 5 pounds).     Intervention(s): portion control and calorie tracking    Assigning the following patient goals: maintain low sodium diet    Physical Activity Screening   When asked if exercising, patient responded: yesHer level of intensity when exercising is moderate.    Patient participates in the following activities: walking    She identified the following barriers to physical activity: weather    Patient reports she will stretch inside to exercise if weather permits walking.     Intervention(s): goal setting and goal tracking     Assigning the following patient goal(s): 150 minutes of exercise per week

## 2020-02-12 DIAGNOSIS — M19.90 OSTEOARTHRITIS, UNSPECIFIED OSTEOARTHRITIS TYPE, UNSPECIFIED SITE: ICD-10-CM

## 2020-02-12 RX ORDER — MELOXICAM 15 MG/1
TABLET ORAL
Qty: 30 TABLET | Refills: 0 | Status: SHIPPED | OUTPATIENT
Start: 2020-02-12 | End: 2020-09-18

## 2020-02-14 NOTE — PROGRESS NOTES
"Digital Medicine: Clinician Follow-Up    Called patient for BP follow up. She is doing well today with no complaints. Patient reports that BP is up and down. She reports monitoring sodium at home but admits to dining out and eating things like fried chicken. She says she has not exercising as much lately either because of the weather. She confirms she is taking nifedipine 90 mg and not 60 mg and denies GRIS.           The history is provided by the patient.     Follow Up  Follow-up reason(s): reading review and routine education      Routine Education Topics: eating patterns  Patient's 30-day BP average is above goal of <130/<80 mmHg.       INTERVENTION(S)  recommended diet modifications, recommended physical activity and recommended med change - patient refuses    PLAN  patient refuses additional therapy and continue monitoring    Continue current mediations. Suggested increasing carvedilol. Patient refused and said "Noo. I'm on too much medication already".     Continue lifestyle changes. Reviewed ways to further reduce sodium intake      There are no preventive care reminders to display for this patient.    Last 5 Patient Entered Readings                                      Current 30 Day Average: 144/72     Recent Readings 2/14/2020 2/13/2020 2/10/2020 2/6/2020 2/4/2020    SBP (mmHg) 137 154 123 145 146    DBP (mmHg) 71 73 79 69 75    Pulse 62 70 65 63 65        Hypertension Medications     carvedilol (COREG) 6.25 MG tablet TAKE 1 TABLET BY MOUTH TWICE A DAY WITH FOOD    hydroCHLOROthiazide (HYDRODIURIL) 12.5 MG Tab Take 1 tablet (12.5 mg total) by mouth once daily.    NIFEdipine (PROCARDIA-XL) 60 MG (OSM) 24 hr tablet TAKE 1 TABLET (60 MG TOTAL) BY MOUTH ONCE DAILY.    NIFEdipine (PROCARDIA-XL) 90 MG (OSM) 24 hr tablet Take 1 tablet (90 mg total) by mouth once daily.    valsartan (DIOVAN) 320 MG tablet TAKE 1 TABLET (320 MG TOTAL) BY MOUTH ONCE DAILY.                 Screenings  "

## 2020-03-12 ENCOUNTER — PATIENT OUTREACH (OUTPATIENT)
Dept: OTHER | Facility: OTHER | Age: 75
End: 2020-03-12

## 2020-03-12 NOTE — PROGRESS NOTES
"Digital Medicine: Clinician Follow-Up    Called patient for follow up. Patient reports that she still has constipation. She takes miralax and milk of magnesia to help with this. She admits that she doesn't enough fruits and vegetables. She eats a small serving of pears and apples in the morning, not many other fruits and vegetables. She has not been exercising.     Patient says "I just have high BP". She thinks she is on too many medications. She admits that she may consume too much sodium. She prefers to work on this before increasing medications. She will also discuss BP readings and therapy with PCP next month.             The history is provided by the patient.     Follow Up  Follow-up reason(s): reading review and routine education      Routine Education Topics: eating patterns  Patient's 30-day BP average is above goal of <130/<80 mmHg.       INTERVENTION(S)  reviewed appropriate dose schedule, recommended diet modifications, recommended physical activity, recommended med change - patient refuses and encouragement/support    PLAN  patient verbalizes understanding, patient amenable to changes, await MD intervention and continue monitoring    Continue current medications.     Discussed low sodium food options.     Recommended fiber, fluid, exercise to help with constipation. Can try docusate as well         There are no preventive care reminders to display for this patient.    Last 5 Patient Entered Readings                                      Current 30 Day Average: 147/73     Recent Readings 3/9/2020 3/6/2020 3/3/2020 3/2/2020 2/29/2020    SBP (mmHg) 147 146 144 157 149    DBP (mmHg) 67 68 75 75 77    Pulse 61 64 64 63 62        Hypertension Medications     carvedilol (COREG) 6.25 MG tablet TAKE 1 TABLET BY MOUTH TWICE A DAY WITH FOOD    hydroCHLOROthiazide (HYDRODIURIL) 12.5 MG Tab Take 1 tablet (12.5 mg total) by mouth once daily.    NIFEdipine (PROCARDIA-XL) 90 MG (OSM) 24 hr tablet Take 1 tablet (90 mg " total) by mouth once daily.    valsartan (DIOVAN) 320 MG tablet TAKE 1 TABLET (320 MG TOTAL) BY MOUTH ONCE DAILY.                 Screenings

## 2020-04-01 ENCOUNTER — PATIENT OUTREACH (OUTPATIENT)
Dept: OTHER | Facility: OTHER | Age: 75
End: 2020-04-01

## 2020-04-01 NOTE — PROGRESS NOTES
Digital Medicine: Health  Follow-Up    Patient reports she is not worried about Covid-19. Patient reports she is feeling good. Patient reports when she takes the blood pressure in the evening blood pressure is always higher.     The history is provided by the patient. No  was used.     Follow Up  Follow-up reason(s): reading review and routine education      Routine Education Topics: eating patterns      INTERVENTION(S)  encouragement/support, denied resources and denied questions    PLAN  patient verbalizes understanding, patient amenable to changes and continue monitoring      There are no preventive care reminders to display for this patient.    Last 5 Patient Entered Readings                                      Current 30 Day Average: 151/72     Recent Readings 3/30/2020 3/29/2020 3/27/2020 3/26/2020 3/26/2020    SBP (mmHg) 149 150 145 162 159    DBP (mmHg) 68 66 66 79 75    Pulse 59 66 59 65 57            Diet Screening   She has the following dietary restrictions: low sodium diet    Patient is continuing to lose weight. Patient reports she eats pear and apples at least 3x/wk. Patient is going to focus on increasing vegetables.     Assigning the following patient goals: maintain low sodium diet    Physical Activity Screening   When asked if exercising, patient responded: yesHer level of intensity when exercising is low.    Patient participates in the following activities: chair exercises    She identified the following barriers to physical activity: no barriers to being active    Patient reports she is doing sit and fit every morning at 8:30a.     Intervention(s): goal setting     Assigning the following patient goal(s): increase physical activity

## 2020-04-11 DIAGNOSIS — E03.4 HYPOTHYROIDISM DUE TO ACQUIRED ATROPHY OF THYROID: ICD-10-CM

## 2020-04-13 RX ORDER — LEVOTHYROXINE SODIUM 100 UG/1
TABLET ORAL
Qty: 90 TABLET | Refills: 0 | Status: SHIPPED | OUTPATIENT
Start: 2020-04-13 | End: 2020-07-13

## 2020-04-20 ENCOUNTER — TELEPHONE (OUTPATIENT)
Dept: INTERNAL MEDICINE | Facility: CLINIC | Age: 75
End: 2020-04-20

## 2020-04-20 NOTE — TELEPHONE ENCOUNTER
Spoke with Mrs Block who is having constipation and some LLQ abdomen pain.  She took MOM 2 days ago and is a little better.  She had stopped the Miralax but restarted it yesterday. She has had no BPR and no F/C. She will continue DAILY Miralax and call/RTC prn any recurrence/worsening abdomen pain.

## 2020-04-20 NOTE — TELEPHONE ENCOUNTER
Spoke with pt, she is having pain in her side. She did take a laxative 3 days ago, which did help with the pain. She does not feel like she is having complete bowel movements. She would like to know if something can be called in to the pharmacy for relief. Please Advise

## 2020-04-20 NOTE — TELEPHONE ENCOUNTER
----- Message from Sirai Saleem sent at 4/20/2020  8:07 AM CDT -----  Contact: self/[phone]  .1 Patient would like to get medical advice.  Symptoms (please be specific): pain in the left side of the body/cramps  How long has patient had these symptoms: 4-5 days  Pharmacy name and phone#: [id number] - [place], [place] - [address] [phone] (Phone) [phone] (Fax)  Any drug allergies: onfile  Comments: Patient would like to get medical advice. Patient refuse to a virtual/audio visit, stated that she need a courtesy call whenever pcp have a change. Thank you.

## 2020-04-21 ENCOUNTER — LAB VISIT (OUTPATIENT)
Dept: LAB | Facility: HOSPITAL | Age: 75
End: 2020-04-21
Attending: INTERNAL MEDICINE
Payer: MEDICARE

## 2020-04-21 DIAGNOSIS — E78.5 HYPERLIPIDEMIA, UNSPECIFIED HYPERLIPIDEMIA TYPE: ICD-10-CM

## 2020-04-21 DIAGNOSIS — E55.9 VITAMIN D DEFICIENCY: ICD-10-CM

## 2020-04-21 DIAGNOSIS — I10 ESSENTIAL HYPERTENSION: ICD-10-CM

## 2020-04-21 LAB
25(OH)D3+25(OH)D2 SERPL-MCNC: 38 NG/ML (ref 30–96)
ALBUMIN SERPL BCP-MCNC: 4 G/DL (ref 3.5–5.2)
ALP SERPL-CCNC: 86 U/L (ref 55–135)
ALT SERPL W/O P-5'-P-CCNC: 14 U/L (ref 10–44)
ANION GAP SERPL CALC-SCNC: 6 MMOL/L (ref 8–16)
AST SERPL-CCNC: 23 U/L (ref 10–40)
BILIRUB SERPL-MCNC: 0.4 MG/DL (ref 0.1–1)
BUN SERPL-MCNC: 10 MG/DL (ref 8–23)
CALCIUM SERPL-MCNC: 10.3 MG/DL (ref 8.7–10.5)
CHLORIDE SERPL-SCNC: 101 MMOL/L (ref 95–110)
CHOLEST SERPL-MCNC: 161 MG/DL (ref 120–199)
CHOLEST/HDLC SERPL: 3.6 {RATIO} (ref 2–5)
CO2 SERPL-SCNC: 30 MMOL/L (ref 23–29)
CREAT SERPL-MCNC: 0.9 MG/DL (ref 0.5–1.4)
EST. GFR  (AFRICAN AMERICAN): >60 ML/MIN/1.73 M^2
EST. GFR  (NON AFRICAN AMERICAN): >60 ML/MIN/1.73 M^2
GLUCOSE SERPL-MCNC: 92 MG/DL (ref 70–110)
HDLC SERPL-MCNC: 45 MG/DL (ref 40–75)
HDLC SERPL: 28 % (ref 20–50)
LDLC SERPL CALC-MCNC: 82.6 MG/DL (ref 63–159)
NONHDLC SERPL-MCNC: 116 MG/DL
POTASSIUM SERPL-SCNC: 3.7 MMOL/L (ref 3.5–5.1)
PROT SERPL-MCNC: 8.4 G/DL (ref 6–8.4)
SODIUM SERPL-SCNC: 137 MMOL/L (ref 136–145)
TRIGL SERPL-MCNC: 167 MG/DL (ref 30–150)

## 2020-04-21 PROCEDURE — 80053 COMPREHEN METABOLIC PANEL: CPT | Mod: HCNC

## 2020-04-21 PROCEDURE — 80061 LIPID PANEL: CPT | Mod: HCNC

## 2020-04-21 PROCEDURE — 36415 COLL VENOUS BLD VENIPUNCTURE: CPT | Mod: HCNC

## 2020-04-21 PROCEDURE — 82306 VITAMIN D 25 HYDROXY: CPT | Mod: HCNC

## 2020-04-23 ENCOUNTER — PATIENT OUTREACH (OUTPATIENT)
Dept: OTHER | Facility: OTHER | Age: 75
End: 2020-04-23

## 2020-04-23 NOTE — PROGRESS NOTES
Called patient for follow up. No answer. Left message for call back.     Patient's 30-day BP average is 148/72 mmHg. Will discuss adding spironolactone. Reviewed CMP on 4/21/2020

## 2020-04-24 ENCOUNTER — OFFICE VISIT (OUTPATIENT)
Dept: INTERNAL MEDICINE | Facility: CLINIC | Age: 75
End: 2020-04-24
Payer: MEDICARE

## 2020-04-24 ENCOUNTER — TELEPHONE (OUTPATIENT)
Dept: INTERNAL MEDICINE | Facility: CLINIC | Age: 75
End: 2020-04-24

## 2020-04-24 VITALS
WEIGHT: 198.63 LBS | DIASTOLIC BLOOD PRESSURE: 60 MMHG | TEMPERATURE: 98 F | HEART RATE: 66 BPM | OXYGEN SATURATION: 98 % | BODY MASS INDEX: 30.1 KG/M2 | SYSTOLIC BLOOD PRESSURE: 120 MMHG | HEIGHT: 68 IN

## 2020-04-24 DIAGNOSIS — I10 ESSENTIAL HYPERTENSION: ICD-10-CM

## 2020-04-24 DIAGNOSIS — E78.5 HYPERLIPIDEMIA, UNSPECIFIED HYPERLIPIDEMIA TYPE: ICD-10-CM

## 2020-04-24 DIAGNOSIS — E78.5 HYPERLIPIDEMIA, UNSPECIFIED HYPERLIPIDEMIA TYPE: Primary | ICD-10-CM

## 2020-04-24 DIAGNOSIS — K57.32 DIVERTICULITIS OF LARGE INTESTINE WITHOUT PERFORATION OR ABSCESS WITHOUT BLEEDING: Primary | ICD-10-CM

## 2020-04-24 PROCEDURE — 3074F SYST BP LT 130 MM HG: CPT | Mod: HCNC,CPTII,S$GLB, | Performed by: INTERNAL MEDICINE

## 2020-04-24 PROCEDURE — 99999 PR PBB SHADOW E&M-EST. PATIENT-LVL III: CPT | Mod: PBBFAC,HCNC,, | Performed by: INTERNAL MEDICINE

## 2020-04-24 PROCEDURE — 3078F PR MOST RECENT DIASTOLIC BLOOD PRESSURE < 80 MM HG: ICD-10-PCS | Mod: HCNC,CPTII,S$GLB, | Performed by: INTERNAL MEDICINE

## 2020-04-24 PROCEDURE — 99214 PR OFFICE/OUTPT VISIT, EST, LEVL IV, 30-39 MIN: ICD-10-PCS | Mod: HCNC,S$GLB,, | Performed by: INTERNAL MEDICINE

## 2020-04-24 PROCEDURE — 1159F MED LIST DOCD IN RCRD: CPT | Mod: HCNC,S$GLB,, | Performed by: INTERNAL MEDICINE

## 2020-04-24 PROCEDURE — 3074F PR MOST RECENT SYSTOLIC BLOOD PRESSURE < 130 MM HG: ICD-10-PCS | Mod: HCNC,CPTII,S$GLB, | Performed by: INTERNAL MEDICINE

## 2020-04-24 PROCEDURE — 1101F PT FALLS ASSESS-DOCD LE1/YR: CPT | Mod: HCNC,CPTII,S$GLB, | Performed by: INTERNAL MEDICINE

## 2020-04-24 PROCEDURE — 1101F PR PT FALLS ASSESS DOC 0-1 FALLS W/OUT INJ PAST YR: ICD-10-PCS | Mod: HCNC,CPTII,S$GLB, | Performed by: INTERNAL MEDICINE

## 2020-04-24 PROCEDURE — 1126F AMNT PAIN NOTED NONE PRSNT: CPT | Mod: HCNC,S$GLB,, | Performed by: INTERNAL MEDICINE

## 2020-04-24 PROCEDURE — 3078F DIAST BP <80 MM HG: CPT | Mod: HCNC,CPTII,S$GLB, | Performed by: INTERNAL MEDICINE

## 2020-04-24 PROCEDURE — 1126F PR PAIN SEVERITY QUANTIFIED, NO PAIN PRESENT: ICD-10-PCS | Mod: HCNC,S$GLB,, | Performed by: INTERNAL MEDICINE

## 2020-04-24 PROCEDURE — 99214 OFFICE O/P EST MOD 30 MIN: CPT | Mod: HCNC,S$GLB,, | Performed by: INTERNAL MEDICINE

## 2020-04-24 PROCEDURE — 1159F PR MEDICATION LIST DOCUMENTED IN MEDICAL RECORD: ICD-10-PCS | Mod: HCNC,S$GLB,, | Performed by: INTERNAL MEDICINE

## 2020-04-24 PROCEDURE — 99999 PR PBB SHADOW E&M-EST. PATIENT-LVL III: ICD-10-PCS | Mod: PBBFAC,HCNC,, | Performed by: INTERNAL MEDICINE

## 2020-04-24 RX ORDER — MULTIVITAMIN
1 TABLET ORAL DAILY
COMMUNITY
End: 2021-01-05 | Stop reason: CLARIF

## 2020-04-24 RX ORDER — METRONIDAZOLE 500 MG/1
TABLET ORAL
Qty: 21 TABLET | Refills: 0 | Status: SHIPPED | OUTPATIENT
Start: 2020-04-24 | End: 2020-08-03

## 2020-04-24 RX ORDER — CIPROFLOXACIN 500 MG/1
500 TABLET ORAL 2 TIMES DAILY
Qty: 14 TABLET | Refills: 0 | Status: SHIPPED | OUTPATIENT
Start: 2020-04-24 | End: 2020-12-14

## 2020-04-24 NOTE — TELEPHONE ENCOUNTER
Dear Schedulers,  Please call and schedule pt for 5 month RTC appt with 1 week prior fasting lab.  I've placed lab orders.  Thanks

## 2020-04-24 NOTE — PATIENT INSTRUCTIONS
Medication Notes:  #1-OK to stop Vitamin D capsule  #2-Start a Multivitamin for Women Daily    For Diverticulitis:  #1-[place] diet with increased water x 1-2 weeks  #2-See Divertculosis diet

## 2020-04-24 NOTE — PROGRESS NOTES
Subjective:       Patient ID: Elena Block is a 74 y.o. female.    Chief Complaint:   Abdominal Pain    HPI: Ms Block today with a 6 day history abdominal pain.The pain is in left upper/lower quadrants; pain is crampy.  She has had no BPR and no F/C .  Eating seems to bring on the symptoms. She has no N/V,  but had some constipation at start of symptoms 6 days ago. She is back on Miralx x last 4 days.  She is s/p Colonoscopy(8/19)-showed 3 adenomatous polyps/+diverticuloses    Past Medical, Surgical, Social History: Please see as stated in Epic chart which has been reviewed.    Current Outpatient Medications   Medication Sig Dispense Refill    carvedilol (COREG) 6.25 MG tablet TAKE 1 TABLET BY MOUTH TWICE A DAY WITH FOOD 180 tablet 1    ergocalciferol (VITAMIN D2) 50,000 unit Cap Take 1 capsule (50,000 Units total) by mouth every 7 days. 12 capsule 3    FOLBEE 2.5-25-1 mg Tab TAKE 1 TABLET BY MOUTH EVERY DAY 90 tablet 3    hydroCHLOROthiazide (HYDRODIURIL) 12.5 MG Tab Take 1 tablet (12.5 mg total) by mouth once daily. 90 tablet 1    levothyroxine (SYNTHROID) 100 MCG tablet TAKE ONE TABLET BY MOUTH BEFORE BREAKFAST FOR THYROID 90 tablet 0    meloxicam (MOBIC) 15 MG tablet TAKE 1 TABLET BY MOUTH ONCE DAILY WITH FOOD AS NEEDED FOR JOINT PAIN 30 tablet 0    multivitamin (THERAGRAN) per tablet Take 1 tablet by mouth once daily.      NIFEdipine (PROCARDIA-XL) 90 MG (OSM) 24 hr tablet Take 1 tablet (90 mg total) by mouth once daily. 90 tablet 1    polyethylene glycol (GLYCOLAX) 17 gram/dose powder 1 cap in 4-6 oz liquid : Take 2x/day x 3 days then go to once daily thereafter for constipation 289 g 6    simvastatin (ZOCOR) 40 MG tablet Take 1 tablet (40 mg total) by mouth every evening. 90 tablet 0    timolol maleate 0.5% (TIMOPTIC-XE) 0.5 % SolG INSTILL 1 DROP INTO BOTH EYES ONCE DAILY 10 mL 3    valsartan (DIOVAN) 320 MG tablet TAKE 1 TABLET (320 MG TOTAL) BY MOUTH ONCE DAILY. 90 tablet 3     "ciprofloxacin HCl (CIPRO) 500 MG tablet Take 1 tablet (500 mg total) by mouth 2 (two) times daily. 14 tablet 0    metroNIDAZOLE (FLAGYL) 500 MG tablet 1 tab with meals 3x/day 21 tablet 0     No current facility-administered medications for this visit.        Review of Systems   Constitutional: Negative for fever.   Respiratory: Negative for chest tightness, shortness of breath and wheezing.    Cardiovascular: Negative for chest pain.   Gastrointestinal: Positive for abdominal pain and constipation.   Musculoskeletal:        Left ankle occasionally gives out but no pain,injury or weakness at present   Neurological: Negative for dizziness, seizures, syncope and headaches.   Psychiatric/Behavioral: Negative.        Objective:      Lab Results   Component Value Date    WBC 9.02 01/15/2019    HGB 13.7 01/15/2019    HCT 41.7 01/15/2019     01/15/2019    CHOL 161 04/21/2020    TRIG 167 (H) 04/21/2020    HDL 45 04/21/2020    ALT 14 04/21/2020    AST 23 04/21/2020     04/21/2020    K 3.7 04/21/2020     04/21/2020    CREATININE 0.9 04/21/2020    BUN 10 04/21/2020    CO2 30 (H) 04/21/2020    TSH 2.069 11/19/2019    HGBA1C 5.3 05/19/2006     Physical Exam   Constitutional: She appears well-developed and well-nourished.   Cardiovascular: Normal rate, regular rhythm and normal heart sounds.   Pulmonary/Chest: Effort normal and breath sounds normal.   Abdominal: Soft. Bowel sounds are normal. She exhibits no mass. There is tenderness. There is no rebound. No hernia.   Left lower abdominal quadrant +tenderness on deep palpation/no masses and no rebound   Musculoskeletal: She exhibits no edema, tenderness or deformity.   Vitals reviewed.        Vital Signs  Temp: 98.4 °F (36.9 °C)  Temp src: Oral  Pulse: 66  SpO2: 98 %  BP: 120/60  Pain Score: 0-No pain  Height and Weight  Height: 5' 8" (172.7 cm)  Weight: 90.1 kg (198 lb 10.2 oz)  BSA (Calculated - sq m): 2.08 sq meters  BMI (Calculated): 30.2  Weight in (lb) to " have BMI = 25: 164.1]    Assessment:       1. Diverticulitis of large intestine without perforation or abscess without bleeding    2. Essential hypertension    3. Hyperlipidemia, unspecified hyperlipidemia type        Plan:     Health Maintenance   Topic Date Due    Mammogram  06/13/2020    High Dose Statin  01/29/2021    Lipid Panel  04/21/2021    TETANUS VACCINE  10/01/2024    Colonoscopy  08/08/2029    Hepatitis C Screening  Completed    Pneumococcal Vaccine (65+ Low/Medium Risk)  Completed    DEXA SCAN  Discontinued        Elena was seen today for abdominal pain.    Diagnoses and all orders for this visit:    Diverticulitis of large intestine without perforation or abscess without bleeding  -     ciprofloxacin HCl (CIPRO) 500 MG tablet; Take 1 tablet (500 mg total) by mouth 2 (two) times daily.  -     metroNIDAZOLE (FLAGYL) 500 MG tablet; 1 tab with meals 3x/day  -     [place] diet x next week with increased water intake  -     Miralax Daily        -     [employer] info on Diverticulosis/Diverticulitis given: Encouraged to avoid seeds/nuts/popcorn in diet    Essential hypertension/controllled        -     Continue Diovan 320mg QD, Nifedipine 90mg QD, HCTZ 12.5mg QD, and Coreg 6.25mg BID    Hyperlipidemia, unspecified hyperlipidemia type/controlled        -      Continue Zocor 40mg QD    Health Maintenance        -      RTC x 5 months with 1 week prior fasting lab

## 2020-05-08 ENCOUNTER — LAB VISIT (OUTPATIENT)
Dept: LAB | Facility: HOSPITAL | Age: 75
End: 2020-05-08
Attending: INTERNAL MEDICINE
Payer: MEDICARE

## 2020-05-08 DIAGNOSIS — R19.7 DIARRHEA, UNSPECIFIED TYPE: ICD-10-CM

## 2020-05-08 PROCEDURE — 87046 STOOL CULTR AEROBIC BACT EA: CPT | Mod: 59,HCNC

## 2020-05-08 PROCEDURE — 87449 NOS EACH ORGANISM AG IA: CPT | Mod: HCNC

## 2020-05-08 PROCEDURE — 82272 OCCULT BLD FECES 1-3 TESTS: CPT | Mod: HCNC

## 2020-05-08 PROCEDURE — 87209 SMEAR COMPLEX STAIN: CPT | Mod: HCNC

## 2020-05-08 PROCEDURE — 87045 FECES CULTURE AEROBIC BACT: CPT | Mod: HCNC

## 2020-05-08 PROCEDURE — 87324 CLOSTRIDIUM AG IA: CPT | Mod: HCNC

## 2020-05-08 PROCEDURE — 87427 SHIGA-LIKE TOXIN AG IA: CPT | Mod: 59,HCNC

## 2020-05-09 LAB
C DIFF GDH STL QL: NEGATIVE
C DIFF TOX A+B STL QL IA: NEGATIVE
OB PNL STL: POSITIVE

## 2020-05-11 ENCOUNTER — TELEPHONE (OUTPATIENT)
Dept: INTERNAL MEDICINE | Facility: CLINIC | Age: 75
End: 2020-05-11

## 2020-05-11 DIAGNOSIS — R10.84 GENERALIZED ABDOMINAL PAIN: ICD-10-CM

## 2020-05-11 DIAGNOSIS — R19.5 HEME + STOOL: ICD-10-CM

## 2020-05-11 DIAGNOSIS — R10.30 PAIN RADIATING TO LOWER ABDOMEN: Primary | ICD-10-CM

## 2020-05-11 DIAGNOSIS — K62.5 BLOOD PER RECTUM: ICD-10-CM

## 2020-05-11 LAB
E COLI SXT1 STL QL IA: NEGATIVE
E COLI SXT2 STL QL IA: NEGATIVE

## 2020-05-11 NOTE — TELEPHONE ENCOUNTER
Spoke with Mrs Umair franklin her Stools(5/8)-showed +blood/rest negative to date.  She does describe recurrent episode of LLQ abdomen pain this past weekend which resolved. I have recommended a CT Abd/pelvis and GI Clinic Appt-she agrees.  Aelxsandra/Izaiah, please call pt to schedule a CT Abdomen/pelvis and a GI appt.  Thanks so much.

## 2020-05-12 LAB
BACTERIA STL CULT: NORMAL
O+P STL MICRO: NORMAL

## 2020-05-16 ENCOUNTER — HOSPITAL ENCOUNTER (OUTPATIENT)
Dept: RADIOLOGY | Facility: HOSPITAL | Age: 75
Discharge: HOME | End: 2020-05-16
Attending: INTERNAL MEDICINE
Payer: MEDICARE

## 2020-05-16 DIAGNOSIS — R10.30 PAIN RADIATING TO LOWER ABDOMEN: ICD-10-CM

## 2020-05-16 PROCEDURE — 74177 CT ABD & PELVIS W/CONTRAST: CPT | Mod: 26,HCNC,, | Performed by: RADIOLOGY

## 2020-05-16 PROCEDURE — 25500020 PHARM REV CODE 255: Mod: HCNC | Performed by: INTERNAL MEDICINE

## 2020-05-16 PROCEDURE — 74177 CT ABDOMEN PELVIS WITH CONTRAST: ICD-10-PCS | Mod: 26,HCNC,, | Performed by: RADIOLOGY

## 2020-05-16 PROCEDURE — 74177 CT ABD & PELVIS W/CONTRAST: CPT | Mod: TC,HCNC

## 2020-05-16 RX ADMIN — IOHEXOL 100 ML: 350 INJECTION, SOLUTION INTRAVENOUS at 09:05

## 2020-05-18 ENCOUNTER — TELEPHONE (OUTPATIENT)
Dept: INTERNAL MEDICINE | Facility: CLINIC | Age: 75
End: 2020-05-18

## 2020-05-18 NOTE — PROGRESS NOTES
Digital Medicine: Clinician Follow-Up    Called patient for follow up. Patient says that she has been having some medical issues (diverticulitis and diarrhea). She hasn't taken HCTZ in about 3 weeks secondary to diarrhea. She hasn't had diarrhea in 3 weeks. She reports drinking 48 oz water per day, 1/2 orange juice and water, 1/2 water and sprite.               The history is provided by the patient.     Follow Up  Follow-up reason(s): reading review and routine education      Routine Education Topics: eating patterns  Patient's 30-day BP average is above goal of <130/<80 mmHg.       INTERVENTION(S)  reviewed appropriate dose schedule and recommended diet modifications    PLAN  patient verbalizes understanding and continue monitoring    Continue current medications. If no more diarrhea in a few days, resume HCTZ.     Increase fluids to > 64 oz per day      There are no preventive care reminders to display for this patient.    Last 5 Patient Entered Readings                                      Current 30 Day Average: 151/73     Recent Readings 5/15/2020 5/14/2020 5/11/2020 5/6/2020 4/30/2020    SBP (mmHg) 159 163 148 132 146    DBP (mmHg) 77 71 69 67 74    Pulse 64 67 59 60 59             Hypertension Medications     carvedilol (COREG) 6.25 MG tablet TAKE 1 TABLET BY MOUTH TWICE A DAY WITH FOOD    hydroCHLOROthiazide (HYDRODIURIL) 12.5 MG Tab Take 1 tablet (12.5 mg total) by mouth once daily.    NIFEdipine (PROCARDIA-XL) 90 MG (OSM) 24 hr tablet Take 1 tablet (90 mg total) by mouth once daily.    valsartan (DIOVAN) 320 MG tablet TAKE 1 TABLET (320 MG TOTAL) BY MOUTH ONCE DAILY.                 Screenings

## 2020-05-18 NOTE — TELEPHONE ENCOUNTER
Spoke with Mrs Block noemi her CT Abdomen/Pelvis(5/16)-showed no acute abnormalities-only a small abdomen wall hernia on the right, which is asymptomatic. She will keep GI appt due to Stool +blood to ensure no further work up required.

## 2020-05-20 ENCOUNTER — OFFICE VISIT (OUTPATIENT)
Dept: GASTROENTEROLOGY | Facility: CLINIC | Age: 75
End: 2020-05-20
Payer: MEDICARE

## 2020-05-20 ENCOUNTER — LAB VISIT (OUTPATIENT)
Dept: LAB | Facility: HOSPITAL | Age: 75
End: 2020-05-20
Attending: INTERNAL MEDICINE
Payer: MEDICARE

## 2020-05-20 VITALS
BODY MASS INDEX: 29.63 KG/M2 | WEIGHT: 194.88 LBS | HEART RATE: 62 BPM | DIASTOLIC BLOOD PRESSURE: 83 MMHG | SYSTOLIC BLOOD PRESSURE: 143 MMHG

## 2020-05-20 DIAGNOSIS — E61.1 IRON DEFICIENCY: Primary | ICD-10-CM

## 2020-05-20 DIAGNOSIS — R19.5 HEME + STOOL: ICD-10-CM

## 2020-05-20 DIAGNOSIS — E61.1 IRON DEFICIENCY: ICD-10-CM

## 2020-05-20 LAB
BASOPHILS # BLD AUTO: 0.05 K/UL (ref 0–0.2)
BASOPHILS NFR BLD: 1.1 % (ref 0–1.9)
DIFFERENTIAL METHOD: ABNORMAL
EOSINOPHIL # BLD AUTO: 0.1 K/UL (ref 0–0.5)
EOSINOPHIL NFR BLD: 1.3 % (ref 0–8)
ERYTHROCYTE [DISTWIDTH] IN BLOOD BY AUTOMATED COUNT: 12.8 % (ref 11.5–14.5)
FERRITIN SERPL-MCNC: 205 NG/ML (ref 20–300)
HCT VFR BLD AUTO: 43.8 % (ref 37–48.5)
HGB BLD-MCNC: 14.2 G/DL (ref 12–16)
IGA SERPL-MCNC: 214 MG/DL (ref 40–350)
IMM GRANULOCYTES # BLD AUTO: 0.01 K/UL (ref 0–0.04)
IMM GRANULOCYTES NFR BLD AUTO: 0.2 % (ref 0–0.5)
IRON SERPL-MCNC: 67 UG/DL (ref 30–160)
LIPASE SERPL-CCNC: 40 U/L (ref 4–60)
LYMPHOCYTES # BLD AUTO: 2.1 K/UL (ref 1–4.8)
LYMPHOCYTES NFR BLD: 44.9 % (ref 18–48)
MCH RBC QN AUTO: 28.6 PG (ref 27–31)
MCHC RBC AUTO-ENTMCNC: 32.4 G/DL (ref 32–36)
MCV RBC AUTO: 88 FL (ref 82–98)
MONOCYTES # BLD AUTO: 0.4 K/UL (ref 0.3–1)
MONOCYTES NFR BLD: 8.7 % (ref 4–15)
NEUTROPHILS # BLD AUTO: 2 K/UL (ref 1.8–7.7)
NEUTROPHILS NFR BLD: 43.8 % (ref 38–73)
NRBC BLD-RTO: 0 /100 WBC
PLATELET # BLD AUTO: 273 K/UL (ref 150–350)
PMV BLD AUTO: 8.9 FL (ref 9.2–12.9)
RBC # BLD AUTO: 4.96 M/UL (ref 4–5.4)
SATURATED IRON: 21 % (ref 20–50)
TOTAL IRON BINDING CAPACITY: 317 UG/DL (ref 250–450)
TRANSFERRIN SERPL-MCNC: 214 MG/DL (ref 200–375)
WBC # BLD AUTO: 4.59 K/UL (ref 3.9–12.7)

## 2020-05-20 PROCEDURE — 99999 PR PBB SHADOW E&M-EST. PATIENT-LVL IV: ICD-10-PCS | Mod: PBBFAC,HCNC,, | Performed by: INTERNAL MEDICINE

## 2020-05-20 PROCEDURE — 1101F PR PT FALLS ASSESS DOC 0-1 FALLS W/OUT INJ PAST YR: ICD-10-PCS | Mod: HCNC,CPTII,S$GLB, | Performed by: INTERNAL MEDICINE

## 2020-05-20 PROCEDURE — 1101F PT FALLS ASSESS-DOCD LE1/YR: CPT | Mod: HCNC,CPTII,S$GLB, | Performed by: INTERNAL MEDICINE

## 2020-05-20 PROCEDURE — 1159F MED LIST DOCD IN RCRD: CPT | Mod: HCNC,S$GLB,, | Performed by: INTERNAL MEDICINE

## 2020-05-20 PROCEDURE — 82728 ASSAY OF FERRITIN: CPT | Mod: HCNC

## 2020-05-20 PROCEDURE — 1126F PR PAIN SEVERITY QUANTIFIED, NO PAIN PRESENT: ICD-10-PCS | Mod: HCNC,S$GLB,, | Performed by: INTERNAL MEDICINE

## 2020-05-20 PROCEDURE — 99204 OFFICE O/P NEW MOD 45 MIN: CPT | Mod: HCNC,S$GLB,, | Performed by: INTERNAL MEDICINE

## 2020-05-20 PROCEDURE — 3077F SYST BP >= 140 MM HG: CPT | Mod: HCNC,CPTII,S$GLB, | Performed by: INTERNAL MEDICINE

## 2020-05-20 PROCEDURE — 3079F DIAST BP 80-89 MM HG: CPT | Mod: HCNC,CPTII,S$GLB, | Performed by: INTERNAL MEDICINE

## 2020-05-20 PROCEDURE — 1159F PR MEDICATION LIST DOCUMENTED IN MEDICAL RECORD: ICD-10-PCS | Mod: HCNC,S$GLB,, | Performed by: INTERNAL MEDICINE

## 2020-05-20 PROCEDURE — 83540 ASSAY OF IRON: CPT | Mod: HCNC

## 2020-05-20 PROCEDURE — 3077F PR MOST RECENT SYSTOLIC BLOOD PRESSURE >= 140 MM HG: ICD-10-PCS | Mod: HCNC,CPTII,S$GLB, | Performed by: INTERNAL MEDICINE

## 2020-05-20 PROCEDURE — 83516 IMMUNOASSAY NONANTIBODY: CPT | Mod: HCNC

## 2020-05-20 PROCEDURE — 99204 PR OFFICE/OUTPT VISIT, NEW, LEVL IV, 45-59 MIN: ICD-10-PCS | Mod: HCNC,S$GLB,, | Performed by: INTERNAL MEDICINE

## 2020-05-20 PROCEDURE — 85025 COMPLETE CBC W/AUTO DIFF WBC: CPT | Mod: HCNC

## 2020-05-20 PROCEDURE — 1126F AMNT PAIN NOTED NONE PRSNT: CPT | Mod: HCNC,S$GLB,, | Performed by: INTERNAL MEDICINE

## 2020-05-20 PROCEDURE — 99999 PR PBB SHADOW E&M-EST. PATIENT-LVL IV: CPT | Mod: PBBFAC,HCNC,, | Performed by: INTERNAL MEDICINE

## 2020-05-20 PROCEDURE — 83690 ASSAY OF LIPASE: CPT | Mod: HCNC

## 2020-05-20 PROCEDURE — 82784 ASSAY IGA/IGD/IGG/IGM EACH: CPT | Mod: HCNC

## 2020-05-20 PROCEDURE — 3079F PR MOST RECENT DIASTOLIC BLOOD PRESSURE 80-89 MM HG: ICD-10-PCS | Mod: HCNC,CPTII,S$GLB, | Performed by: INTERNAL MEDICINE

## 2020-05-20 NOTE — PROGRESS NOTES
[facility] Consultation Note    Reason for Consult:  The primary encounter diagnosis was Iron deficiency. A diagnosis of Heme + stool was also pertinent to this visit.    PCP:   Sandra Lyon   [address] / [place] [address]    Referring MD:  Sandra Lyon Md  [address] [place] [address]    Initial History of Present Illness (HPI):  This is a 74 y.o. female here for evaluation of follow-up diarrhea which started after being constipated and empirically treated for suspected diverticulitis.  Patient had a recent colonoscopy at the end of last year complete to the cecum good bowel prep small adenomas polyps removed by colon rectal surgery.  Patient had a recent CT scan no acute findings a small fat containing ventral hernia.  Patient says diarrhea has resolved since stopping her antibiotics stool for C diff was negative no abdominal pain whatsoever nothing to sound like mesenteric angina.  No pain with eating no weight loss no change in bowel habits having well formed brown stools never saw any bright red blood or melanotic stools last CBC shows no anemia.  No real indication to check stool for occult blood with a recent colonoscopy in somebody having diarrhea from antibiotics.  No family history of colon cancer no family history of advanced colon adenomas polyps no FAP no attenuated FAP no MA P no Zhang syndrome.    Abdominal pain - no  Reflux - no  Dysphagia - no   Bowel habits - now normal  GI bleeding - none  NSAID usage - none    Interval HPI 05/20/2020:  The patient's last visit with me was on Visit date not found.      ROS:  Constitutional: No fevers, chills, No weight loss  ENT:  No heartburn no dysphagia no odynophagia no hoarseness  CV: No chest pain, no palpitation  Pulm: No cough, No shortness of breath, no wheezing  Ophtho: No vision changes  GI: see HPI  Derm: No rash, no itching  Heme: No lymphadenopathy, No easy bruising  MSK: No significant  arthritis  : No dysuria, No hematuria  Endo: No hot or cold intolerance  Neuro: No syncope, No seizure, no strokes  Psych: No uncontrolled anxiety, No uncontrolled depression    Medical History:  has a past medical history of Back pain, Cataract, Central retinal vein occlusion of left eye, Degeneration of lumbar or lumbosacral intervertebral disc (4/16/2013), High cholesterol, Hypertension, Hypothyroidism, Impingement syndrome of left shoulder (7/29/2013), Obesity, Pneumonia, Posterior vitreous detachment of both eyes (1/17/2013), S/P partial hysterectomy (1/23/2013), and Vitamin D insufficiency (2016).    Surgical History:  has a past surgical history that includes Partial hysterectomy; Thyroidectomy, partial; Tonsillectomy; Colonoscopy (01/22/2008); Hysterectomy; and Colonoscopy (N/A, 8/8/2019).    Family History: family history includes Alcohol abuse in her brother; Arthritis in her brother; Blindness in her brother, maternal grandmother, and mother; Diabetes in her brother, brother, maternal grandmother, and mother; Drug abuse in her brother; Glaucoma in her brother, brother, and mother; Gout in her son; Heart attack in her mother and son; Heart attacks under age 50 in her son; Heart disease in her brother, brother, brother, and mother; Heart disease (age of onset: 48) in her son; Hypertension in her maternal grandmother; Liver disease in her brother and brother; No Known Problems in her father, maternal aunt, maternal grandfather, maternal uncle, paternal aunt, paternal grandfather, paternal grandmother, paternal uncle, and son; Pemphigus vulgaris in her brother; Stroke in her brother..     Social History:  reports that she has never smoked. She has never used smokeless tobacco. She reports that she drinks about 1.0 standard drinks of alcohol per week. She reports that she does not use drugs.    Review of patient's allergies indicates:   Allergen Reactions    Tramadol Nausea And Vomiting       Medication  List with Changes/Refills   Current Medications    CARVEDILOL (COREG) 6.25 MG TABLET    TAKE 1 TABLET BY MOUTH TWICE A DAY WITH FOOD    CIPROFLOXACIN HCL (CIPRO) 500 MG TABLET    Take 1 tablet (500 mg total) by mouth 2 (two) times daily.    ERGOCALCIFEROL (VITAMIN D2) 50,000 UNIT CAP    Take 1 capsule (50,000 Units total) by mouth every 7 days.    FOLBEE 2.5-25-1 MG TAB    TAKE 1 TABLET BY MOUTH EVERY DAY    HYDROCHLOROTHIAZIDE (HYDRODIURIL) 12.5 MG TAB    Take 1 tablet (12.5 mg total) by mouth once daily.    LEVOTHYROXINE (SYNTHROID) 100 MCG TABLET    TAKE ONE TABLET BY MOUTH BEFORE BREAKFAST FOR THYROID    MELOXICAM (MOBIC) 15 MG TABLET    TAKE 1 TABLET BY MOUTH ONCE DAILY WITH FOOD AS NEEDED FOR JOINT PAIN    METRONIDAZOLE (FLAGYL) 500 MG TABLET    1 tab with meals 3x/day    MULTIVITAMIN (THERAGRAN) PER TABLET    Take 1 tablet by mouth once daily.    NIFEDIPINE (PROCARDIA-XL) 90 MG (OSM) 24 HR TABLET    Take 1 tablet (90 mg total) by mouth once daily.    POLYETHYLENE GLYCOL (GLYCOLAX) 17 GRAM/DOSE POWDER    1 cap in 4-6 oz liquid : Take 2x/day x 3 days then go to once daily thereafter for constipation    SIMVASTATIN (ZOCOR) 40 MG TABLET    Take 1 tablet (40 mg total) by mouth every evening.    TIMOLOL MALEATE 0.5% (TIMOPTIC-XE) 0.5 % SOLG    INSTILL 1 DROP INTO BOTH EYES ONCE DAILY    VALSARTAN (DIOVAN) 320 MG TABLET    TAKE 1 TABLET (320 MG TOTAL) BY MOUTH ONCE DAILY.         Objective Findings:    Vital Signs:  BP (!) 143/83 (BP Location: Left arm, Patient Position: Sitting)   Pulse 62   Wt 88.4 kg (194 lb 14.2 oz)   BMI 29.63 kg/m²   Body mass index is 29.63 kg/m².    Physical Exam:  General Appearance: Well appearing in no acute distress  Eyes:    No scleral icterus  ENT:  No lesions or masses   Lungs: CTA bilaterally, no wheezes, no rhonchi, no rales  Heart:  S1, S2 normal, no murmurs heard  Abdomen:  Non distended, soft, no guarding, no rebound, no tenderness, no appreciated ascites, no bruits, no  hepatosplenomegaly,  No CVA tenderness, no appreciated hernias.  Well-healed abdominal surgical scars.  Musculoskeletal:  No major joint deformities  Skin: No petechiae or rash on exposed skin areas  Neurologic:  Alert and oriented x4  Psychiatric:  Normal speech mentation and affect    Labs:  Lab Results   Component Value Date    WBC 9.02 01/15/2019    HGB 13.7 01/15/2019    HCT 41.7 01/15/2019     01/15/2019    CHOL 161 04/21/2020    TRIG 167 (H) 04/21/2020    HDL 45 04/21/2020    ALT 14 04/21/2020    AST 23 04/21/2020     04/21/2020    K 3.7 04/21/2020     04/21/2020    CREATININE 0.9 04/21/2020    BUN 10 04/21/2020    CO2 30 (H) 04/21/2020    TSH 2.069 11/19/2019    HGBA1C 5.3 05/19/2006               Medical Decision Making:  CT scan images personally reviewed by myself discussed with patient  Lab work reviewed.  Diverticulosis diverticulitis talk given  Follow-up CBC and iron studies talk given.  Colonoscopy path and images personally reviewed by myself.    Assessment:  1. Iron deficiency    2. Heme + stool         Recommendations:   1.  Will check to make sure patient is not iron deficient anemic although not likely  But would .  If not iron deficient no further evaluation needed currently  Unless patient's symptoms return or she has overt GI bleeding.  Stool for occult blood not recommended within 5 years of a colonoscopy to determine need for follow-up colonoscopy timing  2.  Return to GI clinic telemedicine telephone visit in 8 weeks to ensure patient is stable and doing well and does not need  Any further evaluation.    No follow-ups on file.      Order summary:  Orders Placed This Encounter    CBC auto differential    Lipase    TISSUE TRANSGLUTAMINASE (TTG), IGA    IgA    Iron and TIBC    Ferritin         Thank you so much for allowing me to participate in the care of Elena BRUCE Umair Moreno MD

## 2020-05-20 NOTE — LETTER
May 20, 2020      Sandra Lyon MD  [address]  [facility] [address]           [facility]  [address]  [facility] [address]  Phone: [phone]  Fax: [phone]          Patient: Elena Block   MR Number: [id number]   YOB: 1945   Date of Visit: 5/20/2020       Dear Dr. Sandra Lyon:    Thank you for referring Elena Block to me for evaluation. Attached you will find relevant portions of my assessment and plan of care.    If you have questions, please do not hesitate to call me. I look forward to following Elena Block along with you.    Sincerely,    Edward Moreno MD    Enclosure  CC:  No Recipients    If you would like to receive this communication electronically, please contact [email or web address] or [phone] to request more information on [facility] access.    For providers and/or their staff who would like to refer a patient to [facility], please contact us through our one-stop-shop provider referral line, [facility], at [phone].    If you feel you have received this communication in error or would no longer like to receive these types of communications, please e-mail [email or web address]

## 2020-05-21 ENCOUNTER — PATIENT OUTREACH (OUTPATIENT)
Dept: OTHER | Facility: OTHER | Age: 75
End: 2020-05-21

## 2020-05-22 LAB — TTG IGA SER-ACNC: 8 UNITS

## 2020-06-16 DIAGNOSIS — I10 ESSENTIAL HYPERTENSION: ICD-10-CM

## 2020-06-16 RX ORDER — NIFEDIPINE 90 MG/1
90 TABLET, EXTENDED RELEASE ORAL DAILY
Qty: 90 TABLET | Refills: 1 | Status: SHIPPED | OUTPATIENT
Start: 2020-06-16 | End: 2020-10-07

## 2020-06-16 NOTE — TELEPHONE ENCOUNTER
----- Message from Marily Ybarra sent at 6/16/2020 10:30 AM CDT -----  Regarding: Patient @ [phone]  Requesting an RX refill or new RX.  Is this a refill or new RX:  Refill  RX name and strength: NIFEdipine (PROCARDIA-XL) 90 MG (OSM) 24 hr tablet  Directions (copy/paste from chart):    Is this a 30 day or 90 day RX:  90 days  Local pharmacy or mail order pharmacy:  Local  Pharmacy name and phone # [id number] [place], [place] - [facility]  Comments:

## 2020-06-17 ENCOUNTER — TELEPHONE (OUTPATIENT)
Dept: INTERNAL MEDICINE | Facility: CLINIC | Age: 75
End: 2020-06-17

## 2020-06-17 NOTE — TELEPHONE ENCOUNTER
----- Message from Annabelle Luong sent at 6/17/2020 10:53 AM CDT -----  Contact: [facility] [phone]  Type: Rx Clarification/ Additional Information/ Questions    Medication: NIFEdipine (PROCARDIA-XL) 90 MG (OSM) 24 hr tablet    What questions do you have about the medication, if any? [facility] is calling to clarify the dosage, since in the past pt was taking 30 MG      Pharmacy number: [id number]       Please call pharmacy back and advise

## 2020-06-19 ENCOUNTER — TELEPHONE (OUTPATIENT)
Dept: INTERNAL MEDICINE | Facility: CLINIC | Age: 75
End: 2020-06-19

## 2020-06-19 NOTE — TELEPHONE ENCOUNTER
----- Message from Siria Saleem sent at 6/19/2020 10:36 AM CDT -----  Regarding: self/[phone]  What orders is pt asking for?: annual mammogram    Is there a future appointment with the provider?: no    When?:    Comments?:

## 2020-06-22 ENCOUNTER — PATIENT OUTREACH (OUTPATIENT)
Dept: OTHER | Facility: OTHER | Age: 75
End: 2020-06-22

## 2020-06-22 ENCOUNTER — TELEPHONE (OUTPATIENT)
Dept: INTERNAL MEDICINE | Facility: CLINIC | Age: 75
End: 2020-06-22

## 2020-06-22 DIAGNOSIS — Z12.39 BREAST CANCER SCREENING: Primary | ICD-10-CM

## 2020-06-22 DIAGNOSIS — Z12.31 ENCOUNTER FOR SCREENING MAMMOGRAM FOR MALIGNANT NEOPLASM OF BREAST: ICD-10-CM

## 2020-06-22 NOTE — PROGRESS NOTES
Digital Medicine: Clinician Follow-Up    Patient called and left voicemail requesting that I refill nifedipine rx. I called to follow up. She says PCP's office handled this for her. She says that for the past few months, she was mistakenly taking nifedipine 30 mg instead of 90 mg. She restart nifedipine 90 mg a few days ago.     The history is provided by the patient.   Follow Up  Follow-up reason(s): reading review and routine education    Patient's 30-da BP average is above goal of <130/<80 mmHg.   Patient taking wrong nifedipine dose for past few months. Increased nifedipine from 30 to 90 mg a few days ago      INTERVENTION(S)  reviewed appropriate dose schedule and encouragement/support    PLAN  patient verbalizes understanding and continue monitoring    Continue current medications. Continue nifedipine 90 mg      There are no preventive care reminders to display for this patient.    Last 5 Patient Entered Readings                                      Current 30 Day Average: 145/73     Recent Readings 6/19/2020 6/19/2020 6/18/2020 6/14/2020 6/12/2020    SBP (mmHg) 151 162 163 127 153    DBP (mmHg) 73 81 81 66 79    Pulse 61 61 62 60 66             Hypertension Medications     carvedilol (COREG) 6.25 MG tablet TAKE 1 TABLET BY MOUTH TWICE A DAY WITH FOOD    hydroCHLOROthiazide (HYDRODIURIL) 12.5 MG Tab Take 1 tablet (12.5 mg total) by mouth once daily.    NIFEdipine (PROCARDIA-XL) 90 MG (OSM) 24 hr tablet Take 1 tablet (90 mg total) by mouth once daily.    valsartan (DIOVAN) 320 MG tablet TAKE 1 TABLET (320 MG TOTAL) BY MOUTH ONCE DAILY.

## 2020-06-22 NOTE — TELEPHONE ENCOUNTER
----- Message from Rey Monsivais sent at 6/22/2020  1:45 PM CDT -----  Regarding: mammo orders          The Pt would like for you to send her Mammo order over and contact the Pt after so that she will know to call us back to schedule the appt.    Phone # [phone]

## 2020-06-24 NOTE — PROGRESS NOTES
"Digital Medicine: Health  Follow-Up    The history is provided by the patient. No  was used.   Follow Up  Follow-up reason(s): reading review and routine education      Routine Education Topics: eating patterns and physical activity      INTERVENTION(S)  encouragement/support, denied questions and smoothies     PLAN  patient verbalizes understanding, patient amenable to changes and continue monitoring      There are no preventive care reminders to display for this patient.    Last 5 Patient Entered Readings                                      Current 30 Day Average: 145/73     Recent Readings 6/23/2020 6/19/2020 6/19/2020 6/18/2020 6/14/2020    SBP (mmHg) 141 151 162 163 127    DBP (mmHg) 68 73 81 81 66    Pulse 68 61 61 62 60            Diet Screening   No change to diet.  She has the following dietary restrictions: low sodium diet    Patient is continuing to work on increasing vegetable intake. Patient and I talked about ways to increase vegetable intake. Patient is going to try to incorporate vegetables in smoothies. Sending resource via e-mail.     Assigning the following patient goals: maintain low sodium diet    Physical Activity Screening       Patient reports she is still doing morning routine. Patient reports due Covid-19 her walking has decreased. Patient states, "the park I go to has a lot of people". Patient reports she is going to increasing walking by going in neighborhood or changing time she goes to park.     Intervention(s): goal setting     Assigning the following patient goal(s): increase physical activity    SDOH- Deferred.   "

## 2020-06-26 ENCOUNTER — HOSPITAL ENCOUNTER (OUTPATIENT)
Dept: RADIOLOGY | Facility: HOSPITAL | Age: 75
Discharge: HOME | End: 2020-06-26
Attending: INTERNAL MEDICINE
Payer: MEDICARE

## 2020-06-26 DIAGNOSIS — Z12.39 BREAST CANCER SCREENING: ICD-10-CM

## 2020-06-26 DIAGNOSIS — Z12.31 ENCOUNTER FOR SCREENING MAMMOGRAM FOR MALIGNANT NEOPLASM OF BREAST: ICD-10-CM

## 2020-06-26 PROCEDURE — 77067 MAMMO DIGITAL SCREENING BILAT WITH TOMOSYNTHESIS_CAD: ICD-10-PCS | Mod: 26,HCNC,, | Performed by: RADIOLOGY

## 2020-06-26 PROCEDURE — 77067 SCR MAMMO BI INCL CAD: CPT | Mod: 26,HCNC,, | Performed by: RADIOLOGY

## 2020-06-26 PROCEDURE — 77063 BREAST TOMOSYNTHESIS BI: CPT | Mod: 26,HCNC,, | Performed by: RADIOLOGY

## 2020-06-26 PROCEDURE — 77063 MAMMO DIGITAL SCREENING BILAT WITH TOMOSYNTHESIS_CAD: ICD-10-PCS | Mod: 26,HCNC,, | Performed by: RADIOLOGY

## 2020-06-26 PROCEDURE — 77067 SCR MAMMO BI INCL CAD: CPT | Mod: TC,HCNC

## 2020-06-30 ENCOUNTER — CLINICAL SUPPORT (OUTPATIENT)
Dept: OPHTHALMOLOGY | Facility: CLINIC | Age: 75
End: 2020-06-30
Payer: MEDICARE

## 2020-06-30 ENCOUNTER — OFFICE VISIT (OUTPATIENT)
Dept: OPTOMETRY | Facility: CLINIC | Age: 75
End: 2020-06-30
Payer: MEDICARE

## 2020-06-30 DIAGNOSIS — H40.1121 PRIMARY OPEN ANGLE GLAUCOMA (POAG) OF LEFT EYE, MILD STAGE: ICD-10-CM

## 2020-06-30 DIAGNOSIS — H40.001 GLAUCOMA SUSPECT, RIGHT EYE: ICD-10-CM

## 2020-06-30 DIAGNOSIS — H34.8122 CENTRAL RETINAL VEIN OCCLUSION OF LEFT EYE, UNSPECIFIED COMPLICATION STATUS: ICD-10-CM

## 2020-06-30 DIAGNOSIS — H25.13 NUCLEAR SCLEROSIS, BILATERAL: ICD-10-CM

## 2020-06-30 DIAGNOSIS — H40.053 OCULAR HYPERTENSION, BILATERAL: ICD-10-CM

## 2020-06-30 DIAGNOSIS — H52.03 HYPEROPIA OF BOTH EYES WITH ASTIGMATISM: ICD-10-CM

## 2020-06-30 DIAGNOSIS — H52.203 HYPEROPIA OF BOTH EYES WITH ASTIGMATISM: ICD-10-CM

## 2020-06-30 DIAGNOSIS — H52.4 PRESBYOPIA OF BOTH EYES: ICD-10-CM

## 2020-06-30 DIAGNOSIS — H40.053 OCULAR HYPERTENSION, BILATERAL: Primary | ICD-10-CM

## 2020-06-30 DIAGNOSIS — H25.011 CORTICAL AGE-RELATED CATARACT, RIGHT EYE: ICD-10-CM

## 2020-06-30 PROCEDURE — 92015 PR REFRACTION: ICD-10-PCS | Mod: HCNC,S$GLB,, | Performed by: OPTOMETRIST

## 2020-06-30 PROCEDURE — 99499 UNLISTED E&M SERVICE: CPT | Mod: HCNC,S$GLB,, | Performed by: OPTOMETRIST

## 2020-06-30 PROCEDURE — 92014 PR EYE EXAM, EST PATIENT,COMPREHESV: ICD-10-PCS | Mod: HCNC,S$GLB,, | Performed by: OPTOMETRIST

## 2020-06-30 PROCEDURE — 92083 HUMPHREY VISUAL FIELD - OU - BOTH EYES: ICD-10-PCS | Mod: HCNC,S$GLB,, | Performed by: OPTOMETRIST

## 2020-06-30 PROCEDURE — 99999 PR PBB SHADOW E&M-EST. PATIENT-LVL III: CPT | Mod: PBBFAC,HCNC,, | Performed by: OPTOMETRIST

## 2020-06-30 PROCEDURE — 92014 COMPRE OPH EXAM EST PT 1/>: CPT | Mod: HCNC,S$GLB,, | Performed by: OPTOMETRIST

## 2020-06-30 PROCEDURE — 99499 RISK ADDL DX/OHS AUDIT: ICD-10-PCS | Mod: HCNC,S$GLB,, | Performed by: OPTOMETRIST

## 2020-06-30 PROCEDURE — 92015 DETERMINE REFRACTIVE STATE: CPT | Mod: HCNC,S$GLB,, | Performed by: OPTOMETRIST

## 2020-06-30 PROCEDURE — 92083 EXTENDED VISUAL FIELD XM: CPT | Mod: HCNC,S$GLB,, | Performed by: OPTOMETRIST

## 2020-06-30 PROCEDURE — 99999 PR PBB SHADOW E&M-EST. PATIENT-LVL III: ICD-10-PCS | Mod: PBBFAC,HCNC,, | Performed by: OPTOMETRIST

## 2020-06-30 NOTE — PATIENT INSTRUCTIONS
Nuclear sclerosis of lens of both eyes, consistent with age, and peripheral cortical cataract in the right eye.  No need for cataract surgery in either eye.  Monitor.    Hyperopia with astigmatism in each eye.  Presbyopia consistent with age.  New spectacle lens Rx issued.      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  Has been followed in retina clinic     Prior diagnosis of bilateral ocular hypertension.   Repeat HVF test done today.  Reviewed test results and discussed with Ms. Block.  No evidence of glaucomatous visual field defect in the right eye.  Results of visual field test results in the left eye suggestive of early/mild stage glaucoma.      Currently using Timolol Maleate 0.5% gel-forming ophthalmic solution in each eye every evening for IOP control.   IOP high-normal in each eye.   Continue drops in both eyes as noted above.     Recheck in six month with recheck of HVF test (24-2 LUKAS standard) on the same date.  Orders entered.

## 2020-06-30 NOTE — PROGRESS NOTES
24-2  Ss done ou     Rel & Fix =  Fair ou      Coop =     Fair     Patient has allergies to latex   Used pirate patch for hvf test     jthomas

## 2020-07-05 NOTE — PROGRESS NOTES
HPI     Ocular Hypertension      Additional comments: Pt states vision is stable. No complaints               Comments     Patient's age: 74 y.o.  Occupation: retired   Approximate date of last eye examination:  03/15/2019  Name of last eye doctor seen:   City/State: [facility]  Wears glasses? Yes      If yes, wears  Full-time or part-time?  Full time   Present glasses are: Bifocal, SV Distance, SV Reading?  Bifocals lens  Approximate age of present glasses:  2 + yrs    Got new glasses following last exam, or subsequently?:  No     Any problem with VA with glasses?  Pt has no complaints with vision.  Wears CLs?:  no  Headaches?  Rarely   Eye pain/discomfort?  No                                                                                      Flashes?  No   Floaters?  Yes    Diplopia/Double vision?  No   Patient's Ocular History:         Any eye surgeries? No          Any eye injury?  No          Any treatment for eye disease?  Ocular hypertension   Family history of eye disease?  Mother + two brothers: glaucoma  Significant patient medical history:         1. Diabetes?  No        If yes, IDDM or NIDDM?  n/a   2. HBP?  Yes              3. Other (describe):  None    ! OTC eyedrops currently using:  No    ! Prescription eye meds currently using:  Timolol Maleate 0.5% gel   forming solution qAM OU   ! Any history of allergy/adverse reaction to any eye meds used   previously?  Yes, when the company which brand caused burning   ! Any history of allergy/adverse reaction to eyedrops used during prior   eye exam(s)? No    ! Any history of allergy/adverse reaction to Novacaine or similar meds?   No    ! Any history of allergy/adverse reaction to Epinephrine or similar meds?   No     ! Patient okay with use of anesthetic eyedrops to check eye pressure?    Yes         ! Patient okay with use of eyedrops to dilate pupils today?  Yes    !  Allergies/Medications/Medical History/Family History reviewed today?   "  yes      PD =   66/63  Desired reading distance =   17"                                                                     Last edited by Tim Hamlin, OD on 6/30/2020 11:51 AM. (History)            Assessment /Plan     For exam results, see Encounter Report.    1. Ocular hypertension, bilateral  Schreiber Visual Field - OU - Extended - Both Eyes   2. Glaucoma suspect, right eye  Schreiber Visual Field - OU - Extended - Both Eyes   3. Primary open angle glaucoma (POAG) of left eye, mild stage  Schreiber Visual Field - OU - Extended - Both Eyes   4. Nuclear sclerosis, bilateral     5. Cortical age-related cataract, right eye     6. Central retinal vein occlusion of left eye, unspecified complication status     7. Hyperopia of both eyes with astigmatism     8. Presbyopia of both eyes                Nuclear sclerosis of lens of both eyes, consistent with age, and peripheral cortical cataract in the right eye.  No need for cataract surgery in either eye.  Monitor.    Hyperopia with astigmatism in each eye.  Presbyopia consistent with age.  New spectacle lens Rx issued.      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  Has been followed in retina clinic     Prior diagnosis of bilateral ocular hypertension.   Repeat HVF test done today.  Reviewed test results and discussed with MsAcosta Umair.  No evidence of glaucomatous visual field defect in the right eye.  Results of visual field test results in the left eye suggestive of early/mild stage glaucoma.      Currently using Timolol Maleate 0.5% gel-forming ophthalmic solution in each eye every evening for IOP control.   IOP high-normal in each eye.   Continue drops in both eyes as noted above.     Recheck in six month with recheck of HVF test (24-2 LUKAS standard) on the same date.  Orders entered.                  "

## 2020-07-20 ENCOUNTER — PATIENT OUTREACH (OUTPATIENT)
Dept: OTHER | Facility: OTHER | Age: 75
End: 2020-07-20

## 2020-07-20 ENCOUNTER — OFFICE VISIT (OUTPATIENT)
Dept: GASTROENTEROLOGY | Facility: CLINIC | Age: 75
End: 2020-07-20
Payer: MEDICARE

## 2020-07-20 DIAGNOSIS — E61.1 IRON DEFICIENCY: ICD-10-CM

## 2020-07-20 DIAGNOSIS — Z86.010 HISTORY OF COLON POLYPS: ICD-10-CM

## 2020-07-20 DIAGNOSIS — R10.32 ABDOMINAL CRAMPING IN LEFT LOWER QUADRANT: ICD-10-CM

## 2020-07-20 DIAGNOSIS — K59.09 CONSTIPATION, CHRONIC: ICD-10-CM

## 2020-07-20 DIAGNOSIS — R10.12 ABDOMINAL CRAMPING IN LEFT UPPER QUADRANT: Primary | ICD-10-CM

## 2020-07-20 PROCEDURE — 1125F PR PAIN SEVERITY QUANTIFIED, PAIN PRESENT: ICD-10-PCS | Mod: HCNC,,, | Performed by: INTERNAL MEDICINE

## 2020-07-20 PROCEDURE — 1125F AMNT PAIN NOTED PAIN PRSNT: CPT | Mod: HCNC,,, | Performed by: INTERNAL MEDICINE

## 2020-07-20 PROCEDURE — 99441 PR PHYSICIAN TELEPHONE EVALUATION 5-10 MIN: ICD-10-PCS | Mod: HCNC,95,, | Performed by: INTERNAL MEDICINE

## 2020-07-20 PROCEDURE — 1101F PR PT FALLS ASSESS DOC 0-1 FALLS W/OUT INJ PAST YR: ICD-10-PCS | Mod: HCNC,CPTII,, | Performed by: INTERNAL MEDICINE

## 2020-07-20 PROCEDURE — 1159F MED LIST DOCD IN RCRD: CPT | Mod: HCNC,,, | Performed by: INTERNAL MEDICINE

## 2020-07-20 PROCEDURE — 99441 PR PHYSICIAN TELEPHONE EVALUATION 5-10 MIN: CPT | Mod: HCNC,95,, | Performed by: INTERNAL MEDICINE

## 2020-07-20 PROCEDURE — 1159F PR MEDICATION LIST DOCUMENTED IN MEDICAL RECORD: ICD-10-PCS | Mod: HCNC,,, | Performed by: INTERNAL MEDICINE

## 2020-07-20 PROCEDURE — 1101F PT FALLS ASSESS-DOCD LE1/YR: CPT | Mod: HCNC,CPTII,, | Performed by: INTERNAL MEDICINE

## 2020-07-20 NOTE — Clinical Note
Can you not a please schedule patient for fasting labs in 3 weeks orders placed    Please schedule patient for telemedicine video visit or audio visit with me in 4 weeks for follow-up.

## 2020-07-20 NOTE — PROGRESS NOTES
Established Patient - Audio Only Telehealth Visit     The patient location is:  At home  The chief complaint leading to consultation is:  Left upper quadrant and left lower quadrant postprandial cramping  Visit type: Virtual visit with audio only (telephone)  Total time spent with patient:  10 min       The reason for the audio only service rather than synchronous audio and video virtual visit was related to technical difficulties or patient preference/necessity.     Each patient to whom I provide medical services by telemedicine is:  (1) informed of the relationship between the physician and patient and the respective role of any other health care provider with respect to management of the patient; and (2) notified that they may decline to receive medical services by telemedicine and may withdraw from such care at any time. Patient verbally consented to receive this service via voice-only telephone call.      [facility] Consultation Note    Reason for Consult:  The primary encounter diagnosis was Abdominal cramping in left upper quadrant. Diagnoses of Abdominal cramping in left lower quadrant, Constipation, chronic, and Iron deficiency were also pertinent to this visit.    PCP:   Sandra Lyon       Referring MD:  No referring provider defined for this encounter.    Initial History of Present Illness (HPI):  This is a 74 y.o. female here for evaluation of postprandial abdominal cramping left upper quadrant left lower quadrant with constipation.  Patient thinks she is lactose intolerant but still doing dairy products recommend she avoid dairy products intake Lactaid pills over-the-counter also recommend she take her look laxative daily not 3 times a week she is currently on MiraLax.  Recommend she take it once daily before dinner.  She has not noticed any blood in her stool.  No fever chills no shortness of breath no chest pain.    Abdominal pain - as a  Reflux - no  Dysphagia - no   Bowel  habits - normal  GI bleeding - none  NSAID usage - meloxicam    Interval HPI 07/20/2020:  The patient's last visit with me was on 5/20/2020.      ROS:  Constitutional: No fevers, chills, No weight loss  ENT:  No heartburn no dysphagia no odynophagia no hoarseness  CV: No chest pain, no palpitation  Pulm: No cough, No shortness of breath, no wheezing  GI: see HPI  Derm: No rash, no itching  Heme: No lymphadenopathy, No easy bruising  MSK: No significant arthritis  : No dysuria, No hematuria  Endo: No hot or cold intolerance  Neuro: No syncope, No seizure, no strokes  Psych: No uncontrolled anxiety, No uncontrolled depression    Medical History:  has a past medical history of Back pain, Cataract, Central retinal vein occlusion of left eye, Degeneration of lumbar or lumbosacral intervertebral disc (4/16/2013), High cholesterol, Hypertension, Hypothyroidism, Impingement syndrome of left shoulder (7/29/2013), Obesity, Pneumonia, Posterior vitreous detachment of both eyes (1/17/2013), S/P partial hysterectomy (1/23/2013), and Vitamin D insufficiency (2016).    Surgical History:  has a past surgical history that includes Partial hysterectomy; Thyroidectomy, partial; Tonsillectomy; Colonoscopy (01/22/2008); Hysterectomy; and Colonoscopy (N/A, 8/8/2019).    Family History: family history includes Alcohol abuse in her brother; Arthritis in her brother; Blindness in her brother, maternal grandmother, and mother; Diabetes in her brother, brother, maternal grandmother, and mother; Drug abuse in her brother; Glaucoma in her brother, brother, and mother; Gout in her son; Heart attack in her mother and son; Heart attacks under age 50 in her son; Heart disease in her brother, brother, brother, and mother; Heart disease (age of onset: 48) in her son; Hypertension in her maternal grandmother; Liver disease in her brother and brother; No Known Problems in her father, maternal aunt, maternal grandfather, maternal uncle, paternal aunt,  paternal grandfather, paternal grandmother, paternal uncle, and son; Pemphigus vulgaris in her brother; Stroke in her brother..     Social History:  reports that she has never smoked. She has never used smokeless tobacco. She reports current alcohol use of about 1.0 standard drinks of alcohol per week. She reports that she does not use drugs.    Review of patient's allergies indicates:   Allergen Reactions    Tramadol Nausea And Vomiting       Medication List with Changes/Refills   Current Medications    CARVEDILOL (COREG) 6.25 MG TABLET    TAKE 1 TABLET BY MOUTH TWICE A DAY WITH FOOD    CIPROFLOXACIN HCL (CIPRO) 500 MG TABLET    Take 1 tablet (500 mg total) by mouth 2 (two) times daily.    ERGOCALCIFEROL (VITAMIN D2) 50,000 UNIT CAP    Take 1 capsule (50,000 Units total) by mouth every 7 days.    FOLBEE 2.5-25-1 MG TAB    TAKE 1 TABLET BY MOUTH EVERY DAY    HYDROCHLOROTHIAZIDE (HYDRODIURIL) 12.5 MG TAB    Take 1 tablet (12.5 mg total) by mouth once daily.    LEVOTHYROXINE (SYNTHROID) 100 MCG TABLET    TAKE ONE TABLET BY MOUTH BEFORE BREAKFAST FOR THYROID    MELOXICAM (MOBIC) 15 MG TABLET    TAKE 1 TABLET BY MOUTH ONCE DAILY WITH FOOD AS NEEDED FOR JOINT PAIN    METRONIDAZOLE (FLAGYL) 500 MG TABLET    1 tab with meals 3x/day    MULTIVITAMIN (THERAGRAN) PER TABLET    Take 1 tablet by mouth once daily.    NIFEDIPINE (PROCARDIA-XL) 90 MG (OSM) 24 HR TABLET    Take 1 tablet (90 mg total) by mouth once daily.    POLYETHYLENE GLYCOL (GLYCOLAX) 17 GRAM/DOSE POWDER    1 cap in 4-6 oz liquid : Take 2x/day x 3 days then go to once daily thereafter for constipation    SIMVASTATIN (ZOCOR) 40 MG TABLET    TAKE 1 TABLET BY MOUTH EVERY EVENING FOR CHOLESTEROL    TIMOLOL MALEATE 0.5% (TIMOPTIC-XE) 0.5 % SOLG    INSTILL 1 DROP INTO BOTH EYES ONCE DAILY    VALSARTAN (DIOVAN) 320 MG TABLET    TAKE 1 TABLET (320 MG TOTAL) BY MOUTH ONCE DAILY.         Objective Findings:    Vital Signs:  There were no vitals taken for this visit.   There is no height or weight on file to calculate BMI.    Physical Exam:  Telemedicine telephone visit.  Neurologic:  Alert and oriented x4  Psychiatric:  Normal speech mentation and affect    Labs:  Lab Results   Component Value Date    WBC 4.59 05/20/2020    HGB 14.2 05/20/2020    HCT 43.8 05/20/2020     05/20/2020    CHOL 161 04/21/2020    TRIG 167 (H) 04/21/2020    HDL 45 04/21/2020    ALT 14 04/21/2020    AST 23 04/21/2020     04/21/2020    K 3.7 04/21/2020     04/21/2020    CREATININE 0.9 04/21/2020    BUN 10 04/21/2020    CO2 30 (H) 04/21/2020    TSH 2.069 11/19/2019    HGBA1C 5.3 05/19/2006           Medical Decision Making:  CT scan images personally reviewed by myself.  Discussed with patient fat ventral hernia.    Impression:     No acute intraabdominal pathology to explain patient's abdominal pain.     Small anterior abdominal wall fat containing hernia.     There is calcified plaque at the origin of the celiac and SMA.     Irregular opacities right middle lobe likely airways disease.  Findings are incompletely imaged.    Electronically signed by: Juan José Veras MD  Date:                                            05/16/2020    Labs reviewed    Assessment:  1. Abdominal cramping in left upper quadrant    2. Abdominal cramping in left lower quadrant    3. Constipation, chronic    4. Iron deficiency         Recommendations:   1.  Recommend follow-up fasting labs in about 3 weeks orders placed  2.  Recommend patient take MiraLax once daily before dinner instead of 3 times a week for better control and management of her constipation.  3.  Recommend patient avoid dairy products and Lactaid pills once daily.  4.  Return to GI clinic telemedicine her video visit in 4 weeks for updates see for postprandial abdominal cramps have improved or resolved with the above measures.    Follow up in about 4 weeks (around 8/17/2020).      Order summary:  Orders Placed This Encounter    CBC auto  differential    Ferritin    Iron and TIBC    Lipase    Comprehensive metabolic panel         Thank you so much for allowing me to participate in the care of Elena BRUCE Umair Moreno MD               This service was not originating from a related E/M service provided within the previous 7 days nor will  to an E/M service or procedure within the next 24 hours or my soonest available appointment.  Prevailing standard of care was able to be met in this audio-only visit.

## 2020-07-23 ENCOUNTER — PATIENT OUTREACH (OUTPATIENT)
Dept: OTHER | Facility: OTHER | Age: 75
End: 2020-07-23

## 2020-07-23 NOTE — PROGRESS NOTES
"Digital Medicine: Health  Follow-Up    The history is provided by the patient.             Reason for review: Blood pressure not at goal        Topics Covered on Call: physical activity, Diet and informed patient PharmD called on 07/20/20.     Additional Follow-up details: Patient reports things are going well.         Diet-Change      Dietary Improvements:Patient reports she has increased vegetable intake. Patient reports she is overall eating well. Patient has avoided red meats for 3 weeks. Patient is also consuming smaller portions.       Dietary Indiscretions:        Intervention(s): portion control      Physical Activity-Change      Additional physical activity details: Patient states, "walking is much better".       Medication Adherence-Medication adherence was assessed.      Substance, Sleep, Stress-Not assessed    PLAN  Continue current diet/physical activity routine:  Instructed to charge device:  Reviewed Device Techniques  Patient verbalizes understanding. Patient did not express questions or concerns and patient has contact information if needed.    Explained the importance of self-monitoring and medication adherence. Encouraged the patient to communicate with their health  for lifestyle modifications to help improve or maintain a healthy lifestyle.        There are no preventive care reminders to display for this patient.    Last 5 Patient Entered Readings                                      Current 30 Day Average: 144/70     Recent Readings 7/20/2020 7/16/2020 7/11/2020 7/7/2020 7/3/2020    SBP (mmHg) 151 145 129 153 134    DBP (mmHg) 77 66 63 74 70    Pulse 60 65 57 63 58               "

## 2020-08-03 NOTE — PROGRESS NOTES
Digital Medicine: Clinician Follow-Up    Called patient for routine follow up. She says she is doing well and can't really complain about anything. She says that she has not been taking HCTZ because it causes her urinate. She confirms taking nifedipine, carvedilol and valsartan. She says she will restart HCTZ next week. She says that she exercises regularly but could do better with sodium monitoring     The history is provided by the patient.   Follow-up reason(s): routine follow up.         Last 5 Patient Entered Readings                                      Current 30 Day Average: 138/69     Recent Readings 7/27/2020 7/26/2020 7/23/2020 7/20/2020 7/16/2020    SBP (mmHg) 140 114 137 151 145    DBP (mmHg) 75 60 70 77 66    Pulse 61 56 60 60 65               Depression Screening  Did not address depression screening.    Sleep Apnea Screening    Did not address sleep apnea screening.     Medication Affordability Screening  Did not address medication affordability screening.     Medication Adherence-Medication adherence was asssessed.    Patient reported missing medication: more than once a week and HCTZ causes excessive urination.    Patient identified the following reasons for non-adherence:  Side effects from medication:            ASSESSMENT(S)  Patients BP average is 138/69 mmHg, which is above goal. Patient's BP goal is less than or equal to 130/80 per 2017 ACC/AHA Hypertension Guidelines.       PLAN  Continue current therapy: Resume HCTZ as directed. Will monitor for increased urination  Continue current diet/physical activity routine:  Provided patient education: Low sodium diet    Patient verbalizes understanding. Patient did not express questions or concerns and patient has contact information if needed.          There are no preventive care reminders to display for this patient.      Hypertension Medications     carvediloL (COREG) 6.25 MG tablet TAKE 1 TABLET BY MOUTH TWICE A DAY WITH FOOD     hydroCHLOROthiazide (HYDRODIURIL) 12.5 MG Tab Take 1 tablet (12.5 mg total) by mouth once daily.    NIFEdipine (PROCARDIA-XL) 90 MG (OSM) 24 hr tablet Take 1 tablet (90 mg total) by mouth once daily.    valsartan (DIOVAN) 320 MG tablet TAKE 1 TABLET (320 MG TOTAL) BY MOUTH ONCE DAILY.

## 2020-08-25 ENCOUNTER — TELEPHONE (OUTPATIENT)
Dept: GASTROENTEROLOGY | Facility: CLINIC | Age: 75
End: 2020-08-25

## 2020-08-25 NOTE — TELEPHONE ENCOUNTER
----- Message from Ashley Johnson sent at 8/25/2020  9:44 AM CDT -----  Regarding: PT  Contact: PT  PT called to make a f/u appointment with labs. I tried to schedule her but nothing came up as available. Please call back to schedule     Callback: [phone]

## 2020-08-25 NOTE — TELEPHONE ENCOUNTER
Called and spoke to pt.  Pt scheduled for fasting labs and f/u appt.  Asked pt if she has been following recommendations from last visit.  Pt says she has been experiencing cramps and pain with no relief.  Pt has taken Miralax and Metamucil, which pt states has not helped.   Pt says she has been constipated and takes milk of magnesium at least once a week.  Pt will discuss further at next visit with Dr. Moreno.  Pt appreciated the call.

## 2020-08-31 ENCOUNTER — LAB VISIT (OUTPATIENT)
Dept: LAB | Facility: HOSPITAL | Age: 75
End: 2020-08-31
Attending: INTERNAL MEDICINE
Payer: MEDICARE

## 2020-08-31 DIAGNOSIS — K59.09 CONSTIPATION, CHRONIC: ICD-10-CM

## 2020-08-31 DIAGNOSIS — E61.1 IRON DEFICIENCY: ICD-10-CM

## 2020-08-31 DIAGNOSIS — R10.12 ABDOMINAL CRAMPING IN LEFT UPPER QUADRANT: ICD-10-CM

## 2020-08-31 DIAGNOSIS — R10.32 ABDOMINAL CRAMPING IN LEFT LOWER QUADRANT: ICD-10-CM

## 2020-08-31 LAB
ALBUMIN SERPL BCP-MCNC: 3.9 G/DL (ref 3.5–5.2)
ALP SERPL-CCNC: 77 U/L (ref 55–135)
ALT SERPL W/O P-5'-P-CCNC: 19 U/L (ref 10–44)
ANION GAP SERPL CALC-SCNC: 8 MMOL/L (ref 8–16)
AST SERPL-CCNC: 29 U/L (ref 10–40)
BASOPHILS # BLD AUTO: 0.05 K/UL (ref 0–0.2)
BASOPHILS NFR BLD: 0.9 % (ref 0–1.9)
BILIRUB SERPL-MCNC: 0.4 MG/DL (ref 0.1–1)
BUN SERPL-MCNC: 9 MG/DL (ref 8–23)
CALCIUM SERPL-MCNC: 9.6 MG/DL (ref 8.7–10.5)
CHLORIDE SERPL-SCNC: 108 MMOL/L (ref 95–110)
CO2 SERPL-SCNC: 26 MMOL/L (ref 23–29)
CREAT SERPL-MCNC: 0.9 MG/DL (ref 0.5–1.4)
DIFFERENTIAL METHOD: ABNORMAL
EOSINOPHIL # BLD AUTO: 0.1 K/UL (ref 0–0.5)
EOSINOPHIL NFR BLD: 1.7 % (ref 0–8)
ERYTHROCYTE [DISTWIDTH] IN BLOOD BY AUTOMATED COUNT: 13.3 % (ref 11.5–14.5)
EST. GFR  (AFRICAN AMERICAN): >60 ML/MIN/1.73 M^2
EST. GFR  (NON AFRICAN AMERICAN): >60 ML/MIN/1.73 M^2
FERRITIN SERPL-MCNC: 186 NG/ML (ref 20–300)
GLUCOSE SERPL-MCNC: 92 MG/DL (ref 70–110)
HCT VFR BLD AUTO: 42.9 % (ref 37–48.5)
HGB BLD-MCNC: 13.8 G/DL (ref 12–16)
IMM GRANULOCYTES # BLD AUTO: 0.01 K/UL (ref 0–0.04)
IMM GRANULOCYTES NFR BLD AUTO: 0.2 % (ref 0–0.5)
IRON SERPL-MCNC: 94 UG/DL (ref 30–160)
LIPASE SERPL-CCNC: 34 U/L (ref 4–60)
LYMPHOCYTES # BLD AUTO: 2.5 K/UL (ref 1–4.8)
LYMPHOCYTES NFR BLD: 46.4 % (ref 18–48)
MCH RBC QN AUTO: 28.9 PG (ref 27–31)
MCHC RBC AUTO-ENTMCNC: 32.2 G/DL (ref 32–36)
MCV RBC AUTO: 90 FL (ref 82–98)
MONOCYTES # BLD AUTO: 0.5 K/UL (ref 0.3–1)
MONOCYTES NFR BLD: 8.9 % (ref 4–15)
NEUTROPHILS # BLD AUTO: 2.2 K/UL (ref 1.8–7.7)
NEUTROPHILS NFR BLD: 41.9 % (ref 38–73)
NRBC BLD-RTO: 0 /100 WBC
PLATELET # BLD AUTO: 293 K/UL (ref 150–350)
PMV BLD AUTO: 8.9 FL (ref 9.2–12.9)
POTASSIUM SERPL-SCNC: 4.1 MMOL/L (ref 3.5–5.1)
PROT SERPL-MCNC: 7.7 G/DL (ref 6–8.4)
RBC # BLD AUTO: 4.78 M/UL (ref 4–5.4)
SATURATED IRON: 30 % (ref 20–50)
SODIUM SERPL-SCNC: 142 MMOL/L (ref 136–145)
TOTAL IRON BINDING CAPACITY: 311 UG/DL (ref 250–450)
TRANSFERRIN SERPL-MCNC: 210 MG/DL (ref 200–375)
WBC # BLD AUTO: 5.3 K/UL (ref 3.9–12.7)

## 2020-08-31 PROCEDURE — 83540 ASSAY OF IRON: CPT | Mod: HCNC

## 2020-08-31 PROCEDURE — 80053 COMPREHEN METABOLIC PANEL: CPT | Mod: HCNC

## 2020-08-31 PROCEDURE — 82728 ASSAY OF FERRITIN: CPT | Mod: HCNC

## 2020-08-31 PROCEDURE — 83690 ASSAY OF LIPASE: CPT | Mod: HCNC

## 2020-08-31 PROCEDURE — 36415 COLL VENOUS BLD VENIPUNCTURE: CPT | Mod: HCNC

## 2020-08-31 PROCEDURE — 85025 COMPLETE CBC W/AUTO DIFF WBC: CPT | Mod: HCNC

## 2020-09-03 ENCOUNTER — PATIENT OUTREACH (OUTPATIENT)
Dept: OTHER | Facility: OTHER | Age: 75
End: 2020-09-03

## 2020-09-08 ENCOUNTER — PATIENT OUTREACH (OUTPATIENT)
Dept: OTHER | Facility: OTHER | Age: 75
End: 2020-09-08

## 2020-09-16 NOTE — PROGRESS NOTES
Digital Medicine: Health  Follow-Up    The history is provided by the patient.             Reason for review: Blood pressure not at goal        Topics Covered on Call: physical activity and Diet    Additional Follow-up details: Patient reports she is doing very well and is pleased with downward trend.           Diet-no change to diet    No change to diet.        Physical Activity-Change      Additional physical activity details: Patient reports she is back walking in [place] 2x/wk.       Medication Adherence-Medication adherence was assessed.      Substance, Sleep, Stress-Not assessed      Continue current diet/physical activity routine.       Addressed patient questions and patient has my contact information if needed prior to next outreach. Patient verbalizes understanding.      Explained the importance of self-monitoring and medication adherence. Encouraged the patient to communicate with their health  for lifestyle modifications to help improve or maintain a healthy lifestyle.            There are no preventive care reminders to display for this patient.    Last 5 Patient Entered Readings                                      Current 30 Day Average: 140/72     Recent Readings 9/15/2020 9/12/2020 9/7/2020 9/6/2020 9/6/2020    SBP (mmHg) 125 133 127 158 157    DBP (mmHg) 64 68 59 80 82    Pulse 59 60 62 66 64

## 2020-09-18 ENCOUNTER — LAB VISIT (OUTPATIENT)
Dept: LAB | Facility: HOSPITAL | Age: 75
End: 2020-09-18
Attending: INTERNAL MEDICINE
Payer: MEDICARE

## 2020-09-18 DIAGNOSIS — E78.5 HYPERLIPIDEMIA, UNSPECIFIED HYPERLIPIDEMIA TYPE: ICD-10-CM

## 2020-09-18 DIAGNOSIS — I10 ESSENTIAL HYPERTENSION: ICD-10-CM

## 2020-09-18 LAB
ALBUMIN SERPL BCP-MCNC: 4 G/DL (ref 3.5–5.2)
ALP SERPL-CCNC: 83 U/L (ref 55–135)
ALT SERPL W/O P-5'-P-CCNC: 14 U/L (ref 10–44)
ANION GAP SERPL CALC-SCNC: 9 MMOL/L (ref 8–16)
AST SERPL-CCNC: 25 U/L (ref 10–40)
BASOPHILS # BLD AUTO: 0.04 K/UL (ref 0–0.2)
BASOPHILS NFR BLD: 0.8 % (ref 0–1.9)
BILIRUB SERPL-MCNC: 0.4 MG/DL (ref 0.1–1)
BUN SERPL-MCNC: 12 MG/DL (ref 8–23)
CALCIUM SERPL-MCNC: 9.5 MG/DL (ref 8.7–10.5)
CHLORIDE SERPL-SCNC: 102 MMOL/L (ref 95–110)
CHOLEST SERPL-MCNC: 145 MG/DL (ref 120–199)
CHOLEST/HDLC SERPL: 3.1 {RATIO} (ref 2–5)
CO2 SERPL-SCNC: 27 MMOL/L (ref 23–29)
CREAT SERPL-MCNC: 0.9 MG/DL (ref 0.5–1.4)
DIFFERENTIAL METHOD: NORMAL
EOSINOPHIL # BLD AUTO: 0.1 K/UL (ref 0–0.5)
EOSINOPHIL NFR BLD: 1.2 % (ref 0–8)
ERYTHROCYTE [DISTWIDTH] IN BLOOD BY AUTOMATED COUNT: 13.3 % (ref 11.5–14.5)
EST. GFR  (AFRICAN AMERICAN): >60 ML/MIN/1.73 M^2
EST. GFR  (NON AFRICAN AMERICAN): >60 ML/MIN/1.73 M^2
GLUCOSE SERPL-MCNC: 87 MG/DL (ref 70–110)
HCT VFR BLD AUTO: 42.8 % (ref 37–48.5)
HDLC SERPL-MCNC: 47 MG/DL (ref 40–75)
HDLC SERPL: 32.4 % (ref 20–50)
HGB BLD-MCNC: 14 G/DL (ref 12–16)
IMM GRANULOCYTES # BLD AUTO: 0.01 K/UL (ref 0–0.04)
IMM GRANULOCYTES NFR BLD AUTO: 0.2 % (ref 0–0.5)
LDLC SERPL CALC-MCNC: 77.4 MG/DL (ref 63–159)
LYMPHOCYTES # BLD AUTO: 2.1 K/UL (ref 1–4.8)
LYMPHOCYTES NFR BLD: 42.9 % (ref 18–48)
MCH RBC QN AUTO: 28.9 PG (ref 27–31)
MCHC RBC AUTO-ENTMCNC: 32.7 G/DL (ref 32–36)
MCV RBC AUTO: 88 FL (ref 82–98)
MONOCYTES # BLD AUTO: 0.5 K/UL (ref 0.3–1)
MONOCYTES NFR BLD: 9.1 % (ref 4–15)
NEUTROPHILS # BLD AUTO: 2.3 K/UL (ref 1.8–7.7)
NEUTROPHILS NFR BLD: 45.8 % (ref 38–73)
NONHDLC SERPL-MCNC: 98 MG/DL
NRBC BLD-RTO: 0 /100 WBC
PLATELET # BLD AUTO: 317 K/UL (ref 150–350)
PMV BLD AUTO: 9.4 FL (ref 9.2–12.9)
POTASSIUM SERPL-SCNC: 4 MMOL/L (ref 3.5–5.1)
PROT SERPL-MCNC: 7.9 G/DL (ref 6–8.4)
RBC # BLD AUTO: 4.85 M/UL (ref 4–5.4)
SODIUM SERPL-SCNC: 138 MMOL/L (ref 136–145)
TRIGL SERPL-MCNC: 103 MG/DL (ref 30–150)
TSH SERPL DL<=0.005 MIU/L-ACNC: 1.78 UIU/ML (ref 0.4–4)
WBC # BLD AUTO: 4.92 K/UL (ref 3.9–12.7)

## 2020-09-18 PROCEDURE — 80053 COMPREHEN METABOLIC PANEL: CPT | Mod: HCNC

## 2020-09-18 PROCEDURE — 36415 COLL VENOUS BLD VENIPUNCTURE: CPT | Mod: HCNC

## 2020-09-18 PROCEDURE — 85025 COMPLETE CBC W/AUTO DIFF WBC: CPT | Mod: HCNC

## 2020-09-18 PROCEDURE — 84443 ASSAY THYROID STIM HORMONE: CPT | Mod: HCNC

## 2020-09-18 PROCEDURE — 80061 LIPID PANEL: CPT | Mod: HCNC

## 2020-09-24 ENCOUNTER — OFFICE VISIT (OUTPATIENT)
Dept: INTERNAL MEDICINE | Facility: CLINIC | Age: 75
End: 2020-09-24
Payer: MEDICARE

## 2020-09-24 ENCOUNTER — IMMUNIZATION (OUTPATIENT)
Dept: INTERNAL MEDICINE | Facility: CLINIC | Age: 75
End: 2020-09-24
Payer: MEDICARE

## 2020-09-24 ENCOUNTER — TELEPHONE (OUTPATIENT)
Dept: INTERNAL MEDICINE | Facility: CLINIC | Age: 75
End: 2020-09-24

## 2020-09-24 ENCOUNTER — TELEPHONE (OUTPATIENT)
Dept: GASTROENTEROLOGY | Facility: CLINIC | Age: 75
End: 2020-09-24

## 2020-09-24 DIAGNOSIS — K59.00 CONSTIPATION, UNSPECIFIED CONSTIPATION TYPE: ICD-10-CM

## 2020-09-24 DIAGNOSIS — I10 ESSENTIAL HYPERTENSION: Primary | ICD-10-CM

## 2020-09-24 DIAGNOSIS — E78.5 HYPERLIPIDEMIA, UNSPECIFIED HYPERLIPIDEMIA TYPE: Primary | ICD-10-CM

## 2020-09-24 DIAGNOSIS — I10 ESSENTIAL HYPERTENSION: ICD-10-CM

## 2020-09-24 DIAGNOSIS — E78.5 HYPERLIPIDEMIA, UNSPECIFIED HYPERLIPIDEMIA TYPE: ICD-10-CM

## 2020-09-24 PROCEDURE — 3078F DIAST BP <80 MM HG: CPT | Mod: HCNC,CPTII,S$GLB, | Performed by: INTERNAL MEDICINE

## 2020-09-24 PROCEDURE — 99999 PR PBB SHADOW E&M-EST. PATIENT-LVL V: ICD-10-PCS | Mod: PBBFAC,HCNC,, | Performed by: INTERNAL MEDICINE

## 2020-09-24 PROCEDURE — 99214 OFFICE O/P EST MOD 30 MIN: CPT | Mod: 25,HCNC,S$GLB, | Performed by: INTERNAL MEDICINE

## 2020-09-24 PROCEDURE — G0008 PR ADMIN INFLUENZA VIRUS VAC: ICD-10-PCS | Mod: HCNC,S$GLB,, | Performed by: INTERNAL MEDICINE

## 2020-09-24 PROCEDURE — G0008 ADMIN INFLUENZA VIRUS VAC: HCPCS | Mod: HCNC,S$GLB,, | Performed by: INTERNAL MEDICINE

## 2020-09-24 PROCEDURE — 90694 FLU VACCINE - QUADRIVALENT - ADJUVANTED: ICD-10-PCS | Mod: HCNC,S$GLB,, | Performed by: INTERNAL MEDICINE

## 2020-09-24 PROCEDURE — 1159F MED LIST DOCD IN RCRD: CPT | Mod: HCNC,S$GLB,, | Performed by: INTERNAL MEDICINE

## 2020-09-24 PROCEDURE — 3074F SYST BP LT 130 MM HG: CPT | Mod: HCNC,CPTII,S$GLB, | Performed by: INTERNAL MEDICINE

## 2020-09-24 PROCEDURE — 1126F AMNT PAIN NOTED NONE PRSNT: CPT | Mod: HCNC,S$GLB,, | Performed by: INTERNAL MEDICINE

## 2020-09-24 PROCEDURE — 90694 VACC AIIV4 NO PRSRV 0.5ML IM: CPT | Mod: HCNC,S$GLB,, | Performed by: INTERNAL MEDICINE

## 2020-09-24 PROCEDURE — 3078F PR MOST RECENT DIASTOLIC BLOOD PRESSURE < 80 MM HG: ICD-10-PCS | Mod: HCNC,CPTII,S$GLB, | Performed by: INTERNAL MEDICINE

## 2020-09-24 PROCEDURE — 1101F PR PT FALLS ASSESS DOC 0-1 FALLS W/OUT INJ PAST YR: ICD-10-PCS | Mod: HCNC,CPTII,S$GLB, | Performed by: INTERNAL MEDICINE

## 2020-09-24 PROCEDURE — 1101F PT FALLS ASSESS-DOCD LE1/YR: CPT | Mod: HCNC,CPTII,S$GLB, | Performed by: INTERNAL MEDICINE

## 2020-09-24 PROCEDURE — 3008F PR BODY MASS INDEX (BMI) DOCUMENTED: ICD-10-PCS | Mod: HCNC,CPTII,S$GLB, | Performed by: INTERNAL MEDICINE

## 2020-09-24 PROCEDURE — 3008F BODY MASS INDEX DOCD: CPT | Mod: HCNC,CPTII,S$GLB, | Performed by: INTERNAL MEDICINE

## 2020-09-24 PROCEDURE — 1126F PR PAIN SEVERITY QUANTIFIED, NO PAIN PRESENT: ICD-10-PCS | Mod: HCNC,S$GLB,, | Performed by: INTERNAL MEDICINE

## 2020-09-24 PROCEDURE — 99214 PR OFFICE/OUTPT VISIT, EST, LEVL IV, 30-39 MIN: ICD-10-PCS | Mod: 25,HCNC,S$GLB, | Performed by: INTERNAL MEDICINE

## 2020-09-24 PROCEDURE — 99999 PR PBB SHADOW E&M-EST. PATIENT-LVL V: CPT | Mod: PBBFAC,HCNC,, | Performed by: INTERNAL MEDICINE

## 2020-09-24 PROCEDURE — 1159F PR MEDICATION LIST DOCUMENTED IN MEDICAL RECORD: ICD-10-PCS | Mod: HCNC,S$GLB,, | Performed by: INTERNAL MEDICINE

## 2020-09-24 PROCEDURE — 3074F PR MOST RECENT SYSTOLIC BLOOD PRESSURE < 130 MM HG: ICD-10-PCS | Mod: HCNC,CPTII,S$GLB, | Performed by: INTERNAL MEDICINE

## 2020-09-24 RX ORDER — DOCUSATE SODIUM 100 MG/1
CAPSULE, LIQUID FILLED ORAL
Qty: 60 CAPSULE | Status: SHIPPED | OUTPATIENT
Start: 2020-09-24

## 2020-09-24 RX ORDER — HYDROCHLOROTHIAZIDE 12.5 MG/1
TABLET ORAL
Qty: 30 TABLET | Refills: 0 | Status: SHIPPED | OUTPATIENT
Start: 2020-09-24 | End: 2020-10-21

## 2020-09-24 NOTE — TELEPHONE ENCOUNTER
Laxmi  I stopped Mrs Block's HCTZ as her BP today was 102/58.  I think laly home cuff is reading   Erroneously high? She is coming in for Nurse BP check with the Digital cuff next week  Thanks, Sandra Banks

## 2020-09-24 NOTE — PATIENT INSTRUCTIONS
Medication Notes:   For Blood Pressure:  #1-STOP Hydrochlorthiazide/take only if needed    For Constipation:  #1-Colace at 1-4 caps/day(Build up over 2-4 weeks)-Take DAILY

## 2020-09-24 NOTE — TELEPHONE ENCOUNTER
----- Message from Janet Adams sent at 9/24/2020 11:34 AM CDT -----  Regarding: labs  Lab appt scheduled for March 5. Please place orders and link to appt.    Thank you

## 2020-09-24 NOTE — PROGRESS NOTES
Subjective:       Patient ID: Elena Block is a 74 y.o. female.    Chief Complaint:   Follow-up, Hypertension, and Hyperlipidemia    HPI: Mrs Block for f/u HTN/HLD and Constipation  HTN:Home BP checks ?able with Digital HTN cuff-only takes HCTZ 1x/week/takes all other meds as recommended  Constipation:She continues with constipation-she has a BM 2x/week unless she takes the MOM weekly  She c/o JUANA Quadrant abdomen pain unless she takes the MOM and then has BMs; she questions if she can take MOM regularly  She has been eating healthier with less red meat and less bread and rice    Past Medical, Surgical, Social History: Please see as stated in Epic chart which has been reviewed.    Current Outpatient Medications   Medication Sig Dispense Refill    carvediloL (COREG) 6.25 MG tablet TAKE 1 TABLET BY MOUTH TWICE A DAY WITH FOOD 180 tablet 1    ergocalciferol (VITAMIN D2) 50,000 unit Cap Take 1 capsule (50,000 Units total) by mouth every 7 days. 12 capsule 3    FOLBEE 2.5-25-1 mg Tab TAKE 1 TABLET BY MOUTH EVERY DAY 90 tablet 3    hydroCHLOROthiazide (HYDRODIURIL) 12.5 MG Tab 1 tab weekly as needed for fluid/blood pressure >160/90 30 tablet 0    levothyroxine (SYNTHROID) 100 MCG tablet TAKE ONE TABLET BY MOUTH BEFORE BREAKFAST FOR THYROID 90 tablet 0    meloxicam (MOBIC) 15 MG tablet TAKE 1 TABLET BY MOUTH ONCE DAILY WITH FOOD AS NEEDED FOR JOINT PAIN 30 tablet 0    multivitamin (THERAGRAN) per tablet Take 1 tablet by mouth once daily.      NIFEdipine (PROCARDIA-XL) 90 MG (OSM) 24 hr tablet Take 1 tablet (90 mg total) by mouth once daily. 90 tablet 1    polyethylene glycol (GLYCOLAX) 17 gram/dose powder 1 cap in 4-6 oz liquid : Take 2x/day x 3 days then go to once daily thereafter for constipation 289 g 6    simvastatin (ZOCOR) 40 MG tablet TAKE 1 TABLET BY MOUTH EVERY EVENING FOR CHOLESTEROL 90 tablet 2    timolol maleate 0.5% (TIMOPTIC-XE) 0.5 % SolG INSTILL 1 DROP INTO BOTH EYES ONCE DAILY 10 mL 3     valsartan (DIOVAN) 320 MG tablet TAKE 1 TABLET (320 MG TOTAL) BY MOUTH ONCE DAILY. 90 tablet 3    ciprofloxacin HCl (CIPRO) 500 MG tablet Take 1 tablet (500 mg total) by mouth 2 (two) times daily. (Patient not taking: Reported on 9/24/2020) 14 tablet 0    docusate sodium (COLACE) 100 MG capsule 1-4 caps daily; gradually taper up over 2 weeks 60 capsule prn     No current facility-administered medications for this visit.        Review of Systems   Constitutional: Negative.  Negative for fatigue and fever.   Respiratory: Negative for chest tightness and shortness of breath.    Cardiovascular: Negative for chest pain.   Gastrointestinal: Positive for constipation. Negative for blood in stool.   Genitourinary: Negative.    Musculoskeletal: Positive for arthralgias.        Right Shoulder is better s/p PT   Neurological: Negative for dizziness, syncope and headaches.   Psychiatric/Behavioral: Negative.        Objective:      Lab Results   Component Value Date    WBC 4.92 09/18/2020    HGB 14.0 09/18/2020    HCT 42.8 09/18/2020     09/18/2020    CHOL 145 09/18/2020    TRIG 103 09/18/2020    HDL 47 09/18/2020    ALT 14 09/18/2020    AST 25 09/18/2020     09/18/2020    K 4.0 09/18/2020     09/18/2020    CREATININE 0.9 09/18/2020    BUN 12 09/18/2020    CO2 27 09/18/2020    TSH 1.781 09/18/2020    HGBA1C 5.3 05/19/2006     Physical Exam  Vitals signs reviewed.   Constitutional:       Appearance: Normal appearance.   HENT:      Head: Normocephalic and atraumatic.   Cardiovascular:      Rate and Rhythm: Normal rate and regular rhythm.   Pulmonary:      Effort: Pulmonary effort is normal.      Breath sounds: Normal breath sounds.   Abdominal:      General: Abdomen is flat. Bowel sounds are normal.      Palpations: Abdomen is soft. There is no mass.      Tenderness: There is no abdominal tenderness.   Musculoskeletal:      Right lower leg: No edema.      Left lower leg: No edema.   Skin:     General: Skin is  "warm and dry.   Neurological:      General: No focal deficit present.      Mental Status: She is alert.   Psychiatric:         Mood and Affect: Mood normal.           Vital Signs  Temp: 98.4 °F (36.9 °C)  Temp src: Oral  Pulse: 71  SpO2: 98 %  BP: (!) 102/58  Pain Score: 0-No pain  Height and Weight  Height: 5' 8" (172.7 cm)  Weight: 86.9 kg (191 lb 9.3 oz)  BSA (Calculated - sq m): 2.04 sq meters  BMI (Calculated): 29.1  Weight in (lb) to have BMI = 25: 164.1]    Assessment:       1. Essential hypertension    2. Hyperlipidemia, unspecified hyperlipidemia type    3. Constipation, unspecified constipation type        Plan:     Health Maintenance   Topic Date Due    Mammogram  06/26/2021    Lipid Panel  09/18/2021    High Dose Statin  09/24/2021    TETANUS VACCINE  10/01/2024    Hepatitis C Screening  Completed    Pneumococcal Vaccine (65+ Low/Medium Risk)  Completed    DEXA SCAN  Demetrius Parker was seen today for follow-up, hypertension and hyperlipidemia.    Diagnoses and all orders for this visit:    Essential hypertension/BP running low        -     patient to return with her home digital hypertension cough to be recheck against our manual blood pressure cuff for accuracy  -     'will DC daily HCTZ and recommend hydroCHLOROthiazide (HYDRODIURIL) 12.5 MG Tab; 1 tab weekly as needed for fluid/blood pressure >160/90        -     continue Diovan 320 mg q.d., Procardia XL 90 mg q.d., and Coreg 6.25 mg b.i.d.    Hyperlipidemia, unspecified hyperlipidemia type/controlled        -     continue Zocor 40 mg q.day    Constipation, unspecified constipation type  -     docusate sodium (COLACE) 100 MG capsule; 1-4 caps daily; gradually taper up over 2 weeks  -     'have recommended patient limit her utilization of milk of magnesia or magnesium citrate  to very rare episodes of severe constipation and not utilize either of these a regular basis    Colon Polyps, Adenomatous        -     repeat colonoscopy due " August 2024    Health Maintenance        -     flu shot        -     return to clinic x4 months with 1 week prior fasting lab work

## 2020-09-26 VITALS
HEART RATE: 71 BPM | HEIGHT: 68 IN | TEMPERATURE: 98 F | SYSTOLIC BLOOD PRESSURE: 110 MMHG | OXYGEN SATURATION: 98 % | DIASTOLIC BLOOD PRESSURE: 60 MMHG | BODY MASS INDEX: 29.03 KG/M2 | WEIGHT: 191.56 LBS

## 2020-10-07 ENCOUNTER — TELEPHONE (OUTPATIENT)
Dept: INTERNAL MEDICINE | Facility: CLINIC | Age: 75
End: 2020-10-07

## 2020-10-07 ENCOUNTER — PATIENT OUTREACH (OUTPATIENT)
Dept: OTHER | Facility: OTHER | Age: 75
End: 2020-10-07

## 2020-10-07 DIAGNOSIS — I10 ESSENTIAL HYPERTENSION: ICD-10-CM

## 2020-10-07 DIAGNOSIS — I10 ESSENTIAL HYPERTENSION: Primary | ICD-10-CM

## 2020-10-07 RX ORDER — FELODIPINE 5 MG/1
5 TABLET, EXTENDED RELEASE ORAL DAILY
Qty: 30 TABLET | Refills: 3 | Status: SHIPPED | OUTPATIENT
Start: 2020-10-07 | End: 2020-10-21 | Stop reason: ALTCHOICE

## 2020-10-07 NOTE — TELEPHONE ENCOUNTER
----- Message from Siria Saleem sent at 10/7/2020 12:59 PM CDT -----  Contact: self/[phone]  Patient called in regards requesting a courtesy call from pcp about making changes on medication. Please call and advise. Thank you

## 2020-10-07 NOTE — PROGRESS NOTES
PCP reported on 9/24/2020 that she stopped HCTZ secondary to BP of 102/58 mmHg in clinic. PCP noted that [facility] BP machine may be inaccurate and directed patient to return to clinic in a week for BP check. Called to follow up on this

## 2020-10-07 NOTE — TELEPHONE ENCOUNTER
Laxmi  I wanted to let you know that I stopped pt's Nifedipine 2ndary to c/o constipation. I started Plendil 5mg in it's place.  FYI/Thanks, Sandra Banks

## 2020-10-07 NOTE — TELEPHONE ENCOUNTER
Pt called she want to talk to you about taking her off blood pressure medication , she has been constipated lately she want to see if you can discuss this with her

## 2020-10-07 NOTE — TELEPHONE ENCOUNTER
Spoke with pt who c/o constipation since started on nifedipine in place of amlodipine 5/2019; this change was made to avoid an interaction between amlodipine and simvastatin.  I have D/C'd the nifedipine and erx'd in Plendil 5mg in it's place.  'Will  Have pt RTC x 2-3 weeks for BP check.  Izaiah, please call and schedule pt for a Nurse BP check in 2-3 weeks.  Marshal

## 2020-10-17 ENCOUNTER — TELEPHONE (OUTPATIENT)
Dept: INTERNAL MEDICINE | Facility: CLINIC | Age: 75
End: 2020-10-17

## 2020-10-17 ENCOUNTER — NURSE TRIAGE (OUTPATIENT)
Dept: ADMINISTRATIVE | Facility: CLINIC | Age: 75
End: 2020-10-17

## 2020-10-18 NOTE — TELEPHONE ENCOUNTER
Reason for Disposition   [1] Rectal pain or fullness from fecal impaction (rectum full of stool) AND [2] NOT better after SITZ bath, suppository or enema    Additional Information   Negative: [1] Abdomen pain is main symptom AND [2] male   Negative: [1] Abdomen pain is main symptom AND [2] adult female   Negative: Rectal bleeding or blood in stool is main symptom   Negative: Rectal pain or itching is main symptom   Negative: Constipation in a cancer patient who is currently (or recently) receiving chemotherapy or radiation therapy, or cancer patient who has metastatic or end-stage cancer and is receiving palliative care   Negative: Patient sounds very sick or weak to the triager   Negative: [1] Vomiting AND [2] abdomen looks much more swollen than usual   Negative: [1] Vomiting AND [2] contains bile (green color)   Negative: [1] Constant abdominal pain AND [2] present > 2 hours    Protocols used: CONSTIPATION-A-AH  Pt called re terribly constipated took MOM and stool softener 3 hours ago. last BM 2 days ago.  no abd pain. no N/V, no flatus. checked and BM across rectum. Rec rectal supp, rec miralax in prune juice. Eating reg diet. Urine blocked due to constipation. last uop 330pm. drinking fluids. drank 16.5 oz water. ate at 2pm ate fish and baked beans and potato salad.  Pt wants to use soap and water enema and or mag citrate? Pt states she was constipated about 2 weeks ago. pt eats bkft then soft foods at 2pm and occas a boost. pt states since last august with constipation. Spoke with dr raza BERUMEN. Pt on conference call with MD and triager. Pt states she takes miralax on and off usually qod, denies abd pain. Scope done in august. Passing flatus in past two days. Usually uses MOM, mag citrate, denies N/V. Denies hx SBO. No flatus now. No uop since 330pm. If no BM or uop by end of day rec ED. rec to use MOM tonight at bedtime,use miralax now with big glass of water, increase fluids. Call GI for follow  up next week. Pt states discomfort on L side some times feels like something floating. Hx hernia. Call back with questions.

## 2020-10-18 NOTE — TELEPHONE ENCOUNTER
On call nurse called with pt symptoms . Pt reports chronic  Constipation but feels worsening today   Spoke with pt no bm for 2 days on going constipation history  At time trouble passing gas and urinating but no abdominal pain currenlty no nausea or vomiting.   Today last 230 but feels she may go  Tried stool sofener and milk of magnesia  Feels she may go now but wondering what else she could take  She has not taken miralax advaised hydration  Take miralax now and per gi note resume more regularly discused  She should hydrate she can take additional mom  This evening if needed  But if no bm/ no urination tonight she was advised to go in for evalution  She will call to schedule gi follow up discussed next week as well

## 2020-10-19 NOTE — TELEPHONE ENCOUNTER
Spoke with pt yesterday 10/18 and recommended she increase Colace to 2 tabs BID.  She had BM after MOM dose taken this weekend, which I've recommended she not take on a regular basis.

## 2020-10-21 ENCOUNTER — TELEPHONE (OUTPATIENT)
Dept: INTERNAL MEDICINE | Facility: CLINIC | Age: 75
End: 2020-10-21

## 2020-10-21 ENCOUNTER — CLINICAL SUPPORT (OUTPATIENT)
Dept: INTERNAL MEDICINE | Facility: CLINIC | Age: 75
End: 2020-10-21
Payer: MEDICARE

## 2020-10-21 DIAGNOSIS — I10 ESSENTIAL HYPERTENSION: ICD-10-CM

## 2020-10-21 RX ORDER — HYDROCHLOROTHIAZIDE 12.5 MG/1
TABLET ORAL
Qty: 45 TABLET | Refills: 1 | Status: SHIPPED | OUTPATIENT
Start: 2020-10-21 | End: 2020-12-22

## 2020-10-21 RX ORDER — NIFEDIPINE 90 MG/1
90 TABLET, FILM COATED, EXTENDED RELEASE ORAL DAILY
Qty: 90 TABLET | Refills: 1 | Status: SHIPPED | OUTPATIENT
Start: 2020-10-21 | End: 2021-01-05 | Stop reason: CLARIF

## 2020-10-21 RX ORDER — HYDROCHLOROTHIAZIDE 12.5 MG/1
TABLET ORAL
Qty: 30 TABLET | Refills: 0 | Status: CANCELLED
Start: 2020-10-21

## 2020-10-21 NOTE — TELEPHONE ENCOUNTER
Patient came in for blood pressure check. Blood pressure reading 142/72 p66(manually). 149/77 p70(digital)

## 2020-10-21 NOTE — PROGRESS NOTES
Patient came in for blood pressure check. Blood pressure reading 142/72 p66(manually). 149/77 p70(digital) Dr Lyon notified of patient's b/p.

## 2020-10-21 NOTE — PROGRESS NOTES
Digital Medicine: Clinician Follow-Up    Called patient for follow up. Overall, she is doing well. She has had some issues with constipation and PCP stopped HCTZ and switched nifedipine to felodipine. Patient reports that this didn't make much of a difference and she thinks she the constipation controlled now. Patient reports taking carvedilol 6.25 mg and valsartan 320 mg at 6 am and reports taking nifedipine 90 mg and other carvedilol 6.25 mg dose around 12:00 pm. Patient reports that she went to PCP's office today for a BP check. Her BP taken on the clinic machine was 142/72 mmHg pulse 66 bpm and on the [facility] machine was 149/77 mmHg pulse 70 bpm.         The history is provided by the patient.      Review of patient's allergies indicates:   -- Tramadol -- Nausea And Vomiting  Follow-up reason(s): medication change follow-up and routine follow up.     Hypertension    Patient's blood pressure is stable.   Patient did make medication change.          Last 5 Patient Entered Readings                                      Current 30 Day Average: 151/75     Recent Readings 10/20/2020 10/19/2020 10/17/2020 10/15/2020 10/12/2020    SBP (mmHg) 148 141 182 145 162    DBP (mmHg) 73 78 83 74 80    Pulse 65 65 76 62 58                 Depression Screening  Did not address depression screening.    Sleep Apnea Screening    Did not address sleep apnea screening.     Medication Affordability Screening  Did not address medication affordability screening.     Medication Adherence-Medication adherence was asssessed.  Patient continue taking medication as prescribed.            ASSESSMENT(S)  Patients BP average is 151/75 mmHg, which is above goal. Patient's BP goal is less than or equal to 130/80.     BP taken on [facility] BP machine was comparable to BP taken on clinic machine.       Hypertension Plan  Continue current therapy. Recommended increasing carvedilol. Patient refuses medication canges at this time.   Provided patient  education.  Fiber, fluid, and exercise to help with constipation  Discussed long-term complications of chronic HTN and BP goal per ACC/ AHA 2017 guidelines.      Addressed patient questions and patient has my contact information if needed prior to next outreach. Patient verbalizes understanding.             There are no preventive care reminders to display for this patient.  There are no preventive care reminders to display for this patient.      Hypertension Medications     carvediloL (COREG) 6.25 MG tablet TAKE 1 TABLET BY MOUTH TWICE A DAY WITH FOOD    felodipine (PLENDIL) 5 MG 24 hr tablet Take 1 tablet (5 mg total) by mouth once daily. 1 tab daily for BP in place of nifedipine    hydroCHLOROthiazide (HYDRODIURIL) 12.5 MG Tab 1 tab weekly as needed for fluid/blood pressure >160/90    valsartan (DIOVAN) 320 MG tablet TAKE 1 TABLET (320 MG TOTAL) BY MOUTH ONCE DAILY.

## 2020-10-21 NOTE — TELEPHONE ENCOUNTER
BPs running a bit high with Digital cuff reading a bit higher.  'Have restarted HCTZ 12.5mg every other day.  Pt didn't tolerate the felodipine so she went back to nifedipine.  Izaiah, please schedule pt for a BP check in 2-3  weeks.  Thanks

## 2020-10-21 NOTE — TELEPHONE ENCOUNTER
Aristides Hair  I wanted to let you know I restarted pt's HCTZ 12.5 mg at every other day so at present she's taking: Nifedipine 90mg, Coreg 6.25mg BID, Diovan 320mg QD, and HCTZ 12.5 mg QOD. Thanks, Sandra Banks

## 2020-11-03 ENCOUNTER — PATIENT OUTREACH (OUTPATIENT)
Dept: OTHER | Facility: OTHER | Age: 75
End: 2020-11-03

## 2020-11-18 NOTE — PROGRESS NOTES
Digital Medicine: Health  Follow-Up    The history is provided by the patient.             Reason for review: Blood pressure not at goal        Topics Covered on Call: physical activity and Diet    Additional Follow-up details: Patient's blood pressure is trending down. Patient reports she is doing good.             Diet-Change      Additional diet details: Patient reports she recently meet with her PCP to speak about constipation about a month ago. Patient reports she is no longer eating after 6p. Patient is continuing to avoid red meats. Patient is also consuming fruits and attempting to increased vegetable intake. Patient is continuing to watch portions. Patient is pleased with dietary habits and feels great.     Physical Activity-Change      Additional physical activity details: Patient reports she is no longer walking in the park but has stayed active by walking in her neighborhood.       Medication Adherence-Medication adherence was assessed.      Substance, Sleep, Stress-Not assessed      Continue current diet/physical activity routine.       Addressed patient questions and patient has my contact information if needed prior to next outreach.   Explained the importance of self-monitoring and medication adherence. Encouraged the patient to communicate with their health  for lifestyle modifications to help improve or maintain a healthy lifestyle.               There are no preventive care reminders to display for this patient.      Last 5 Patient Entered Readings                                      Current 30 Day Average: 142/75     Recent Readings 11/17/2020 11/17/2020 11/12/2020 11/6/2020 11/5/2020    SBP (mmHg) 125 112 136 145 152    DBP (mmHg) 66 63 65 74 77    Pulse 63 59 61 64 62

## 2020-11-24 NOTE — PROGRESS NOTES
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  Complications: None.  Implants:   Implant Name Type Inv. Item Serial No.  Lot No. LRB No. Used Action   GENERATOR DBS SYSTEM INFINITY 5 IPG 6660ANS Neurology device GENERATOR DBS SYSTEM INFINITY 5 IPG 6660ANS [id number] [employer]  Left 1 Explanted   GENERATOR DBS SYSTEM INFINITY 5 IPG 6660ANS Neurology device GENERATOR DBS SYSTEM INFINITY 5 IPG 6660ANS [id number] [employer]  Left 1 Implanted         Skin: exofin and monocryl    Ron Magdaleno MD  Neurosurgery Resident, PGY-2    Please contact neurosurgery resident on call with questions.    Dial * * [phone], enter [phone] when prompted.    HPI     Concerns About Ocular Health    Additional comments: 6 month f/u on ocular hypertension.            Comments   Patient in for progress check.  Patient is in for a 6 month f/u with HVF f/u   Being followed for (diagnosis):  Ocular Hypertension     Date last seen:  11/13/2017    Doctor last seen:  Dr. Hamlin     Prescribed eye medications(s) using:  Timolol 0.5 gel forming 1 x a day OU       OTC eye medication(s) using:  No    Signs/symptoms of condition resolved/better/stable/worse?:   Stable     Allergies/Medications reviewed today?  Yes     Blood pressure 152/93  Pulse 66 BPM        Last edited by Tim Hamlin, OD on 5/22/2018  9:12 AM. (History)            Assessment /Plan     For exam results, see Encounter Report.    1. Ocular hypertension, bilateral  Schreiber Visual Field - OU - Extended - Both Eyes    Schreiber Visual Field - OU - Extended - Both Eyes   2. Other localized visual field defect, left eye  Schreiber Visual Field - OU - Extended - Both Eyes   3. Central retinal vein occlusion of left eye, unspecified complication status     4. Glaucoma suspect of both eyes     5. Nuclear sclerosis, bilateral     6. Cortical cataract of both eyes                    Nuclear sclerosis/peripheral cortical cataract in both eyes.    VA with glasses made to the last Rx issued appears comparable to the VA achieved with last refraction in each eye.   No change made to the last spectacle lens Rx issued.      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  Has been followed in retina clinic    Prior diagnosis of bilateral ocular hypertension.   Repeat HVF test done today.  Reviewed test results and discussed with Ms. Block.  Normal visual field in the right eye.  Non-specific visual field defect in the left eye, but note apparent enlargement of blind spot in the left eye, and surperior paracentral visual field defect in the superonasal quadrant of  visual field of the left eye.   Currently using Timolol Maleate 0.5% gel-forming ophthalmic solution in each eye every evening for IOP control.    Continue drops in both eyes as noted above.  Return for recheck of HVF test (24-2 LUKAS standard), and follow up visit with me (Dr. Hamlin) on the same date.  Suggest do left eye first on future HVF test.  Orders for future HVF test entered.

## 2020-12-07 ENCOUNTER — TELEPHONE (OUTPATIENT)
Dept: INTERNAL MEDICINE | Facility: CLINIC | Age: 75
End: 2020-12-07

## 2020-12-07 DIAGNOSIS — N81.10 FEMALE CYSTOCELE: Primary | ICD-10-CM

## 2020-12-07 NOTE — TELEPHONE ENCOUNTER
Mayank Blackwell calls with c/o vaginal protuberance that sounds like a bladder prolapse.  Would you please call her to schedule an appt in [facility].  Thanks

## 2020-12-08 NOTE — TELEPHONE ENCOUNTER
Appointments has been scheduled for urogynecology. Patient's appt date is 12/10/2020. Patient advised of appointment date and time.

## 2020-12-08 NOTE — TELEPHONE ENCOUNTER
----- Message from Melyssa Brenner sent at 12/8/2020  8:55 AM CST -----  Contact: [phone]  Pt would like call back -states nurse knows what it is regarding

## 2020-12-09 ENCOUNTER — PES CALL (OUTPATIENT)
Dept: ADMINISTRATIVE | Facility: CLINIC | Age: 75
End: 2020-12-09

## 2020-12-10 ENCOUNTER — INITIAL CONSULT (OUTPATIENT)
Dept: UROGYNECOLOGY | Facility: CLINIC | Age: 75
End: 2020-12-10
Payer: MEDICARE

## 2020-12-10 ENCOUNTER — TELEPHONE (OUTPATIENT)
Dept: UROGYNECOLOGY | Facility: CLINIC | Age: 75
End: 2020-12-10

## 2020-12-10 ENCOUNTER — TELEPHONE (OUTPATIENT)
Dept: INTERNAL MEDICINE | Facility: CLINIC | Age: 75
End: 2020-12-10

## 2020-12-10 VITALS
HEIGHT: 68 IN | DIASTOLIC BLOOD PRESSURE: 78 MMHG | WEIGHT: 191.81 LBS | SYSTOLIC BLOOD PRESSURE: 138 MMHG | BODY MASS INDEX: 29.07 KG/M2

## 2020-12-10 DIAGNOSIS — N99.3 PROLAPSE OF VAGINAL VAULT AFTER HYSTERECTOMY: Primary | ICD-10-CM

## 2020-12-10 DIAGNOSIS — Z01.818 PRE-OP TESTING: ICD-10-CM

## 2020-12-10 DIAGNOSIS — N81.10 FEMALE CYSTOCELE: ICD-10-CM

## 2020-12-10 PROCEDURE — 3075F PR MOST RECENT SYSTOLIC BLOOD PRESS GE 130-139MM HG: ICD-10-PCS | Mod: HCNC,CPTII,S$GLB, | Performed by: OBSTETRICS & GYNECOLOGY

## 2020-12-10 PROCEDURE — 99999 PR PBB SHADOW E&M-EST. PATIENT-LVL V: CPT | Mod: PBBFAC,HCNC,, | Performed by: OBSTETRICS & GYNECOLOGY

## 2020-12-10 PROCEDURE — 3078F PR MOST RECENT DIASTOLIC BLOOD PRESSURE < 80 MM HG: ICD-10-PCS | Mod: HCNC,CPTII,S$GLB, | Performed by: OBSTETRICS & GYNECOLOGY

## 2020-12-10 PROCEDURE — 3075F SYST BP GE 130 - 139MM HG: CPT | Mod: HCNC,CPTII,S$GLB, | Performed by: OBSTETRICS & GYNECOLOGY

## 2020-12-10 PROCEDURE — 3078F DIAST BP <80 MM HG: CPT | Mod: HCNC,CPTII,S$GLB, | Performed by: OBSTETRICS & GYNECOLOGY

## 2020-12-10 PROCEDURE — 99999 PR PBB SHADOW E&M-EST. PATIENT-LVL V: ICD-10-PCS | Mod: PBBFAC,HCNC,, | Performed by: OBSTETRICS & GYNECOLOGY

## 2020-12-10 PROCEDURE — 99204 OFFICE O/P NEW MOD 45 MIN: CPT | Mod: HCNC,S$GLB,, | Performed by: OBSTETRICS & GYNECOLOGY

## 2020-12-10 PROCEDURE — 99204 PR OFFICE/OUTPT VISIT, NEW, LEVL IV, 45-59 MIN: ICD-10-PCS | Mod: HCNC,S$GLB,, | Performed by: OBSTETRICS & GYNECOLOGY

## 2020-12-10 PROCEDURE — 1159F MED LIST DOCD IN RCRD: CPT | Mod: HCNC,S$GLB,, | Performed by: OBSTETRICS & GYNECOLOGY

## 2020-12-10 PROCEDURE — 1159F PR MEDICATION LIST DOCUMENTED IN MEDICAL RECORD: ICD-10-PCS | Mod: HCNC,S$GLB,, | Performed by: OBSTETRICS & GYNECOLOGY

## 2020-12-10 RX ORDER — ESTRADIOL 0.1 MG/G
1 CREAM VAGINAL
Qty: 1 TUBE | Refills: 11 | Status: SHIPPED | OUTPATIENT
Start: 2020-12-11 | End: 2021-12-13

## 2020-12-10 NOTE — PROGRESS NOTES
Subjective:      Patient ID: Elena Block is a 75 y.o. female.    Chief Complaint:  Consult and Vaginal Prolapse      History of Present Illness  75-year-old multipara history of subtotal hysterectomy via midline incision presents with pelvic organ prolapse that is protruding outside under road this is bothersome to the patient.  There is no pain  There is no leakage of urine  There is no difficulty with voiding as per patient  She would like options          Past Medical History:   Diagnosis Date    Back pain     Cataract     Central retinal vein occlusion of left eye     Degeneration of lumbar or lumbosacral intervertebral disc 2013    High cholesterol     Hypertension     Hypothyroidism     Impingement syndrome of left shoulder 2013    Obesity     Pneumonia     Posterior vitreous detachment of both eyes 2013    S/P partial hysterectomy 2013    Vitamin D insufficiency        Past Surgical History:   Procedure Laterality Date    COLONOSCOPY  2008    COLONOSCOPY N/A 2019    Procedure: COLONOSCOPY;  Surgeon: Allie Preston MD;  Location: [address];  Service: Endoscopy;  Laterality: N/A;    HYSTERECTOMY      Partial     PARTIAL HYSTERECTOMY      THYROIDECTOMY, PARTIAL      TONSILLECTOMY         GYN & OB History  No LMP recorded. Patient has had a hysterectomy.   Date of Last Pap: No result found    OB History    Para Term  AB Living   2 2 2         SAB TAB Ectopic Multiple Live Births                  # Outcome Date GA Lbr Henrique/2nd Weight Sex Delivery Anes PTL Lv   2 Term            1 Term                Health Maintenance       Date Due Completion Date    Shingles Vaccine (3 of 3) 2020    Mammogram 2021    Lipid Panel 2021    High Dose Statin 12/10/2021 12/10/2020    TETANUS VACCINE 10/01/2024 10/1/2014    Colorectal Cancer Screening 2029          Family History   Problem  Relation Age of Onset    Diabetes Mother     Glaucoma Mother     Heart disease Mother     Blindness Mother     Heart attack Mother     Glaucoma Brother     Stroke Brother     Liver disease Brother     Heart disease Brother     Arthritis Brother     Alcohol abuse Brother     Drug abuse Brother     Glaucoma Brother     Diabetes Brother     Pemphigus vulgaris Brother     Blindness Brother     No Known Problems Father     Hypertension Maternal Grandmother     Diabetes Maternal Grandmother     Blindness Maternal Grandmother     No Known Problems Paternal Grandmother     No Known Problems Paternal Grandfather     Heart disease Brother     Diabetes Brother         Bilateral Knee amputation    Heart disease Brother     Liver disease Brother     Gout Son     Heart disease Son 48        stent placement    Heart attack Son     Heart attacks under age 50 Son     No Known Problems Son     No Known Problems Maternal Grandfather     No Known Problems Maternal Aunt     No Known Problems Maternal Uncle     No Known Problems Paternal Aunt     No Known Problems Paternal Uncle     Amblyopia Neg Hx     Macular degeneration Neg Hx     Retinal detachment Neg Hx     Strabismus Neg Hx     Thyroid disease Neg Hx     Cataracts Neg Hx     Cancer Neg Hx        Social History     Socioeconomic History    Marital status:      Spouse name: Not on file    Number of children: Not on file    Years of education: Not on file    Highest education level: Not on file   Occupational History    Not on file   Social Needs    Financial resource strain: Not on file    Food insecurity     Worry: Not on file     Inability: Not on file    Transportation needs     Medical: Not on file     Non-medical: Not on file   Tobacco Use    Smoking status: Never Smoker    Smokeless tobacco: Never Used   Substance and Sexual Activity    Alcohol use: Yes     Alcohol/week: 1.0 standard drinks     Types: 1 Cans of beer  "per week     Comment: Occasionally; beer once a  week (socially)    Drug use: No    Sexual activity: Not Currently     Partners: Male     Comment: maybe once month   Lifestyle    Physical activity     Days per week: Not on file     Minutes per session: Not on file    Stress: Not on file   Relationships    Social connections     Talks on phone: Not on file     Gets together: Not on file     Attends Gnosticist service: Not on file     Active member of club or organization: Not on file     Attends meetings of clubs or organizations: Not on file     Relationship status: Not on file   Other Topics Concern    Not on file   Social History Narrative    Mrs Block is  with 2 grown sons and a # of grandkids       Review of Systems  Review of Systems  Bulge,   pressure     Objective:   /78   Ht 5' 8" (1.727 m)   Wt 87 kg (191 lb 12.8 oz)   BMI 29.16 kg/m²     Physical Exam   Genitourinary: Pelvic exam was performed with patient supine. Skenes normal. Right labia normal. Urethra exhibits no urethral caruncle, no urethral diverticulum, no urethral mass and no hypermobility. 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 Right adnexum displays no mass. Cervix exhibits absence. Uterus is absent.   POP-Q  Aa: 1 Ba: 1 C: -5   GH:  PB:  TVL:    Ap: -1 Bp:  D:                           Assessment:     1. Prolapse of vaginal vault after hysterectomy    2. Female cystocele    3. Pre-op testing            Plan:     1. Prolapse of vaginal vault after hysterectomy    2. Female cystocele    3. Pre-op testing      After examination had patient presented my office for we then reviewed my findings in great detail.  I started with the American urogynecology interactive web site we diagrammed her anatomy 1st but then I spent a significant amount of time discussing normal anatomy before introducing diagram from that point we then utilized a diagram or the web site to discuss different options from " pessary  Native tissue repair versus sacral colpopexy I utilized a power point presentation as well as the admission graphics within the web site to ensure the patient full comprehension understanding we reviewed each aspect of the after visit summary.    At of note she be needed more information we spent were time we spent on the patient with the patient she then discussed her constipation as well as voiding issues.  This was interesting we then talked in revisited each aspect of the presentation patient is review wants to move forward with sacral colpopexy near future total this visit 45 min greater than 50% time 30 min direct discussion reviewing aspects and options  Patient Instructions

## 2020-12-10 NOTE — TELEPHONE ENCOUNTER
----- Message from Severo Love sent at 12/10/2020 11:50 AM CST -----  PLEASE CALL PT SHE SAYS YOU CAN RESCHEDULE HER SURGERY TO  01/08 BUT SHE DO HAD A QUESTION  [phone]

## 2020-12-10 NOTE — LETTER
December 10, 2020      Sandra Lyon MD  [address]  [facility] [address]           [id number]   [address] [place] [id number]  Phone: [phone]          Patient: Elena Block   MR Number: [id number]   YOB: 1945   Date of Visit: 12/10/2020       Dear Dr. Sandra Lyon:    Thank you for referring Elena Block to me for evaluation. Attached you will find relevant portions of my assessment and plan of care.    If you have questions, please do not hesitate to call me. I look forward to following Elena Block along with you.    Sincerely,    Mauricio Corea MD    Enclosure  CC:  No Recipients    If you would like to receive this communication electronically, please contact [email or web address] or [phone] to request more information on [employer] Link access.    For providers and/or their staff who would like to refer a patient to [facility], please contact us through our one-stop-shop provider referral line, [facility], at [phone].    If you feel you have received this communication in error or would no longer like to receive these types of communications, please e-mail [email or web address]

## 2020-12-11 ENCOUNTER — TELEPHONE (OUTPATIENT)
Dept: INTERNAL MEDICINE | Facility: CLINIC | Age: 75
End: 2020-12-11

## 2020-12-11 DIAGNOSIS — Z01.818 PRE-OP EVALUATION: Primary | ICD-10-CM

## 2020-12-11 NOTE — TELEPHONE ENCOUNTER
----- Message from Judy Fu sent at 12/11/2020  9:49 AM CST -----  Contact: self/[phone]  Who left a message for the patient: MA  Does patient know what this is regarding:    Caller is requesting an earlier appt than we can schedule.  Caller declined first available appointment listed below. Caller will not accept being placed on the wait list and is requesting a message be sent to the provider.   When is the next available appointment: 05/20/2021  Reason for the appointment: Preop for GN procedure 01/08/2021  Patient preference of timeframe to be scheduled:   Comments:       Please advise

## 2020-12-11 NOTE — TELEPHONE ENCOUNTER
Izaiah, Would you please schedule Mrs Block to come in for a Pre-op clearance 12/22 at 2PM with a few day prior lab and EKG.  Thanks

## 2020-12-14 ENCOUNTER — PROCEDURE VISIT (OUTPATIENT)
Dept: UROGYNECOLOGY | Facility: CLINIC | Age: 75
End: 2020-12-14
Payer: MEDICARE

## 2020-12-14 ENCOUNTER — TELEPHONE (OUTPATIENT)
Dept: INTERNAL MEDICINE | Facility: CLINIC | Age: 75
End: 2020-12-14

## 2020-12-14 VITALS
SYSTOLIC BLOOD PRESSURE: 132 MMHG | BODY MASS INDEX: 29.37 KG/M2 | WEIGHT: 193.81 LBS | DIASTOLIC BLOOD PRESSURE: 78 MMHG | HEIGHT: 68 IN

## 2020-12-14 DIAGNOSIS — N99.3 PROLAPSE OF VAGINAL VAULT AFTER HYSTERECTOMY: ICD-10-CM

## 2020-12-14 DIAGNOSIS — N81.10 FEMALE CYSTOCELE: ICD-10-CM

## 2020-12-14 LAB
BILIRUB SERPL-MCNC: NORMAL MG/DL
BLOOD URINE, POC: NORMAL
CLARITY, POC UA: CLEAR
COLOR, POC UA: YELLOW
GLUCOSE UR QL STRIP: NORMAL
KETONES UR QL STRIP: NORMAL
LEUKOCYTE ESTERASE URINE, POC: NORMAL
NITRITE, POC UA: NORMAL
PH, POC UA: 6
PROTEIN, POC: NORMAL
SPECIFIC GRAVITY, POC UA: 1
UROBILINOGEN, POC UA: NORMAL

## 2020-12-14 PROCEDURE — 51728 PR COMPLEX CYSTOMETROGRAM VOIDING PRESSURE STUDIES: ICD-10-PCS | Mod: HCNC,S$GLB,, | Performed by: OBSTETRICS & GYNECOLOGY

## 2020-12-14 PROCEDURE — 99214 OFFICE O/P EST MOD 30 MIN: CPT | Mod: 25,HCNC,S$GLB, | Performed by: OBSTETRICS & GYNECOLOGY

## 2020-12-14 PROCEDURE — 81002 POCT URINE DIPSTICK WITHOUT MICROSCOPE: ICD-10-PCS | Mod: HCNC,S$GLB,, | Performed by: OBSTETRICS & GYNECOLOGY

## 2020-12-14 PROCEDURE — 51728 CYSTOMETROGRAM W/VP: CPT | Mod: HCNC,S$GLB,, | Performed by: OBSTETRICS & GYNECOLOGY

## 2020-12-14 PROCEDURE — 81002 URINALYSIS NONAUTO W/O SCOPE: CPT | Mod: HCNC,S$GLB,, | Performed by: OBSTETRICS & GYNECOLOGY

## 2020-12-14 PROCEDURE — 52000 PR CYSTOURETHROSCOPY: ICD-10-PCS | Mod: 59,HCNC,S$GLB, | Performed by: OBSTETRICS & GYNECOLOGY

## 2020-12-14 PROCEDURE — 51741 ELECTRO-UROFLOWMETRY FIRST: CPT | Mod: 51,HCNC,S$GLB, | Performed by: OBSTETRICS & GYNECOLOGY

## 2020-12-14 PROCEDURE — 99214 PR OFFICE/OUTPT VISIT, EST, LEVL IV, 30-39 MIN: ICD-10-PCS | Mod: 25,HCNC,S$GLB, | Performed by: OBSTETRICS & GYNECOLOGY

## 2020-12-14 PROCEDURE — 52000 CYSTOURETHROSCOPY: CPT | Mod: 59,HCNC,S$GLB, | Performed by: OBSTETRICS & GYNECOLOGY

## 2020-12-14 PROCEDURE — 51741 PR UROFLOWMETRY, COMPLEX: ICD-10-PCS | Mod: 51,HCNC,S$GLB, | Performed by: OBSTETRICS & GYNECOLOGY

## 2020-12-14 PROCEDURE — 51797 INTRAABDOMINAL PRESSURE TEST: CPT | Mod: HCNC,S$GLB,, | Performed by: OBSTETRICS & GYNECOLOGY

## 2020-12-14 PROCEDURE — 51784 PR ANAL/URINARY MUSCLE STUDY: ICD-10-PCS | Mod: 51,HCNC,S$GLB, | Performed by: OBSTETRICS & GYNECOLOGY

## 2020-12-14 PROCEDURE — 51797 PR VOIDING PRESS STUDY INTRA-ABDOMINAL VOID: ICD-10-PCS | Mod: HCNC,S$GLB,, | Performed by: OBSTETRICS & GYNECOLOGY

## 2020-12-14 PROCEDURE — 51784 ANAL/URINARY MUSCLE STUDY: CPT | Mod: 51,HCNC,S$GLB, | Performed by: OBSTETRICS & GYNECOLOGY

## 2020-12-14 RX ORDER — SULFAMETHOXAZOLE AND TRIMETHOPRIM 800; 160 MG/1; MG/1
1 TABLET ORAL
Status: COMPLETED | OUTPATIENT
Start: 2020-12-14 | End: 2020-12-14

## 2020-12-14 RX ORDER — LIDOCAINE HYDROCHLORIDE 20 MG/ML
JELLY TOPICAL ONCE
Status: COMPLETED | OUTPATIENT
Start: 2020-12-14 | End: 2020-12-14

## 2020-12-14 RX ORDER — ASPIRIN 81 MG/1
81 TABLET ORAL DAILY
COMMUNITY
End: 2021-01-05 | Stop reason: CLARIF

## 2020-12-14 RX ADMIN — LIDOCAINE HYDROCHLORIDE 5 ML: 20 JELLY TOPICAL at 04:12

## 2020-12-14 RX ADMIN — SULFAMETHOXAZOLE AND TRIMETHOPRIM 1 TABLET: 800; 160 TABLET ORAL at 04:12

## 2020-12-14 NOTE — TELEPHONE ENCOUNTER
Rosalva, please let pt know Izaiah will be calling her tomorrow to be scheduled next week with me.  thanks

## 2020-12-14 NOTE — PROCEDURES
Procedures         SURGEONS NARRATIVE:  A time out was performed in which the patient identity and procedure were confirmed.  Urodynamic evaluation was performed using a computerized system ([employer]  Uroflowmetry was performed on the patient in the sitting position without catheters in place.  Subsequent urodynamic testing was performed with the patient in the lithotomy position at 45 degrees. Air charged catheters were used with sterile water as the infusion medium. Vesical and abdominal (rectal) pressures were measured, and detrusor pressure was calculated. EMG activity was recorded with surface electrodes. During filling, room temperature sterile water was infused at a rate of 30 cubic centimeters per minute. The patient was asked cough after instillation of each 100cc volume. Two Valsalva leak point pressures and two cough leak point pressures were performed with the catheters in place at 300 cubic centimeters and again at maximum capacity. Valsalva leak point pressure was defined as the difference between vesical pressure at which leakage was noted (visualized at the external urethral meatus) and the baseline vesical pressure. Following urodynamic testing, a pressure flow study was performed with the patient in the sitting position. Vesical and abdominal pressures were monitored and detrusor pressures were calculated. After the pressure flow study, the catheters were then removed. The patient tolerated the procedure well.     Urine dipstick: .    1.  VOIDING PHASE:      a.  Uroflowmetry:   Prolapse reduction: No   Voided volume:  18.90 mL    Voiding time:   7.9 seconds   Max flow:  3.70 mL/s   Avg flow:   2.30 mL/s    PVR:   450 mL    The overall configuration of this uroflow study was  profound voiding dysfunction as expected.  This is a patient with stage III anterior compartment defect along with apically involvement with profound voiding dysfunction patient states she has no  incontinence nor voiding dysfunction however is predicted basedon prolaspe    b.  Pressure flow:   Prolapse reduction: No   Voided volume:   303 mL   Voiding time:   2:03 seconds   Peak flow:  10.90 mL/s    Avg flow:  3.0 mL/s   Max det pressure:  26.30  cm H20   Det pressure at max flow: 15.30 cm H20   Void initiated by  valsalva.     Urethral relaxation (EMG):  Yes seen there is an increase in the amount of activity right where the flow is so this is definitely dyssynergy void   PVR (calculated):  77 patient then went on to void another 350 cc mL    The overall configuration of this pressure flow study was prolonged.  It is also intermittent with 2 distinct P areas which are significantly attenuated and prolonged this is really Valsalva voiding    2.  FILLING PHASE:   1st desire: 260 mL   Normal desire:  265 mL   Strong desire:  281 mL   Urgency:  380 mL   Compliance (calculated)  compliant mL/cm H20   EMG activity during filling:   Elevated   Detrusor contractions observed: No.  There was no terminal detrusor contractions were spontaneous detrusor contractions during the filling phase after we give her 1st void there was detrusor contractility    3.  URETHRAL FUNCTION/STORAGE PHASE:    a.  WITHOUT prolapse reduction:   The at no point was there any leakage of urine       These findings are consistent with negative LENIN.  There was significant voiding dysfunction     Assessment:  Advanced prolapse with significant voiding dysfunction no LENIN seen during UDS.    Plan:    To cystoscopy      Title of Operation:   Cystourethroscopy.     INDICATIONS:  Advanced pelvic organ prolapse    PREOPERATIVE DIAGNOSIS  Advance of pelvic organ prolapse with voiding dysfunction    POSTOPERATIVE DIAGNOSIS:   Same    Anesthesia:   2% Xylocaine gel.    Specimen (Bacteriological, Pathological or other):   None.     Prosthetic Device/Implant:   None.     Surgeons Narrative:     After informed consent was obtained, the  patient was placed in the lithotomy position. The urethral meatus was prepped with Betadine and 10 cubic centimeters of 2% Xylocaine gel were introduced into the urethra. A flexible cystourethroscope was introduced into the bladder. The bladder was distended with approximately 300 cubic centimeters of sterile water. A systematic survey was performed in which the bladder was surveyed using multiple sequential passes in a clockwise fashion from the bladder dome to the bladder base to the urethrovesical junction. The trigone and ureteral orifices were observed. The scope was then flipped back on itself, and the urethrovesical junction was viewed. A vaginal examining finger was then placed with pressure suburethrally at the urethrovesical junction as the telescope was withdrawn in order to perform positive pressure urethroscopy.  Standard maneuvers of cough, squeeze and Valsalva were performed. The telescope was then completely withdrawn.     Findings: Urethroscopy:  Normal.  Cystoscopy:  Normal bladder mucosa, bilateral ureteral flow was noted.      Assessment: Essentially normal cystourethroscopy.      Plan:     After the carrying out urodynamics and cystoscopy I had patient patient has been present my office.  We then proceeded to review all aspects of the case.  I started with the American urogynecology interactive web site.  I diagrammed her anatomy in comparison to normal anatomy we then talked about options  We then talked about her profound voiding dysfunction and revisited conservative therapy with pessary she seeing the an admission with contained within the web site.  We then talked about different surgical approaches.  Coital capacity has to be retained.  Additionally patient wishes for the strong this type of repair with minimal chance of recurrence.  Patient noted understanding when I discussed native tissue repair versus sacral colpopexy.  Were going to be doing this surgery on January 8th    After review  of all aspects as stated patient felt that these we went ahead and obtain proper consent and I reviewed the surgical packet with her.  In addition to carrying out urodynamics and cysto another 40 min of time spent direct direct discussion with the patient patient has been

## 2020-12-15 ENCOUNTER — TELEPHONE (OUTPATIENT)
Dept: INTERNAL MEDICINE | Facility: CLINIC | Age: 75
End: 2020-12-15

## 2020-12-16 ENCOUNTER — TELEPHONE (OUTPATIENT)
Dept: INTERNAL MEDICINE | Facility: CLINIC | Age: 75
End: 2020-12-16

## 2020-12-17 ENCOUNTER — HOSPITAL ENCOUNTER (OUTPATIENT)
Dept: CARDIOLOGY | Facility: CLINIC | Age: 75
Discharge: HOME | End: 2020-12-17
Payer: MEDICARE

## 2020-12-17 DIAGNOSIS — Z01.818 PRE-OP EVALUATION: ICD-10-CM

## 2020-12-17 PROCEDURE — 93005 EKG 12-LEAD: ICD-10-PCS | Mod: HCNC,S$GLB,, | Performed by: INTERNAL MEDICINE

## 2020-12-17 PROCEDURE — 93010 EKG 12-LEAD: ICD-10-PCS | Mod: HCNC,S$GLB,, | Performed by: INTERNAL MEDICINE

## 2020-12-17 PROCEDURE — 93005 ELECTROCARDIOGRAM TRACING: CPT | Mod: HCNC,S$GLB,, | Performed by: INTERNAL MEDICINE

## 2020-12-17 PROCEDURE — 93010 ELECTROCARDIOGRAM REPORT: CPT | Mod: HCNC,S$GLB,, | Performed by: INTERNAL MEDICINE

## 2020-12-22 ENCOUNTER — HOSPITAL ENCOUNTER (OUTPATIENT)
Dept: RADIOLOGY | Facility: HOSPITAL | Age: 75
Discharge: HOME | End: 2020-12-22
Attending: INTERNAL MEDICINE
Payer: MEDICARE

## 2020-12-22 ENCOUNTER — OFFICE VISIT (OUTPATIENT)
Dept: INTERNAL MEDICINE | Facility: CLINIC | Age: 75
End: 2020-12-22
Payer: MEDICARE

## 2020-12-22 DIAGNOSIS — Z01.818 PRE-OP EVALUATION: ICD-10-CM

## 2020-12-22 DIAGNOSIS — K59.00 CONSTIPATION, UNSPECIFIED CONSTIPATION TYPE: ICD-10-CM

## 2020-12-22 DIAGNOSIS — I10 ESSENTIAL HYPERTENSION: ICD-10-CM

## 2020-12-22 DIAGNOSIS — Z01.818 PRE-OP EVALUATION: Primary | ICD-10-CM

## 2020-12-22 DIAGNOSIS — K58.9 IRRITABLE BOWEL SYNDROME, UNSPECIFIED TYPE: ICD-10-CM

## 2020-12-22 PROCEDURE — 99499 RISK ADDL DX/OHS AUDIT: ICD-10-PCS | Mod: HCNC,S$GLB,, | Performed by: INTERNAL MEDICINE

## 2020-12-22 PROCEDURE — 1126F AMNT PAIN NOTED NONE PRSNT: CPT | Mod: HCNC,S$GLB,, | Performed by: INTERNAL MEDICINE

## 2020-12-22 PROCEDURE — 1159F MED LIST DOCD IN RCRD: CPT | Mod: HCNC,S$GLB,, | Performed by: INTERNAL MEDICINE

## 2020-12-22 PROCEDURE — 3077F SYST BP >= 140 MM HG: CPT | Mod: HCNC,CPTII,S$GLB, | Performed by: INTERNAL MEDICINE

## 2020-12-22 PROCEDURE — 3078F PR MOST RECENT DIASTOLIC BLOOD PRESSURE < 80 MM HG: ICD-10-PCS | Mod: HCNC,CPTII,S$GLB, | Performed by: INTERNAL MEDICINE

## 2020-12-22 PROCEDURE — 99499 UNLISTED E&M SERVICE: CPT | Mod: HCNC,S$GLB,, | Performed by: INTERNAL MEDICINE

## 2020-12-22 PROCEDURE — 99999 PR PBB SHADOW E&M-EST. PATIENT-LVL V: ICD-10-PCS | Mod: PBBFAC,HCNC,, | Performed by: INTERNAL MEDICINE

## 2020-12-22 PROCEDURE — 71046 X-RAY EXAM CHEST 2 VIEWS: CPT | Mod: TC,HCNC

## 2020-12-22 PROCEDURE — 1159F PR MEDICATION LIST DOCUMENTED IN MEDICAL RECORD: ICD-10-PCS | Mod: HCNC,S$GLB,, | Performed by: INTERNAL MEDICINE

## 2020-12-22 PROCEDURE — 3077F PR MOST RECENT SYSTOLIC BLOOD PRESSURE >= 140 MM HG: ICD-10-PCS | Mod: HCNC,CPTII,S$GLB, | Performed by: INTERNAL MEDICINE

## 2020-12-22 PROCEDURE — 99214 OFFICE O/P EST MOD 30 MIN: CPT | Mod: HCNC,S$GLB,, | Performed by: INTERNAL MEDICINE

## 2020-12-22 PROCEDURE — 3288F FALL RISK ASSESSMENT DOCD: CPT | Mod: HCNC,CPTII,S$GLB, | Performed by: INTERNAL MEDICINE

## 2020-12-22 PROCEDURE — 1101F PR PT FALLS ASSESS DOC 0-1 FALLS W/OUT INJ PAST YR: ICD-10-PCS | Mod: HCNC,CPTII,S$GLB, | Performed by: INTERNAL MEDICINE

## 2020-12-22 PROCEDURE — 71046 X-RAY EXAM CHEST 2 VIEWS: CPT | Mod: 26,HCNC,, | Performed by: RADIOLOGY

## 2020-12-22 PROCEDURE — 3288F PR FALLS RISK ASSESSMENT DOCUMENTED: ICD-10-PCS | Mod: HCNC,CPTII,S$GLB, | Performed by: INTERNAL MEDICINE

## 2020-12-22 PROCEDURE — 99214 PR OFFICE/OUTPT VISIT, EST, LEVL IV, 30-39 MIN: ICD-10-PCS | Mod: HCNC,S$GLB,, | Performed by: INTERNAL MEDICINE

## 2020-12-22 PROCEDURE — 1101F PT FALLS ASSESS-DOCD LE1/YR: CPT | Mod: HCNC,CPTII,S$GLB, | Performed by: INTERNAL MEDICINE

## 2020-12-22 PROCEDURE — 3078F DIAST BP <80 MM HG: CPT | Mod: HCNC,CPTII,S$GLB, | Performed by: INTERNAL MEDICINE

## 2020-12-22 PROCEDURE — 1126F PR PAIN SEVERITY QUANTIFIED, NO PAIN PRESENT: ICD-10-PCS | Mod: HCNC,S$GLB,, | Performed by: INTERNAL MEDICINE

## 2020-12-22 PROCEDURE — 3008F PR BODY MASS INDEX (BMI) DOCUMENTED: ICD-10-PCS | Mod: HCNC,CPTII,S$GLB, | Performed by: INTERNAL MEDICINE

## 2020-12-22 PROCEDURE — 71046 XR CHEST PA AND LATERAL: ICD-10-PCS | Mod: 26,HCNC,, | Performed by: RADIOLOGY

## 2020-12-22 PROCEDURE — 99999 PR PBB SHADOW E&M-EST. PATIENT-LVL V: CPT | Mod: PBBFAC,HCNC,, | Performed by: INTERNAL MEDICINE

## 2020-12-22 PROCEDURE — 3008F BODY MASS INDEX DOCD: CPT | Mod: HCNC,CPTII,S$GLB, | Performed by: INTERNAL MEDICINE

## 2020-12-22 RX ORDER — HYDROCHLOROTHIAZIDE 12.5 MG/1
TABLET ORAL
Qty: 45 TABLET | Refills: 1 | Status: SHIPPED | OUTPATIENT
Start: 2020-12-22 | End: 2021-03-12 | Stop reason: SDUPTHER

## 2020-12-22 NOTE — PATIENT INSTRUCTIONS
For Blood pressure:  #1-Take HCTZ 12.5 ng daily until surgery  #2- After Surgery, decrease HCTZ to 1 tab every other day      For Abdomen pain/IBS(irritable bowel syndrome):  #1-Probiotics daily: Cultural or Ultimate Jeannie 10-15 billion count daily

## 2020-12-22 NOTE — PROGRESS NOTES
Subjective:       Patient ID: Elena Block is a 75 y.o. female.    Chief Complaint:   Follow-up, Hypertension, Hyperlipidemia, and Pre-op Exam    HPI: Mrs Block presents today for pre-op clearance and follow up the above  She is scheduled for sacral colpopexy to repair vaginal prolapse 1/8/21 under general anesthesia per Dr Corea  HTN: BPs running a little high: 174/85, 139/67, 161/74            Medications: 1-Coreg 6.25 BID, 2-HCTZ 12.5 QD now(had stopped it due to bladder incontinence),            3- Nifedipine XL 90 QD, 4-Diovan 320 QD   Constipation: Better on Miralax/Colace about  2x/week    Past Medical, Surgical, Social History: Please see as stated in Epic chart which has been reviewed.    Current Outpatient Medications   Medication Sig Dispense Refill    carvediloL (COREG) 6.25 MG tablet TAKE 1 TABLET BY MOUTH TWICE A DAY WITH FOOD 180 tablet 1    docusate sodium (COLACE) 100 MG capsule 1-4 caps daily; gradually taper up over 2 weeks 60 capsule prn    estradioL (ESTRACE) 0.01 % (0.1 mg/gram) vaginal cream Place 1 g vaginally every Mon, Wed, Fri. 1 Tube 11    FOLBEE 2.5-25-1 mg Tab TAKE 1 TABLET BY MOUTH EVERY DAY 90 tablet 3    hydroCHLOROthiazide (HYDRODIURIL) 12.5 MG Tab 1 tab every day 45 tablet 1    levothyroxine (SYNTHROID) 100 MCG tablet TAKE ONE TABLET BY MOUTH BEFORE BREAKFAST FOR THYROID 90 tablet 2    NIFEdipine (PROCARDIA-XL) 90 MG (OSM) 24 hr tablet TAKE 1 TABLET (90 MG TOTAL) BY MOUTH ONCE DAILY. 90 tablet 1    polyethylene glycol (GLYCOLAX) 17 gram/dose powder 1 cap in 4-6 oz liquid : Take 2x/day x 3 days then go to once daily thereafter for constipation 289 g 6    simvastatin (ZOCOR) 40 MG tablet TAKE 1 TABLET BY MOUTH EVERY EVENING FOR CHOLESTEROL 90 tablet 2    timolol maleate 0.5% (TIMOPTIC-XE) 0.5 % SolG INSTILL 1 DROP INTO BOTH EYES ONCE DAILY 10 mL 3    valsartan (DIOVAN) 320 MG tablet TAKE 1 TABLET (320 MG TOTAL) BY MOUTH ONCE DAILY. 90 tablet 3    aspirin  (ECOTRIN) 81 MG EC tablet Take 81 mg by mouth once daily.      ergocalciferol (VITAMIN D2) 50,000 unit Cap Take 1 capsule (50,000 Units total) by mouth every 7 days. (Patient not taking: Reported on 12/22/2020) 12 capsule 3    meloxicam (MOBIC) 15 MG tablet TAKE 1 TABLET BY MOUTH ONCE DAILY WITH FOOD AS NEEDED FOR JOINT PAIN (Patient not taking: Reported on 12/14/2020) 30 tablet 0    multivitamin (THERAGRAN) per tablet Take 1 tablet by mouth once daily.      NIFEdipine (ADALAT CC) 90 MG TbSR Take 1 tablet (90 mg total) by mouth once daily. 90 tablet 1     No current facility-administered medications for this visit.        Review of Systems   Constitutional: Negative for fever.   HENT: Negative.    Respiratory: Negative.  Negative for cough, chest tightness and shortness of breath.    Cardiovascular: Negative for chest pain and leg swelling.   Gastrointestinal: Positive for abdominal distention and constipation. Negative for blood in stool.        +IBS Sx/constipation-chronic   Genitourinary: Positive for difficulty urinating and urgency.        + Urinary urgency/vaginal incontinence   Musculoskeletal: Negative.    Neurological: Positive for headaches. Negative for dizziness, tremors, syncope, weakness and numbness.   Psychiatric/Behavioral: Negative.        Objective:      Lab Results   Component Value Date    WBC 5.07 12/17/2020    HGB 13.7 12/17/2020    HCT 42.7 12/17/2020     12/17/2020    CHOL 145 09/18/2020    TRIG 103 09/18/2020    HDL 47 09/18/2020    ALT 14 12/17/2020    AST 24 12/17/2020     12/17/2020    K 4.2 12/17/2020     12/17/2020    CREATININE 0.9 12/17/2020    BUN 12 12/17/2020    CO2 25 12/17/2020    TSH 1.781 09/18/2020    HGBA1C 5.3 05/19/2006     Physical Exam  Vitals signs reviewed.   Constitutional:       Appearance: Normal appearance.   HENT:      Head: Normocephalic and atraumatic.      Mouth/Throat:      Mouth: Mucous membranes are dry.      Pharynx: No posterior  "oropharyngeal erythema.   Neck:      Musculoskeletal: Normal range of motion and neck supple. No muscular tenderness.   Cardiovascular:      Rate and Rhythm: Normal rate and regular rhythm.   Pulmonary:      Effort: Pulmonary effort is normal.      Breath sounds: Normal breath sounds. No wheezing.   Chest:      Chest wall: No tenderness.   Abdominal:      General: Abdomen is flat. Bowel sounds are normal.      Palpations: Abdomen is soft. There is no mass.      Tenderness: There is no abdominal tenderness.   Musculoskeletal:         General: No swelling.      Right lower leg: No edema.      Left lower leg: No edema.   Lymphadenopathy:      Cervical: No cervical adenopathy.   Skin:     General: Skin is warm and dry.   Neurological:      General: No focal deficit present.      Mental Status: She is alert.   Psychiatric:         Mood and Affect: Mood normal.           Vital Signs  Pulse: 72  SpO2: 97 %  BP: (!) 142/70  Pain Score: 0-No pain  Height and Weight  Height: 5' 8" (172.7 cm)  Weight: 85.5 kg (188 lb 7.9 oz)  BSA (Calculated - sq m): 2.03 sq meters  BMI (Calculated): 28.7  Weight in (lb) to have BMI = 25: 164.1]    Patient undergoing non-emergent non-cardiac surgery.  Patient with no active cardiac problems (Patient does not have unstable angina, decompensated heart failure, significant uncontrolled tachy or samm arrhythmias or severe valvular heart disease).  Patient undergoing intermediate risk surgery.  Functional Status: Patient has functional METs >4  Revised cardiac risk index (RCRI) score is 0 which corresponds to 0.4% KATE risk.  (1 point each for any of the following conditions: Ischemic Heart Disease/CAD, Cerebrovascular Disease, Compensated congestive heart failure, Diabetes requiring insulin therapy, Serum creatinine > 2)     Pre-Operative Risk:  Intraperitoneal, Intrathoracic, or Suprainguinal Vascular Surgery: no  History of MI, Positive stress test, Nitrate use, Q Waves, or Current Angina: " no  History or manifestations of Heart Failure: no  History of TIA or CVA: no  Pre-operative treatment with insulin: no  Pre-operative creatinine >2 mg/dL: no    ?No risk factors - 0.4 percent   ?One risk factor - 1.0 percent   ?Two risk factors - 2.4 percent   ?Three or more risk factors - 5.4 percent     RCRI:   Patient going for intermediate risk surgery.  No absolute contraindications to the proposed surgery at this time.      Assessment:       1. Pre-op evaluation    2. Essential hypertension    3. Irritable bowel syndrome, unspecified type    4. Constipation, unspecified constipation type        Plan:     Health Maintenance   Topic Date Due    Mammogram  06/26/2021    Lipid Panel  09/18/2021    High Dose Statin  12/22/2021    TETANUS VACCINE  10/01/2024    Hepatitis C Screening  Completed    Pneumococcal Vaccine (65+ Low/Medium Risk)  Completed    DEXA SCAN  Discontinued        Elena was seen today for follow-up, hypertension, hyperlipidemia and pre-op exam.    Diagnoses and all orders for this visit:    Pre-op evaluation pending Sacral colpopexy 1/8 per Dr Adhikari  -     X-Ray Chest PA And Lateral; Future    Essential hypertension/acceptable   -     hydroCHLOROthiazide (HYDRODIURIL) 12.5 MG Tab; 1 tab every day  -     Continue Nifedipine XL 90mg QD, Diovan 320mg QD, and Coreg 6.25mg BID    Irritable bowel syndrome, unspecified type        -      Pt to start DAILY Probiotics (Cultural or Ultimate Jeannie)    Constipation, unspecified constipation type        -      Pt to continue Miralax/Colace daily    Health Maintenance        -      RTC as scheduled or see pt sooner prn.

## 2020-12-26 VITALS
WEIGHT: 188.5 LBS | DIASTOLIC BLOOD PRESSURE: 70 MMHG | HEIGHT: 68 IN | OXYGEN SATURATION: 97 % | HEART RATE: 72 BPM | SYSTOLIC BLOOD PRESSURE: 120 MMHG | BODY MASS INDEX: 28.57 KG/M2

## 2021-01-05 ENCOUNTER — HOSPITAL ENCOUNTER (OUTPATIENT)
Dept: PREADMISSION TESTING | Facility: OTHER | Age: 76
Discharge: HOME | End: 2021-01-05
Attending: OBSTETRICS & GYNECOLOGY
Payer: MEDICARE

## 2021-01-05 ENCOUNTER — ANESTHESIA EVENT (OUTPATIENT)
Dept: SURGERY | Facility: OTHER | Age: 76
End: 2021-01-05
Payer: MEDICARE

## 2021-01-05 ENCOUNTER — HOSPITAL ENCOUNTER (OUTPATIENT)
Dept: PREADMISSION TESTING | Facility: OTHER | Age: 76
Discharge: HOME | End: 2021-01-05
Payer: MEDICARE

## 2021-01-05 VITALS
HEART RATE: 62 BPM | WEIGHT: 187 LBS | SYSTOLIC BLOOD PRESSURE: 117 MMHG | TEMPERATURE: 97 F | DIASTOLIC BLOOD PRESSURE: 58 MMHG | HEIGHT: 68 IN | OXYGEN SATURATION: 99 % | BODY MASS INDEX: 28.34 KG/M2

## 2021-01-05 DIAGNOSIS — Z01.818 PRE-OP TESTING: ICD-10-CM

## 2021-01-05 PROCEDURE — U0003 INFECTIOUS AGENT DETECTION BY NUCLEIC ACID (DNA OR RNA); SEVERE ACUTE RESPIRATORY SYNDROME CORONAVIRUS 2 (SARS-COV-2) (CORONAVIRUS DISEASE [COVID-19]), AMPLIFIED PROBE TECHNIQUE, MAKING USE OF HIGH THROUGHPUT TECHNOLOGIES AS DESCRIBED BY CMS-2020-01-R: HCPCS | Mod: HCNC

## 2021-01-05 RX ORDER — SODIUM CHLORIDE, SODIUM LACTATE, POTASSIUM CHLORIDE, CALCIUM CHLORIDE 600; 310; 30; 20 MG/100ML; MG/100ML; MG/100ML; MG/100ML
INJECTION, SOLUTION INTRAVENOUS CONTINUOUS
Status: CANCELLED | OUTPATIENT
Start: 2021-01-05

## 2021-01-06 DIAGNOSIS — Z29.9 PREVENTIVE MEASURE: ICD-10-CM

## 2021-01-06 LAB — SARS-COV-2 RNA RESP QL NAA+PROBE: NOT DETECTED

## 2021-01-06 RX ORDER — CYANOCOBALAMIN/FOLIC AC/VIT B6 1-2.5-25MG
1 TABLET ORAL DAILY
Qty: 90 TABLET | Refills: 3 | Status: SHIPPED | OUTPATIENT
Start: 2021-01-06 | End: 2022-01-12

## 2021-01-07 ENCOUNTER — TELEPHONE (OUTPATIENT)
Dept: UROGYNECOLOGY | Facility: CLINIC | Age: 76
End: 2021-01-07

## 2021-01-07 DIAGNOSIS — N99.3 PROLAPSE OF VAGINAL VAULT AFTER HYSTERECTOMY: Primary | ICD-10-CM

## 2021-01-07 RX ORDER — LIDOCAINE HYDROCHLORIDE 20 MG/ML
JELLY TOPICAL ONCE
Status: CANCELLED | OUTPATIENT
Start: 2021-01-07 | End: 2021-01-07

## 2021-01-08 ENCOUNTER — ANESTHESIA (OUTPATIENT)
Dept: SURGERY | Facility: OTHER | Age: 76
End: 2021-01-08
Payer: MEDICARE

## 2021-01-08 ENCOUNTER — HOSPITAL ENCOUNTER (OUTPATIENT)
Facility: OTHER | Age: 76
Discharge: HOME | End: 2021-01-09
Attending: OBSTETRICS & GYNECOLOGY | Admitting: OBSTETRICS & GYNECOLOGY
Payer: MEDICARE

## 2021-01-08 DIAGNOSIS — N81.10 VAGINAL PROLAPSE: Primary | ICD-10-CM

## 2021-01-08 PROCEDURE — 88305 TISSUE EXAM BY PATHOLOGIST: CPT | Mod: 26,HCNC,, | Performed by: PATHOLOGY

## 2021-01-08 PROCEDURE — 25000003 PHARM REV CODE 250: Mod: HCNC | Performed by: ANESTHESIOLOGY

## 2021-01-08 PROCEDURE — 25000003 PHARM REV CODE 250: Mod: HCNC | Performed by: OBSTETRICS & GYNECOLOGY

## 2021-01-08 PROCEDURE — 71000039 HC RECOVERY, EACH ADD'L HOUR: Mod: HCNC | Performed by: OBSTETRICS & GYNECOLOGY

## 2021-01-08 PROCEDURE — 36000713 HC OR TIME LEV V EA ADD 15 MIN: Mod: HCNC | Performed by: OBSTETRICS & GYNECOLOGY

## 2021-01-08 PROCEDURE — 99900035 HC TECH TIME PER 15 MIN (STAT): Mod: HCNC

## 2021-01-08 PROCEDURE — 63600175 PHARM REV CODE 636 W HCPCS: Mod: HCNC | Performed by: NURSE ANESTHETIST, CERTIFIED REGISTERED

## 2021-01-08 PROCEDURE — 63600175 PHARM REV CODE 636 W HCPCS: Mod: HCNC | Performed by: OBSTETRICS & GYNECOLOGY

## 2021-01-08 PROCEDURE — 88305 TISSUE EXAM BY PATHOLOGIST: CPT | Mod: HCNC | Performed by: PATHOLOGY

## 2021-01-08 PROCEDURE — 27201423 OPTIME MED/SURG SUP & DEVICES STERILE SUPPLY: Mod: HCNC | Performed by: OBSTETRICS & GYNECOLOGY

## 2021-01-08 PROCEDURE — 57425 PR LAPAROSCOPY, SURG, COLPOPEXY: ICD-10-PCS | Mod: 22,HCNC,, | Performed by: OBSTETRICS & GYNECOLOGY

## 2021-01-08 PROCEDURE — 63600175 PHARM REV CODE 636 W HCPCS: Mod: HCNC | Performed by: ANESTHESIOLOGY

## 2021-01-08 PROCEDURE — 37000008 HC ANESTHESIA 1ST 15 MINUTES: Mod: HCNC | Performed by: OBSTETRICS & GYNECOLOGY

## 2021-01-08 PROCEDURE — 37000009 HC ANESTHESIA EA ADD 15 MINS: Mod: HCNC | Performed by: OBSTETRICS & GYNECOLOGY

## 2021-01-08 PROCEDURE — 88305 TISSUE EXAM BY PATHOLOGIST: ICD-10-PCS | Mod: 26,HCNC,, | Performed by: PATHOLOGY

## 2021-01-08 PROCEDURE — 94799 UNLISTED PULMONARY SVC/PX: CPT | Mod: HCNC

## 2021-01-08 PROCEDURE — 94761 N-INVAS EAR/PLS OXIMETRY MLT: CPT | Mod: HCNC

## 2021-01-08 PROCEDURE — P9045 ALBUMIN (HUMAN), 5%, 250 ML: HCPCS | Mod: JG,HCNC | Performed by: NURSE ANESTHETIST, CERTIFIED REGISTERED

## 2021-01-08 PROCEDURE — C1781 MESH (IMPLANTABLE): HCPCS | Mod: HCNC | Performed by: OBSTETRICS & GYNECOLOGY

## 2021-01-08 PROCEDURE — 58661 PR LAP,RMV  ADNEXAL STRUCTURE: ICD-10-PCS | Mod: 51,HCNC,RT, | Performed by: OBSTETRICS & GYNECOLOGY

## 2021-01-08 PROCEDURE — 25000003 PHARM REV CODE 250: Mod: HCNC | Performed by: NURSE ANESTHETIST, CERTIFIED REGISTERED

## 2021-01-08 PROCEDURE — 71000033 HC RECOVERY, INTIAL HOUR: Mod: HCNC | Performed by: OBSTETRICS & GYNECOLOGY

## 2021-01-08 PROCEDURE — 36000712 HC OR TIME LEV V 1ST 15 MIN: Mod: HCNC | Performed by: OBSTETRICS & GYNECOLOGY

## 2021-01-08 PROCEDURE — 57425 LAPAROSCOPY SURG COLPOPEXY: CPT | Mod: 22,HCNC,, | Performed by: OBSTETRICS & GYNECOLOGY

## 2021-01-08 PROCEDURE — 58661 LAPAROSCOPY REMOVE ADNEXA: CPT | Mod: 51,HCNC,RT, | Performed by: OBSTETRICS & GYNECOLOGY

## 2021-01-08 DEVICE — MESH RESTORELLE Y CONTOUR 24X3: Type: IMPLANTABLE DEVICE | Site: VAGINA | Status: FUNCTIONAL

## 2021-01-08 RX ORDER — SIMVASTATIN 10 MG/1
40 TABLET, FILM COATED ORAL NIGHTLY
Status: DISCONTINUED | OUTPATIENT
Start: 2021-01-08 | End: 2021-01-09 | Stop reason: HOSPADM

## 2021-01-08 RX ORDER — OXYCODONE AND ACETAMINOPHEN 5; 325 MG/1; MG/1
1 TABLET ORAL EVERY 6 HOURS PRN
Qty: 30 TABLET | Refills: 0 | Status: SHIPPED | OUTPATIENT
Start: 2021-01-08 | End: 2021-03-22

## 2021-01-08 RX ORDER — KETOROLAC TROMETHAMINE 30 MG/ML
INJECTION, SOLUTION INTRAMUSCULAR; INTRAVENOUS
Status: DISCONTINUED | OUTPATIENT
Start: 2021-01-08 | End: 2021-01-08

## 2021-01-08 RX ORDER — KETAMINE HCL IN 0.9 % NACL 50 MG/5 ML
SYRINGE (ML) INTRAVENOUS
Status: DISCONTINUED | OUTPATIENT
Start: 2021-01-08 | End: 2021-01-08

## 2021-01-08 RX ORDER — FAMOTIDINE 10 MG/ML
20 INJECTION INTRAVENOUS EVERY 12 HOURS
Status: DISCONTINUED | OUTPATIENT
Start: 2021-01-08 | End: 2021-01-09 | Stop reason: HOSPADM

## 2021-01-08 RX ORDER — LIDOCAINE HYDROCHLORIDE AND EPINEPHRINE 10; 10 MG/ML; UG/ML
INJECTION, SOLUTION INFILTRATION; PERINEURAL
Status: DISCONTINUED | OUTPATIENT
Start: 2021-01-08 | End: 2021-01-08 | Stop reason: HOSPADM

## 2021-01-08 RX ORDER — FENTANYL CITRATE 50 UG/ML
INJECTION, SOLUTION INTRAMUSCULAR; INTRAVENOUS
Status: DISCONTINUED | OUTPATIENT
Start: 2021-01-08 | End: 2021-01-08

## 2021-01-08 RX ORDER — ONDANSETRON 8 MG/1
8 TABLET, ORALLY DISINTEGRATING ORAL EVERY 8 HOURS PRN
Status: DISCONTINUED | OUTPATIENT
Start: 2021-01-08 | End: 2021-01-09 | Stop reason: HOSPADM

## 2021-01-08 RX ORDER — IBUPROFEN 600 MG/1
600 TABLET ORAL
Status: DISCONTINUED | OUTPATIENT
Start: 2021-01-09 | End: 2021-01-09 | Stop reason: HOSPADM

## 2021-01-08 RX ORDER — ONDANSETRON 2 MG/ML
INJECTION INTRAMUSCULAR; INTRAVENOUS
Status: DISCONTINUED | OUTPATIENT
Start: 2021-01-08 | End: 2021-01-08

## 2021-01-08 RX ORDER — HYDROMORPHONE HYDROCHLORIDE 1 MG/ML
1 INJECTION, SOLUTION INTRAMUSCULAR; INTRAVENOUS; SUBCUTANEOUS EVERY 4 HOURS PRN
Status: DISCONTINUED | OUTPATIENT
Start: 2021-01-08 | End: 2021-01-09 | Stop reason: HOSPADM

## 2021-01-08 RX ORDER — POLYETHYLENE GLYCOL 3350 17 G/17G
17 POWDER, FOR SOLUTION ORAL DAILY
Status: DISCONTINUED | OUTPATIENT
Start: 2021-01-09 | End: 2021-01-08

## 2021-01-08 RX ORDER — ROCURONIUM BROMIDE 10 MG/ML
INJECTION, SOLUTION INTRAVENOUS
Status: DISCONTINUED | OUTPATIENT
Start: 2021-01-08 | End: 2021-01-08

## 2021-01-08 RX ORDER — ALBUMIN HUMAN 50 G/1000ML
SOLUTION INTRAVENOUS CONTINUOUS PRN
Status: DISCONTINUED | OUTPATIENT
Start: 2021-01-08 | End: 2021-01-08

## 2021-01-08 RX ORDER — OXYCODONE HYDROCHLORIDE 5 MG/1
5 TABLET ORAL
Status: DISCONTINUED | OUTPATIENT
Start: 2021-01-08 | End: 2021-01-08 | Stop reason: HOSPADM

## 2021-01-08 RX ORDER — ACETAMINOPHEN 500 MG
1000 TABLET ORAL EVERY 6 HOURS PRN
COMMUNITY
End: 2023-01-06

## 2021-01-08 RX ORDER — SODIUM CHLORIDE, SODIUM LACTATE, POTASSIUM CHLORIDE, CALCIUM CHLORIDE 600; 310; 30; 20 MG/100ML; MG/100ML; MG/100ML; MG/100ML
INJECTION, SOLUTION INTRAVENOUS CONTINUOUS
Status: DISCONTINUED | OUTPATIENT
Start: 2021-01-08 | End: 2021-01-08

## 2021-01-08 RX ORDER — LIDOCAINE HYDROCHLORIDE 20 MG/ML
JELLY TOPICAL ONCE
Status: DISCONTINUED | OUTPATIENT
Start: 2021-01-08 | End: 2021-01-08 | Stop reason: HOSPADM

## 2021-01-08 RX ORDER — POLYETHYLENE GLYCOL 3350 17 G/17G
17 POWDER, FOR SOLUTION ORAL DAILY
Status: DISCONTINUED | OUTPATIENT
Start: 2021-01-09 | End: 2021-01-09 | Stop reason: HOSPADM

## 2021-01-08 RX ORDER — DIPHENHYDRAMINE HYDROCHLORIDE 50 MG/ML
12.5 INJECTION INTRAMUSCULAR; INTRAVENOUS EVERY 30 MIN PRN
Status: DISCONTINUED | OUTPATIENT
Start: 2021-01-08 | End: 2021-01-08 | Stop reason: HOSPADM

## 2021-01-08 RX ORDER — PROPOFOL 10 MG/ML
VIAL (ML) INTRAVENOUS
Status: DISCONTINUED | OUTPATIENT
Start: 2021-01-08 | End: 2021-01-08

## 2021-01-08 RX ORDER — MUPIROCIN 20 MG/G
1 OINTMENT TOPICAL 2 TIMES DAILY
Status: DISCONTINUED | OUTPATIENT
Start: 2021-01-08 | End: 2021-01-09 | Stop reason: HOSPADM

## 2021-01-08 RX ORDER — BISACODYL 10 MG
10 SUPPOSITORY, RECTAL RECTAL DAILY PRN
Status: DISCONTINUED | OUTPATIENT
Start: 2021-01-08 | End: 2021-01-09 | Stop reason: HOSPADM

## 2021-01-08 RX ORDER — DOCUSATE SODIUM 100 MG/1
100 CAPSULE, LIQUID FILLED ORAL 2 TIMES DAILY
Qty: 30 CAPSULE | Refills: 1 | Status: SHIPPED | OUTPATIENT
Start: 2021-01-08 | End: 2023-07-29

## 2021-01-08 RX ORDER — ONDANSETRON 2 MG/ML
4 INJECTION INTRAMUSCULAR; INTRAVENOUS DAILY PRN
Status: DISCONTINUED | OUTPATIENT
Start: 2021-01-08 | End: 2021-01-08 | Stop reason: HOSPADM

## 2021-01-08 RX ORDER — OXYCODONE AND ACETAMINOPHEN 10; 325 MG/1; MG/1
1 TABLET ORAL EVERY 4 HOURS PRN
Status: DISCONTINUED | OUTPATIENT
Start: 2021-01-08 | End: 2021-01-09 | Stop reason: HOSPADM

## 2021-01-08 RX ORDER — EPHEDRINE SULFATE 50 MG/ML
INJECTION, SOLUTION INTRAVENOUS
Status: DISCONTINUED | OUTPATIENT
Start: 2021-01-08 | End: 2021-01-08

## 2021-01-08 RX ORDER — LIDOCAINE HYDROCHLORIDE 20 MG/ML
INJECTION INTRAVENOUS
Status: DISCONTINUED | OUTPATIENT
Start: 2021-01-08 | End: 2021-01-08

## 2021-01-08 RX ORDER — ACETAMINOPHEN 10 MG/ML
INJECTION, SOLUTION INTRAVENOUS
Status: DISCONTINUED | OUTPATIENT
Start: 2021-01-08 | End: 2021-01-08

## 2021-01-08 RX ORDER — CEFAZOLIN SODIUM 1 G/3ML
2 INJECTION, POWDER, FOR SOLUTION INTRAMUSCULAR; INTRAVENOUS
Status: COMPLETED | OUTPATIENT
Start: 2021-01-08 | End: 2021-01-08

## 2021-01-08 RX ORDER — DIPHENHYDRAMINE HCL 25 MG
25 CAPSULE ORAL EVERY 4 HOURS PRN
Status: DISCONTINUED | OUTPATIENT
Start: 2021-01-08 | End: 2021-01-09 | Stop reason: HOSPADM

## 2021-01-08 RX ORDER — DIPHENHYDRAMINE HYDROCHLORIDE 50 MG/ML
25 INJECTION INTRAMUSCULAR; INTRAVENOUS EVERY 4 HOURS PRN
Status: DISCONTINUED | OUTPATIENT
Start: 2021-01-08 | End: 2021-01-09 | Stop reason: HOSPADM

## 2021-01-08 RX ORDER — HYDRALAZINE HYDROCHLORIDE 20 MG/ML
10 INJECTION INTRAMUSCULAR; INTRAVENOUS EVERY 4 HOURS PRN
Status: DISCONTINUED | OUTPATIENT
Start: 2021-01-08 | End: 2021-01-09 | Stop reason: HOSPADM

## 2021-01-08 RX ORDER — IBUPROFEN 600 MG/1
600 TABLET ORAL EVERY 6 HOURS PRN
Qty: 30 TABLET | Refills: 0 | Status: SHIPPED | OUTPATIENT
Start: 2021-01-08 | End: 2021-03-12

## 2021-01-08 RX ORDER — HYDROMORPHONE HYDROCHLORIDE 2 MG/ML
0.4 INJECTION, SOLUTION INTRAMUSCULAR; INTRAVENOUS; SUBCUTANEOUS EVERY 5 MIN PRN
Status: DISCONTINUED | OUTPATIENT
Start: 2021-01-08 | End: 2021-01-08 | Stop reason: HOSPADM

## 2021-01-08 RX ORDER — ATROPINE SULFATE 0.4 MG/ML
INJECTION, SOLUTION ENDOTRACHEAL; INTRAMEDULLARY; INTRAMUSCULAR; INTRAVENOUS; SUBCUTANEOUS
Status: DISCONTINUED | OUTPATIENT
Start: 2021-01-08 | End: 2021-01-08

## 2021-01-08 RX ORDER — SODIUM CHLORIDE, SODIUM LACTATE, POTASSIUM CHLORIDE, CALCIUM CHLORIDE 600; 310; 30; 20 MG/100ML; MG/100ML; MG/100ML; MG/100ML
INJECTION, SOLUTION INTRAVENOUS CONTINUOUS
Status: DISCONTINUED | OUTPATIENT
Start: 2021-01-08 | End: 2021-01-09 | Stop reason: HOSPADM

## 2021-01-08 RX ORDER — OXYCODONE AND ACETAMINOPHEN 5; 325 MG/1; MG/1
1 TABLET ORAL EVERY 4 HOURS PRN
Status: DISCONTINUED | OUTPATIENT
Start: 2021-01-08 | End: 2021-01-09 | Stop reason: HOSPADM

## 2021-01-08 RX ORDER — SODIUM CHLORIDE 0.9 % (FLUSH) 0.9 %
3 SYRINGE (ML) INJECTION
Status: DISCONTINUED | OUTPATIENT
Start: 2021-01-08 | End: 2021-01-09 | Stop reason: HOSPADM

## 2021-01-08 RX ORDER — KETOROLAC TROMETHAMINE 30 MG/ML
15 INJECTION, SOLUTION INTRAMUSCULAR; INTRAVENOUS
Status: DISCONTINUED | OUTPATIENT
Start: 2021-01-08 | End: 2021-01-09 | Stop reason: HOSPADM

## 2021-01-08 RX ADMIN — SIMVASTATIN 40 MG: 10 TABLET, FILM COATED ORAL at 08:01

## 2021-01-08 RX ADMIN — PROPOFOL 140 MG: 10 INJECTION, EMULSION INTRAVENOUS at 07:01

## 2021-01-08 RX ADMIN — SODIUM CHLORIDE, SODIUM LACTATE, POTASSIUM CHLORIDE, AND CALCIUM CHLORIDE: 600; 310; 30; 20 INJECTION, SOLUTION INTRAVENOUS at 06:01

## 2021-01-08 RX ADMIN — KETOROLAC TROMETHAMINE 15 MG: 30 INJECTION, SOLUTION INTRAMUSCULAR; INTRAVENOUS at 11:01

## 2021-01-08 RX ADMIN — EPHEDRINE SULFATE 5 MG: 50 INJECTION INTRAVENOUS at 09:01

## 2021-01-08 RX ADMIN — ROCURONIUM BROMIDE 50 MG: 10 SOLUTION INTRAVENOUS at 07:01

## 2021-01-08 RX ADMIN — EPHEDRINE SULFATE 10 MG: 50 INJECTION INTRAVENOUS at 09:01

## 2021-01-08 RX ADMIN — ATROPINE SULFATE 0.3 MG: 0.4 INJECTION, SOLUTION INTRAMUSCULAR; INTRAVENOUS; SUBCUTANEOUS at 08:01

## 2021-01-08 RX ADMIN — FENTANYL CITRATE 100 MCG: 50 INJECTION, SOLUTION INTRAMUSCULAR; INTRAVENOUS at 07:01

## 2021-01-08 RX ADMIN — KETOROLAC TROMETHAMINE 15 MG: 30 INJECTION, SOLUTION INTRAMUSCULAR; INTRAVENOUS at 05:01

## 2021-01-08 RX ADMIN — ALBUMIN (HUMAN): 12.5 SOLUTION INTRAVENOUS at 08:01

## 2021-01-08 RX ADMIN — ROCURONIUM BROMIDE 10 MG: 10 SOLUTION INTRAVENOUS at 10:01

## 2021-01-08 RX ADMIN — FAMOTIDINE 20 MG: 10 INJECTION INTRAVENOUS at 08:01

## 2021-01-08 RX ADMIN — FENTANYL CITRATE 50 MCG: 50 INJECTION, SOLUTION INTRAMUSCULAR; INTRAVENOUS at 11:01

## 2021-01-08 RX ADMIN — EPHEDRINE SULFATE 5 MG: 50 INJECTION INTRAVENOUS at 10:01

## 2021-01-08 RX ADMIN — Medication 50 MG: at 07:01

## 2021-01-08 RX ADMIN — SODIUM CHLORIDE, SODIUM LACTATE, POTASSIUM CHLORIDE, AND CALCIUM CHLORIDE: .6; .31; .03; .02 INJECTION, SOLUTION INTRAVENOUS at 05:01

## 2021-01-08 RX ADMIN — KETOROLAC TROMETHAMINE 30 MG: 30 INJECTION, SOLUTION INTRAMUSCULAR; INTRAVENOUS at 11:01

## 2021-01-08 RX ADMIN — OXYCODONE 5 MG: 5 TABLET ORAL at 12:01

## 2021-01-08 RX ADMIN — LIDOCAINE HYDROCHLORIDE 100 MG: 20 INJECTION, SOLUTION INTRAVENOUS at 07:01

## 2021-01-08 RX ADMIN — ONDANSETRON HYDROCHLORIDE 4 MG: 2 INJECTION INTRAMUSCULAR; INTRAVENOUS at 11:01

## 2021-01-08 RX ADMIN — FENTANYL CITRATE 50 MCG: 50 INJECTION, SOLUTION INTRAMUSCULAR; INTRAVENOUS at 08:01

## 2021-01-08 RX ADMIN — CEFAZOLIN 2 G: 330 INJECTION, POWDER, FOR SOLUTION INTRAMUSCULAR; INTRAVENOUS at 07:01

## 2021-01-08 RX ADMIN — EPHEDRINE SULFATE 10 MG: 50 INJECTION INTRAVENOUS at 08:01

## 2021-01-08 RX ADMIN — SODIUM CHLORIDE, SODIUM LACTATE, POTASSIUM CHLORIDE, AND CALCIUM CHLORIDE: 600; 310; 30; 20 INJECTION, SOLUTION INTRAVENOUS at 11:01

## 2021-01-08 RX ADMIN — ACETAMINOPHEN 1000 MG: 10 INJECTION, SOLUTION INTRAVENOUS at 07:01

## 2021-01-08 RX ADMIN — MUPIROCIN 1 G: 20 OINTMENT TOPICAL at 08:01

## 2021-01-08 RX ADMIN — SODIUM CHLORIDE, SODIUM LACTATE, POTASSIUM CHLORIDE, AND CALCIUM CHLORIDE: 600; 310; 30; 20 INJECTION, SOLUTION INTRAVENOUS at 08:01

## 2021-01-08 RX ADMIN — ROCURONIUM BROMIDE 10 MG: 10 SOLUTION INTRAVENOUS at 08:01

## 2021-01-08 RX ADMIN — OXYCODONE 5 MG: 5 TABLET ORAL at 03:01

## 2021-01-08 RX ADMIN — ROCURONIUM BROMIDE 10 MG: 10 SOLUTION INTRAVENOUS at 09:01

## 2021-01-09 VITALS
DIASTOLIC BLOOD PRESSURE: 61 MMHG | WEIGHT: 187 LBS | TEMPERATURE: 98 F | BODY MASS INDEX: 28.34 KG/M2 | HEIGHT: 68 IN | HEART RATE: 78 BPM | RESPIRATION RATE: 16 BRPM | OXYGEN SATURATION: 99 % | SYSTOLIC BLOOD PRESSURE: 128 MMHG

## 2021-01-09 LAB
BASOPHILS # BLD AUTO: 0.04 K/UL (ref 0–0.2)
BASOPHILS NFR BLD: 0.5 % (ref 0–1.9)
CREAT SERPL-MCNC: 0.8 MG/DL (ref 0.5–1.4)
DIFFERENTIAL METHOD: ABNORMAL
EOSINOPHIL # BLD AUTO: 0 K/UL (ref 0–0.5)
EOSINOPHIL NFR BLD: 0.2 % (ref 0–8)
ERYTHROCYTE [DISTWIDTH] IN BLOOD BY AUTOMATED COUNT: 13 % (ref 11.5–14.5)
EST. GFR  (AFRICAN AMERICAN): >60 ML/MIN/1.73 M^2
EST. GFR  (NON AFRICAN AMERICAN): >60 ML/MIN/1.73 M^2
HCT VFR BLD AUTO: 34.3 % (ref 37–48.5)
HGB BLD-MCNC: 11.3 G/DL (ref 12–16)
IMM GRANULOCYTES # BLD AUTO: 0.01 K/UL (ref 0–0.04)
IMM GRANULOCYTES NFR BLD AUTO: 0.1 % (ref 0–0.5)
LYMPHOCYTES # BLD AUTO: 1.7 K/UL (ref 1–4.8)
LYMPHOCYTES NFR BLD: 21 % (ref 18–48)
MCH RBC QN AUTO: 28.5 PG (ref 27–31)
MCHC RBC AUTO-ENTMCNC: 32.9 G/DL (ref 32–36)
MCV RBC AUTO: 87 FL (ref 82–98)
MONOCYTES # BLD AUTO: 0.9 K/UL (ref 0.3–1)
MONOCYTES NFR BLD: 10.6 % (ref 4–15)
NEUTROPHILS # BLD AUTO: 5.5 K/UL (ref 1.8–7.7)
NEUTROPHILS NFR BLD: 67.6 % (ref 38–73)
NRBC BLD-RTO: 0 /100 WBC
PLATELET # BLD AUTO: 209 K/UL (ref 150–350)
PMV BLD AUTO: 8.8 FL (ref 9.2–12.9)
RBC # BLD AUTO: 3.96 M/UL (ref 4–5.4)
WBC # BLD AUTO: 8.13 K/UL (ref 3.9–12.7)

## 2021-01-09 PROCEDURE — 94799 UNLISTED PULMONARY SVC/PX: CPT | Mod: HCNC

## 2021-01-09 PROCEDURE — 25000003 PHARM REV CODE 250: Mod: HCNC | Performed by: OBSTETRICS & GYNECOLOGY

## 2021-01-09 PROCEDURE — 36415 COLL VENOUS BLD VENIPUNCTURE: CPT | Mod: HCNC

## 2021-01-09 PROCEDURE — 63600175 PHARM REV CODE 636 W HCPCS: Mod: HCNC | Performed by: OBSTETRICS & GYNECOLOGY

## 2021-01-09 PROCEDURE — 94761 N-INVAS EAR/PLS OXIMETRY MLT: CPT | Mod: HCNC

## 2021-01-09 PROCEDURE — 82565 ASSAY OF CREATININE: CPT | Mod: HCNC

## 2021-01-09 PROCEDURE — 85025 COMPLETE CBC W/AUTO DIFF WBC: CPT | Mod: HCNC

## 2021-01-09 PROCEDURE — 51798 US URINE CAPACITY MEASURE: CPT | Mod: HCNC

## 2021-01-09 RX ADMIN — POLYETHYLENE GLYCOL 3350 17 G: 17 POWDER, FOR SOLUTION ORAL at 09:01

## 2021-01-09 RX ADMIN — KETOROLAC TROMETHAMINE 15 MG: 30 INJECTION, SOLUTION INTRAMUSCULAR; INTRAVENOUS at 05:01

## 2021-01-09 RX ADMIN — FAMOTIDINE 20 MG: 10 INJECTION INTRAVENOUS at 09:01

## 2021-01-09 RX ADMIN — MUPIROCIN 1 G: 20 OINTMENT TOPICAL at 09:01

## 2021-01-19 LAB
FINAL PATHOLOGIC DIAGNOSIS: NORMAL
GROSS: NORMAL
Lab: NORMAL

## 2021-01-22 ENCOUNTER — OFFICE VISIT (OUTPATIENT)
Dept: UROGYNECOLOGY | Facility: CLINIC | Age: 76
End: 2021-01-22
Payer: MEDICARE

## 2021-01-22 ENCOUNTER — PATIENT MESSAGE (OUTPATIENT)
Dept: ADMINISTRATIVE | Facility: OTHER | Age: 76
End: 2021-01-22

## 2021-01-22 VITALS
WEIGHT: 187.63 LBS | DIASTOLIC BLOOD PRESSURE: 56 MMHG | HEIGHT: 68 IN | SYSTOLIC BLOOD PRESSURE: 121 MMHG | BODY MASS INDEX: 28.44 KG/M2

## 2021-01-22 DIAGNOSIS — Z09 POSTOP CHECK: Primary | ICD-10-CM

## 2021-01-22 PROCEDURE — 3008F PR BODY MASS INDEX (BMI) DOCUMENTED: ICD-10-PCS | Mod: HCNC,CPTII,S$GLB, | Performed by: OBSTETRICS & GYNECOLOGY

## 2021-01-22 PROCEDURE — 1101F PT FALLS ASSESS-DOCD LE1/YR: CPT | Mod: HCNC,CPTII,S$GLB, | Performed by: OBSTETRICS & GYNECOLOGY

## 2021-01-22 PROCEDURE — 3008F BODY MASS INDEX DOCD: CPT | Mod: HCNC,CPTII,S$GLB, | Performed by: OBSTETRICS & GYNECOLOGY

## 2021-01-22 PROCEDURE — 1126F PR PAIN SEVERITY QUANTIFIED, NO PAIN PRESENT: ICD-10-PCS | Mod: HCNC,S$GLB,, | Performed by: OBSTETRICS & GYNECOLOGY

## 2021-01-22 PROCEDURE — 3288F PR FALLS RISK ASSESSMENT DOCUMENTED: ICD-10-PCS | Mod: HCNC,CPTII,S$GLB, | Performed by: OBSTETRICS & GYNECOLOGY

## 2021-01-22 PROCEDURE — 99024 PR POST-OP FOLLOW-UP VISIT: ICD-10-PCS | Mod: HCNC,S$GLB,, | Performed by: OBSTETRICS & GYNECOLOGY

## 2021-01-22 PROCEDURE — 99024 POSTOP FOLLOW-UP VISIT: CPT | Mod: HCNC,S$GLB,, | Performed by: OBSTETRICS & GYNECOLOGY

## 2021-01-22 PROCEDURE — 1126F AMNT PAIN NOTED NONE PRSNT: CPT | Mod: HCNC,S$GLB,, | Performed by: OBSTETRICS & GYNECOLOGY

## 2021-01-22 PROCEDURE — 1101F PR PT FALLS ASSESS DOC 0-1 FALLS W/OUT INJ PAST YR: ICD-10-PCS | Mod: HCNC,CPTII,S$GLB, | Performed by: OBSTETRICS & GYNECOLOGY

## 2021-01-22 PROCEDURE — 99999 PR PBB SHADOW E&M-EST. PATIENT-LVL III: CPT | Mod: PBBFAC,HCNC,, | Performed by: OBSTETRICS & GYNECOLOGY

## 2021-01-22 PROCEDURE — 3288F FALL RISK ASSESSMENT DOCD: CPT | Mod: HCNC,CPTII,S$GLB, | Performed by: OBSTETRICS & GYNECOLOGY

## 2021-01-22 PROCEDURE — 99999 PR PBB SHADOW E&M-EST. PATIENT-LVL III: ICD-10-PCS | Mod: PBBFAC,HCNC,, | Performed by: OBSTETRICS & GYNECOLOGY

## 2021-02-05 ENCOUNTER — IMMUNIZATION (OUTPATIENT)
Dept: PHARMACY | Facility: CLINIC | Age: 76
End: 2021-02-05
Payer: MEDICARE

## 2021-02-05 DIAGNOSIS — Z23 NEED FOR VACCINATION: Primary | ICD-10-CM

## 2021-02-15 ENCOUNTER — OFFICE VISIT (OUTPATIENT)
Dept: UROGYNECOLOGY | Facility: CLINIC | Age: 76
End: 2021-02-15
Payer: MEDICARE

## 2021-02-15 VITALS
WEIGHT: 192.44 LBS | DIASTOLIC BLOOD PRESSURE: 70 MMHG | SYSTOLIC BLOOD PRESSURE: 119 MMHG | HEIGHT: 68 IN | BODY MASS INDEX: 29.17 KG/M2

## 2021-02-15 DIAGNOSIS — Z09 POSTOP CHECK: Primary | ICD-10-CM

## 2021-02-15 PROCEDURE — 3008F BODY MASS INDEX DOCD: CPT | Mod: HCNC,CPTII,S$GLB, | Performed by: OBSTETRICS & GYNECOLOGY

## 2021-02-15 PROCEDURE — 1101F PT FALLS ASSESS-DOCD LE1/YR: CPT | Mod: HCNC,CPTII,S$GLB, | Performed by: OBSTETRICS & GYNECOLOGY

## 2021-02-15 PROCEDURE — 99024 PR POST-OP FOLLOW-UP VISIT: ICD-10-PCS | Mod: HCNC,S$GLB,, | Performed by: OBSTETRICS & GYNECOLOGY

## 2021-02-15 PROCEDURE — 99024 POSTOP FOLLOW-UP VISIT: CPT | Mod: HCNC,S$GLB,, | Performed by: OBSTETRICS & GYNECOLOGY

## 2021-02-15 PROCEDURE — 1126F PR PAIN SEVERITY QUANTIFIED, NO PAIN PRESENT: ICD-10-PCS | Mod: HCNC,S$GLB,, | Performed by: OBSTETRICS & GYNECOLOGY

## 2021-02-15 PROCEDURE — 1101F PR PT FALLS ASSESS DOC 0-1 FALLS W/OUT INJ PAST YR: ICD-10-PCS | Mod: HCNC,CPTII,S$GLB, | Performed by: OBSTETRICS & GYNECOLOGY

## 2021-02-15 PROCEDURE — 99999 PR PBB SHADOW E&M-EST. PATIENT-LVL III: ICD-10-PCS | Mod: PBBFAC,HCNC,, | Performed by: OBSTETRICS & GYNECOLOGY

## 2021-02-15 PROCEDURE — 1126F AMNT PAIN NOTED NONE PRSNT: CPT | Mod: HCNC,S$GLB,, | Performed by: OBSTETRICS & GYNECOLOGY

## 2021-02-15 PROCEDURE — 3288F PR FALLS RISK ASSESSMENT DOCUMENTED: ICD-10-PCS | Mod: HCNC,CPTII,S$GLB, | Performed by: OBSTETRICS & GYNECOLOGY

## 2021-02-15 PROCEDURE — 3008F PR BODY MASS INDEX (BMI) DOCUMENTED: ICD-10-PCS | Mod: HCNC,CPTII,S$GLB, | Performed by: OBSTETRICS & GYNECOLOGY

## 2021-02-15 PROCEDURE — 99999 PR PBB SHADOW E&M-EST. PATIENT-LVL III: CPT | Mod: PBBFAC,HCNC,, | Performed by: OBSTETRICS & GYNECOLOGY

## 2021-02-15 PROCEDURE — 3288F FALL RISK ASSESSMENT DOCD: CPT | Mod: HCNC,CPTII,S$GLB, | Performed by: OBSTETRICS & GYNECOLOGY

## 2021-02-23 ENCOUNTER — PES CALL (OUTPATIENT)
Dept: ADMINISTRATIVE | Facility: CLINIC | Age: 76
End: 2021-02-23

## 2021-03-05 ENCOUNTER — IMMUNIZATION (OUTPATIENT)
Dept: PHARMACY | Facility: CLINIC | Age: 76
End: 2021-03-05
Payer: MEDICARE

## 2021-03-05 DIAGNOSIS — Z23 NEED FOR VACCINATION: Primary | ICD-10-CM

## 2021-03-09 ENCOUNTER — LAB VISIT (OUTPATIENT)
Dept: LAB | Facility: HOSPITAL | Age: 76
End: 2021-03-09
Attending: INTERNAL MEDICINE
Payer: MEDICARE

## 2021-03-09 DIAGNOSIS — E78.5 HYPERLIPIDEMIA, UNSPECIFIED HYPERLIPIDEMIA TYPE: ICD-10-CM

## 2021-03-09 DIAGNOSIS — I10 ESSENTIAL HYPERTENSION: ICD-10-CM

## 2021-03-09 LAB
ALBUMIN SERPL BCP-MCNC: 3.5 G/DL (ref 3.5–5.2)
ALP SERPL-CCNC: 91 U/L (ref 55–135)
ALT SERPL W/O P-5'-P-CCNC: 14 U/L (ref 10–44)
ANION GAP SERPL CALC-SCNC: 6 MMOL/L (ref 8–16)
AST SERPL-CCNC: 30 U/L (ref 10–40)
BILIRUB SERPL-MCNC: 0.5 MG/DL (ref 0.1–1)
BUN SERPL-MCNC: 11 MG/DL (ref 8–23)
CALCIUM SERPL-MCNC: 9.3 MG/DL (ref 8.7–10.5)
CHLORIDE SERPL-SCNC: 105 MMOL/L (ref 95–110)
CHOLEST SERPL-MCNC: 144 MG/DL (ref 120–199)
CHOLEST/HDLC SERPL: 3.3 {RATIO} (ref 2–5)
CO2 SERPL-SCNC: 29 MMOL/L (ref 23–29)
CREAT SERPL-MCNC: 0.9 MG/DL (ref 0.5–1.4)
EST. GFR  (AFRICAN AMERICAN): >60 ML/MIN/1.73 M^2
EST. GFR  (NON AFRICAN AMERICAN): >60 ML/MIN/1.73 M^2
GLUCOSE SERPL-MCNC: 87 MG/DL (ref 70–110)
HDLC SERPL-MCNC: 44 MG/DL (ref 40–75)
HDLC SERPL: 30.6 % (ref 20–50)
LDLC SERPL CALC-MCNC: 81 MG/DL (ref 63–159)
NONHDLC SERPL-MCNC: 100 MG/DL
POTASSIUM SERPL-SCNC: 4.1 MMOL/L (ref 3.5–5.1)
PROT SERPL-MCNC: 7.5 G/DL (ref 6–8.4)
SODIUM SERPL-SCNC: 140 MMOL/L (ref 136–145)
TRIGL SERPL-MCNC: 95 MG/DL (ref 30–150)

## 2021-03-09 PROCEDURE — 36415 COLL VENOUS BLD VENIPUNCTURE: CPT | Performed by: INTERNAL MEDICINE

## 2021-03-09 PROCEDURE — 80053 COMPREHEN METABOLIC PANEL: CPT | Performed by: INTERNAL MEDICINE

## 2021-03-09 PROCEDURE — 80061 LIPID PANEL: CPT | Performed by: INTERNAL MEDICINE

## 2021-03-12 ENCOUNTER — TELEPHONE (OUTPATIENT)
Dept: INTERNAL MEDICINE | Facility: CLINIC | Age: 76
End: 2021-03-12

## 2021-03-12 ENCOUNTER — OFFICE VISIT (OUTPATIENT)
Dept: INTERNAL MEDICINE | Facility: CLINIC | Age: 76
End: 2021-03-12
Payer: MEDICARE

## 2021-03-12 DIAGNOSIS — D12.6 ADENOMATOUS POLYP OF COLON, UNSPECIFIED PART OF COLON: ICD-10-CM

## 2021-03-12 DIAGNOSIS — E03.9 HYPOTHYROIDISM, UNSPECIFIED TYPE: ICD-10-CM

## 2021-03-12 DIAGNOSIS — E78.5 HYPERLIPIDEMIA, UNSPECIFIED HYPERLIPIDEMIA TYPE: ICD-10-CM

## 2021-03-12 DIAGNOSIS — M19.90 OSTEOARTHRITIS, UNSPECIFIED OSTEOARTHRITIS TYPE, UNSPECIFIED SITE: ICD-10-CM

## 2021-03-12 DIAGNOSIS — I10 ESSENTIAL HYPERTENSION: Primary | ICD-10-CM

## 2021-03-12 DIAGNOSIS — K59.00 CONSTIPATION, UNSPECIFIED CONSTIPATION TYPE: ICD-10-CM

## 2021-03-12 DIAGNOSIS — Z12.31 ENCOUNTER FOR SCREENING MAMMOGRAM FOR MALIGNANT NEOPLASM OF BREAST: ICD-10-CM

## 2021-03-12 PROCEDURE — 3075F SYST BP GE 130 - 139MM HG: CPT | Mod: CPTII,S$GLB,, | Performed by: INTERNAL MEDICINE

## 2021-03-12 PROCEDURE — 99999 PR PBB SHADOW E&M-EST. PATIENT-LVL V: ICD-10-PCS | Mod: PBBFAC,,, | Performed by: INTERNAL MEDICINE

## 2021-03-12 PROCEDURE — 3078F DIAST BP <80 MM HG: CPT | Mod: CPTII,S$GLB,, | Performed by: INTERNAL MEDICINE

## 2021-03-12 PROCEDURE — 99999 PR PBB SHADOW E&M-EST. PATIENT-LVL V: CPT | Mod: PBBFAC,,, | Performed by: INTERNAL MEDICINE

## 2021-03-12 PROCEDURE — 3075F PR MOST RECENT SYSTOLIC BLOOD PRESS GE 130-139MM HG: ICD-10-PCS | Mod: CPTII,S$GLB,, | Performed by: INTERNAL MEDICINE

## 2021-03-12 PROCEDURE — 1126F PR PAIN SEVERITY QUANTIFIED, NO PAIN PRESENT: ICD-10-PCS | Mod: S$GLB,,, | Performed by: INTERNAL MEDICINE

## 2021-03-12 PROCEDURE — 99214 PR OFFICE/OUTPT VISIT, EST, LEVL IV, 30-39 MIN: ICD-10-PCS | Mod: S$GLB,,, | Performed by: INTERNAL MEDICINE

## 2021-03-12 PROCEDURE — 3078F PR MOST RECENT DIASTOLIC BLOOD PRESSURE < 80 MM HG: ICD-10-PCS | Mod: CPTII,S$GLB,, | Performed by: INTERNAL MEDICINE

## 2021-03-12 PROCEDURE — 3008F PR BODY MASS INDEX (BMI) DOCUMENTED: ICD-10-PCS | Mod: CPTII,S$GLB,, | Performed by: INTERNAL MEDICINE

## 2021-03-12 PROCEDURE — 1159F MED LIST DOCD IN RCRD: CPT | Mod: S$GLB,,, | Performed by: INTERNAL MEDICINE

## 2021-03-12 PROCEDURE — 3008F BODY MASS INDEX DOCD: CPT | Mod: CPTII,S$GLB,, | Performed by: INTERNAL MEDICINE

## 2021-03-12 PROCEDURE — 99214 OFFICE O/P EST MOD 30 MIN: CPT | Mod: S$GLB,,, | Performed by: INTERNAL MEDICINE

## 2021-03-12 PROCEDURE — 3288F FALL RISK ASSESSMENT DOCD: CPT | Mod: CPTII,S$GLB,, | Performed by: INTERNAL MEDICINE

## 2021-03-12 PROCEDURE — 1101F PR PT FALLS ASSESS DOC 0-1 FALLS W/OUT INJ PAST YR: ICD-10-PCS | Mod: CPTII,S$GLB,, | Performed by: INTERNAL MEDICINE

## 2021-03-12 PROCEDURE — 1126F AMNT PAIN NOTED NONE PRSNT: CPT | Mod: S$GLB,,, | Performed by: INTERNAL MEDICINE

## 2021-03-12 PROCEDURE — 1159F PR MEDICATION LIST DOCUMENTED IN MEDICAL RECORD: ICD-10-PCS | Mod: S$GLB,,, | Performed by: INTERNAL MEDICINE

## 2021-03-12 PROCEDURE — 3288F PR FALLS RISK ASSESSMENT DOCUMENTED: ICD-10-PCS | Mod: CPTII,S$GLB,, | Performed by: INTERNAL MEDICINE

## 2021-03-12 PROCEDURE — 1101F PT FALLS ASSESS-DOCD LE1/YR: CPT | Mod: CPTII,S$GLB,, | Performed by: INTERNAL MEDICINE

## 2021-03-12 RX ORDER — SIMVASTATIN 40 MG/1
40 TABLET, FILM COATED ORAL NIGHTLY
Qty: 90 TABLET | Refills: 2 | Status: SHIPPED | OUTPATIENT
Start: 2021-03-12 | End: 2021-11-22

## 2021-03-12 RX ORDER — NIFEDIPINE 90 MG/1
90 TABLET, EXTENDED RELEASE ORAL DAILY
Qty: 90 TABLET | Refills: 1 | Status: SHIPPED | OUTPATIENT
Start: 2021-03-12 | End: 2021-08-26 | Stop reason: SDUPTHER

## 2021-03-12 RX ORDER — HYDROCHLOROTHIAZIDE 12.5 MG/1
TABLET ORAL
Qty: 45 TABLET | Refills: 1 | Status: SHIPPED | OUTPATIENT
Start: 2021-03-12 | End: 2021-11-22

## 2021-03-12 RX ORDER — MELOXICAM 15 MG/1
TABLET ORAL
Qty: 30 TABLET | Refills: 0 | Status: SHIPPED | OUTPATIENT
Start: 2021-03-12 | End: 2021-10-08

## 2021-03-14 VITALS
BODY MASS INDEX: 28.7 KG/M2 | SYSTOLIC BLOOD PRESSURE: 130 MMHG | HEART RATE: 75 BPM | DIASTOLIC BLOOD PRESSURE: 60 MMHG | WEIGHT: 189.38 LBS | OXYGEN SATURATION: 98 % | HEIGHT: 68 IN | TEMPERATURE: 98 F

## 2021-03-14 PROBLEM — D12.6 ADENOMATOUS COLON POLYP: Status: ACTIVE | Noted: 2021-03-14

## 2021-03-14 PROBLEM — Z98.890 S/P COLONOSCOPY: Status: RESOLVED | Noted: 2019-08-08 | Resolved: 2021-03-14

## 2021-03-22 ENCOUNTER — OFFICE VISIT (OUTPATIENT)
Dept: INTERNAL MEDICINE | Facility: CLINIC | Age: 76
End: 2021-03-22
Payer: MEDICARE

## 2021-03-22 VITALS
WEIGHT: 191.38 LBS | HEART RATE: 64 BPM | HEIGHT: 68 IN | BODY MASS INDEX: 29.01 KG/M2 | SYSTOLIC BLOOD PRESSURE: 138 MMHG | DIASTOLIC BLOOD PRESSURE: 62 MMHG

## 2021-03-22 DIAGNOSIS — I10 ESSENTIAL HYPERTENSION: ICD-10-CM

## 2021-03-22 DIAGNOSIS — D12.6 ADENOMATOUS POLYP OF COLON, UNSPECIFIED PART OF COLON: ICD-10-CM

## 2021-03-22 DIAGNOSIS — H40.053 BILATERAL OCULAR HYPERTENSION: ICD-10-CM

## 2021-03-22 DIAGNOSIS — E66.3 OVERWEIGHT (BMI 25.0-29.9): ICD-10-CM

## 2021-03-22 DIAGNOSIS — Z00.00 ENCOUNTER FOR PREVENTIVE HEALTH EXAMINATION: Primary | ICD-10-CM

## 2021-03-22 DIAGNOSIS — E78.5 HYPERLIPIDEMIA, UNSPECIFIED HYPERLIPIDEMIA TYPE: ICD-10-CM

## 2021-03-22 DIAGNOSIS — I70.0 ATHEROSCLEROSIS OF AORTA: ICD-10-CM

## 2021-03-22 DIAGNOSIS — L84 CALLUS OF HEEL: ICD-10-CM

## 2021-03-22 DIAGNOSIS — E89.0 POST-SURGICAL HYPOTHYROIDISM: ICD-10-CM

## 2021-03-22 DIAGNOSIS — N81.10 VAGINAL PROLAPSE: ICD-10-CM

## 2021-03-22 PROCEDURE — 3288F FALL RISK ASSESSMENT DOCD: CPT | Mod: CPTII,S$GLB,, | Performed by: NURSE PRACTITIONER

## 2021-03-22 PROCEDURE — 99999 PR PBB SHADOW E&M-EST. PATIENT-LVL V: ICD-10-PCS | Mod: PBBFAC,,, | Performed by: NURSE PRACTITIONER

## 2021-03-22 PROCEDURE — 3008F PR BODY MASS INDEX (BMI) DOCUMENTED: ICD-10-PCS | Mod: CPTII,S$GLB,, | Performed by: NURSE PRACTITIONER

## 2021-03-22 PROCEDURE — 3288F PR FALLS RISK ASSESSMENT DOCUMENTED: ICD-10-PCS | Mod: CPTII,S$GLB,, | Performed by: NURSE PRACTITIONER

## 2021-03-22 PROCEDURE — 1158F PR ADVANCE CARE PLANNING DISCUSS DOCUMENTED IN MEDICAL RECORD: ICD-10-PCS | Mod: S$GLB,,, | Performed by: NURSE PRACTITIONER

## 2021-03-22 PROCEDURE — 3078F PR MOST RECENT DIASTOLIC BLOOD PRESSURE < 80 MM HG: ICD-10-PCS | Mod: CPTII,S$GLB,, | Performed by: NURSE PRACTITIONER

## 2021-03-22 PROCEDURE — 99499 RISK ADDL DX/OHS AUDIT: ICD-10-PCS | Mod: S$GLB,,, | Performed by: NURSE PRACTITIONER

## 2021-03-22 PROCEDURE — 1101F PT FALLS ASSESS-DOCD LE1/YR: CPT | Mod: CPTII,S$GLB,, | Performed by: NURSE PRACTITIONER

## 2021-03-22 PROCEDURE — 99999 PR PBB SHADOW E&M-EST. PATIENT-LVL V: CPT | Mod: PBBFAC,,, | Performed by: NURSE PRACTITIONER

## 2021-03-22 PROCEDURE — 1126F AMNT PAIN NOTED NONE PRSNT: CPT | Mod: S$GLB,,, | Performed by: NURSE PRACTITIONER

## 2021-03-22 PROCEDURE — G0439 PPPS, SUBSEQ VISIT: HCPCS | Mod: S$GLB,,, | Performed by: NURSE PRACTITIONER

## 2021-03-22 PROCEDURE — 3008F BODY MASS INDEX DOCD: CPT | Mod: CPTII,S$GLB,, | Performed by: NURSE PRACTITIONER

## 2021-03-22 PROCEDURE — 1101F PR PT FALLS ASSESS DOC 0-1 FALLS W/OUT INJ PAST YR: ICD-10-PCS | Mod: CPTII,S$GLB,, | Performed by: NURSE PRACTITIONER

## 2021-03-22 PROCEDURE — 3075F PR MOST RECENT SYSTOLIC BLOOD PRESS GE 130-139MM HG: ICD-10-PCS | Mod: CPTII,S$GLB,, | Performed by: NURSE PRACTITIONER

## 2021-03-22 PROCEDURE — 99499 UNLISTED E&M SERVICE: CPT | Mod: S$GLB,,, | Performed by: NURSE PRACTITIONER

## 2021-03-22 PROCEDURE — 3078F DIAST BP <80 MM HG: CPT | Mod: CPTII,S$GLB,, | Performed by: NURSE PRACTITIONER

## 2021-03-22 PROCEDURE — 1158F ADVNC CARE PLAN TLK DOCD: CPT | Mod: S$GLB,,, | Performed by: NURSE PRACTITIONER

## 2021-03-22 PROCEDURE — 3075F SYST BP GE 130 - 139MM HG: CPT | Mod: CPTII,S$GLB,, | Performed by: NURSE PRACTITIONER

## 2021-03-22 PROCEDURE — 1126F PR PAIN SEVERITY QUANTIFIED, NO PAIN PRESENT: ICD-10-PCS | Mod: S$GLB,,, | Performed by: NURSE PRACTITIONER

## 2021-03-22 PROCEDURE — G0439 PR MEDICARE ANNUAL WELLNESS SUBSEQUENT VISIT: ICD-10-PCS | Mod: S$GLB,,, | Performed by: NURSE PRACTITIONER

## 2021-03-25 ENCOUNTER — TELEPHONE (OUTPATIENT)
Dept: INTERNAL MEDICINE | Facility: CLINIC | Age: 76
End: 2021-03-25

## 2021-03-26 ENCOUNTER — OFFICE VISIT (OUTPATIENT)
Dept: PODIATRY | Facility: CLINIC | Age: 76
End: 2021-03-26
Payer: MEDICARE

## 2021-03-26 VITALS
HEIGHT: 68 IN | DIASTOLIC BLOOD PRESSURE: 80 MMHG | BODY MASS INDEX: 28.95 KG/M2 | HEART RATE: 65 BPM | WEIGHT: 191 LBS | SYSTOLIC BLOOD PRESSURE: 180 MMHG

## 2021-03-26 DIAGNOSIS — L84 CORN OR CALLUS: Primary | ICD-10-CM

## 2021-03-26 PROCEDURE — 99203 OFFICE O/P NEW LOW 30 MIN: CPT | Mod: ,,, | Performed by: PODIATRIST

## 2021-03-26 PROCEDURE — 1126F AMNT PAIN NOTED NONE PRSNT: CPT | Mod: ,,, | Performed by: PODIATRIST

## 2021-03-26 PROCEDURE — 1159F MED LIST DOCD IN RCRD: CPT | Mod: ,,, | Performed by: PODIATRIST

## 2021-03-26 PROCEDURE — 17999 UNLISTD PX SKN MUC MEMB SUBQ: CPT | Mod: CSM,S$GLB,, | Performed by: PODIATRIST

## 2021-03-26 PROCEDURE — 3077F PR MOST RECENT SYSTOLIC BLOOD PRESSURE >= 140 MM HG: ICD-10-PCS | Mod: CPTII,,, | Performed by: PODIATRIST

## 2021-03-26 PROCEDURE — 3079F DIAST BP 80-89 MM HG: CPT | Mod: CPTII,,, | Performed by: PODIATRIST

## 2021-03-26 PROCEDURE — 17999 PR NON-COVERED FOOT CARE: ICD-10-PCS | Mod: CSM,S$GLB,, | Performed by: PODIATRIST

## 2021-03-26 PROCEDURE — 3008F BODY MASS INDEX DOCD: CPT | Mod: CPTII,,, | Performed by: PODIATRIST

## 2021-03-26 PROCEDURE — 3008F PR BODY MASS INDEX (BMI) DOCUMENTED: ICD-10-PCS | Mod: CPTII,,, | Performed by: PODIATRIST

## 2021-03-26 PROCEDURE — 3079F PR MOST RECENT DIASTOLIC BLOOD PRESSURE 80-89 MM HG: ICD-10-PCS | Mod: CPTII,,, | Performed by: PODIATRIST

## 2021-03-26 PROCEDURE — 99999 PR PBB SHADOW E&M-EST. PATIENT-LVL IV: ICD-10-PCS | Mod: PBBFAC,,, | Performed by: PODIATRIST

## 2021-03-26 PROCEDURE — 99203 PR OFFICE/OUTPT VISIT, NEW, LEVL III, 30-44 MIN: ICD-10-PCS | Mod: ,,, | Performed by: PODIATRIST

## 2021-03-26 PROCEDURE — 3288F PR FALLS RISK ASSESSMENT DOCUMENTED: ICD-10-PCS | Mod: CPTII,,, | Performed by: PODIATRIST

## 2021-03-26 PROCEDURE — 1101F PT FALLS ASSESS-DOCD LE1/YR: CPT | Mod: CPTII,,, | Performed by: PODIATRIST

## 2021-03-26 PROCEDURE — 3288F FALL RISK ASSESSMENT DOCD: CPT | Mod: CPTII,,, | Performed by: PODIATRIST

## 2021-03-26 PROCEDURE — 1159F PR MEDICATION LIST DOCUMENTED IN MEDICAL RECORD: ICD-10-PCS | Mod: ,,, | Performed by: PODIATRIST

## 2021-03-26 PROCEDURE — 1126F PR PAIN SEVERITY QUANTIFIED, NO PAIN PRESENT: ICD-10-PCS | Mod: ,,, | Performed by: PODIATRIST

## 2021-03-26 PROCEDURE — 3077F SYST BP >= 140 MM HG: CPT | Mod: CPTII,,, | Performed by: PODIATRIST

## 2021-03-26 PROCEDURE — 1101F PR PT FALLS ASSESS DOC 0-1 FALLS W/OUT INJ PAST YR: ICD-10-PCS | Mod: CPTII,,, | Performed by: PODIATRIST

## 2021-03-26 PROCEDURE — 99999 PR PBB SHADOW E&M-EST. PATIENT-LVL IV: CPT | Mod: PBBFAC,,, | Performed by: PODIATRIST

## 2021-05-17 ENCOUNTER — TELEPHONE (OUTPATIENT)
Dept: UROGYNECOLOGY | Facility: CLINIC | Age: 76
End: 2021-05-17

## 2021-06-16 ENCOUNTER — TELEPHONE (OUTPATIENT)
Dept: INTERNAL MEDICINE | Facility: CLINIC | Age: 76
End: 2021-06-16

## 2021-07-06 ENCOUNTER — HOSPITAL ENCOUNTER (OUTPATIENT)
Dept: RADIOLOGY | Facility: HOSPITAL | Age: 76
Discharge: HOME | End: 2021-07-06
Attending: INTERNAL MEDICINE
Payer: MEDICARE

## 2021-07-06 VITALS — WEIGHT: 190 LBS | HEIGHT: 68 IN | BODY MASS INDEX: 28.79 KG/M2

## 2021-07-06 DIAGNOSIS — Z12.31 ENCOUNTER FOR SCREENING MAMMOGRAM FOR MALIGNANT NEOPLASM OF BREAST: ICD-10-CM

## 2021-07-06 PROCEDURE — 77063 MAMMO DIGITAL SCREENING BILAT WITH TOMO: ICD-10-PCS | Mod: 26,,, | Performed by: RADIOLOGY

## 2021-07-06 PROCEDURE — 77067 SCR MAMMO BI INCL CAD: CPT | Mod: TC

## 2021-07-06 PROCEDURE — 77067 SCR MAMMO BI INCL CAD: CPT | Mod: 26,,, | Performed by: RADIOLOGY

## 2021-07-06 PROCEDURE — 77067 MAMMO DIGITAL SCREENING BILAT WITH TOMO: ICD-10-PCS | Mod: 26,,, | Performed by: RADIOLOGY

## 2021-07-06 PROCEDURE — 77063 BREAST TOMOSYNTHESIS BI: CPT | Mod: 26,,, | Performed by: RADIOLOGY

## 2021-07-30 ENCOUNTER — PATIENT OUTREACH (OUTPATIENT)
Dept: ADMINISTRATIVE | Facility: HOSPITAL | Age: 76
End: 2021-07-30

## 2021-08-06 ENCOUNTER — LAB VISIT (OUTPATIENT)
Dept: LAB | Facility: HOSPITAL | Age: 76
End: 2021-08-06
Attending: INTERNAL MEDICINE
Payer: MEDICARE

## 2021-08-06 DIAGNOSIS — I10 ESSENTIAL HYPERTENSION: ICD-10-CM

## 2021-08-06 DIAGNOSIS — E03.9 HYPOTHYROIDISM, UNSPECIFIED TYPE: ICD-10-CM

## 2021-08-06 DIAGNOSIS — E78.5 HYPERLIPIDEMIA, UNSPECIFIED HYPERLIPIDEMIA TYPE: ICD-10-CM

## 2021-08-06 LAB
ALBUMIN SERPL BCP-MCNC: 3.7 G/DL (ref 3.5–5.2)
ALP SERPL-CCNC: 82 U/L (ref 55–135)
ALT SERPL W/O P-5'-P-CCNC: 20 U/L (ref 10–44)
ANION GAP SERPL CALC-SCNC: 11 MMOL/L (ref 8–16)
AST SERPL-CCNC: 27 U/L (ref 10–40)
BASOPHILS # BLD AUTO: 0.05 K/UL (ref 0–0.2)
BASOPHILS NFR BLD: 1 % (ref 0–1.9)
BILIRUB SERPL-MCNC: 0.6 MG/DL (ref 0.1–1)
BUN SERPL-MCNC: 10 MG/DL (ref 8–23)
CALCIUM SERPL-MCNC: 9.8 MG/DL (ref 8.7–10.5)
CHLORIDE SERPL-SCNC: 103 MMOL/L (ref 95–110)
CHOLEST SERPL-MCNC: 166 MG/DL (ref 120–199)
CHOLEST/HDLC SERPL: 3.2 {RATIO} (ref 2–5)
CO2 SERPL-SCNC: 26 MMOL/L (ref 23–29)
CREAT SERPL-MCNC: 0.9 MG/DL (ref 0.5–1.4)
DIFFERENTIAL METHOD: NORMAL
EOSINOPHIL # BLD AUTO: 0.1 K/UL (ref 0–0.5)
EOSINOPHIL NFR BLD: 1.2 % (ref 0–8)
ERYTHROCYTE [DISTWIDTH] IN BLOOD BY AUTOMATED COUNT: 13.1 % (ref 11.5–14.5)
EST. GFR  (AFRICAN AMERICAN): >60 ML/MIN/1.73 M^2
EST. GFR  (NON AFRICAN AMERICAN): >60 ML/MIN/1.73 M^2
GLUCOSE SERPL-MCNC: 87 MG/DL (ref 70–110)
HCT VFR BLD AUTO: 43.6 % (ref 37–48.5)
HDLC SERPL-MCNC: 52 MG/DL (ref 40–75)
HDLC SERPL: 31.3 % (ref 20–50)
HGB BLD-MCNC: 14.2 G/DL (ref 12–16)
IMM GRANULOCYTES # BLD AUTO: 0.01 K/UL (ref 0–0.04)
IMM GRANULOCYTES NFR BLD AUTO: 0.2 % (ref 0–0.5)
LDLC SERPL CALC-MCNC: 96.4 MG/DL (ref 63–159)
LYMPHOCYTES # BLD AUTO: 2.3 K/UL (ref 1–4.8)
LYMPHOCYTES NFR BLD: 45.3 % (ref 18–48)
MCH RBC QN AUTO: 29 PG (ref 27–31)
MCHC RBC AUTO-ENTMCNC: 32.6 G/DL (ref 32–36)
MCV RBC AUTO: 89 FL (ref 82–98)
MONOCYTES # BLD AUTO: 0.5 K/UL (ref 0.3–1)
MONOCYTES NFR BLD: 10.1 % (ref 4–15)
NEUTROPHILS # BLD AUTO: 2.2 K/UL (ref 1.8–7.7)
NEUTROPHILS NFR BLD: 42.2 % (ref 38–73)
NONHDLC SERPL-MCNC: 114 MG/DL
NRBC BLD-RTO: 0 /100 WBC
PLATELET # BLD AUTO: 266 K/UL (ref 150–450)
PMV BLD AUTO: 10.1 FL (ref 9.2–12.9)
POTASSIUM SERPL-SCNC: 4.5 MMOL/L (ref 3.5–5.1)
PROT SERPL-MCNC: 7.6 G/DL (ref 6–8.4)
RBC # BLD AUTO: 4.9 M/UL (ref 4–5.4)
SODIUM SERPL-SCNC: 140 MMOL/L (ref 136–145)
TRIGL SERPL-MCNC: 88 MG/DL (ref 30–150)
TSH SERPL DL<=0.005 MIU/L-ACNC: 2.91 UIU/ML (ref 0.4–4)
WBC # BLD AUTO: 5.17 K/UL (ref 3.9–12.7)

## 2021-08-06 PROCEDURE — 84443 ASSAY THYROID STIM HORMONE: CPT | Performed by: INTERNAL MEDICINE

## 2021-08-06 PROCEDURE — 36415 COLL VENOUS BLD VENIPUNCTURE: CPT | Performed by: INTERNAL MEDICINE

## 2021-08-06 PROCEDURE — 80061 LIPID PANEL: CPT | Performed by: INTERNAL MEDICINE

## 2021-08-06 PROCEDURE — 80053 COMPREHEN METABOLIC PANEL: CPT | Performed by: INTERNAL MEDICINE

## 2021-08-06 PROCEDURE — 85025 COMPLETE CBC W/AUTO DIFF WBC: CPT | Performed by: INTERNAL MEDICINE

## 2021-08-13 ENCOUNTER — OFFICE VISIT (OUTPATIENT)
Dept: INTERNAL MEDICINE | Facility: CLINIC | Age: 76
End: 2021-08-13
Payer: MEDICARE

## 2021-08-13 DIAGNOSIS — Z12.11 COLON CANCER SCREENING: ICD-10-CM

## 2021-08-13 DIAGNOSIS — E78.5 HYPERLIPIDEMIA, UNSPECIFIED HYPERLIPIDEMIA TYPE: ICD-10-CM

## 2021-08-13 DIAGNOSIS — I10 ESSENTIAL HYPERTENSION: Primary | ICD-10-CM

## 2021-08-13 PROCEDURE — 3008F PR BODY MASS INDEX (BMI) DOCUMENTED: ICD-10-PCS | Mod: CPTII,S$GLB,, | Performed by: INTERNAL MEDICINE

## 2021-08-13 PROCEDURE — 3288F PR FALLS RISK ASSESSMENT DOCUMENTED: ICD-10-PCS | Mod: CPTII,S$GLB,, | Performed by: INTERNAL MEDICINE

## 2021-08-13 PROCEDURE — 99214 OFFICE O/P EST MOD 30 MIN: CPT | Mod: S$GLB,,, | Performed by: INTERNAL MEDICINE

## 2021-08-13 PROCEDURE — 1159F PR MEDICATION LIST DOCUMENTED IN MEDICAL RECORD: ICD-10-PCS | Mod: CPTII,S$GLB,, | Performed by: INTERNAL MEDICINE

## 2021-08-13 PROCEDURE — 1126F AMNT PAIN NOTED NONE PRSNT: CPT | Mod: CPTII,S$GLB,, | Performed by: INTERNAL MEDICINE

## 2021-08-13 PROCEDURE — 99499 RISK ADDL DX/OHS AUDIT: ICD-10-PCS | Mod: HCNC,S$GLB,, | Performed by: INTERNAL MEDICINE

## 2021-08-13 PROCEDURE — 3078F DIAST BP <80 MM HG: CPT | Mod: CPTII,S$GLB,, | Performed by: INTERNAL MEDICINE

## 2021-08-13 PROCEDURE — 99214 PR OFFICE/OUTPT VISIT, EST, LEVL IV, 30-39 MIN: ICD-10-PCS | Mod: S$GLB,,, | Performed by: INTERNAL MEDICINE

## 2021-08-13 PROCEDURE — 99999 PR PBB SHADOW E&M-EST. PATIENT-LVL IV: CPT | Mod: PBBFAC,,, | Performed by: INTERNAL MEDICINE

## 2021-08-13 PROCEDURE — 99999 PR PBB SHADOW E&M-EST. PATIENT-LVL IV: ICD-10-PCS | Mod: PBBFAC,,, | Performed by: INTERNAL MEDICINE

## 2021-08-13 PROCEDURE — 1159F MED LIST DOCD IN RCRD: CPT | Mod: CPTII,S$GLB,, | Performed by: INTERNAL MEDICINE

## 2021-08-13 PROCEDURE — 3075F PR MOST RECENT SYSTOLIC BLOOD PRESS GE 130-139MM HG: ICD-10-PCS | Mod: CPTII,S$GLB,, | Performed by: INTERNAL MEDICINE

## 2021-08-13 PROCEDURE — 99499 UNLISTED E&M SERVICE: CPT | Mod: HCNC,S$GLB,, | Performed by: INTERNAL MEDICINE

## 2021-08-13 PROCEDURE — 3078F PR MOST RECENT DIASTOLIC BLOOD PRESSURE < 80 MM HG: ICD-10-PCS | Mod: CPTII,S$GLB,, | Performed by: INTERNAL MEDICINE

## 2021-08-13 PROCEDURE — 3008F BODY MASS INDEX DOCD: CPT | Mod: CPTII,S$GLB,, | Performed by: INTERNAL MEDICINE

## 2021-08-13 PROCEDURE — 3075F SYST BP GE 130 - 139MM HG: CPT | Mod: CPTII,S$GLB,, | Performed by: INTERNAL MEDICINE

## 2021-08-13 PROCEDURE — 1126F PR PAIN SEVERITY QUANTIFIED, NO PAIN PRESENT: ICD-10-PCS | Mod: CPTII,S$GLB,, | Performed by: INTERNAL MEDICINE

## 2021-08-13 PROCEDURE — 1101F PR PT FALLS ASSESS DOC 0-1 FALLS W/OUT INJ PAST YR: ICD-10-PCS | Mod: CPTII,S$GLB,, | Performed by: INTERNAL MEDICINE

## 2021-08-13 PROCEDURE — 1101F PT FALLS ASSESS-DOCD LE1/YR: CPT | Mod: CPTII,S$GLB,, | Performed by: INTERNAL MEDICINE

## 2021-08-13 PROCEDURE — 3288F FALL RISK ASSESSMENT DOCD: CPT | Mod: CPTII,S$GLB,, | Performed by: INTERNAL MEDICINE

## 2021-08-14 VITALS
TEMPERATURE: 98 F | SYSTOLIC BLOOD PRESSURE: 132 MMHG | WEIGHT: 192.69 LBS | DIASTOLIC BLOOD PRESSURE: 60 MMHG | HEART RATE: 63 BPM | OXYGEN SATURATION: 99 % | BODY MASS INDEX: 29.2 KG/M2 | HEIGHT: 68 IN

## 2021-08-19 RX ORDER — VALSARTAN 320 MG/1
320 TABLET ORAL DAILY
Qty: 90 TABLET | Refills: 3 | Status: SHIPPED | OUTPATIENT
Start: 2021-08-19 | End: 2022-01-21 | Stop reason: SDUPTHER

## 2021-08-26 ENCOUNTER — TELEPHONE (OUTPATIENT)
Dept: INTERNAL MEDICINE | Facility: CLINIC | Age: 76
End: 2021-08-26

## 2021-08-26 DIAGNOSIS — I10 ESSENTIAL HYPERTENSION: ICD-10-CM

## 2021-08-26 RX ORDER — NIFEDIPINE 90 MG/1
90 TABLET, EXTENDED RELEASE ORAL DAILY
Qty: 90 TABLET | Refills: 2 | Status: SHIPPED | OUTPATIENT
Start: 2021-08-26 | End: 2021-09-12

## 2021-09-24 ENCOUNTER — LAB VISIT (OUTPATIENT)
Dept: LAB | Facility: HOSPITAL | Age: 76
End: 2021-09-24
Attending: INTERNAL MEDICINE
Payer: MEDICARE

## 2021-09-24 DIAGNOSIS — Z12.11 COLON CANCER SCREENING: ICD-10-CM

## 2021-09-24 PROCEDURE — 82274 ASSAY TEST FOR BLOOD FECAL: CPT | Performed by: INTERNAL MEDICINE

## 2021-09-28 DIAGNOSIS — E03.4 HYPOTHYROIDISM DUE TO ACQUIRED ATROPHY OF THYROID: ICD-10-CM

## 2021-09-28 RX ORDER — LEVOTHYROXINE SODIUM 100 UG/1
TABLET ORAL
Qty: 90 TABLET | Refills: 2 | Status: SHIPPED | OUTPATIENT
Start: 2021-09-28 | End: 2022-01-21 | Stop reason: SDUPTHER

## 2021-09-30 LAB — HEMOCCULT STL QL IA: NEGATIVE

## 2021-10-25 ENCOUNTER — PATIENT MESSAGE (OUTPATIENT)
Dept: INTERNAL MEDICINE | Facility: CLINIC | Age: 76
End: 2021-10-25
Payer: MEDICARE

## 2021-10-27 ENCOUNTER — OFFICE VISIT (OUTPATIENT)
Dept: OPTOMETRY | Facility: CLINIC | Age: 76
End: 2021-10-27
Payer: MEDICARE

## 2021-10-27 DIAGNOSIS — H40.001 GLAUCOMA SUSPECT, RIGHT EYE: ICD-10-CM

## 2021-10-27 DIAGNOSIS — H52.203 ASTIGMATISM OF BOTH EYES, UNSPECIFIED TYPE: ICD-10-CM

## 2021-10-27 DIAGNOSIS — H25.011 CORTICAL AGE-RELATED CATARACT, RIGHT EYE: ICD-10-CM

## 2021-10-27 DIAGNOSIS — H52.4 PRESBYOPIA OF BOTH EYES: ICD-10-CM

## 2021-10-27 DIAGNOSIS — H40.053 OCULAR HYPERTENSION, BILATERAL: Primary | ICD-10-CM

## 2021-10-27 DIAGNOSIS — H40.1121 PRIMARY OPEN ANGLE GLAUCOMA (POAG) OF LEFT EYE, MILD STAGE: ICD-10-CM

## 2021-10-27 DIAGNOSIS — H34.8122 CENTRAL RETINAL VEIN OCCLUSION OF LEFT EYE, UNSPECIFIED COMPLICATION STATUS: ICD-10-CM

## 2021-10-27 DIAGNOSIS — H25.13 NUCLEAR SCLEROSIS, BILATERAL: ICD-10-CM

## 2021-10-27 PROCEDURE — 4010F PR ACE/ARB THEARPY RXD/TAKEN: ICD-10-PCS | Mod: CPTII,S$GLB,, | Performed by: OPTOMETRIST

## 2021-10-27 PROCEDURE — 99999 PR PBB SHADOW E&M-EST. PATIENT-LVL II: CPT | Mod: PBBFAC,,, | Performed by: OPTOMETRIST

## 2021-10-27 PROCEDURE — 1159F PR MEDICATION LIST DOCUMENTED IN MEDICAL RECORD: ICD-10-PCS | Mod: CPTII,S$GLB,, | Performed by: OPTOMETRIST

## 2021-10-27 PROCEDURE — 4010F ACE/ARB THERAPY RXD/TAKEN: CPT | Mod: CPTII,S$GLB,, | Performed by: OPTOMETRIST

## 2021-10-27 PROCEDURE — 92015 PR REFRACTION: ICD-10-PCS | Mod: S$GLB,,, | Performed by: OPTOMETRIST

## 2021-10-27 PROCEDURE — 99499 RISK ADDL DX/OHS AUDIT: ICD-10-PCS | Mod: S$GLB,,, | Performed by: OPTOMETRIST

## 2021-10-27 PROCEDURE — 92014 PR EYE EXAM, EST PATIENT,COMPREHESV: ICD-10-PCS | Mod: S$GLB,,, | Performed by: OPTOMETRIST

## 2021-10-27 PROCEDURE — 99499 UNLISTED E&M SERVICE: CPT | Mod: S$GLB,,, | Performed by: OPTOMETRIST

## 2021-10-27 PROCEDURE — 92014 COMPRE OPH EXAM EST PT 1/>: CPT | Mod: S$GLB,,, | Performed by: OPTOMETRIST

## 2021-10-27 PROCEDURE — 99999 PR PBB SHADOW E&M-EST. PATIENT-LVL II: ICD-10-PCS | Mod: PBBFAC,,, | Performed by: OPTOMETRIST

## 2021-10-27 PROCEDURE — 92015 DETERMINE REFRACTIVE STATE: CPT | Mod: S$GLB,,, | Performed by: OPTOMETRIST

## 2021-10-27 PROCEDURE — 1159F MED LIST DOCD IN RCRD: CPT | Mod: CPTII,S$GLB,, | Performed by: OPTOMETRIST

## 2021-11-05 ENCOUNTER — IMMUNIZATION (OUTPATIENT)
Dept: INTERNAL MEDICINE | Facility: CLINIC | Age: 76
End: 2021-11-05
Payer: MEDICARE

## 2021-11-05 DIAGNOSIS — Z23 NEED FOR VACCINATION: Primary | ICD-10-CM

## 2021-11-05 PROCEDURE — 0064A COVID-19, MRNA, LNP-S, PF, 100 MCG/0.25 ML DOSE VACCINE (MODERNA BOOSTER): CPT | Mod: HCNC,CV19,PBBFAC | Performed by: INTERNAL MEDICINE

## 2022-01-03 ENCOUNTER — OFFICE VISIT (OUTPATIENT)
Dept: OPTOMETRY | Facility: CLINIC | Age: 77
End: 2022-01-03
Payer: MEDICARE

## 2022-01-03 ENCOUNTER — CLINICAL SUPPORT (OUTPATIENT)
Dept: OPHTHALMOLOGY | Facility: CLINIC | Age: 77
End: 2022-01-03
Payer: MEDICARE

## 2022-01-03 DIAGNOSIS — H40.001 GLAUCOMA SUSPECT, RIGHT EYE: ICD-10-CM

## 2022-01-03 DIAGNOSIS — H40.053 OCULAR HYPERTENSION, BILATERAL: Primary | ICD-10-CM

## 2022-01-03 DIAGNOSIS — H34.8122 CENTRAL RETINAL VEIN OCCLUSION OF LEFT EYE, UNSPECIFIED COMPLICATION STATUS: ICD-10-CM

## 2022-01-03 DIAGNOSIS — H40.1121 PRIMARY OPEN ANGLE GLAUCOMA (POAG) OF LEFT EYE, MILD STAGE: ICD-10-CM

## 2022-01-03 DIAGNOSIS — H40.053 OCULAR HYPERTENSION, BILATERAL: ICD-10-CM

## 2022-01-03 DIAGNOSIS — H25.011 CORTICAL AGE-RELATED CATARACT, RIGHT EYE: ICD-10-CM

## 2022-01-03 DIAGNOSIS — H25.13 NUCLEAR SCLEROSIS, BILATERAL: ICD-10-CM

## 2022-01-03 DIAGNOSIS — H40.1122 PRIMARY OPEN ANGLE GLAUCOMA (POAG) OF LEFT EYE, MODERATE STAGE: ICD-10-CM

## 2022-01-03 PROCEDURE — 99999 PR PBB SHADOW E&M-EST. PATIENT-LVL III: ICD-10-PCS | Mod: PBBFAC,,, | Performed by: OPTOMETRIST

## 2022-01-03 PROCEDURE — 92012 PR EYE EXAM, EST PATIENT,INTERMED: ICD-10-PCS | Mod: S$GLB,,, | Performed by: OPTOMETRIST

## 2022-01-03 PROCEDURE — 1101F PT FALLS ASSESS-DOCD LE1/YR: CPT | Mod: CPTII,S$GLB,, | Performed by: OPTOMETRIST

## 2022-01-03 PROCEDURE — 1101F PR PT FALLS ASSESS DOC 0-1 FALLS W/OUT INJ PAST YR: ICD-10-PCS | Mod: CPTII,S$GLB,, | Performed by: OPTOMETRIST

## 2022-01-03 PROCEDURE — 1159F MED LIST DOCD IN RCRD: CPT | Mod: CPTII,S$GLB,, | Performed by: OPTOMETRIST

## 2022-01-03 PROCEDURE — 92012 INTRM OPH EXAM EST PATIENT: CPT | Mod: S$GLB,,, | Performed by: OPTOMETRIST

## 2022-01-03 PROCEDURE — 99999 PR PBB SHADOW E&M-EST. PATIENT-LVL III: CPT | Mod: PBBFAC,,, | Performed by: OPTOMETRIST

## 2022-01-03 PROCEDURE — 1126F PR PAIN SEVERITY QUANTIFIED, NO PAIN PRESENT: ICD-10-PCS | Mod: CPTII,S$GLB,, | Performed by: OPTOMETRIST

## 2022-01-03 PROCEDURE — 1159F PR MEDICATION LIST DOCUMENTED IN MEDICAL RECORD: ICD-10-PCS | Mod: CPTII,S$GLB,, | Performed by: OPTOMETRIST

## 2022-01-03 PROCEDURE — 3288F FALL RISK ASSESSMENT DOCD: CPT | Mod: CPTII,S$GLB,, | Performed by: OPTOMETRIST

## 2022-01-03 PROCEDURE — 3288F PR FALLS RISK ASSESSMENT DOCUMENTED: ICD-10-PCS | Mod: CPTII,S$GLB,, | Performed by: OPTOMETRIST

## 2022-01-03 PROCEDURE — 1126F AMNT PAIN NOTED NONE PRSNT: CPT | Mod: CPTII,S$GLB,, | Performed by: OPTOMETRIST

## 2022-01-03 NOTE — PATIENT INSTRUCTIONS
Nuclear sclerosis of lens of both eyes, consistent with age.  Peripheral cortical cataract in the right eye.     Astigmatic refractive error in each eye, per refraction done at last visit, with best-corrected VA of 20/25-1 in the right eye and 20/25 (-) in the left eye  No change made to the last spectacle lens Rx issued .     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  Has been followed in retina clinic in the past, and low-dose aspirin therapy recommended.  Ms. Block states she is no longer taking aspirin, as she was advised by her physician that she did not need to be taking aspirin.      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     Currently using Timolol Maleate 0.5% gel-forming ophthalmic solution in each eye every evening for IOP control.   IOP within normal range in each eye today (18 mm Hg in the right eye and 17 mm Hg in the left eye).  Repeat HVF test (24-2 LUKAS Standard) done today.  Reviewed test results:      OD  Normal visual field - no evidence of glaucomatous visual field defedt       OS  Superior arcuate visual field defect, consistent with moderate stage glaucoma      Continue drops in both eyes as noted above    Recheck in Oct, 2022 with repeat HVF at that time.

## 2022-01-03 NOTE — PROGRESS NOTES
Visual field test done.  Patient stated no latex allergies used coverlet       Sphere Cylinder Axis Dist VA Add   Right -0.25 +1.50 002 20/25-1 +2.50   Left [place] +1.00 180 20/25 (-) +2.50   Comments: ST bifocal, OR single vision distance lenses and/or single vision reading lenses.

## 2022-01-09 DIAGNOSIS — Z29.9 PREVENTIVE MEASURE: ICD-10-CM

## 2022-01-09 DIAGNOSIS — I10 ESSENTIAL HYPERTENSION: ICD-10-CM

## 2022-01-09 NOTE — TELEPHONE ENCOUNTER
No new care gaps identified.  Powered by [employer]. Reference number: [id number].   1/09/2022 10:55:49 AM CST

## 2022-01-12 RX ORDER — CYANOCOBALAMIN/FOLIC AC/VIT B6 1-2.5-25MG
TABLET ORAL
Qty: 90 TABLET | Refills: 3 | Status: SHIPPED | OUTPATIENT
Start: 2022-01-12 | End: 2023-03-27

## 2022-01-12 RX ORDER — NIFEDIPINE 90 MG/1
90 TABLET, EXTENDED RELEASE ORAL DAILY
Qty: 90 TABLET | Refills: 2 | Status: SHIPPED | OUTPATIENT
Start: 2022-01-12 | End: 2022-03-03 | Stop reason: SDUPTHER

## 2022-01-12 NOTE — TELEPHONE ENCOUNTER
Refill Routing Note   Medication(s) are not appropriate for processing by [facility] for the following reason(s):      - Outside of protocol    [facility] action(s):  Approve  Route          --->Care Gap information included in message below if applicable.   Medication reconciliation completed: No   Automatic Epic Generated Protocol Data:    Orders Placed This Encounter    NIFEdipine (PROCARDIA-XL) 90 MG (OSM) 24 hr tablet      Requested Prescriptions   Pending Prescriptions Disp Refills    FOLBEE 2.5-25-1 mg Tab [Pharmacy Med Name: FOLBEE TABLET] 90 tablet 3     Sig: TAKE 1 TABLET BY MOUTH EVERY DAY       There is no refill protocol information for this order      Signed Prescriptions Disp Refills    NIFEdipine (PROCARDIA-XL) 90 MG (OSM) 24 hr tablet 90 tablet 2     Sig: Take 1 tablet (90 mg total) by mouth once daily.       Cardiovascular:  Calcium Channel Blockers Passed - 1/9/2022 10:55 AM        Passed - Patient is at least 18 years old        Passed - Last BP in normal range within 360 days     BP Readings from Last 1 Encounters:   08/14/21 132/60               Passed - Valid encounter within last 15 months     Recent Visits  Date Type Provider Dept   08/13/21 Office Visit Sandra Lyon MD [facility]   03/12/21 Office Visit Sandra Lyon MD [facility]   12/22/20 Office Visit Sandra Lyon MD [facility]   09/24/20 Office Visit Sandra Lyon MD [facility]   04/24/20 Office Visit Sandra Lyon MD [facility]   Showing recent visits within past 720 days and meeting all other requirements  Future Appointments  No visits were found meeting these conditions.  Showing future appointments within next 150 days and meeting all other requirements      Future Appointments              In 3 weeks LAB, APPOINTMENT [facility] (Venipuncture), [facility]    In 1 month MD Amrik Overton [facility]- Primary [facility]                       Appointments  past 12m or future 3m with PCP    Date Provider   Last Visit   8/13/2021 Sandra Lyon MD   Next Visit   2/15/2022 Sandra Lyon MD   ED visits in past 90 days: 0        Note composed:3:47 PM 01/12/2022

## 2022-01-21 DIAGNOSIS — N81.10 FEMALE CYSTOCELE: Primary | ICD-10-CM

## 2022-01-21 DIAGNOSIS — N99.3 PROLAPSE OF VAGINAL VAULT AFTER HYSTERECTOMY: ICD-10-CM

## 2022-01-21 DIAGNOSIS — M19.90 OSTEOARTHRITIS, UNSPECIFIED OSTEOARTHRITIS TYPE, UNSPECIFIED SITE: ICD-10-CM

## 2022-01-21 DIAGNOSIS — I10 ESSENTIAL HYPERTENSION: ICD-10-CM

## 2022-01-21 DIAGNOSIS — E03.4 HYPOTHYROIDISM DUE TO ACQUIRED ATROPHY OF THYROID: ICD-10-CM

## 2022-01-21 DIAGNOSIS — E78.5 HYPERLIPIDEMIA, UNSPECIFIED HYPERLIPIDEMIA TYPE: ICD-10-CM

## 2022-01-21 RX ORDER — LEVOTHYROXINE SODIUM 100 UG/1
TABLET ORAL
Qty: 90 TABLET | Refills: 2 | Status: SHIPPED | OUTPATIENT
Start: 2022-01-21 | End: 2022-09-20

## 2022-01-21 RX ORDER — MELOXICAM 15 MG/1
TABLET ORAL
Qty: 30 TABLET | Refills: 0 | Status: SHIPPED | OUTPATIENT
Start: 2022-01-21 | End: 2022-08-17 | Stop reason: SDUPTHER

## 2022-01-21 RX ORDER — HYDROCHLOROTHIAZIDE 12.5 MG/1
TABLET ORAL
Qty: 90 TABLET | Refills: 2 | Status: SHIPPED | OUTPATIENT
Start: 2022-01-21 | End: 2023-07-27

## 2022-01-21 RX ORDER — SIMVASTATIN 40 MG/1
40 TABLET, FILM COATED ORAL NIGHTLY
Qty: 90 TABLET | Refills: 2 | Status: SHIPPED | OUTPATIENT
Start: 2022-01-21 | End: 2022-10-05

## 2022-01-21 RX ORDER — ESTRADIOL 0.1 MG/G
1 CREAM VAGINAL
Qty: 42.5 G | Refills: 11 | Status: SHIPPED | OUTPATIENT
Start: 2022-01-21 | End: 2022-03-31 | Stop reason: SDUPTHER

## 2022-01-21 RX ORDER — CARVEDILOL 6.25 MG/1
6.25 TABLET ORAL 2 TIMES DAILY WITH MEALS
Qty: 180 TABLET | Refills: 1 | Status: SHIPPED | OUTPATIENT
Start: 2022-01-21 | End: 2022-07-12

## 2022-01-21 RX ORDER — VALSARTAN 320 MG/1
320 TABLET ORAL DAILY
Qty: 90 TABLET | Refills: 2 | Status: SHIPPED | OUTPATIENT
Start: 2022-01-21 | End: 2022-09-20

## 2022-01-21 NOTE — TELEPHONE ENCOUNTER
No new care gaps identified.  Powered by [employer]. Reference number: [id number].   1/21/2022 10:19:05 AM CST

## 2022-02-08 ENCOUNTER — LAB VISIT (OUTPATIENT)
Dept: LAB | Facility: HOSPITAL | Age: 77
End: 2022-02-08
Attending: INTERNAL MEDICINE
Payer: MEDICARE

## 2022-02-08 DIAGNOSIS — I10 ESSENTIAL HYPERTENSION: ICD-10-CM

## 2022-02-08 DIAGNOSIS — E78.5 HYPERLIPIDEMIA, UNSPECIFIED HYPERLIPIDEMIA TYPE: ICD-10-CM

## 2022-02-08 LAB
ALBUMIN SERPL BCP-MCNC: 3.8 G/DL (ref 3.5–5.2)
ALP SERPL-CCNC: 86 U/L (ref 55–135)
ALT SERPL W/O P-5'-P-CCNC: 17 U/L (ref 10–44)
ANION GAP SERPL CALC-SCNC: 8 MMOL/L (ref 8–16)
AST SERPL-CCNC: 23 U/L (ref 10–40)
BILIRUB SERPL-MCNC: 0.4 MG/DL (ref 0.1–1)
BUN SERPL-MCNC: 9 MG/DL (ref 8–23)
CALCIUM SERPL-MCNC: 9.8 MG/DL (ref 8.7–10.5)
CHLORIDE SERPL-SCNC: 104 MMOL/L (ref 95–110)
CHOLEST SERPL-MCNC: 156 MG/DL (ref 120–199)
CHOLEST/HDLC SERPL: 3.2 {RATIO} (ref 2–5)
CO2 SERPL-SCNC: 26 MMOL/L (ref 23–29)
CREAT SERPL-MCNC: 0.8 MG/DL (ref 0.5–1.4)
EST. GFR  (AFRICAN AMERICAN): >60 ML/MIN/1.73 M^2
EST. GFR  (NON AFRICAN AMERICAN): >60 ML/MIN/1.73 M^2
GLUCOSE SERPL-MCNC: 95 MG/DL (ref 70–110)
HDLC SERPL-MCNC: 49 MG/DL (ref 40–75)
HDLC SERPL: 31.4 % (ref 20–50)
LDLC SERPL CALC-MCNC: 84.2 MG/DL (ref 63–159)
NONHDLC SERPL-MCNC: 107 MG/DL
POTASSIUM SERPL-SCNC: 4 MMOL/L (ref 3.5–5.1)
PROT SERPL-MCNC: 8.1 G/DL (ref 6–8.4)
SODIUM SERPL-SCNC: 138 MMOL/L (ref 136–145)
TRIGL SERPL-MCNC: 114 MG/DL (ref 30–150)

## 2022-02-08 PROCEDURE — 80053 COMPREHEN METABOLIC PANEL: CPT | Performed by: INTERNAL MEDICINE

## 2022-02-08 PROCEDURE — 80061 LIPID PANEL: CPT | Performed by: INTERNAL MEDICINE

## 2022-02-08 PROCEDURE — 36415 COLL VENOUS BLD VENIPUNCTURE: CPT | Performed by: INTERNAL MEDICINE

## 2022-02-15 ENCOUNTER — OFFICE VISIT (OUTPATIENT)
Dept: INTERNAL MEDICINE | Facility: CLINIC | Age: 77
End: 2022-02-15
Payer: MEDICARE

## 2022-02-15 VITALS
HEART RATE: 69 BPM | SYSTOLIC BLOOD PRESSURE: 126 MMHG | WEIGHT: 192.88 LBS | HEIGHT: 68 IN | DIASTOLIC BLOOD PRESSURE: 60 MMHG | TEMPERATURE: 98 F | BODY MASS INDEX: 29.23 KG/M2 | OXYGEN SATURATION: 98 %

## 2022-02-15 DIAGNOSIS — E03.9 HYPOTHYROIDISM, UNSPECIFIED TYPE: ICD-10-CM

## 2022-02-15 DIAGNOSIS — E78.5 HYPERLIPIDEMIA, UNSPECIFIED HYPERLIPIDEMIA TYPE: ICD-10-CM

## 2022-02-15 DIAGNOSIS — I10 ESSENTIAL HYPERTENSION: Primary | ICD-10-CM

## 2022-02-15 DIAGNOSIS — E28.39 ESTROGEN DEFICIENCY: ICD-10-CM

## 2022-02-15 DIAGNOSIS — D12.6 ADENOMATOUS POLYP OF COLON, UNSPECIFIED PART OF COLON: ICD-10-CM

## 2022-02-15 PROCEDURE — 3074F PR MOST RECENT SYSTOLIC BLOOD PRESSURE < 130 MM HG: ICD-10-PCS | Mod: CPTII,S$GLB,, | Performed by: INTERNAL MEDICINE

## 2022-02-15 PROCEDURE — 99214 OFFICE O/P EST MOD 30 MIN: CPT | Mod: S$GLB,,, | Performed by: INTERNAL MEDICINE

## 2022-02-15 PROCEDURE — 1126F PR PAIN SEVERITY QUANTIFIED, NO PAIN PRESENT: ICD-10-PCS | Mod: CPTII,S$GLB,, | Performed by: INTERNAL MEDICINE

## 2022-02-15 PROCEDURE — 1159F PR MEDICATION LIST DOCUMENTED IN MEDICAL RECORD: ICD-10-PCS | Mod: CPTII,S$GLB,, | Performed by: INTERNAL MEDICINE

## 2022-02-15 PROCEDURE — 1159F MED LIST DOCD IN RCRD: CPT | Mod: CPTII,S$GLB,, | Performed by: INTERNAL MEDICINE

## 2022-02-15 PROCEDURE — 3078F PR MOST RECENT DIASTOLIC BLOOD PRESSURE < 80 MM HG: ICD-10-PCS | Mod: CPTII,S$GLB,, | Performed by: INTERNAL MEDICINE

## 2022-02-15 PROCEDURE — 3074F SYST BP LT 130 MM HG: CPT | Mod: CPTII,S$GLB,, | Performed by: INTERNAL MEDICINE

## 2022-02-15 PROCEDURE — 99999 PR PBB SHADOW E&M-EST. PATIENT-LVL IV: CPT | Mod: PBBFAC,,, | Performed by: INTERNAL MEDICINE

## 2022-02-15 PROCEDURE — 1126F AMNT PAIN NOTED NONE PRSNT: CPT | Mod: CPTII,S$GLB,, | Performed by: INTERNAL MEDICINE

## 2022-02-15 PROCEDURE — 3288F PR FALLS RISK ASSESSMENT DOCUMENTED: ICD-10-PCS | Mod: CPTII,S$GLB,, | Performed by: INTERNAL MEDICINE

## 2022-02-15 PROCEDURE — 1101F PT FALLS ASSESS-DOCD LE1/YR: CPT | Mod: CPTII,S$GLB,, | Performed by: INTERNAL MEDICINE

## 2022-02-15 PROCEDURE — 99214 PR OFFICE/OUTPT VISIT, EST, LEVL IV, 30-39 MIN: ICD-10-PCS | Mod: S$GLB,,, | Performed by: INTERNAL MEDICINE

## 2022-02-15 PROCEDURE — 99999 PR PBB SHADOW E&M-EST. PATIENT-LVL IV: ICD-10-PCS | Mod: PBBFAC,,, | Performed by: INTERNAL MEDICINE

## 2022-02-15 PROCEDURE — 3078F DIAST BP <80 MM HG: CPT | Mod: CPTII,S$GLB,, | Performed by: INTERNAL MEDICINE

## 2022-02-15 PROCEDURE — 3288F FALL RISK ASSESSMENT DOCD: CPT | Mod: CPTII,S$GLB,, | Performed by: INTERNAL MEDICINE

## 2022-02-15 PROCEDURE — 1101F PR PT FALLS ASSESS DOC 0-1 FALLS W/OUT INJ PAST YR: ICD-10-PCS | Mod: CPTII,S$GLB,, | Performed by: INTERNAL MEDICINE

## 2022-02-15 NOTE — PROGRESS NOTES
Subjective:       Patient ID: Elena Block is a 76 y.o. female.    Chief Complaint:   Follow-up, Hypertension, and Hyperlipidemia    HPI: Mrs Block presents for follow up the above  HTN: Med Tx 1-Diovan 320mg QD, 2-HCTZ 12.5mg QD, 3-Coreg 6.25mg QD, 4-Procardia XL 90mg QD           Home BPs: 140-145/60-70  HLD: Med Tx 1-Zocor 40mg QD  She is feeling well and has stayed free of COVID infection    Past Medical, Surgical, Social History: Please see as stated in Epic chart which has been reviewed.    Current Outpatient Medications   Medication Sig Dispense Refill    acetaminophen (TYLENOL) 500 MG tablet Take 1,000 mg by mouth every 6 (six) hours as needed for Pain.      carvediloL (COREG) 6.25 MG tablet Take 1 tablet (6.25 mg total) by mouth 2 (two) times daily with meals. 180 tablet 1    docusate sodium (COLACE) 100 MG capsule 1-4 caps daily; gradually taper up over 2 weeks (Patient taking differently: twice a week. 1-4 caps daily; gradually taper up over 2 weeks) 60 capsule prn    docusate sodium (COLACE) 100 MG capsule Take 1 capsule (100 mg total) by mouth 2 (two) times daily. 30 capsule 1    estradioL (ESTRACE) 0.01 % (0.1 mg/gram) vaginal cream Place 1 g vaginally every Mon, Wed, Fri. 42.5 g 11    FOLBEE 2.5-25-1 mg Tab TAKE 1 TABLET BY MOUTH EVERY DAY 90 tablet 3    FOLBEE 2.5-25-1 mg Tab TAKE 1 TABLET BY MOUTH EVERY DAY 90 tablet 3    hydroCHLOROthiazide (HYDRODIURIL) 12.5 MG Tab TAKE 1 TABLET BY MOUTH EVERY DAY 90 tablet 2    Lactobacillus acidophilus (PROBIOTIC ORAL) Take by mouth once daily.      levothyroxine (SYNTHROID) 100 MCG tablet 1 tab daily on empty stomach 90 tablet 2    meloxicam (MOBIC) 15 MG tablet 1 tab QDay with food as needed for arthritis pain 30 tablet 0    NIFEdipine (PROCARDIA-XL) 90 MG (OSM) 24 hr tablet Take 1 tablet (90 mg total) by mouth once daily. 90 tablet 2    polyethylene glycol (GLYCOLAX) 17 gram/dose powder 1 cap in 4-6 oz liquid : Take 2x/day x 3 days then go  to once daily thereafter for constipation 289 g 6    simvastatin (ZOCOR) 40 MG tablet Take 1 tablet (40 mg total) by mouth every evening. 90 tablet 2    timolol maleate 0.5% (TIMOPTIC-XE) 0.5 % SolG INSTILL 1 DROP INTO BOTH EYES ONCE DAILY 10 mL 3    valsartan (DIOVAN) 320 MG tablet Take 1 tablet (320 mg total) by mouth once daily. 90 tablet 2     No current facility-administered medications for this visit.       Review of Systems   Constitutional: Negative.    HENT: Positive for rhinorrhea. Negative for congestion and sinus pain.    Respiratory: Negative for chest tightness and shortness of breath.    Cardiovascular: Negative for chest pain and leg swelling.   Genitourinary: Negative.    Musculoskeletal: Positive for arthralgias.        Right Shoulder pain off/on-Relieved by Meloxicam   Neurological: Negative.  Negative for dizziness.   All other systems reviewed and are negative.      Objective:      Lab Results   Component Value Date    WBC 5.17 08/06/2021    HGB 14.2 08/06/2021    HCT 43.6 08/06/2021     08/06/2021    CHOL 156 02/08/2022    TRIG 114 02/08/2022    HDL 49 02/08/2022    ALT 17 02/08/2022    AST 23 02/08/2022     02/08/2022    K 4.0 02/08/2022     02/08/2022    CREATININE 0.8 02/08/2022    BUN 9 02/08/2022    CO2 26 02/08/2022    TSH 2.908 08/06/2021    HGBA1C 5.3 05/19/2006     Physical Exam  Vitals reviewed.   Constitutional:       Appearance: Normal appearance.   HENT:      Head: Normocephalic and atraumatic.      Mouth/Throat:      Mouth: Mucous membranes are dry.      Pharynx: No posterior oropharyngeal erythema.   Cardiovascular:      Rate and Rhythm: Normal rate and regular rhythm.   Pulmonary:      Effort: Pulmonary effort is normal.      Breath sounds: Normal breath sounds. No wheezing.   Chest:      Chest wall: No tenderness.   Abdominal:      General: Abdomen is flat. Bowel sounds are normal.      Palpations: Abdomen is soft. There is no mass.      Tenderness: There  "is no abdominal tenderness.   Musculoskeletal:         General: No swelling.      Cervical back: Normal range of motion and neck supple. No muscular tenderness.      Right lower leg: No edema.      Left lower leg: No edema.   Lymphadenopathy:      Cervical: No cervical adenopathy.   Skin:     General: Skin is warm and dry.   Neurological:      General: No focal deficit present.      Mental Status: She is alert.       Breasts: On gross visualization, normal; On palpation, no masses/tenderness/mipple D/C    Vital Signs  Temp: 98.2 °F (36.8 °C)  Pulse: 69  SpO2: 98 %  BP: 126/60  Pain Score: 0-No pain  Height and Weight  Height: 5' 8" (172.7 cm)  Weight: 87.5 kg (192 lb 14.4 oz)  BSA (Calculated - sq m): 2.05 sq meters  BMI (Calculated): 29.3  Weight in (lb) to have BMI = 25: 164.1]    Assessment:       1. Essential hypertension    2. Hyperlipidemia, unspecified hyperlipidemia type    3. Hypothyroidism, unspecified type    4. Estrogen deficiency    5. Adenomatous polyp of colon, unspecified part of colon        Plan:     Health Maintenance   Topic Date Due    Mammogram  07/06/2022    High Dose Statin  01/21/2023    Lipid Panel  02/08/2023    TETANUS VACCINE  10/01/2024    Hepatitis C Screening  Completed    DEXA SCAN  Demetrius Parker was seen today for follow-up, hypertension and hyperlipidemia.    Diagnoses and all orders for this visit:    Essential hypertension/controlled        -     Continue 1-Diovan 320mg QD, 2-HCTZ 12.5mg QD, 3-Coreg 6.25mg QD, 4-Procardia XL 90mg QD  -     Comprehensive Metabolic Panel; Future  -     CBC Auto Differential; Future    Hyperlipidemia, unspecified hyperlipidemia type/controlled         -      continue Zocor 40 mg q.day  -     Comprehensive Metabolic Panel; Future  -     Lipid Panel; Future    Hypothyroidism, unspecified type/on Synthroid 100 mcg q.day  -     TSH; Future    Estrogen deficiency  -     DXA Bone Density Spine And Hip; Future    Adenomatous polyp of " colon, unspecified part of colon/status post colonoscopy August 2019        -     repeat colonoscopy August 2024    Health maintenance        -     RTC x 6 months with 1 week prior fasting lab

## 2022-03-03 DIAGNOSIS — I10 ESSENTIAL HYPERTENSION: ICD-10-CM

## 2022-03-03 RX ORDER — NIFEDIPINE 90 MG/1
90 TABLET, EXTENDED RELEASE ORAL DAILY
Qty: 90 TABLET | Refills: 2 | Status: SHIPPED | OUTPATIENT
Start: 2022-03-03 | End: 2022-10-05

## 2022-03-03 NOTE — TELEPHONE ENCOUNTER
----- Message from Jonathonóscar Michael sent at 3/3/2022  9:23 AM CST -----  Contact: self [phone]  Requesting an RX refill or new RX.  Is this a refill or new RX: refill  RX name and strength (copy/paste from chart):  NIFEdipine (PROCARDIA-XL) 90 MG (OSM) 24 hr tablet  Is this a 30 day or 90 day RX: 90  Pharmacy name and phone # (copy/paste from chart):   [facility] - [address], [address] [place] [address], [address] [address]  Phone: [phone] Fax: [phone]       The doctors have asked that we provide their patients with the following 2 reminders -- prescription refills can take up to 72 hours, and a friendly reminder that in the future you can use your [facility] account to request refills yes    Please call and advise

## 2022-03-03 NOTE — TELEPHONE ENCOUNTER
No new care gaps identified.  Powered by [employer] by [employer]. Reference number: [id number].   3/03/2022 2:45:19 PM CST

## 2022-03-31 DIAGNOSIS — N99.3 PROLAPSE OF VAGINAL VAULT AFTER HYSTERECTOMY: ICD-10-CM

## 2022-03-31 DIAGNOSIS — N81.10 FEMALE CYSTOCELE: ICD-10-CM

## 2022-03-31 RX ORDER — ESTRADIOL 0.1 MG/G
1 CREAM VAGINAL
Qty: 42.5 G | Refills: 11 | Status: SHIPPED | OUTPATIENT
Start: 2022-04-01 | End: 2023-05-08

## 2022-04-18 ENCOUNTER — PES CALL (OUTPATIENT)
Dept: ADMINISTRATIVE | Facility: CLINIC | Age: 77
End: 2022-04-18
Payer: MEDICARE

## 2022-05-10 ENCOUNTER — IMMUNIZATION (OUTPATIENT)
Dept: PHARMACY | Facility: CLINIC | Age: 77
End: 2022-05-10
Payer: MEDICARE

## 2022-05-10 DIAGNOSIS — Z23 NEED FOR VACCINATION: Primary | ICD-10-CM

## 2022-05-13 ENCOUNTER — TELEPHONE (OUTPATIENT)
Dept: ADMINISTRATIVE | Facility: CLINIC | Age: 77
End: 2022-05-13
Payer: MEDICARE

## 2022-05-16 ENCOUNTER — OFFICE VISIT (OUTPATIENT)
Dept: INTERNAL MEDICINE | Facility: CLINIC | Age: 77
End: 2022-05-16
Payer: MEDICARE

## 2022-05-16 VITALS
SYSTOLIC BLOOD PRESSURE: 136 MMHG | HEIGHT: 68 IN | HEART RATE: 64 BPM | BODY MASS INDEX: 29.49 KG/M2 | DIASTOLIC BLOOD PRESSURE: 66 MMHG | WEIGHT: 194.56 LBS

## 2022-05-16 DIAGNOSIS — I70.0 ATHEROSCLEROSIS OF AORTA: ICD-10-CM

## 2022-05-16 DIAGNOSIS — Z00.00 ENCOUNTER FOR PREVENTIVE HEALTH EXAMINATION: Primary | ICD-10-CM

## 2022-05-16 DIAGNOSIS — Z12.31 ENCOUNTER FOR SCREENING MAMMOGRAM FOR MALIGNANT NEOPLASM OF BREAST: ICD-10-CM

## 2022-05-16 DIAGNOSIS — E78.5 HYPERLIPIDEMIA, UNSPECIFIED HYPERLIPIDEMIA TYPE: ICD-10-CM

## 2022-05-16 DIAGNOSIS — D12.6 ADENOMATOUS POLYP OF COLON, UNSPECIFIED PART OF COLON: ICD-10-CM

## 2022-05-16 DIAGNOSIS — H40.053 BILATERAL OCULAR HYPERTENSION: ICD-10-CM

## 2022-05-16 DIAGNOSIS — I10 ESSENTIAL HYPERTENSION: ICD-10-CM

## 2022-05-16 DIAGNOSIS — H34.8322 BRANCH RETINAL VEIN OCCLUSION OF LEFT EYE, UNSPECIFIED COMPLICATION STATUS: ICD-10-CM

## 2022-05-16 DIAGNOSIS — E89.0 POST-SURGICAL HYPOTHYROIDISM: ICD-10-CM

## 2022-05-16 PROCEDURE — 1101F PR PT FALLS ASSESS DOC 0-1 FALLS W/OUT INJ PAST YR: ICD-10-PCS | Mod: CPTII,S$GLB,, | Performed by: NURSE PRACTITIONER

## 2022-05-16 PROCEDURE — 1126F PR PAIN SEVERITY QUANTIFIED, NO PAIN PRESENT: ICD-10-PCS | Mod: CPTII,S$GLB,, | Performed by: NURSE PRACTITIONER

## 2022-05-16 PROCEDURE — 3078F DIAST BP <80 MM HG: CPT | Mod: CPTII,S$GLB,, | Performed by: NURSE PRACTITIONER

## 2022-05-16 PROCEDURE — 99999 PR PBB SHADOW E&M-EST. PATIENT-LVL V: ICD-10-PCS | Mod: PBBFAC,,, | Performed by: NURSE PRACTITIONER

## 2022-05-16 PROCEDURE — 3288F FALL RISK ASSESSMENT DOCD: CPT | Mod: CPTII,S$GLB,, | Performed by: NURSE PRACTITIONER

## 2022-05-16 PROCEDURE — 1160F PR REVIEW ALL MEDS BY PRESCRIBER/CLIN PHARMACIST DOCUMENTED: ICD-10-PCS | Mod: CPTII,S$GLB,, | Performed by: NURSE PRACTITIONER

## 2022-05-16 PROCEDURE — 99999 PR PBB SHADOW E&M-EST. PATIENT-LVL V: CPT | Mod: PBBFAC,,, | Performed by: NURSE PRACTITIONER

## 2022-05-16 PROCEDURE — 1159F MED LIST DOCD IN RCRD: CPT | Mod: CPTII,S$GLB,, | Performed by: NURSE PRACTITIONER

## 2022-05-16 PROCEDURE — 3288F PR FALLS RISK ASSESSMENT DOCUMENTED: ICD-10-PCS | Mod: CPTII,S$GLB,, | Performed by: NURSE PRACTITIONER

## 2022-05-16 PROCEDURE — 3078F PR MOST RECENT DIASTOLIC BLOOD PRESSURE < 80 MM HG: ICD-10-PCS | Mod: CPTII,S$GLB,, | Performed by: NURSE PRACTITIONER

## 2022-05-16 PROCEDURE — 1101F PT FALLS ASSESS-DOCD LE1/YR: CPT | Mod: CPTII,S$GLB,, | Performed by: NURSE PRACTITIONER

## 2022-05-16 PROCEDURE — 3075F PR MOST RECENT SYSTOLIC BLOOD PRESS GE 130-139MM HG: ICD-10-PCS | Mod: CPTII,S$GLB,, | Performed by: NURSE PRACTITIONER

## 2022-05-16 PROCEDURE — 3075F SYST BP GE 130 - 139MM HG: CPT | Mod: CPTII,S$GLB,, | Performed by: NURSE PRACTITIONER

## 2022-05-16 PROCEDURE — 1170F PR FUNCTIONAL STATUS ASSESSED: ICD-10-PCS | Mod: CPTII,S$GLB,, | Performed by: NURSE PRACTITIONER

## 2022-05-16 PROCEDURE — G0439 PR MEDICARE ANNUAL WELLNESS SUBSEQUENT VISIT: ICD-10-PCS | Mod: S$GLB,,, | Performed by: NURSE PRACTITIONER

## 2022-05-16 PROCEDURE — 1159F PR MEDICATION LIST DOCUMENTED IN MEDICAL RECORD: ICD-10-PCS | Mod: CPTII,S$GLB,, | Performed by: NURSE PRACTITIONER

## 2022-05-16 PROCEDURE — 1126F AMNT PAIN NOTED NONE PRSNT: CPT | Mod: CPTII,S$GLB,, | Performed by: NURSE PRACTITIONER

## 2022-05-16 PROCEDURE — G0439 PPPS, SUBSEQ VISIT: HCPCS | Mod: S$GLB,,, | Performed by: NURSE PRACTITIONER

## 2022-05-16 PROCEDURE — 1160F RVW MEDS BY RX/DR IN RCRD: CPT | Mod: CPTII,S$GLB,, | Performed by: NURSE PRACTITIONER

## 2022-05-16 PROCEDURE — 1170F FXNL STATUS ASSESSED: CPT | Mod: CPTII,S$GLB,, | Performed by: NURSE PRACTITIONER

## 2022-05-16 NOTE — PATIENT INSTRUCTIONS
Counseling and Referral of Other Preventative  (Italic type indicates deductible and co-insurance are waived)    Patient Name: Elena Block  Today's Date: 5/16/2022    Health Maintenance       Date Due Completion Date    Shingles Vaccine (3 of 3) 03/25/2020 1/29/2020    Mammogram 07/06/2022 7/6/2021    High Dose Statin 01/21/2023 1/21/2022    Lipid Panel 02/08/2023 2/8/2022    TETANUS VACCINE 10/01/2024 10/1/2014    Colorectal Cancer Screening 08/08/2029 9/29/2021        No orders of the defined types were placed in this encounter.    The following information is provided to all patients.  This information is to help you find resources for any of the problems found today that may be affecting your health:                Living healthy guide: www.[facility].[place].gov      Understanding Diabetes: www.diabetes.org      Eating healthy: www.cdc.gov/healthyweight      [facility] home safety checklist: www.cdc.gov/steadi/patient.html      Agency on Aging: www.goea.[place].[facility]      Alcoholics anonymous (AA): www.aa.org      Physical Activity: www.pamela.nih.gov/rm3pcic      Tobacco use: www.quitwithusla.org

## 2022-05-16 NOTE — PROGRESS NOTES
"  Elena Block presented for a  Medicare AWV and comprehensive Health Risk Assessment today. The following components were reviewed and updated:    · Medical history  · Family History  · Social history  · Allergies and Current Medications  · Health Risk Assessment  · Health Maintenance  · Care Team         ** See Completed Assessments for Annual Wellness Visit within the encounter summary.**         The following assessments were completed:  · Living Situation  · CAGE  · Depression Screening  · Timed Get Up and Go  · Whisper Test  · Cognitive Function Screening  · Nutrition Screening  · ADL Screening  · PAQ Screening        Vitals:    05/16/22 0942   BP: 136/66   BP Location: Left arm   Patient Position: Sitting   Pulse: 64   Weight: 88.3 kg (194 lb 8.9 oz)   Height: 5' 8" (1.727 m)     Body mass index is 29.58 kg/m².     Physical Exam  Vitals reviewed.   Constitutional:       General: She is not in acute distress.     Appearance: She is well-developed. She is not diaphoretic.   HENT:      Head: Normocephalic and atraumatic.   Eyes:      General: No scleral icterus.     Conjunctiva/sclera: Conjunctivae normal.   Cardiovascular:      Rate and Rhythm: Normal rate and regular rhythm.      Pulses: Normal pulses.      Heart sounds: Normal heart sounds.   Pulmonary:      Effort: Pulmonary effort is normal. No respiratory distress.      Breath sounds: Normal breath sounds.   Abdominal:      General: There is no distension.   Musculoskeletal:         General: Normal range of motion.      Cervical back: Normal range of motion and neck supple.   Skin:     General: Skin is warm and dry.   Neurological:      Mental Status: She is alert and oriented to person, place, and time.   Psychiatric:         Behavior: Behavior normal.           Diagnoses and health risks identified today and associated recommendations/orders:    1. Encounter for preventive health examination  Assessments completed  Preventive health recommendations " reviewed    2. Atherosclerosis of aorta  Stable. Seen on Ct from 05/16/20  Controlled with current medical therapy  Followed by PCP.     3. Essential hypertension  Stable.   Controlled with current medical therapy  Followed by PCP.     4. Hyperlipidemia, unspecified hyperlipidemia type  Stable.   Controlled with current medical therapy  Followed by PCP.     5. Branch retinal vein occlusion of left eye, unspecified complication status  Stable.   Followed by optometry.     6. Bilateral ocular hypertension  Stable.   Controlled with current medical therapy  Followed by optometry.     7. Post-surgical hypothyroidism  Stable.   Controlled with current medical therapy  Followed by PCP.     8. Adenomatous polyp of colon, unspecified part of colon  Stable.   Last colonoscopy 08/08/2019  Followed by PCP.     9. Encounter for screening mammogram for malignant neoplasm of breast  - Mammo Digital Screening Bilat w/ Guille; Future    Provided Elena with a 5-10 year written screening schedule and personal prevention plan. Recommendations were developed using the USPSTF age appropriate recommendations. Education, counseling, and referrals were provided as needed. After Visit Summary printed and given to patient which includes a list of additional screenings\tests needed.    Follow up in 3 months (on 8/17/2022) for a routine visit with your PCP or sooner if needed.    Lizzie Villalpando NP

## 2022-06-02 ENCOUNTER — TELEPHONE (OUTPATIENT)
Dept: OPHTHALMOLOGY | Facility: CLINIC | Age: 77
End: 2022-06-02
Payer: MEDICARE

## 2022-06-02 NOTE — TELEPHONE ENCOUNTER
----- Message from Issa Martinez sent at 6/2/2022  8:02 AM CDT -----  Regarding: Appt  Contact: Elena  Pt right eyelid is swollen and red. Pt states it runs constantly. Pt has been seen by Dr. Hamlin.    [phone]

## 2022-06-03 ENCOUNTER — OFFICE VISIT (OUTPATIENT)
Dept: OPHTHALMOLOGY | Facility: CLINIC | Age: 77
End: 2022-06-03
Payer: MEDICARE

## 2022-06-03 DIAGNOSIS — H00.11 CHALAZION OF RIGHT UPPER EYELID: Primary | ICD-10-CM

## 2022-06-03 PROCEDURE — 1159F MED LIST DOCD IN RCRD: CPT | Mod: CPTII,S$GLB,, | Performed by: OPHTHALMOLOGY

## 2022-06-03 PROCEDURE — 99202 OFFICE O/P NEW SF 15 MIN: CPT | Mod: S$GLB,,, | Performed by: OPHTHALMOLOGY

## 2022-06-03 PROCEDURE — 1126F AMNT PAIN NOTED NONE PRSNT: CPT | Mod: CPTII,S$GLB,, | Performed by: OPHTHALMOLOGY

## 2022-06-03 PROCEDURE — 1160F PR REVIEW ALL MEDS BY PRESCRIBER/CLIN PHARMACIST DOCUMENTED: ICD-10-PCS | Mod: CPTII,S$GLB,, | Performed by: OPHTHALMOLOGY

## 2022-06-03 PROCEDURE — 3288F PR FALLS RISK ASSESSMENT DOCUMENTED: ICD-10-PCS | Mod: CPTII,S$GLB,, | Performed by: OPHTHALMOLOGY

## 2022-06-03 PROCEDURE — 99999 PR PBB SHADOW E&M-EST. PATIENT-LVL III: ICD-10-PCS | Mod: PBBFAC,,, | Performed by: OPHTHALMOLOGY

## 2022-06-03 PROCEDURE — 1159F PR MEDICATION LIST DOCUMENTED IN MEDICAL RECORD: ICD-10-PCS | Mod: CPTII,S$GLB,, | Performed by: OPHTHALMOLOGY

## 2022-06-03 PROCEDURE — 99202 PR OFFICE/OUTPT VISIT, NEW, LEVL II, 15-29 MIN: ICD-10-PCS | Mod: S$GLB,,, | Performed by: OPHTHALMOLOGY

## 2022-06-03 PROCEDURE — 99999 PR PBB SHADOW E&M-EST. PATIENT-LVL III: CPT | Mod: PBBFAC,,, | Performed by: OPHTHALMOLOGY

## 2022-06-03 PROCEDURE — 1101F PR PT FALLS ASSESS DOC 0-1 FALLS W/OUT INJ PAST YR: ICD-10-PCS | Mod: CPTII,S$GLB,, | Performed by: OPHTHALMOLOGY

## 2022-06-03 PROCEDURE — 1160F RVW MEDS BY RX/DR IN RCRD: CPT | Mod: CPTII,S$GLB,, | Performed by: OPHTHALMOLOGY

## 2022-06-03 PROCEDURE — 3288F FALL RISK ASSESSMENT DOCD: CPT | Mod: CPTII,S$GLB,, | Performed by: OPHTHALMOLOGY

## 2022-06-03 PROCEDURE — 1101F PT FALLS ASSESS-DOCD LE1/YR: CPT | Mod: CPTII,S$GLB,, | Performed by: OPHTHALMOLOGY

## 2022-06-03 PROCEDURE — 1126F PR PAIN SEVERITY QUANTIFIED, NO PAIN PRESENT: ICD-10-PCS | Mod: CPTII,S$GLB,, | Performed by: OPHTHALMOLOGY

## 2022-06-03 NOTE — PROGRESS NOTES
AMY LANDRY 10/21 with Dr. Hamlin.  Patient states RUL has been red, swollen,   watering and itchy since Tuesday.  Symptoms seem to have improved since   Tuesday but are still present.  Using timolol BID OU, last used this   morning.    I have personally interviewed the patient, reviewed the history and   examined the patient and agree with the technician's exam.     Last edited by Klever Hartley MD on 6/3/2022 11:54 AM. (History)            Assessment /Plan     For exam results, see Encounter Report.    Chalazion of right upper eyelid      Lesion almost completely drained. Continue hot compresses. Return as needed.

## 2022-07-06 ENCOUNTER — TELEPHONE (OUTPATIENT)
Dept: RADIOLOGY | Facility: HOSPITAL | Age: 77
End: 2022-07-06
Payer: MEDICARE

## 2022-07-06 NOTE — TELEPHONE ENCOUNTER
Called patient to let her know we had to move her appointment to [place]. Gave patient physical address. Verified appointment time.

## 2022-07-12 ENCOUNTER — HOSPITAL ENCOUNTER (OUTPATIENT)
Dept: RADIOLOGY | Facility: HOSPITAL | Age: 77
Discharge: HOME | End: 2022-07-12
Attending: NURSE PRACTITIONER
Payer: MEDICARE

## 2022-07-12 VITALS — WEIGHT: 198 LBS | BODY MASS INDEX: 30.01 KG/M2 | HEIGHT: 68 IN

## 2022-07-12 DIAGNOSIS — Z12.31 ENCOUNTER FOR SCREENING MAMMOGRAM FOR MALIGNANT NEOPLASM OF BREAST: ICD-10-CM

## 2022-07-12 DIAGNOSIS — I10 ESSENTIAL HYPERTENSION: ICD-10-CM

## 2022-07-12 PROCEDURE — 77063 BREAST TOMOSYNTHESIS BI: CPT | Mod: TC

## 2022-07-12 PROCEDURE — 77063 MAMMO DIGITAL SCREENING BILAT WITH TOMO: ICD-10-PCS | Mod: 26,,, | Performed by: RADIOLOGY

## 2022-07-12 PROCEDURE — 77067 MAMMO DIGITAL SCREENING BILAT WITH TOMO: ICD-10-PCS | Mod: 26,,, | Performed by: RADIOLOGY

## 2022-07-12 PROCEDURE — 77067 SCR MAMMO BI INCL CAD: CPT | Mod: TC

## 2022-07-12 PROCEDURE — 77067 SCR MAMMO BI INCL CAD: CPT | Mod: 26,,, | Performed by: RADIOLOGY

## 2022-07-12 PROCEDURE — 77063 BREAST TOMOSYNTHESIS BI: CPT | Mod: 26,,, | Performed by: RADIOLOGY

## 2022-07-12 RX ORDER — CARVEDILOL 6.25 MG/1
TABLET ORAL
Qty: 180 TABLET | Refills: 2 | Status: SHIPPED | OUTPATIENT
Start: 2022-07-12 | End: 2023-03-23

## 2022-07-12 NOTE — TELEPHONE ENCOUNTER
No new care gaps identified.  [facility] Embedded Care Gaps. Reference number: [id number]. 7/12/2022   3:10:43 AM EDUARDOT

## 2022-07-12 NOTE — TELEPHONE ENCOUNTER
Refill Decision Note   Elena Block  is requesting a refill authorization.  Brief Assessment and Rationale for Refill:  Approve     Medication Therapy Plan:       Medication Reconciliation Completed: No   Comments:     No Care Gaps recommended.     Note composed:10:31 AM 07/12/2022

## 2022-08-10 ENCOUNTER — HOSPITAL ENCOUNTER (OUTPATIENT)
Dept: RADIOLOGY | Facility: CLINIC | Age: 77
Discharge: HOME | End: 2022-08-10
Attending: INTERNAL MEDICINE
Payer: MEDICARE

## 2022-08-10 DIAGNOSIS — E28.39 ESTROGEN DEFICIENCY: ICD-10-CM

## 2022-08-10 PROCEDURE — 77080 DEXA BONE DENSITY SPINE HIP: ICD-10-PCS | Mod: 26,,, | Performed by: INTERNAL MEDICINE

## 2022-08-10 PROCEDURE — 77080 DXA BONE DENSITY AXIAL: CPT | Mod: 26,,, | Performed by: INTERNAL MEDICINE

## 2022-08-10 PROCEDURE — 77080 DXA BONE DENSITY AXIAL: CPT | Mod: TC

## 2022-08-17 ENCOUNTER — OFFICE VISIT (OUTPATIENT)
Dept: INTERNAL MEDICINE | Facility: CLINIC | Age: 77
End: 2022-08-17
Payer: MEDICARE

## 2022-08-17 VITALS
DIASTOLIC BLOOD PRESSURE: 60 MMHG | HEIGHT: 68 IN | OXYGEN SATURATION: 99 % | TEMPERATURE: 98 F | BODY MASS INDEX: 29.23 KG/M2 | WEIGHT: 192.88 LBS | HEART RATE: 68 BPM | SYSTOLIC BLOOD PRESSURE: 120 MMHG

## 2022-08-17 DIAGNOSIS — J32.9 SINUSITIS, UNSPECIFIED CHRONICITY, UNSPECIFIED LOCATION: ICD-10-CM

## 2022-08-17 DIAGNOSIS — E78.5 HYPERLIPIDEMIA, UNSPECIFIED HYPERLIPIDEMIA TYPE: ICD-10-CM

## 2022-08-17 DIAGNOSIS — E03.9 HYPOTHYROIDISM, UNSPECIFIED TYPE: ICD-10-CM

## 2022-08-17 DIAGNOSIS — I10 ESSENTIAL HYPERTENSION: Primary | ICD-10-CM

## 2022-08-17 DIAGNOSIS — D12.6 ADENOMATOUS POLYP OF COLON, UNSPECIFIED PART OF COLON: ICD-10-CM

## 2022-08-17 DIAGNOSIS — M19.90 OSTEOARTHRITIS, UNSPECIFIED OSTEOARTHRITIS TYPE, UNSPECIFIED SITE: ICD-10-CM

## 2022-08-17 DIAGNOSIS — J30.9 ALLERGIC RHINITIS, UNSPECIFIED SEASONALITY, UNSPECIFIED TRIGGER: ICD-10-CM

## 2022-08-17 PROCEDURE — 3074F SYST BP LT 130 MM HG: CPT | Mod: CPTII,S$GLB,, | Performed by: INTERNAL MEDICINE

## 2022-08-17 PROCEDURE — 1126F AMNT PAIN NOTED NONE PRSNT: CPT | Mod: CPTII,S$GLB,, | Performed by: INTERNAL MEDICINE

## 2022-08-17 PROCEDURE — 3074F PR MOST RECENT SYSTOLIC BLOOD PRESSURE < 130 MM HG: ICD-10-PCS | Mod: CPTII,S$GLB,, | Performed by: INTERNAL MEDICINE

## 2022-08-17 PROCEDURE — 99999 PR PBB SHADOW E&M-EST. PATIENT-LVL V: CPT | Mod: PBBFAC,,, | Performed by: INTERNAL MEDICINE

## 2022-08-17 PROCEDURE — 99215 OFFICE O/P EST HI 40 MIN: CPT | Mod: S$GLB,,, | Performed by: INTERNAL MEDICINE

## 2022-08-17 PROCEDURE — 1101F PR PT FALLS ASSESS DOC 0-1 FALLS W/OUT INJ PAST YR: ICD-10-PCS | Mod: CPTII,S$GLB,, | Performed by: INTERNAL MEDICINE

## 2022-08-17 PROCEDURE — 1126F PR PAIN SEVERITY QUANTIFIED, NO PAIN PRESENT: ICD-10-PCS | Mod: CPTII,S$GLB,, | Performed by: INTERNAL MEDICINE

## 2022-08-17 PROCEDURE — 1159F PR MEDICATION LIST DOCUMENTED IN MEDICAL RECORD: ICD-10-PCS | Mod: CPTII,S$GLB,, | Performed by: INTERNAL MEDICINE

## 2022-08-17 PROCEDURE — 99999 PR PBB SHADOW E&M-EST. PATIENT-LVL V: ICD-10-PCS | Mod: PBBFAC,,, | Performed by: INTERNAL MEDICINE

## 2022-08-17 PROCEDURE — 3288F PR FALLS RISK ASSESSMENT DOCUMENTED: ICD-10-PCS | Mod: CPTII,S$GLB,, | Performed by: INTERNAL MEDICINE

## 2022-08-17 PROCEDURE — 1159F MED LIST DOCD IN RCRD: CPT | Mod: CPTII,S$GLB,, | Performed by: INTERNAL MEDICINE

## 2022-08-17 PROCEDURE — 1101F PT FALLS ASSESS-DOCD LE1/YR: CPT | Mod: CPTII,S$GLB,, | Performed by: INTERNAL MEDICINE

## 2022-08-17 PROCEDURE — 3078F PR MOST RECENT DIASTOLIC BLOOD PRESSURE < 80 MM HG: ICD-10-PCS | Mod: CPTII,S$GLB,, | Performed by: INTERNAL MEDICINE

## 2022-08-17 PROCEDURE — 3288F FALL RISK ASSESSMENT DOCD: CPT | Mod: CPTII,S$GLB,, | Performed by: INTERNAL MEDICINE

## 2022-08-17 PROCEDURE — 99215 PR OFFICE/OUTPT VISIT, EST, LEVL V, 40-54 MIN: ICD-10-PCS | Mod: S$GLB,,, | Performed by: INTERNAL MEDICINE

## 2022-08-17 PROCEDURE — 3078F DIAST BP <80 MM HG: CPT | Mod: CPTII,S$GLB,, | Performed by: INTERNAL MEDICINE

## 2022-08-17 RX ORDER — MELOXICAM 15 MG/1
TABLET ORAL
Qty: 30 TABLET | Refills: 0 | Status: SHIPPED | OUTPATIENT
Start: 2022-08-17 | End: 2023-01-06

## 2022-08-17 RX ORDER — AZITHROMYCIN 250 MG/1
TABLET, FILM COATED ORAL
Qty: 6 TABLET | Refills: 0 | Status: SHIPPED | OUTPATIENT
Start: 2022-08-17 | End: 2022-08-22

## 2022-08-17 RX ORDER — CETIRIZINE HYDROCHLORIDE 10 MG/1
10 TABLET ORAL DAILY
Qty: 30 TABLET | Refills: 6 | Status: SHIPPED | OUTPATIENT
Start: 2022-08-17 | End: 2023-09-05

## 2022-08-17 NOTE — PROGRESS NOTES
Subjective:       Patient ID: Elena Block is a 76 y.o. female.    Chief Complaint:   Follow-up and Hypertension    HPI: Mrs Block presents for follow up the above  HTN: Med Tx 1-Diovan 320mg QD, 2-HCTZ 12.5mg QD, 3-Coreg 6.25mg QD, 4-Procardia XL 90mg QD        Home BPs: 120/70, 140/75  HLD: Med Tx 1-Zocor 40mg QD  Sinus: She c/o nasal congestion and itchy/runny eyes; Allegra 180mg and Claritin 10mg haven't helped  No F/C or Purulence  OA: +occasional joint pain/takes Meloxicam as needed not daily  Past Medical, Surgical, Social History: Please see as stated in Epic chart which has been reviewed.    Current Outpatient Medications   Medication Sig Dispense Refill    acetaminophen (TYLENOL) 500 MG tablet Take 1,000 mg by mouth every 6 (six) hours as needed for Pain.      carvediloL (COREG) 6.25 MG tablet TAKE 1 TABLET BY MOUTH  TWICE DAILY WITH MEALS 180 tablet 2    docusate sodium (COLACE) 100 MG capsule 1-4 caps daily; gradually taper up over 2 weeks (Patient taking differently: twice a week. 1-4 caps daily; gradually taper up over 2 weeks) 60 capsule prn    docusate sodium (COLACE) 100 MG capsule Take 1 capsule (100 mg total) by mouth 2 (two) times daily. 30 capsule 1    estradioL (ESTRACE) 0.01 % (0.1 mg/gram) vaginal cream Place 1 g vaginally every Mon, Wed, Fri. 42.5 g 11    FOLBEE 2.5-25-1 mg Tab TAKE 1 TABLET BY MOUTH EVERY DAY 90 tablet 3    FOLBEE 2.5-25-1 mg Tab TAKE 1 TABLET BY MOUTH EVERY DAY 90 tablet 3    hydroCHLOROthiazide (HYDRODIURIL) 12.5 MG Tab TAKE 1 TABLET BY MOUTH EVERY DAY 90 tablet 2    Lactobacillus acidophilus (PROBIOTIC ORAL) Take by mouth once daily.      levothyroxine (SYNTHROID) 100 MCG tablet 1 tab daily on empty stomach 90 tablet 2    NIFEdipine (PROCARDIA-XL) 90 MG (OSM) 24 hr tablet Take 1 tablet (90 mg total) by mouth once daily. 90 tablet 2    psyllium (METAMUCIL) powder Take 1 packet by mouth once daily at 6am.      simvastatin (ZOCOR) 40 MG tablet Take 1  tablet (40 mg total) by mouth every evening. 90 tablet 2    timolol maleate 0.5% (TIMOPTIC-XE) 0.5 % SolG INSTILL 1 DROP INTO BOTH EYES ONCE DAILY 10 mL 3    valsartan (DIOVAN) 320 MG tablet Take 1 tablet (320 mg total) by mouth once daily. 90 tablet 2    azithromycin (Z-DEONNA) 250 MG tablet Take 2 tablets by mouth on day 1; Take 1 tablet by mouth on days 2-5 6 tablet 0    cetirizine (ZYRTEC) 10 MG tablet Take 1 tablet (10 mg total) by mouth once daily. 30 tablet 6    meloxicam (MOBIC) 15 MG tablet 1 tab QDay with food as needed for arthritis pain 30 tablet 0     No current facility-administered medications for this visit.       Review of Systems   Constitutional: Negative for activity change, fatigue and unexpected weight change.   HENT: Positive for congestion and sneezing. Negative for sinus pain and trouble swallowing.    Eyes: Negative for visual disturbance.   Respiratory: Negative for cough, chest tightness, shortness of breath and wheezing.    Cardiovascular: Negative for chest pain, palpitations and leg swelling.   Gastrointestinal: Negative for abdominal pain, anal bleeding, blood in stool, constipation, diarrhea, nausea and vomiting.   Genitourinary: Negative for dysuria, frequency, hematuria, urgency, vaginal bleeding and vaginal discharge.   Musculoskeletal: Negative for arthralgias, back pain and neck pain.   Skin: Negative for color change and rash.   Neurological: Negative for dizziness, syncope, weakness, numbness and headaches.        No focal neurological abnormalities   Psychiatric/Behavioral: Negative for sleep disturbance.        No depression/anxiety       Objective:      Lab Results   Component Value Date    WBC 4.47 08/10/2022    HGB 14.5 08/10/2022    HCT 41.8 08/10/2022     08/10/2022    CHOL 142 08/10/2022    TRIG 109 08/10/2022    HDL 50 08/10/2022    ALT 12 08/10/2022    AST 25 08/10/2022     08/10/2022    K 4.1 08/10/2022     08/10/2022    CREATININE 0.9  "08/10/2022    BUN 12 08/10/2022    CO2 28 08/10/2022    TSH 2.558 08/10/2022    HGBA1C 5.3 05/19/2006     Physical Exam  Vitals reviewed.   Constitutional:       Appearance: Normal appearance.   HENT:      Head: Normocephalic and atraumatic.      Ears:      Comments: Left TM+serous fluid accumulation with developing erythema     Mouth/Throat:      Mouth: Mucous membranes are dry.      Pharynx: No posterior oropharyngeal erythema.   Cardiovascular:      Rate and Rhythm: Normal rate and regular rhythm.   Pulmonary:      Effort: Pulmonary effort is normal.      Breath sounds: Normal breath sounds. No wheezing.   Chest:      Chest wall: No tenderness.   Abdominal:      General: Abdomen is flat. Bowel sounds are normal.      Palpations: Abdomen is soft. There is no mass.      Tenderness: There is no abdominal tenderness.   Musculoskeletal:         General: No swelling.      Cervical back: Normal range of motion and neck supple. No muscular tenderness.      Right lower leg: No edema.      Left lower leg: No edema.   Lymphadenopathy:      Cervical: No cervical adenopathy.   Skin:     General: Skin is warm and dry.   Neurological:      General: No focal deficit present.      Mental Status: She is alert.           Vital Signs  Temp: 98 °F (36.7 °C)  Temp src: Oral  Pulse: 68  SpO2: 99 %  BP: 120/60  BP Location: Right arm  Patient Position: Sitting  Pain Score: 0-No pain  Height and Weight  Height: 5' 8" (172.7 cm)  Weight: 87.5 kg (192 lb 14.4 oz)  BSA (Calculated - sq m): 2.05 sq meters  BMI (Calculated): 29.3  Weight in (lb) to have BMI = 25: 164.1]    Assessment:       1. Essential hypertension    2. Hyperlipidemia, unspecified hyperlipidemia type    3. Hypothyroidism, unspecified type    4. Osteoarthritis, unspecified osteoarthritis type, unspecified site    5. Sinusitis, unspecified chronicity, unspecified location    6. Allergic rhinitis, unspecified seasonality, unspecified trigger    7. Adenomatous polyp of colon, " unspecified part of colon        Plan:     Health Maintenance   Topic Date Due    Mammogram  07/12/2023    Lipid Panel  08/10/2023    High Dose Statin  08/17/2023    TETANUS VACCINE  10/01/2024    Hepatitis C Screening  Completed    DEXA Scan  Discontinued        Elena was seen today for follow-up and hypertension.    Diagnoses and all orders for this visit:    Essential hypertension/controlled        -     COntinue: Med Tx 1-Diovan 320mg QD, 2-HCTZ 12.5mg QD, 3-Coreg 6.25mg QD, 4-Procardia XL 90mg QD  -     Comprehensive Metabolic Panel; Future    Hyperlipidemia, unspecified hyperlipidemia type/controlled on Zocor 40mg QD  -     Comprehensive Metabolic Panel; Future  -     Lipid Panel; Future    Hypothyroidism, unspecified type/controlled on Synthroid 100ug QD    Osteoarthritis, unspecified osteoarthritis type, unspecified site  -     meloxicam (MOBIC) 15 MG tablet; 1 tab QDay with food as needed for arthritis pain    Sinusitis, unspecified chronicity, unspecified location  -     azithromycin (Z-DEONNA) 250 MG tablet; Take 2 tablets by mouth on day 1; Take 1 tablet by mouth on days 2-5    Allergic rhinitis, unspecified seasonality, unspecified trigger  -     cetirizine (ZYRTEC) 10 MG tablet; Take 1 tablet (10 mg total) by mouth once daily.        NOTE: Recommend Mrs Block hold Zyrtec till after completion of antibiotics then start    Adenomatous polyp of colon, unspecified part of colon/s/p Colonoscopy 8/2019        -     Repeat Colonoscopy 8/2024    Health Maintenance        -     RTC x 6 months with 1 week prior fasting lab

## 2022-08-17 NOTE — PATIENT INSTRUCTIONS
For Sinus/Ear infection and Allergies:  #1-Take Zithromax antibiotics 1st  #2-Start Zyrtec 10mg daily at bedtime thereafter x 2 weeks and as needed

## 2022-08-19 NOTE — PROGRESS NOTES
HPI     Concerns About Ocular Health    Additional comments: 6 month f/u            Comments   Patient in for progress check.  Patient is in for a 6 month f/u   Being followed for (diagnosis):  Ocular Hypertension     Date last seen:  05/01/2017    Doctor last seen:  Dr. Hamlin     Prescribed eye medications(s) using:  Timolol 0.5 gel forming 1 x a day OU       OTC eye medication(s) using:  No    Signs/symptoms of condition resolved/better/stable/worse?:   Stable     Allergies/Medications reviewed today?  Yes         Blood pressure 142/80  Pulse = 62 BPM         Last edited by Hilario Mckoy on 11/13/2017 10:30 AM. (History)            Assessment /Plan     For exam results, see Encounter Report.    1. Ocular hypertension, bilateral     2. Glaucoma suspect of both eyes     3. Central vein occlusion of retina     4. Nuclear sclerosis, bilateral     5. Cortical cataract of both eyes     6. Essential hypertension     7. Screening for eye condition     8. Regular astigmatism of both eyes     9. Presbyopia of both eyes                  Nuclear sclerosis/peripheral cortical cataract in both eyes.  No need for cataract surgery.  Astigmatic refractive error in each eye, with satisfactory best-corrected VA in each eye.  Updated spectacle lens Rx issued.  Recommend full-time wear.       Prior diagnosis of bilateral ocular hypertension, and glaucoma suspect based on the finding of larger-than-average cup-to-disc ratio in each eye.  Using Timolol Maleate 0.5% gel-forming ophthalmic solution in both eyes every morning.  IOP high-normal in each eye.  Repeat  Schreiber Visual field done today, without evidence of glaucomatous defect in either eye.     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  Has been followed by Dr. Paz.  Recheck in six months, with repeat HVF (24-2 LUKAS Standard) at that time.   Orders for future HVF test entered.      In the interim, continue to  use one drop Timolol Maleate 0.5% gel-forming ophthalmic solution in each eye every morning.   .          no chills, no nausea, no vomiting, no abdominal pain, no headache, no weight loss/gain, no difficulty swallowing/no fever

## 2022-08-22 ENCOUNTER — PATIENT MESSAGE (OUTPATIENT)
Dept: INTERNAL MEDICINE | Facility: CLINIC | Age: 77
End: 2022-08-22
Payer: MEDICARE

## 2022-09-15 ENCOUNTER — IMMUNIZATION (OUTPATIENT)
Dept: INTERNAL MEDICINE | Facility: CLINIC | Age: 77
End: 2022-09-15
Payer: MEDICARE

## 2022-09-20 DIAGNOSIS — E03.4 HYPOTHYROIDISM DUE TO ACQUIRED ATROPHY OF THYROID: ICD-10-CM

## 2022-09-20 RX ORDER — VALSARTAN 320 MG/1
TABLET ORAL
Qty: 90 TABLET | Refills: 1 | Status: SHIPPED | OUTPATIENT
Start: 2022-09-20 | End: 2023-02-22 | Stop reason: SDUPTHER

## 2022-09-20 RX ORDER — LEVOTHYROXINE SODIUM 100 UG/1
TABLET ORAL
Qty: 90 TABLET | Refills: 3 | Status: SHIPPED | OUTPATIENT
Start: 2022-09-20 | End: 2023-12-05

## 2022-09-20 NOTE — TELEPHONE ENCOUNTER
No new care gaps identified.  [facility] Embedded Care Gaps. Reference number: [id number]. 9/20/2022   9:19:58 AM EDUARDOT

## 2022-09-20 NOTE — TELEPHONE ENCOUNTER
Refill Decision Note   Elena Block  is requesting a refill authorization.  Brief Assessment and Rationale for Refill:  Approve     Medication Therapy Plan:    ACCEPTABLE USE PER ORC PROTOCOL ; RISK MINIMAL WHEN THYROID LABS NORMAL , TSH WNL    Medication Reconciliation Completed: No   Comments:     No Care Gaps recommended.     Note composed:9:46 AM 09/20/2022

## 2022-11-03 ENCOUNTER — TELEPHONE (OUTPATIENT)
Dept: INTERNAL MEDICINE | Facility: CLINIC | Age: 77
End: 2022-11-03
Payer: MEDICARE

## 2022-11-03 DIAGNOSIS — H93.8X9 EAR CONGESTION, UNSPECIFIED LATERALITY: Primary | ICD-10-CM

## 2022-11-03 NOTE — TELEPHONE ENCOUNTER
----- Message from Jacey Babcock sent at 11/3/2022  9:35 AM CDT -----  Contact: [phone]  Patient is calling for Medical Advice regardin nd call about ear problem, please call and advise.

## 2022-11-03 NOTE — TELEPHONE ENCOUNTER
Yue, I saw pt with similar symptoms in August so I agree pt should see ENT and I have placed a referral for such so she can schedule an appt. THanks

## 2022-11-03 NOTE — TELEPHONE ENCOUNTER
Right ear - wooshing and fluttering sounds. Last few weeks its been more frequest and lasting longer. ENT referral??

## 2022-11-04 ENCOUNTER — IMMUNIZATION (OUTPATIENT)
Dept: PHARMACY | Facility: CLINIC | Age: 77
End: 2022-11-04
Payer: MEDICARE

## 2022-11-04 ENCOUNTER — TELEPHONE (OUTPATIENT)
Dept: INTERNAL MEDICINE | Facility: CLINIC | Age: 77
End: 2022-11-04
Payer: MEDICARE

## 2022-11-04 DIAGNOSIS — Z23 NEED FOR VACCINATION: Primary | ICD-10-CM

## 2022-11-04 NOTE — TELEPHONE ENCOUNTER
----- Message from Yue Burt LPN sent at 11/2/2022  9:43 AM CDT -----  Contact: [phone]  We don't have any openings until January. Should pt see ENT?  ----- Message -----  From: Milvia Qiu  Sent: 11/2/2022   9:32 AM CDT  To: [facility]    Pt called to advise that she has some swishing sounds in her right ear and she would like to know if she would need to see the PCP or an ENT. Pt says symptoms started more or less than a month ago and has since gotten worse. Please Advise        Davin Griffiths)  2018 22:34:15

## 2022-11-11 ENCOUNTER — TELEPHONE (OUTPATIENT)
Dept: INTERNAL MEDICINE | Facility: CLINIC | Age: 77
End: 2022-11-11
Payer: MEDICARE

## 2022-11-11 DIAGNOSIS — T14.8XXA MUSCLE STRAIN: Primary | ICD-10-CM

## 2022-11-11 RX ORDER — TIZANIDINE 4 MG/1
4 TABLET ORAL EVERY 8 HOURS
Qty: 30 TABLET | Refills: 0 | Status: SHIPPED | OUTPATIENT
Start: 2022-11-11 | End: 2022-11-21

## 2022-11-11 NOTE — TELEPHONE ENCOUNTER
Spoke to patient. Right leg, upper thigh, near groin. Feels like a pulled muscle. Constant 7-10 pain when up and about. Since Wednesday. Took tylenol and using a heating pad. Heating pad is giving some relief. Please advise.

## 2022-11-11 NOTE — TELEPHONE ENCOUNTER
----- Message from Maryanne Stratton sent at 11/11/2022 11:49 AM CST -----  Regarding: advice  Contact: patient [phone]  [email or web address]     Patient is calling for Medical Advice regarding:Consistent Pain in upper left thigh in near groin area      How long has patient had these symptoms: Wednesday    Pharmacy name and phone#:      [id number] - [place] - [address]  [address]  [place] [address]  Phone: [phone] Fax: [phone]    Would like response via [employer]: Please call

## 2022-11-12 NOTE — TELEPHONE ENCOUNTER
Pt  c/o right thigh pain x last 2 days. She thinks she pulled a muscle.  Movement brings the pain on and rest/heat relieves the pain.. 'Have erx'd in Tizanidine 4mg AD #30/NR. If no improvement or worsening, pt to call/RTC.

## 2022-12-14 ENCOUNTER — OFFICE VISIT (OUTPATIENT)
Dept: OPTOMETRY | Facility: CLINIC | Age: 77
End: 2022-12-14
Payer: MEDICARE

## 2022-12-14 DIAGNOSIS — H40.1122 PRIMARY OPEN ANGLE GLAUCOMA (POAG) OF LEFT EYE, MODERATE STAGE: ICD-10-CM

## 2022-12-14 DIAGNOSIS — H26.9 CORTICAL CATARACT OF BOTH EYES: ICD-10-CM

## 2022-12-14 DIAGNOSIS — H52.4 PRESBYOPIA OF BOTH EYES: ICD-10-CM

## 2022-12-14 DIAGNOSIS — H40.001 GLAUCOMA SUSPECT, RIGHT EYE: ICD-10-CM

## 2022-12-14 DIAGNOSIS — H52.203 ASTIGMATISM OF BOTH EYES, UNSPECIFIED TYPE: ICD-10-CM

## 2022-12-14 DIAGNOSIS — H40.053 OCULAR HYPERTENSION, BILATERAL: Primary | ICD-10-CM

## 2022-12-14 DIAGNOSIS — H25.13 NUCLEAR SCLEROSIS, BILATERAL: ICD-10-CM

## 2022-12-14 PROCEDURE — 99999 PR PBB SHADOW E&M-EST. PATIENT-LVL III: CPT | Mod: PBBFAC,,, | Performed by: OPTOMETRIST

## 2022-12-14 PROCEDURE — 92014 COMPRE OPH EXAM EST PT 1/>: CPT | Mod: S$GLB,,, | Performed by: OPTOMETRIST

## 2022-12-14 PROCEDURE — 1101F PT FALLS ASSESS-DOCD LE1/YR: CPT | Mod: CPTII,S$GLB,, | Performed by: OPTOMETRIST

## 2022-12-14 PROCEDURE — 92133 CPTRZD OPH DX IMG PST SGM ON: CPT | Mod: S$GLB,,, | Performed by: OPTOMETRIST

## 2022-12-14 PROCEDURE — 92133 POSTERIOR SEGMENT OCT OPTIC NERVE(OCULAR COHERENCE TOMOGRAPHY) - OU - BOTH EYES: ICD-10-PCS | Mod: S$GLB,,, | Performed by: OPTOMETRIST

## 2022-12-14 PROCEDURE — 92015 PR REFRACTION: ICD-10-PCS | Mod: S$GLB,,, | Performed by: OPTOMETRIST

## 2022-12-14 PROCEDURE — 92083 EXTENDED VISUAL FIELD XM: CPT | Mod: S$GLB,,, | Performed by: OPTOMETRIST

## 2022-12-14 PROCEDURE — 99999 PR PBB SHADOW E&M-EST. PATIENT-LVL III: ICD-10-PCS | Mod: PBBFAC,,, | Performed by: OPTOMETRIST

## 2022-12-14 PROCEDURE — 92015 DETERMINE REFRACTIVE STATE: CPT | Mod: S$GLB,,, | Performed by: OPTOMETRIST

## 2022-12-14 PROCEDURE — 1126F AMNT PAIN NOTED NONE PRSNT: CPT | Mod: CPTII,S$GLB,, | Performed by: OPTOMETRIST

## 2022-12-14 PROCEDURE — 1126F PR PAIN SEVERITY QUANTIFIED, NO PAIN PRESENT: ICD-10-PCS | Mod: CPTII,S$GLB,, | Performed by: OPTOMETRIST

## 2022-12-14 PROCEDURE — 1101F PR PT FALLS ASSESS DOC 0-1 FALLS W/OUT INJ PAST YR: ICD-10-PCS | Mod: CPTII,S$GLB,, | Performed by: OPTOMETRIST

## 2022-12-14 PROCEDURE — 1159F MED LIST DOCD IN RCRD: CPT | Mod: CPTII,S$GLB,, | Performed by: OPTOMETRIST

## 2022-12-14 PROCEDURE — 92083 HUMPHREY VISUAL FIELD - OU - BOTH EYES: ICD-10-PCS | Mod: S$GLB,,, | Performed by: OPTOMETRIST

## 2022-12-14 PROCEDURE — 3288F FALL RISK ASSESSMENT DOCD: CPT | Mod: CPTII,S$GLB,, | Performed by: OPTOMETRIST

## 2022-12-14 PROCEDURE — 92014 PR EYE EXAM, EST PATIENT,COMPREHESV: ICD-10-PCS | Mod: S$GLB,,, | Performed by: OPTOMETRIST

## 2022-12-14 PROCEDURE — 3288F PR FALLS RISK ASSESSMENT DOCUMENTED: ICD-10-PCS | Mod: CPTII,S$GLB,, | Performed by: OPTOMETRIST

## 2022-12-14 PROCEDURE — 1159F PR MEDICATION LIST DOCUMENTED IN MEDICAL RECORD: ICD-10-PCS | Mod: CPTII,S$GLB,, | Performed by: OPTOMETRIST

## 2022-12-14 NOTE — PROGRESS NOTES
"HPI     eye examination             Comments: General eye exam and refraction.  Prior diagnosis of bilateral ocular hypertension, POAG in OD and glaucoma   suspect in OS.  Repeat HVF test (24-2) done today, and OCT RNFL thickness analysis.  Wears glasses full-time.  Feels VA with glasses not as good as she would   like.            Comments    Patient's age: 77 y.o. female  Occupation: retired  Approximate date of last eye examination: 06/03/2022   Last saw  Dr. Hamlin 01/03/2022  Wears glasses?  yes     If yes, wears  Full-time or part-time?  Full-time   Present glasses are: Bifocal, SV Distance, SV Reading?  ST bifocal  Approximate age of present glasses: 1 year  Got new glasses following last exam, or subsequently?:  yes   Any problem with VA with glasses? Vision not as clear as she would like   Wears CLs?:  no  Headaches?  No   Eye pain/discomfort?  Watery OU, felt like something was in her eye                                                                               Flashes?  No  Floaters?  Yes - increased awareness of floaters (mostly OS)  Diplopia/Double vision? no  Patient's Ocular History:         Any eye surgeries? no         Any eye injury?  Hit in left eye with tree branch many years ago         Any treatment for eye disease?  OHT - using gel-forming Timolol   0.5% every morning in both eyes.   H/o CRVO in OS and PVD OU  -no longer   taking aspirin on daily basis - states "doctor told me I did not need to   take it"  Family history of eye disease?  Mother and two brothers:  glaucoma   Significant patient medical history:         1. Diabetes? no       If yes, IDDM or NIDDM? N/a   2. HBP?  Yes - takes meds - states well-controlled               3. Other (describe):  none reported    ! OTC eyedrops currently using:  no   ! Prescription eye meds currently using:  timolol malate 0.5% gel-forming   solution in both eyes every evening    ! Any history of allergy/adverse reaction to any eye meds " "used   previously?  no   ! Any history of allergy/adverse reaction to eyedrops used during prior   eye exam(s)? no   ! Any history of allergy/adverse reaction to Novacaine or similar meds?   no   ! Any history of allergy/adverse reaction to Epinephrine or similar meds?   no    ! Patient okay with use of anesthetic eyedrops to check eye pressure?    yes       ! Patient okay with use of eyedrops to dilate pupils today? yes   !  Allergies/Medications/Medical History/Family History reviewed today?    yes      PD =  67/63   Desired reading distance =  17"                                                                                                                                                                                                                               Last edited by Tim Hamlin, OD on 12/14/2022 12:32 PM.            Assessment /Plan     For exam results, see Encounter Report.    1. Ocular hypertension, bilateral  Schreiber Visual Field - OU - Extended - Both Eyes    Posterior Segment OCT Optic Nerve- Both eyes      2. Primary open angle glaucoma (POAG) of left eye, moderate stage  Schreiber Visual Field - OU - Extended - Both Eyes    Posterior Segment OCT Optic Nerve- Both eyes      3. Glaucoma suspect, right eye  Schreiber Visual Field - OU - Extended - Both Eyes    Posterior Segment OCT Optic Nerve- Both eyes      4. Nuclear sclerosis, bilateral        5. Cortical cataract of both eyes        6. Astigmatism of both eyes, unspecified type        7. Presbyopia of both eyes                         Nuclear sclerosis of lens of both eyes, consistent with age.  Peripheral cortical cataract in each eye, more apparent in the right eye than in the left eye.      Astigmatic refractive error in each eye, per refraction done at last visit, with best-corrected VA of 20/40 (-)  in the right eye and 20/40+4 (20/30-1) in the left eye.  Still no compelling need for cataract surgery.  Reassess in six months. "   New spectacle lens Rx issued for full-time wear.      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  Has been followed in retina clinic in the past, and low-dose aspirin therapy recommended.  Ms. Block states she is no longer taking aspirin, as she was advised by her physician that she did not need to be taking aspirin.      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     Currently using Timolol Maleate 0.5% gel-forming ophthalmic solution in each eye every evening for IOP control.   IOP within normal range in each eye today (16 mm Hg in the right eye and 16 mm Hg in the left eye).  Repeat HVF test (24-2 LUKAS Standard) done today.    Taking test results at face:   OD focal superior paracentral visual field defect, not overtly glaucomatous in appearance.   OS  superior arcuate visual field defect, consistent with moderate stage glaucoma      Continue drops in both eyes as noted above     Reassess cataracts in SIX MONTHS..

## 2022-12-14 NOTE — PATIENT INSTRUCTIONS
Nuclear sclerosis of lens of both eyes, consistent with age.  Peripheral cortical cataract in each eye, more apparent in the right eye than in the left eye.      Astigmatic refractive error in each eye, per refraction done at last visit, with best-corrected VA of 20/40 (-)  in the right eye and 20/40+4 (20/30-1) in the left eye.  Still no compelling need for cataract surgery.  Reassess in six months.   New spectacle lens Rx issued for full-time wear.      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  Has been followed in retina clinic in the past, and low-dose aspirin therapy recommended.  Ms. Block states she is no longer taking aspirin, as she was advised by her physician that she did not need to be taking aspirin.      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     Currently using Timolol Maleate 0.5% gel-forming ophthalmic solution in each eye every evening for IOP control.   IOP within normal range in each eye today (16 mm Hg in the right eye and 16 mm Hg in the left eye).  Repeat HVF test (24-2 LUKAS Standard) done today.    Taking test results at face:   OD focal superior paracentral visual field defect, not overtly glaucomatous in appearance.   OS  superior arcuate visual field defect, consistent with moderate stage glaucoma      Continue drops in both eyes as noted above     Reassess cataracts in SIX MONTHS..    Orders for HVf test in six months entered

## 2022-12-29 ENCOUNTER — TELEPHONE (OUTPATIENT)
Dept: INTERNAL MEDICINE | Facility: CLINIC | Age: 77
End: 2022-12-29
Payer: MEDICARE

## 2023-01-06 ENCOUNTER — OFFICE VISIT (OUTPATIENT)
Dept: PODIATRY | Facility: CLINIC | Age: 78
End: 2023-01-06
Payer: MEDICARE

## 2023-01-06 VITALS — HEIGHT: 68 IN | BODY MASS INDEX: 29.1 KG/M2 | WEIGHT: 192 LBS

## 2023-01-06 DIAGNOSIS — L84 CORN OR CALLUS: Primary | ICD-10-CM

## 2023-01-06 DIAGNOSIS — M79.671 RIGHT FOOT PAIN: ICD-10-CM

## 2023-01-06 PROCEDURE — 1160F RVW MEDS BY RX/DR IN RCRD: CPT | Mod: CPTII,S$GLB,, | Performed by: PODIATRIST

## 2023-01-06 PROCEDURE — 1159F PR MEDICATION LIST DOCUMENTED IN MEDICAL RECORD: ICD-10-PCS | Mod: CPTII,S$GLB,, | Performed by: PODIATRIST

## 2023-01-06 PROCEDURE — 1125F AMNT PAIN NOTED PAIN PRSNT: CPT | Mod: CPTII,S$GLB,, | Performed by: PODIATRIST

## 2023-01-06 PROCEDURE — 99999 PR PBB SHADOW E&M-EST. PATIENT-LVL III: ICD-10-PCS | Mod: PBBFAC,,, | Performed by: PODIATRIST

## 2023-01-06 PROCEDURE — 99999 PR PBB SHADOW E&M-EST. PATIENT-LVL III: CPT | Mod: PBBFAC,,, | Performed by: PODIATRIST

## 2023-01-06 PROCEDURE — 99213 PR OFFICE/OUTPT VISIT, EST, LEVL III, 20-29 MIN: ICD-10-PCS | Mod: S$GLB,,, | Performed by: PODIATRIST

## 2023-01-06 PROCEDURE — 1125F PR PAIN SEVERITY QUANTIFIED, PAIN PRESENT: ICD-10-PCS | Mod: CPTII,S$GLB,, | Performed by: PODIATRIST

## 2023-01-06 PROCEDURE — 1159F MED LIST DOCD IN RCRD: CPT | Mod: CPTII,S$GLB,, | Performed by: PODIATRIST

## 2023-01-06 PROCEDURE — 99213 OFFICE O/P EST LOW 20 MIN: CPT | Mod: S$GLB,,, | Performed by: PODIATRIST

## 2023-01-06 PROCEDURE — 1160F PR REVIEW ALL MEDS BY PRESCRIBER/CLIN PHARMACIST DOCUMENTED: ICD-10-PCS | Mod: CPTII,S$GLB,, | Performed by: PODIATRIST

## 2023-01-06 NOTE — PROGRESS NOTES
Subjective:      Patient ID: Elena Block is a 77 y.o. female.    Chief Complaint: Foot Problem (Right Foot 5/10)    Thick hard callus 5th metatarsal base right foot, with pain and discoloration.Gradual onset, worsening over past several weeks, aggravated by increased weight bearing, shoe gear, pressure.  Has tried urea but not lately.      Patient Active Problem List   Diagnosis    Essential hypertension    Venous tributary (branch) occlusion of retina    Cataracts, both eyes    Ocular hypertension - Both Eyes    Hyperlipidemia    Post-surgical hypothyroidism    Posterior vitreous detachment of both eyes    Disorder of bursae and tendons in right shoulder region    Overweight (BMI 25.0-29.9)    Atherosclerosis of aorta    Vaginal prolapse    Adenomatous colon polyp         Current Outpatient Medications on File Prior to Visit   Medication Sig Dispense Refill    carvediloL (COREG) 6.25 MG tablet TAKE 1 TABLET BY MOUTH  TWICE DAILY WITH MEALS 180 tablet 2    cetirizine (ZYRTEC) 10 MG tablet Take 1 tablet (10 mg total) by mouth once daily. 30 tablet 6    docusate sodium (COLACE) 100 MG capsule 1-4 caps daily; gradually taper up over 2 weeks (Patient taking differently: twice a week. 1-4 caps daily; gradually taper up over 2 weeks) 60 capsule prn    docusate sodium (COLACE) 100 MG capsule Take 1 capsule (100 mg total) by mouth 2 (two) times daily. 30 capsule 1    estradioL (ESTRACE) 0.01 % (0.1 mg/gram) vaginal cream Place 1 g vaginally every Mon, Wed, Fri. 42.5 g 11    FOLBEE 2.5-25-1 mg Tab TAKE 1 TABLET BY MOUTH EVERY DAY 90 tablet 3    FOLBEE 2.5-25-1 mg Tab TAKE 1 TABLET BY MOUTH EVERY DAY 90 tablet 3    hydroCHLOROthiazide (HYDRODIURIL) 12.5 MG Tab TAKE 1 TABLET BY MOUTH EVERY DAY 90 tablet 2    Lactobacillus acidophilus (PROBIOTIC ORAL) Take by mouth once daily.      levothyroxine (SYNTHROID) 100 MCG tablet TAKE 1 TABLET BY MOUTH  DAILY ON EMPTY STOMACH 90 tablet 3    NIFEdipine (PROCARDIA-XL) 90 MG (OSM) 24  hr tablet TAKE 1 TABLET BY MOUTH ONCE DAILY 90 tablet 3    psyllium (METAMUCIL) powder Take 1 packet by mouth once daily at 6am.      simvastatin (ZOCOR) 40 MG tablet TAKE 1 TABLET BY MOUTH IN  THE EVENING 90 tablet 3    timolol maleate 0.5% (TIMOPTIC-XE) 0.5 % SolG INSTILL 1 DROP INTO BOTH EYES ONCE DAILY 10 mL 3    valsartan (DIOVAN) 320 MG tablet TAKE 1 TABLET BY MOUTH ONCE DAILY 90 tablet 1    acetaminophen (TYLENOL) 500 MG tablet Take 1,000 mg by mouth every 6 (six) hours as needed for Pain.      meloxicam (MOBIC) 15 MG tablet 1 tab QDay with food as needed for arthritis pain 30 tablet 0     No current facility-administered medications on file prior to visit.         Review of patient's allergies indicates:   Allergen Reactions    Tramadol Nausea And Vomiting           Social History     Socioeconomic History    Marital status:     Number of children: 2   Tobacco Use    Smoking status: Never    Smokeless tobacco: Never   Substance and Sexual Activity    Alcohol use: Yes     Alcohol/week: 1.0 standard drink     Types: 1 Cans of beer per week     Comment: Occasionally; beer once a  week (socially)    Drug use: No    Sexual activity: Not Currently     Partners: Male     Comment: maybe once month   Social History Narrative    Mrs Block is  with 2 grown sons and 7 Grandkids/3 Great Grandkids         Review of Systems   Constitutional: Negative for chills, diaphoresis, fever, malaise/fatigue and night sweats.   Cardiovascular:  Negative for claudication, cyanosis, leg swelling and syncope.   Skin:  Positive for suspicious lesions. Negative for color change, dry skin, nail changes, rash and unusual hair distribution.   Musculoskeletal:  Negative for falls, joint pain, joint swelling, muscle cramps, muscle weakness and stiffness.   Gastrointestinal:  Negative for constipation, diarrhea, nausea and vomiting.   Neurological:  Negative for brief paralysis, disturbances in coordination, focal weakness,  numbness, paresthesias, sensory change and tremors.         Objective:      Physical Exam  Constitutional:       General: She is not in acute distress.     Appearance: She is well-developed. She is not diaphoretic.   Cardiovascular:      Pulses:           Popliteal pulses are 2+ on the right side and 2+ on the left side.        Dorsalis pedis pulses are 2+ on the right side and 2+ on the left side.        Posterior tibial pulses are 2+ on the right side and 2+ on the left side.      Comments: Capillary refill 3 seconds all toes/distal feet, all toes/both feet warm to touch.      Negative lymphadenopathy bilateral popliteal fossa and tarsal tunnel.      Negavie lower extremity edema bilateral.    Musculoskeletal:      Right ankle: No swelling, deformity, ecchymosis or lacerations. Normal range of motion. Normal pulse.      Right Achilles Tendon: Normal. No defects. Segovia's test negative.      Comments: Prominent styloid process bilateral    Lymphadenopathy:      Lower Body: No right inguinal adenopathy. No left inguinal adenopathy.      Comments: Negative lymphadenopathy bilateral popliteal fossa and tarsal tunnel.    Negative lymphangitic streaking bilateral feet/ankles/legs.   Skin:     General: Skin is warm and dry.      Capillary Refill: Capillary refill takes 2 to 3 seconds.      Coloration: Skin is not pale.      Findings: No abrasion, bruising, burn, ecchymosis, erythema, laceration, lesion or rash.      Nails: There is no clubbing.      Comments:   Focal hyperkeratotic lesion right 5th metatarsal base consisting entirely of hyperkeratotic tissue without open skin, drainage, pus, fluctuance, malodor, or signs of infection    Neurological:      Mental Status: She is alert and oriented to person, place, and time.      Sensory: No sensory deficit.      Motor: No tremor, atrophy or abnormal muscle tone.      Gait: Gait normal.      Deep Tendon Reflexes:      Reflex Scores:       Patellar reflexes are 2+ on the  right side and 2+ on the left side.       Achilles reflexes are 2+ on the right side and 2+ on the left side.  Psychiatric:         Behavior: Behavior is cooperative.             Assessment:       Encounter Diagnoses   Name Primary?    Corn or callus Yes    Right foot pain          Plan:       Elena was seen today for foot problem.    Diagnoses and all orders for this visit:    Corn or callus    Right foot pain      I counseled the patient on her conditions, their implications and medical management.    Continue urea daily, cover with bandaid when sleeping. ekly with a pumice stone.    Discussed conservative treatment with shoes of adequate dimensions, material, and style to alleviate symptoms and delay or prevent surgical intervention.      Call or return to clinic prn if these symptoms worsen or fail to improve as anticipated.

## 2023-02-01 ENCOUNTER — HOSPITAL ENCOUNTER (OUTPATIENT)
Dept: RADIOLOGY | Facility: HOSPITAL | Age: 78
Discharge: HOME | End: 2023-02-01
Attending: NURSE PRACTITIONER
Payer: MEDICARE

## 2023-02-01 ENCOUNTER — OFFICE VISIT (OUTPATIENT)
Dept: ORTHOPEDICS | Facility: CLINIC | Age: 78
End: 2023-02-01
Payer: MEDICARE

## 2023-02-01 VITALS — HEIGHT: 68 IN | BODY MASS INDEX: 29.1 KG/M2 | WEIGHT: 192 LBS

## 2023-02-01 DIAGNOSIS — M76.31 ILIOTIBIAL BAND SYNDROME OF RIGHT SIDE: ICD-10-CM

## 2023-02-01 DIAGNOSIS — M70.61 TROCHANTERIC BURSITIS OF RIGHT HIP: Primary | ICD-10-CM

## 2023-02-01 DIAGNOSIS — M25.551 RIGHT HIP PAIN: Primary | ICD-10-CM

## 2023-02-01 DIAGNOSIS — M25.551 RIGHT HIP PAIN: ICD-10-CM

## 2023-02-01 PROCEDURE — 73502 XR HIP WITH PELVIS WHEN PERFORMED, 2 OR 3  VIEWS RIGHT: ICD-10-PCS | Mod: 26,RT,, | Performed by: RADIOLOGY

## 2023-02-01 PROCEDURE — 3288F FALL RISK ASSESSMENT DOCD: CPT | Mod: CPTII,S$GLB,, | Performed by: NURSE PRACTITIONER

## 2023-02-01 PROCEDURE — 1159F PR MEDICATION LIST DOCUMENTED IN MEDICAL RECORD: ICD-10-PCS | Mod: CPTII,S$GLB,, | Performed by: NURSE PRACTITIONER

## 2023-02-01 PROCEDURE — 20610 LARGE JOINT ASPIRATION/INJECTION: R GREATER TROCHANTERIC BURSA: ICD-10-PCS | Mod: RT,S$GLB,, | Performed by: NURSE PRACTITIONER

## 2023-02-01 PROCEDURE — 1126F AMNT PAIN NOTED NONE PRSNT: CPT | Mod: CPTII,S$GLB,, | Performed by: NURSE PRACTITIONER

## 2023-02-01 PROCEDURE — 99999 PR PBB SHADOW E&M-EST. PATIENT-LVL III: ICD-10-PCS | Mod: PBBFAC,,, | Performed by: NURSE PRACTITIONER

## 2023-02-01 PROCEDURE — 1126F PR PAIN SEVERITY QUANTIFIED, NO PAIN PRESENT: ICD-10-PCS | Mod: CPTII,S$GLB,, | Performed by: NURSE PRACTITIONER

## 2023-02-01 PROCEDURE — 73502 X-RAY EXAM HIP UNI 2-3 VIEWS: CPT | Mod: 26,RT,, | Performed by: RADIOLOGY

## 2023-02-01 PROCEDURE — 73502 X-RAY EXAM HIP UNI 2-3 VIEWS: CPT | Mod: TC,RT

## 2023-02-01 PROCEDURE — 99999 PR PBB SHADOW E&M-EST. PATIENT-LVL III: CPT | Mod: PBBFAC,,, | Performed by: NURSE PRACTITIONER

## 2023-02-01 PROCEDURE — 20610 DRAIN/INJ JOINT/BURSA W/O US: CPT | Mod: RT,S$GLB,, | Performed by: NURSE PRACTITIONER

## 2023-02-01 PROCEDURE — 1159F MED LIST DOCD IN RCRD: CPT | Mod: CPTII,S$GLB,, | Performed by: NURSE PRACTITIONER

## 2023-02-01 PROCEDURE — 99204 OFFICE O/P NEW MOD 45 MIN: CPT | Mod: 25,S$GLB,, | Performed by: NURSE PRACTITIONER

## 2023-02-01 PROCEDURE — 1160F PR REVIEW ALL MEDS BY PRESCRIBER/CLIN PHARMACIST DOCUMENTED: ICD-10-PCS | Mod: CPTII,S$GLB,, | Performed by: NURSE PRACTITIONER

## 2023-02-01 PROCEDURE — 99204 PR OFFICE/OUTPT VISIT, NEW, LEVL IV, 45-59 MIN: ICD-10-PCS | Mod: 25,S$GLB,, | Performed by: NURSE PRACTITIONER

## 2023-02-01 PROCEDURE — 3288F PR FALLS RISK ASSESSMENT DOCUMENTED: ICD-10-PCS | Mod: CPTII,S$GLB,, | Performed by: NURSE PRACTITIONER

## 2023-02-01 PROCEDURE — 1101F PT FALLS ASSESS-DOCD LE1/YR: CPT | Mod: CPTII,S$GLB,, | Performed by: NURSE PRACTITIONER

## 2023-02-01 PROCEDURE — 1160F RVW MEDS BY RX/DR IN RCRD: CPT | Mod: CPTII,S$GLB,, | Performed by: NURSE PRACTITIONER

## 2023-02-01 PROCEDURE — 1101F PR PT FALLS ASSESS DOC 0-1 FALLS W/OUT INJ PAST YR: ICD-10-PCS | Mod: CPTII,S$GLB,, | Performed by: NURSE PRACTITIONER

## 2023-02-01 RX ORDER — MELOXICAM 15 MG/1
15 TABLET ORAL DAILY
Qty: 30 TABLET | Refills: 0 | Status: SHIPPED | OUTPATIENT
Start: 2023-02-01 | End: 2023-09-05 | Stop reason: SDUPTHER

## 2023-02-01 RX ORDER — LIDOCAINE HYDROCHLORIDE 10 MG/ML
4 INJECTION INFILTRATION; PERINEURAL
Status: DISCONTINUED | OUTPATIENT
Start: 2023-02-01 | End: 2023-02-01 | Stop reason: HOSPADM

## 2023-02-01 RX ORDER — TRIAMCINOLONE ACETONIDE 40 MG/ML
40 INJECTION, SUSPENSION INTRA-ARTICULAR; INTRAMUSCULAR
Status: DISCONTINUED | OUTPATIENT
Start: 2023-02-01 | End: 2023-02-01 | Stop reason: HOSPADM

## 2023-02-01 RX ADMIN — TRIAMCINOLONE ACETONIDE 40 MG: 40 INJECTION, SUSPENSION INTRA-ARTICULAR; INTRAMUSCULAR at 01:02

## 2023-02-01 RX ADMIN — LIDOCAINE HYDROCHLORIDE 4 ML: 10 INJECTION INFILTRATION; PERINEURAL at 01:02

## 2023-02-01 NOTE — PROGRESS NOTES
SUBJECTIVE:     Chief Complaint & History of Present Illness:  Elena Block is a New 77 y.o. year old female patient presenting today for constant right hip pain which started 3-4 weeks ago.  There is not a history of trauma.  The pain is located in the lateral aspect of the hip.  The pain is described as shooting, 7/10.  It is radiates to knee and worse at night .  There is radiation into the knee.  Previous treatments include acetaminophen and lidocaine patches and Mobic and steroid injection which have provided good relief.  There is not a history of previous injury or surgery to the hip.  The patient does not use an assistive device.      Past Medical History:   Diagnosis Date    Back pain     Cataract     Central retinal vein occlusion of left eye     Degeneration of lumbar or lumbosacral intervertebral disc 4/16/2013    High cholesterol     Hypertension     Hypothyroidism     Impingement syndrome of left shoulder 7/29/2013    Obesity     Pneumonia     Posterior vitreous detachment of both eyes 1/17/2013    S/P partial hysterectomy 01/23/2013    Vitamin D insufficiency 2016       Past Surgical History:   Procedure Laterality Date    COLONOSCOPY  01/22/2008    COLONOSCOPY N/A 8/8/2019    Procedure: COLONOSCOPY;  Surgeon: Allie Preston MD;  Location: [address];  Service: Endoscopy;  Laterality: N/A;    CYSTOSCOPY N/A 1/8/2021    Procedure: CYSTOSCOPY;  Surgeon: Mauricio Corea MD;  Location: [facility];  Service: OB/GYN;  Laterality: N/A;    HYSTERECTOMY      Partial     LAPAROSCOPIC LYSIS OF ADHESIONS N/A 1/8/2021    Procedure: LYSIS, ADHESIONS, LAPAROSCOPIC;  Surgeon: Mauricio Corea MD;  Location: [facility];  Service: OB/GYN;  Laterality: N/A;    PARTIAL HYSTERECTOMY      ROBOT-ASSISTED LAPAROSCOPIC ABDOMINAL SACROCOLPOPEXY USING DA PATSY XI N/A 1/8/2021    Procedure: XI ROBOTIC SACROCOLPOPEXY, ABDOMINAL;  Surgeon: Mauricio Corea MD;  Location: [facility];  Service: OB/GYN;  Laterality: N/A;     ROBOT-ASSISTED LAPAROSCOPIC SURGICAL REMOVAL OF OVARY USING DA PATSY XI Right 1/8/2021    Procedure: XI ROBOTIC OOPHORECTOMY;  Surgeon: Mauricio Corea MD;  Location: [facility];  Service: OB/GYN;  Laterality: Right;    THYROIDECTOMY, PARTIAL      TONSILLECTOMY         Family History   Problem Relation Age of Onset    Diabetes Mother     Glaucoma Mother     Heart disease Mother     Blindness Mother     Heart attack Mother     No Known Problems Father     Glaucoma Brother     Stroke Brother     Liver disease Brother     Heart disease Brother     Arthritis Brother     Alcohol abuse Brother     Drug abuse Brother     Glaucoma Brother     Diabetes Brother     Pemphigus vulgaris Brother     Blindness Brother     Heart disease Brother     Diabetes Brother         Bilateral Knee amputation    Heart disease Brother     Liver disease Brother     Gout Son     Heart disease Son 48        stent placement    Heart attack Son     Heart attacks under age 50 Son     No Known Problems Son     No Known Problems Maternal Aunt     No Known Problems Maternal Uncle     No Known Problems Paternal Aunt     No Known Problems Paternal Uncle     Hypertension Maternal Grandmother     Diabetes Maternal Grandmother     Blindness Maternal Grandmother     No Known Problems Maternal Grandfather     No Known Problems Paternal Grandmother     No Known Problems Paternal Grandfather     Amblyopia Neg Hx     Macular degeneration Neg Hx     Retinal detachment Neg Hx     Strabismus Neg Hx     Thyroid disease Neg Hx     Cataracts Neg Hx     Cancer Neg Hx        Review of patient's allergies indicates:   Allergen Reactions    Tramadol Nausea And Vomiting         Current Outpatient Medications:     carvediloL (COREG) 6.25 MG tablet, TAKE 1 TABLET BY MOUTH  TWICE DAILY WITH MEALS, Disp: 180 tablet, Rfl: 2    cetirizine (ZYRTEC) 10 MG tablet, Take 1 tablet (10 mg total) by mouth once daily., Disp: 30 tablet, Rfl: 6    docusate sodium (COLACE) 100 MG capsule,  1-4 caps daily; gradually taper up over 2 weeks (Patient taking differently: twice a week. 1-4 caps daily; gradually taper up over 2 weeks), Disp: 60 capsule, Rfl: prn    docusate sodium (COLACE) 100 MG capsule, Take 1 capsule (100 mg total) by mouth 2 (two) times daily., Disp: 30 capsule, Rfl: 1    estradioL (ESTRACE) 0.01 % (0.1 mg/gram) vaginal cream, Place 1 g vaginally every Mon, Wed, Fri., Disp: 42.5 g, Rfl: 11    FOLBEE 2.5-25-1 mg Tab, TAKE 1 TABLET BY MOUTH EVERY DAY, Disp: 90 tablet, Rfl: 3    FOLBEE 2.5-25-1 mg Tab, TAKE 1 TABLET BY MOUTH EVERY DAY, Disp: 90 tablet, Rfl: 3    hydroCHLOROthiazide (HYDRODIURIL) 12.5 MG Tab, TAKE 1 TABLET BY MOUTH EVERY DAY, Disp: 90 tablet, Rfl: 2    Lactobacillus acidophilus (PROBIOTIC ORAL), Take by mouth once daily., Disp: , Rfl:     levothyroxine (SYNTHROID) 100 MCG tablet, TAKE 1 TABLET BY MOUTH  DAILY ON EMPTY STOMACH, Disp: 90 tablet, Rfl: 3    NIFEdipine (PROCARDIA-XL) 90 MG (OSM) 24 hr tablet, TAKE 1 TABLET BY MOUTH ONCE DAILY, Disp: 90 tablet, Rfl: 3    psyllium (METAMUCIL) powder, Take 1 packet by mouth once daily at 6am., Disp: , Rfl:     simvastatin (ZOCOR) 40 MG tablet, TAKE 1 TABLET BY MOUTH IN  THE EVENING, Disp: 90 tablet, Rfl: 3    timolol maleate 0.5% (TIMOPTIC-XE) 0.5 % SolG, INSTILL 1 DROP INTO BOTH EYES ONCE DAILY, Disp: 10 mL, Rfl: 3    valsartan (DIOVAN) 320 MG tablet, TAKE 1 TABLET BY MOUTH ONCE DAILY, Disp: 90 tablet, Rfl: 1    Review of Systems:  ROS:  Constitutional: no fever or chills  Eyes: no visual changes  ENT: no nasal congestion or sore throat  Respiratory: no cough or shortness of breath  Cardiovascular: no chest pain or palpitations  Gastrointestinal: no nausea or vomiting, tolerating diet  Genitourinary: no hematuria or dysuria  Integument/Breast: no rash or pruritis  Hematologic/Lymphatic: no easy bruising or lymphadenopathy  Musculoskeletal:  right hip pain  Neurological: no seizures or tremors  Behavioral/Psych: no auditory or visual  "hallucinations  Endocrine: no heat or cold intolerance      PE:  There were no vitals taken for this visit.  Estimated body mass index is 29.19 kg/m² as calculated from the following:    Height as of 1/6/23: 5' 8" (1.727 m).    Weight as of 1/6/23: 87.1 kg (192 lb).   General: Pleasant, cooperative, NAD   HEENT: NCAT, sclera nonicteric   Lungs: Respirations are equal and unlabored.   Abdomen: Soft and non-tender.  CV: 2+ bilateral upper and lower extremity pulses.   Skin: Intact throughout LE with no rashes, erythema, or lesions  Extremities: No LE edema, NVI lower extremities      Hip Exam:   rightpositives: tenderness over greater trochanter and negatives: no pain with heel impact  pulses full    100 degrees flexion  -5 degrees extension   30 degrees internal rotation  30 degrees external rotation  20 degrees abduction  20 degrees adduction     RADIOGRAPHS:  Right hip x-ray was obtained, findings show no acute fractures.  Is mild degenerative joint disease.  All radiographs were personally reviewed by me.    ASSESSMENT/PLAN:       ICD-10-CM ICD-9-CM   1. Trochanteric bursitis of right hip  M70.61 726.5   2. Iliotibial band syndrome of right side  M76.31 728.89     -Elenahebert Block presents to clinic today with c/c right hip pain for the past 3-4 weeks.  She has taken Mobic, Lidocaine patches, and Tylenol which helped.  She also reports getting a steroid injection in the past which really relieved her pain in the past.  -X-ray as above.  -Recommend RICE therapy.  -I will refill her Mobic and give her a CSI.  See procedure note.  -Recommend ice massage to lateral leg for suspected IT Banditis.  -May continue using Tylenol and Lidocaine patches as she is using.  -Follow up PRN.    "

## 2023-02-01 NOTE — PROCEDURES
Large Joint Aspiration/Injection: R greater trochanteric bursa    Date/Time: 2/1/2023 1:30 PM  Performed by: Alfredo Portillo NP  Authorized by: Alfredo Portillo NP     Consent Done?:  Yes (Verbal)  Indications:  Pain  Site marked: the procedure site was marked    Timeout: prior to procedure the correct patient, procedure, and site was verified    Prep: patient was prepped and draped in usual sterile fashion      Local anesthesia used?: Yes    Local anesthetic:  Lidocaine spray    Details:  Needle Size:  22 G  Ultrasonic Guidance for needle placement?: No    Approach:  Lateral  Location:  Hip  Site:  R greater trochanteric bursa  Medications:  4 mL LIDOcaine HCL 10 mg/ml (1%) 10 mg/mL (1 %); 40 mg triamcinolone acetonide 40 mg/mL  Patient tolerance:  Patient tolerated the procedure well with no immediate complications

## 2023-02-17 ENCOUNTER — LAB VISIT (OUTPATIENT)
Dept: LAB | Facility: HOSPITAL | Age: 78
End: 2023-02-17
Attending: INTERNAL MEDICINE
Payer: MEDICARE

## 2023-02-17 DIAGNOSIS — I10 ESSENTIAL HYPERTENSION: ICD-10-CM

## 2023-02-17 DIAGNOSIS — E78.5 HYPERLIPIDEMIA, UNSPECIFIED HYPERLIPIDEMIA TYPE: ICD-10-CM

## 2023-02-17 LAB
ALBUMIN SERPL BCP-MCNC: 3.9 G/DL (ref 3.5–5.2)
ALP SERPL-CCNC: 84 U/L (ref 55–135)
ALT SERPL W/O P-5'-P-CCNC: 36 U/L (ref 10–44)
ANION GAP SERPL CALC-SCNC: 8 MMOL/L (ref 8–16)
AST SERPL-CCNC: 37 U/L (ref 10–40)
BILIRUB SERPL-MCNC: 0.5 MG/DL (ref 0.1–1)
BUN SERPL-MCNC: 18 MG/DL (ref 8–23)
CALCIUM SERPL-MCNC: 9.5 MG/DL (ref 8.7–10.5)
CHLORIDE SERPL-SCNC: 104 MMOL/L (ref 95–110)
CHOLEST SERPL-MCNC: 153 MG/DL (ref 120–199)
CHOLEST/HDLC SERPL: 2.9 {RATIO} (ref 2–5)
CO2 SERPL-SCNC: 26 MMOL/L (ref 23–29)
CREAT SERPL-MCNC: 0.9 MG/DL (ref 0.5–1.4)
EST. GFR  (NO RACE VARIABLE): >60 ML/MIN/1.73 M^2
GLUCOSE SERPL-MCNC: 82 MG/DL (ref 70–110)
HDLC SERPL-MCNC: 52 MG/DL (ref 40–75)
HDLC SERPL: 34 % (ref 20–50)
LDLC SERPL CALC-MCNC: 89 MG/DL (ref 63–159)
NONHDLC SERPL-MCNC: 101 MG/DL
POTASSIUM SERPL-SCNC: 4.2 MMOL/L (ref 3.5–5.1)
PROT SERPL-MCNC: 8 G/DL (ref 6–8.4)
SODIUM SERPL-SCNC: 138 MMOL/L (ref 136–145)
TRIGL SERPL-MCNC: 60 MG/DL (ref 30–150)

## 2023-02-17 PROCEDURE — 36415 COLL VENOUS BLD VENIPUNCTURE: CPT | Performed by: INTERNAL MEDICINE

## 2023-02-17 PROCEDURE — 80053 COMPREHEN METABOLIC PANEL: CPT | Performed by: INTERNAL MEDICINE

## 2023-02-17 PROCEDURE — 80061 LIPID PANEL: CPT | Performed by: INTERNAL MEDICINE

## 2023-02-21 NOTE — PROGRESS NOTES
"Subjective:       Patient ID: Elena Block is a 77 y.o. female.    Chief Complaint:   Annual Exam    HPI: Mrs Block presents for follow up the above    HTN: Med Tx 1-Diovan 320mg QD, 2-HCTZ 12.5mg QD, 3-Coreg 6.25mg BID, 4-Procardia XL 90mg QD        Home BPs: 130/75  HLD: Med Tx 1-Zocor 40mg QD  OA: + c/o pain in her right hip even s/p steroid injection 2/1/23 and taking Meloxicam daily, but still with pain. Pain c/w "pins/needles"  in right front/side thigh.  The "pins/needles sx" is only at night and none during day  She has no back pain.  She has had no falls and no leg weakness    Past Medical, Surgical, Social History: Please see as stated in Epic chart which has been reviewed.    Current Outpatient Medications   Medication Sig Dispense Refill    carvediloL (COREG) 6.25 MG tablet TAKE 1 TABLET BY MOUTH  TWICE DAILY WITH MEALS 180 tablet 2    cetirizine (ZYRTEC) 10 MG tablet Take 1 tablet (10 mg total) by mouth once daily. 30 tablet 6    docusate sodium (COLACE) 100 MG capsule 1-4 caps daily; gradually taper up over 2 weeks (Patient taking differently: twice a week. 1-4 caps daily; gradually taper up over 2 weeks) 60 capsule prn    docusate sodium (COLACE) 100 MG capsule Take 1 capsule (100 mg total) by mouth 2 (two) times daily. 30 capsule 1    estradioL (ESTRACE) 0.01 % (0.1 mg/gram) vaginal cream Place 1 g vaginally every Mon, Wed, Fri. 42.5 g 11    FOLBEE 2.5-25-1 mg Tab TAKE 1 TABLET BY MOUTH EVERY DAY 90 tablet 3    FOLBEE 2.5-25-1 mg Tab TAKE 1 TABLET BY MOUTH EVERY DAY 90 tablet 3    gabapentin (NEURONTIN) 100 MG capsule 1 cap in AM and 2 caps prior to bedtime 90 capsule 3    hydroCHLOROthiazide (HYDRODIURIL) 12.5 MG Tab TAKE 1 TABLET BY MOUTH EVERY DAY 90 tablet 2    Lactobacillus acidophilus (PROBIOTIC ORAL) Take by mouth once daily.      levothyroxine (SYNTHROID) 100 MCG tablet TAKE 1 TABLET BY MOUTH  DAILY ON EMPTY STOMACH 90 tablet 3    meloxicam (MOBIC) 15 MG tablet Take 1 tablet (15 mg " total) by mouth once daily. 30 tablet 0    NIFEdipine (PROCARDIA-XL) 90 MG (OSM) 24 hr tablet TAKE 1 TABLET BY MOUTH ONCE DAILY 90 tablet 3    psyllium (METAMUCIL) powder Take 1 packet by mouth once daily at 6am.      simvastatin (ZOCOR) 40 MG tablet TAKE 1 TABLET BY MOUTH IN  THE EVENING 90 tablet 3    timolol maleate 0.5% (TIMOPTIC-XE) 0.5 % SolG INSTILL 1 DROP INTO BOTH EYES ONCE DAILY 10 mL 3    valsartan (DIOVAN) 320 MG tablet Take 1 tablet (320 mg total) by mouth once daily. 30 tablet 0     No current facility-administered medications for this visit.       Review of Systems   Neurological:  Positive for numbness.        Right thigh/See HPI   All other systems reviewed and are negative.    Objective:      Lab Results   Component Value Date    WBC 4.47 08/10/2022    HGB 14.5 08/10/2022    HCT 41.8 08/10/2022     08/10/2022    CHOL 153 02/17/2023    TRIG 60 02/17/2023    HDL 52 02/17/2023    ALT 36 02/17/2023    AST 37 02/17/2023     02/17/2023    K 4.2 02/17/2023     02/17/2023    CREATININE 0.9 02/17/2023    BUN 18 02/17/2023    CO2 26 02/17/2023    TSH 2.558 08/10/2022    HGBA1C 5.3 05/19/2006     Physical Exam  Vitals reviewed.   Constitutional:       Appearance: Normal appearance.   HENT:      Head: Normocephalic and atraumatic.      Mouth/Throat:      Mouth: Mucous membranes are dry.      Pharynx: No posterior oropharyngeal erythema.   Cardiovascular:      Rate and Rhythm: Normal rate and regular rhythm.   Pulmonary:      Effort: Pulmonary effort is normal.      Breath sounds: Normal breath sounds. No wheezing.   Chest:      Chest wall: No tenderness.   Abdominal:      General: Abdomen is flat. Bowel sounds are normal.      Palpations: Abdomen is soft. There is no mass.      Tenderness: There is no abdominal tenderness.   Musculoskeletal:         General: Tenderness present. No swelling.      Cervical back: Normal range of motion and neck supple. No muscular tenderness.      Right lower  "leg: No edema.      Left lower leg: No edema.      Comments: Right trochanteric bursa mildly tender but hip with good ROM   Lymphadenopathy:      Cervical: No cervical adenopathy.   Skin:     General: Skin is warm and dry.   Neurological:      General: No focal deficit present.      Mental Status: She is alert.      Sensory: No sensory deficit.      Motor: No weakness.      Coordination: Coordination normal.      Gait: Gait normal.      Deep Tendon Reflexes: Reflexes normal.      Comments: DTRs at knees slightly decreased   Psychiatric:         Mood and Affect: Mood normal.         Vital Signs  Pulse: 69  SpO2: 97 %  BP: 136/66  BP Location: Left arm  Patient Position: Sitting  Pain Score: 0-No pain  Height and Weight  Height: 5' 8" (172.7 cm)  Weight: 87.7 kg (193 lb 5.5 oz)  BSA (Calculated - sq m): 2.05 sq meters  BMI (Calculated): 29.4  Weight in (lb) to have BMI = 25: 164.1    Assessment:       1. Essential hypertension    2. Hyperlipidemia, unspecified hyperlipidemia type    3. Osteoarthritis, unspecified osteoarthritis type, unspecified site    4. Hypothyroidism, unspecified type    5. Lumbar radiculopathy, chronic    6. Meralgia paresthetica, right    7. Adenomatous polyp of colon, unspecified part of colon    8. Unspecified severe protein-calorie malnutrition        Plan:     Health Maintenance   Topic Date Due    Mammogram  07/12/2023    Lipid Panel  02/17/2024    TETANUS VACCINE  10/01/2024    Colonoscopy  08/08/2029    Hepatitis C Screening  Completed    DEXA Scan  Demetrius Parker was seen today for annual exam.    Diagnoses and all orders for this visit:    Essential hypertension/controlled        -     Continue:  Med Tx 1-Diovan 320mg QD, 2-HCTZ 12.5mg QD, 3-Coreg 6.25mg BID, 4-Procardia XL 90mg QD  -     valsartan (DIOVAN) 320 MG tablet; Take 1 tablet (320 mg total) by mouth once daily.  -     CBC Auto Differential; Future  -     Comprehensive Metabolic Panel; Future  -     TSH; " Future    Hyperlipidemia, unspecified hyperlipidemia type/controlled on ASimvastatin 40 mg q.day  -     Comprehensive Metabolic Panel; Future  -     Lipid Panel; Future    Osteoarthritis, unspecified osteoarthritis type, unspecified site    Hypothyroidism, unspecified type/on Synthroid 100 mcg q.day        -     check TSH with return to clinic lab work    Lumbar radiculopathy, chronic/s/p Lumbar CT 3/2013  -     gabapentin (NEURONTIN) 100 MG capsule; 1 cap in AM and 2 caps prior to bedtime  -     consider lumbar MRI if no improvement in symptoms    Meralgia paresthetica, right  -     gabapentin (NEURONTIN) 100 MG capsule; 1 cap in AM and 2 caps prior to bedtime  -     Vitamin B12; Future    Adenomatous polyps of colon, unspecified part of colon/status post colonoscopy August 2019        -     repeat colonoscopy due August 2024    Unspecified severe protein-calorie malnutrition  -     Vitamin B12; Future    Health maintenance        -     return to clinic x6 months with 1 week prior fasting lab or see patient sooner if needed

## 2023-02-22 ENCOUNTER — OFFICE VISIT (OUTPATIENT)
Dept: INTERNAL MEDICINE | Facility: CLINIC | Age: 78
End: 2023-02-22
Payer: MEDICARE

## 2023-02-22 VITALS
DIASTOLIC BLOOD PRESSURE: 66 MMHG | HEIGHT: 68 IN | SYSTOLIC BLOOD PRESSURE: 136 MMHG | HEART RATE: 69 BPM | OXYGEN SATURATION: 97 % | WEIGHT: 193.31 LBS | BODY MASS INDEX: 29.3 KG/M2

## 2023-02-22 DIAGNOSIS — E03.9 HYPOTHYROIDISM, UNSPECIFIED TYPE: ICD-10-CM

## 2023-02-22 DIAGNOSIS — M19.90 OSTEOARTHRITIS, UNSPECIFIED OSTEOARTHRITIS TYPE, UNSPECIFIED SITE: ICD-10-CM

## 2023-02-22 DIAGNOSIS — E78.5 HYPERLIPIDEMIA, UNSPECIFIED HYPERLIPIDEMIA TYPE: ICD-10-CM

## 2023-02-22 DIAGNOSIS — E43 UNSPECIFIED SEVERE PROTEIN-CALORIE MALNUTRITION: ICD-10-CM

## 2023-02-22 DIAGNOSIS — I10 ESSENTIAL HYPERTENSION: Primary | ICD-10-CM

## 2023-02-22 DIAGNOSIS — G57.11 MERALGIA PARESTHETICA, RIGHT: ICD-10-CM

## 2023-02-22 DIAGNOSIS — D12.6 ADENOMATOUS POLYP OF COLON, UNSPECIFIED PART OF COLON: ICD-10-CM

## 2023-02-22 DIAGNOSIS — M54.16 LUMBAR RADICULOPATHY, CHRONIC: ICD-10-CM

## 2023-02-22 PROCEDURE — 99214 PR OFFICE/OUTPT VISIT, EST, LEVL IV, 30-39 MIN: ICD-10-PCS | Mod: S$GLB,,, | Performed by: INTERNAL MEDICINE

## 2023-02-22 PROCEDURE — 3075F SYST BP GE 130 - 139MM HG: CPT | Mod: CPTII,S$GLB,, | Performed by: INTERNAL MEDICINE

## 2023-02-22 PROCEDURE — 3288F PR FALLS RISK ASSESSMENT DOCUMENTED: ICD-10-PCS | Mod: CPTII,S$GLB,, | Performed by: INTERNAL MEDICINE

## 2023-02-22 PROCEDURE — 3288F FALL RISK ASSESSMENT DOCD: CPT | Mod: CPTII,S$GLB,, | Performed by: INTERNAL MEDICINE

## 2023-02-22 PROCEDURE — 3075F PR MOST RECENT SYSTOLIC BLOOD PRESS GE 130-139MM HG: ICD-10-PCS | Mod: CPTII,S$GLB,, | Performed by: INTERNAL MEDICINE

## 2023-02-22 PROCEDURE — 1126F PR PAIN SEVERITY QUANTIFIED, NO PAIN PRESENT: ICD-10-PCS | Mod: CPTII,S$GLB,, | Performed by: INTERNAL MEDICINE

## 2023-02-22 PROCEDURE — 99999 PR PBB SHADOW E&M-EST. PATIENT-LVL V: CPT | Mod: PBBFAC,,, | Performed by: INTERNAL MEDICINE

## 2023-02-22 PROCEDURE — 3078F PR MOST RECENT DIASTOLIC BLOOD PRESSURE < 80 MM HG: ICD-10-PCS | Mod: CPTII,S$GLB,, | Performed by: INTERNAL MEDICINE

## 2023-02-22 PROCEDURE — 1159F MED LIST DOCD IN RCRD: CPT | Mod: CPTII,S$GLB,, | Performed by: INTERNAL MEDICINE

## 2023-02-22 PROCEDURE — 1159F PR MEDICATION LIST DOCUMENTED IN MEDICAL RECORD: ICD-10-PCS | Mod: CPTII,S$GLB,, | Performed by: INTERNAL MEDICINE

## 2023-02-22 PROCEDURE — 99214 OFFICE O/P EST MOD 30 MIN: CPT | Mod: S$GLB,,, | Performed by: INTERNAL MEDICINE

## 2023-02-22 PROCEDURE — 1101F PT FALLS ASSESS-DOCD LE1/YR: CPT | Mod: CPTII,S$GLB,, | Performed by: INTERNAL MEDICINE

## 2023-02-22 PROCEDURE — 3078F DIAST BP <80 MM HG: CPT | Mod: CPTII,S$GLB,, | Performed by: INTERNAL MEDICINE

## 2023-02-22 PROCEDURE — 1126F AMNT PAIN NOTED NONE PRSNT: CPT | Mod: CPTII,S$GLB,, | Performed by: INTERNAL MEDICINE

## 2023-02-22 PROCEDURE — 1101F PR PT FALLS ASSESS DOC 0-1 FALLS W/OUT INJ PAST YR: ICD-10-PCS | Mod: CPTII,S$GLB,, | Performed by: INTERNAL MEDICINE

## 2023-02-22 PROCEDURE — 99999 PR PBB SHADOW E&M-EST. PATIENT-LVL V: ICD-10-PCS | Mod: PBBFAC,,, | Performed by: INTERNAL MEDICINE

## 2023-02-22 RX ORDER — VALSARTAN 320 MG/1
320 TABLET ORAL DAILY
Qty: 30 TABLET | Refills: 0 | Status: SHIPPED | OUTPATIENT
Start: 2023-02-22 | End: 2023-03-16

## 2023-02-22 RX ORDER — GABAPENTIN 100 MG/1
CAPSULE ORAL
Qty: 90 CAPSULE | Refills: 3 | Status: SHIPPED | OUTPATIENT
Start: 2023-02-22 | End: 2023-03-09

## 2023-02-24 ENCOUNTER — TELEPHONE (OUTPATIENT)
Dept: INTERNAL MEDICINE | Facility: CLINIC | Age: 78
End: 2023-02-24
Payer: MEDICARE

## 2023-02-24 DIAGNOSIS — M54.16 LUMBAR RADICULOPATHY, CHRONIC: Primary | ICD-10-CM

## 2023-02-24 NOTE — TELEPHONE ENCOUNTER
----- Message from Jacey Babcock sent at 2/24/2023  7:56 AM CST -----  Contact: [phone]  Patient is calling for Medical Advice regarding: Patient would like you to put the order in for the CT scan  and please call to schedule it.

## 2023-02-28 ENCOUNTER — HOSPITAL ENCOUNTER (OUTPATIENT)
Dept: RADIOLOGY | Facility: HOSPITAL | Age: 78
Discharge: HOME | End: 2023-02-28
Attending: INTERNAL MEDICINE
Payer: MEDICARE

## 2023-02-28 DIAGNOSIS — M54.16 LUMBAR RADICULOPATHY, CHRONIC: ICD-10-CM

## 2023-02-28 PROCEDURE — 72148 MRI LUMBAR SPINE W/O DYE: CPT | Mod: TC

## 2023-02-28 PROCEDURE — 72148 MRI LUMBAR SPINE WITHOUT CONTRAST: ICD-10-PCS | Mod: 26,,, | Performed by: RADIOLOGY

## 2023-02-28 PROCEDURE — 72148 MRI LUMBAR SPINE W/O DYE: CPT | Mod: 26,,, | Performed by: RADIOLOGY

## 2023-03-09 ENCOUNTER — TELEPHONE (OUTPATIENT)
Dept: INTERNAL MEDICINE | Facility: CLINIC | Age: 78
End: 2023-03-09
Payer: MEDICARE

## 2023-03-09 ENCOUNTER — PATIENT MESSAGE (OUTPATIENT)
Dept: INTERNAL MEDICINE | Facility: CLINIC | Age: 78
End: 2023-03-09
Payer: MEDICARE

## 2023-03-09 DIAGNOSIS — M54.16 LUMBAR RADICULOPATHY: Primary | ICD-10-CM

## 2023-03-09 DIAGNOSIS — R20.2 NUMBNESS AND TINGLING OF BOTH LEGS: ICD-10-CM

## 2023-03-09 DIAGNOSIS — R20.0 NUMBNESS AND TINGLING OF BOTH LEGS: ICD-10-CM

## 2023-03-10 NOTE — TELEPHONE ENCOUNTER
"Spoke with Mrs Block ie Review of MRI Lumbar spine(2/28/23)-shows diffuse facet OA/DDD from L3-S1 and mild spinal stenosis at L4-5.  She couldn't tolerate the Gabapentin 2ndary  to dizziness/being off balance. She is getting  a little relief from Meloxicam 15mg daily.  I've recommended she try to decrease the Meloxicam  to 1/2 tab if tolerated.  'Will refer pt to Neurology since she can't tolerate the gabapentin and I'd rather her not be on NSAIDS long term. She agrees  Irasema, would you please call Mrs Block and schedule her an appt in Neurology for "Numbness/tingling" ; I've placed the referral.  Thanks  "

## 2023-03-13 ENCOUNTER — CLINICAL SUPPORT (OUTPATIENT)
Dept: AUDIOLOGY | Facility: CLINIC | Age: 78
End: 2023-03-13
Payer: MEDICARE

## 2023-03-13 ENCOUNTER — OFFICE VISIT (OUTPATIENT)
Dept: OTOLARYNGOLOGY | Facility: CLINIC | Age: 78
End: 2023-03-13
Payer: MEDICARE

## 2023-03-13 VITALS — SYSTOLIC BLOOD PRESSURE: 131 MMHG | HEART RATE: 68 BPM | DIASTOLIC BLOOD PRESSURE: 77 MMHG

## 2023-03-13 DIAGNOSIS — H93.11 TINNITUS, RIGHT EAR: Primary | ICD-10-CM

## 2023-03-13 DIAGNOSIS — H93.8X9 EAR CONGESTION, UNSPECIFIED LATERALITY: ICD-10-CM

## 2023-03-13 DIAGNOSIS — H93.A1 PULSATILE TINNITUS, RIGHT EAR: Primary | ICD-10-CM

## 2023-03-13 PROCEDURE — 1126F AMNT PAIN NOTED NONE PRSNT: CPT | Mod: CPTII,S$GLB,, | Performed by: OTOLARYNGOLOGY

## 2023-03-13 PROCEDURE — 99999 PR PBB SHADOW E&M-EST. PATIENT-LVL III: ICD-10-PCS | Mod: PBBFAC,,, | Performed by: OTOLARYNGOLOGY

## 2023-03-13 PROCEDURE — 3078F PR MOST RECENT DIASTOLIC BLOOD PRESSURE < 80 MM HG: ICD-10-PCS | Mod: CPTII,S$GLB,, | Performed by: OTOLARYNGOLOGY

## 2023-03-13 PROCEDURE — 1101F PT FALLS ASSESS-DOCD LE1/YR: CPT | Mod: CPTII,S$GLB,, | Performed by: OTOLARYNGOLOGY

## 2023-03-13 PROCEDURE — 1159F PR MEDICATION LIST DOCUMENTED IN MEDICAL RECORD: ICD-10-PCS | Mod: CPTII,S$GLB,, | Performed by: OTOLARYNGOLOGY

## 2023-03-13 PROCEDURE — 92567 PR TYMPA2METRY: ICD-10-PCS | Mod: S$GLB,,, | Performed by: AUDIOLOGIST

## 2023-03-13 PROCEDURE — 3078F DIAST BP <80 MM HG: CPT | Mod: CPTII,S$GLB,, | Performed by: OTOLARYNGOLOGY

## 2023-03-13 PROCEDURE — 1126F PR PAIN SEVERITY QUANTIFIED, NO PAIN PRESENT: ICD-10-PCS | Mod: CPTII,S$GLB,, | Performed by: OTOLARYNGOLOGY

## 2023-03-13 PROCEDURE — 3288F PR FALLS RISK ASSESSMENT DOCUMENTED: ICD-10-PCS | Mod: CPTII,S$GLB,, | Performed by: OTOLARYNGOLOGY

## 2023-03-13 PROCEDURE — 99203 OFFICE O/P NEW LOW 30 MIN: CPT | Mod: S$GLB,,, | Performed by: OTOLARYNGOLOGY

## 2023-03-13 PROCEDURE — 92557 PR COMPREHENSIVE HEARING TEST: ICD-10-PCS | Mod: S$GLB,,, | Performed by: AUDIOLOGIST

## 2023-03-13 PROCEDURE — 92557 COMPREHENSIVE HEARING TEST: CPT | Mod: S$GLB,,, | Performed by: AUDIOLOGIST

## 2023-03-13 PROCEDURE — 99203 PR OFFICE/OUTPT VISIT, NEW, LEVL III, 30-44 MIN: ICD-10-PCS | Mod: S$GLB,,, | Performed by: OTOLARYNGOLOGY

## 2023-03-13 PROCEDURE — 99999 PR PBB SHADOW E&M-EST. PATIENT-LVL III: CPT | Mod: PBBFAC,,, | Performed by: OTOLARYNGOLOGY

## 2023-03-13 PROCEDURE — 3075F SYST BP GE 130 - 139MM HG: CPT | Mod: CPTII,S$GLB,, | Performed by: OTOLARYNGOLOGY

## 2023-03-13 PROCEDURE — 99999 PR PBB SHADOW E&M-EST. PATIENT-LVL I: ICD-10-PCS | Mod: PBBFAC,,, | Performed by: AUDIOLOGIST

## 2023-03-13 PROCEDURE — 3288F FALL RISK ASSESSMENT DOCD: CPT | Mod: CPTII,S$GLB,, | Performed by: OTOLARYNGOLOGY

## 2023-03-13 PROCEDURE — 92567 TYMPANOMETRY: CPT | Mod: S$GLB,,, | Performed by: AUDIOLOGIST

## 2023-03-13 PROCEDURE — 1159F MED LIST DOCD IN RCRD: CPT | Mod: CPTII,S$GLB,, | Performed by: OTOLARYNGOLOGY

## 2023-03-13 PROCEDURE — 3075F PR MOST RECENT SYSTOLIC BLOOD PRESS GE 130-139MM HG: ICD-10-PCS | Mod: CPTII,S$GLB,, | Performed by: OTOLARYNGOLOGY

## 2023-03-13 PROCEDURE — 1101F PR PT FALLS ASSESS DOC 0-1 FALLS W/OUT INJ PAST YR: ICD-10-PCS | Mod: CPTII,S$GLB,, | Performed by: OTOLARYNGOLOGY

## 2023-03-13 PROCEDURE — 99999 PR PBB SHADOW E&M-EST. PATIENT-LVL I: CPT | Mod: PBBFAC,,, | Performed by: AUDIOLOGIST

## 2023-03-13 NOTE — PROGRESS NOTES
"Elena Block, a 77 y.o. female, was seen today in the clinic for an audiologic evaluation.  Patient's main complaint was an intermittent "whooshing sound" in the right ear.  Patient also reported one episode of dizziness described as lightheadedness. Patient reported lightheadedness occurred several days ago when she stood up too quickly.     Tympanometry revealed Type A in the right ear and Type A in the left ear. Audiogram results revealed normal hearing sensitivity bilaterally. Speech reception thresholds were noted at 10 dB in the right ear and 10 dB in the left ear.  Speech discrimination scores were 96% in the right ear and 100% in the left ear.    Recommendations:  Otologic evaluation  Repeat audiogram as needed  Hearing protection when in noise        "

## 2023-03-13 NOTE — PATIENT INSTRUCTIONS
Audiometry reviewed; hearing WNL per all parameters; pt. reassured  Indications for vascular/ear / neck anatomy work-up discussed; no study ordered today   Note negative 19 AD pressure per tympanometry  Eustachian tube literature provided  Tinnitus literature provided  Monitor AD otologic status; call for any significant change

## 2023-03-13 NOTE — PROGRESS NOTES
"Subjective:       Patient ID: Elena Block is a 77 y.o. female.    Chief Complaint: Other (Whooshing sound in right ear)    HPI: Ms. Block is a 77 year old AAF whose chief complaint is a "whooshing "sound in her right ear which lasts about 5-10 minutes after lying down at night.  She tends to remove her night cap for relief.  The problem began about 4 months ago. She does not perceive the AD pulsatile tinnitus sensation at all during the day.   She denies any hx of ear surgery or ear infections. She denies any hx of head trauma.  She has changed medications recently for pain control.   She has a remote hx of migraine headaches. She is s/p a thyroidectomy procedure performed many years ago. She has a hx  of tonsillectomy.      Past Medical History:   Diagnosis Date    Back pain     Cataract     Central retinal vein occlusion of left eye     Degeneration of lumbar or lumbosacral intervertebral disc 02/2023    MRI-+diffuse OA/DDD, +mild spinal stenosis at L4-5    High cholesterol     Hypertension     Hypothyroidism     Impingement syndrome of left shoulder 07/29/2013    Obesity     Pneumonia     Posterior vitreous detachment of both eyes 01/17/2013    S/P partial hysterectomy 01/23/2013    Vitamin D insufficiency 2016   ALL: gabapentin. Tramadol  Past Surgical History:   Procedure Laterality Date    COLONOSCOPY  01/22/2008    COLONOSCOPY N/A 8/8/2019    Procedure: COLONOSCOPY;  Surgeon: Allie Preston MD;  Location: [address];  Service: Endoscopy;  Laterality: N/A;    CYSTOSCOPY N/A 1/8/2021    Procedure: CYSTOSCOPY;  Surgeon: Mauricio Corea MD;  Location: [facility];  Service: OB/GYN;  Laterality: N/A;    HYSTERECTOMY      Partial     LAPAROSCOPIC LYSIS OF ADHESIONS N/A 1/8/2021    Procedure: LYSIS, ADHESIONS, LAPAROSCOPIC;  Surgeon: Mauricio Corea MD;  Location: [facility];  Service: OB/GYN;  Laterality: N/A;    PARTIAL HYSTERECTOMY      ROBOT-ASSISTED LAPAROSCOPIC ABDOMINAL SACROCOLPOPEXY USING DA " PATSY XI N/A 1/8/2021    Procedure: XI ROBOTIC SACROCOLPOPEXY, ABDOMINAL;  Surgeon: Mauricio Corea MD;  Location: [facility] OR;  Service: OB/GYN;  Laterality: N/A;    ROBOT-ASSISTED LAPAROSCOPIC SURGICAL REMOVAL OF OVARY USING DA PATSY XI Right 1/8/2021    Procedure: XI ROBOTIC OOPHORECTOMY;  Surgeon: Mauricio Corea MD;  Location: [facility] OR;  Service: OB/GYN;  Laterality: Right;    THYROIDECTOMY, PARTIAL      TONSILLECTOMY       Current Outpatient Medications on File Prior to Visit   Medication Sig Dispense Refill    carvediloL (COREG) 6.25 MG tablet TAKE 1 TABLET BY MOUTH  TWICE DAILY WITH MEALS 180 tablet 2    cetirizine (ZYRTEC) 10 MG tablet Take 1 tablet (10 mg total) by mouth once daily. 30 tablet 6    docusate sodium (COLACE) 100 MG capsule 1-4 caps daily; gradually taper up over 2 weeks (Patient taking differently: twice a week. 1-4 caps daily; gradually taper up over 2 weeks) 60 capsule prn    docusate sodium (COLACE) 100 MG capsule Take 1 capsule (100 mg total) by mouth 2 (two) times daily. 30 capsule 1    estradioL (ESTRACE) 0.01 % (0.1 mg/gram) vaginal cream Place 1 g vaginally every Mon, Wed, Fri. 42.5 g 11    FOLBEE 2.5-25-1 mg Tab TAKE 1 TABLET BY MOUTH EVERY DAY 90 tablet 3    FOLBEE 2.5-25-1 mg Tab TAKE 1 TABLET BY MOUTH EVERY DAY 90 tablet 3    hydroCHLOROthiazide (HYDRODIURIL) 12.5 MG Tab TAKE 1 TABLET BY MOUTH EVERY DAY 90 tablet 2    Lactobacillus acidophilus (PROBIOTIC ORAL) Take by mouth once daily.      levothyroxine (SYNTHROID) 100 MCG tablet TAKE 1 TABLET BY MOUTH  DAILY ON EMPTY STOMACH 90 tablet 3    meloxicam (MOBIC) 15 MG tablet Take 1 tablet (15 mg total) by mouth once daily. 30 tablet 0    NIFEdipine (PROCARDIA-XL) 90 MG (OSM) 24 hr tablet TAKE 1 TABLET BY MOUTH ONCE DAILY 90 tablet 3    psyllium (METAMUCIL) powder Take 1 packet by mouth once daily at 6am.      simvastatin (ZOCOR) 40 MG tablet TAKE 1 TABLET BY MOUTH IN  THE EVENING 90 tablet 3    timolol maleate 0.5% (TIMOPTIC-XE) 0.5 %  SolG INSTILL 1 DROP INTO BOTH EYES ONCE DAILY 10 mL 3    valsartan (DIOVAN) 320 MG tablet Take 1 tablet (320 mg total) by mouth once daily. 30 tablet 0     No current facility-administered medications on file prior to visit.       Review of Systems     Constitutional: Negative for appetite change, chills, fatigue, fever and unexpected weight loss.      Respiratory:  Negative for cough, shortness of breath, sleep apnea, snoring and wheezing.      Cardiovascular:  Negative for chest pain, foot swelling, irregular heartbeat and swollen veins.     Gastrointestinal:  Negative for abdominal pain, acid reflux, constipation, diarrhea, heartburn and vomiting.     Genitourinary: Positive for frequent urination.     Musc: Negative for aching joints, aching muscles, back pain and neck pain.     Skin: Negative for rash.     Allergy: Negative for food allergies and seasonal allergies.     Endocrine: Negative for cold intolerance and heat intolerance.      Neurological: Negative for dizziness, headaches, light-headedness, seizures and tremors.      Hematologic: Negative for bruises/bleeds easily and swollen glands.      Psychiatric: Negative for decreased concentration, depression, nervous/anxious and sleep disturbance.            The patient completed an audiometric study this morning performed by the [facility] audiology service.  The study is duplicated below and the results are reviewed with her in detail  Objective:        General:  Alert and oriented bespectacled  female in no acute distress  Both ears were examined under the microscope.  Right ear is  auscultated; I do not appreciate any sound emanating from the right ear or the right neck.    Physical Exam  HENT:      Ears:        Nose:        Mouth/Throat:         Assessment:       1. Pulsatile tinnitus, right ear only at night after lying down   2. Ear congestion, unspecified laterality        3.     Negative 19 right ear pressure per tympanometry  Plan:      Audiometry reviewed; hearing WNL per all parameters; pt. reassured  Indications for vascular/ear / neck anatomy work-up discussed; no study ordered today   Note negative 19 AD pressure per tympanometry  Eustachian tube literature provided  Tinnitus literature provided  Monitor AD otologic status; call for any significant change

## 2023-03-16 DIAGNOSIS — I10 ESSENTIAL HYPERTENSION: ICD-10-CM

## 2023-03-16 RX ORDER — VALSARTAN 320 MG/1
TABLET ORAL
Qty: 90 TABLET | Refills: 3 | Status: SHIPPED | OUTPATIENT
Start: 2023-03-16

## 2023-03-16 NOTE — TELEPHONE ENCOUNTER
No new care gaps identified.  [facility] Embedded Care Gaps. Reference number: [id number]. 3/16/2023   8:31:26 AM EDUARDOT

## 2023-03-16 NOTE — TELEPHONE ENCOUNTER
Refill Decision Note   Elena Block  is requesting a refill authorization.  Brief Assessment and Rationale for Refill:  Approve     Medication Therapy Plan:       Medication Reconciliation Completed: No   Comments:     No Care Gaps recommended.     Note composed:4:13 PM 03/16/2023

## 2023-04-17 NOTE — PROGRESS NOTES
Neurology Clinic Note      Date: 4/18/23  Patient Name: Elena Block   MRN: [id number]   PCP: Sandra Lyon  Referring Provider: Sandra Lyon MD    Assessment and Plan:   Elena Block is a 77 y.o. female presenting for evaluation of lumbar radiculopathy. Her exam is normal and she has no myelopathic signs.  Recommend taking Gabapentin 300mg nightly, since her symptoms are mostly at night.  Referral placed to Pt/OT    We also discussed a referral to Pain Clinic but reasonable to defer for now as her symptoms are responding well to Gabapentin.     RTC in 3-4 months.        Problem List Items Addressed This Visit    None  Visit Diagnoses       Lumbar radiculopathy    -  Primary    Relevant Medications    gabapentin (NEURONTIN) 300 MG capsule    Other Relevant Orders    Ambulatory referral/consult to Physical/Occupational Therapy    Numbness and tingling of both legs                  Subjective:          HPI:   Ms. Elena Block is a 77 y.o. female with a history of HTN, HLD, hypothyroidism presenting for evaluation of numbness and tingling in both her legs.     Symptoms began around 5 months ago with left thigh pain.  A few weeks later she started to experience pain in her right lower extremity, radiating from the right hip down to the knee. Pain in the left thigh has resolved. She denies any gait instability, weakness or falls. Pain is mostly at night on laying down. She denies any pain during the day or while walking around.  Has chronic incontinence, which has not gotten worse.    She was started on Gabapentin which has helped. Currently on 100mg TID.     She does state that prior to the onset of symptoms, she was helping her  move a heavy mattress.    Reviewed her MRI lumbar spine which showed mild bilateral neural foraminal narrowing at L4-L5 and moderate left and mild-moderate right neural foraminal narrowing at L5-S1 with mild spinal canal stenosis at L4-L5.      PAST MEDICAL  HISTORY:  Past Medical History:   Diagnosis Date    Back pain     Cataract     Central retinal vein occlusion of left eye     Degeneration of lumbar or lumbosacral intervertebral disc 02/2023    MRI-+diffuse OA/DDD, +mild spinal stenosis at L4-5    High cholesterol     Hypertension     Hypothyroidism     Impingement syndrome of left shoulder 07/29/2013    Obesity     Pneumonia     Posterior vitreous detachment of both eyes 01/17/2013    S/P partial hysterectomy 01/23/2013    Vitamin D insufficiency 2016       PAST SURGICAL HISTORY:  Past Surgical History:   Procedure Laterality Date    COLONOSCOPY  01/22/2008    COLONOSCOPY N/A 8/8/2019    Procedure: COLONOSCOPY;  Surgeon: Allie Preston MD;  Location: [address]);  Service: Endoscopy;  Laterality: N/A;    CYSTOSCOPY N/A 1/8/2021    Procedure: CYSTOSCOPY;  Surgeon: Mauricio Corea MD;  Location: [facility];  Service: OB/GYN;  Laterality: N/A;    HYSTERECTOMY      Partial     LAPAROSCOPIC LYSIS OF ADHESIONS N/A 1/8/2021    Procedure: LYSIS, ADHESIONS, LAPAROSCOPIC;  Surgeon: Mauricio Corea MD;  Location: [facility];  Service: OB/GYN;  Laterality: N/A;    PARTIAL HYSTERECTOMY      ROBOT-ASSISTED LAPAROSCOPIC ABDOMINAL SACROCOLPOPEXY USING DA PATSY XI N/A 1/8/2021    Procedure: XI ROBOTIC SACROCOLPOPEXY, ABDOMINAL;  Surgeon: Mauricio Corea MD;  Location: [facility];  Service: OB/GYN;  Laterality: N/A;    ROBOT-ASSISTED LAPAROSCOPIC SURGICAL REMOVAL OF OVARY USING DA PATSY XI Right 1/8/2021    Procedure: XI ROBOTIC OOPHORECTOMY;  Surgeon: Mauricio Corea MD;  Location: [facility];  Service: OB/GYN;  Laterality: Right;    THYROIDECTOMY, PARTIAL      TONSILLECTOMY         CURRENT MEDS:  Current Outpatient Medications   Medication Sig Dispense Refill    carvediloL (COREG) 6.25 MG tablet TAKE 1 TABLET BY MOUTH  TWICE DAILY WITH MEALS 180 tablet 3    cetirizine (ZYRTEC) 10 MG tablet Take 1 tablet (10 mg total) by mouth once daily. 30 tablet 6    docusate sodium  (COLACE) 100 MG capsule 1-4 caps daily; gradually taper up over 2 weeks (Patient taking differently: twice a week. 1-4 caps daily; gradually taper up over 2 weeks) 60 capsule prn    docusate sodium (COLACE) 100 MG capsule Take 1 capsule (100 mg total) by mouth 2 (two) times daily. 30 capsule 1    estradioL (ESTRACE) 0.01 % (0.1 mg/gram) vaginal cream Place 1 g vaginally every Mon, Wed, Fri. 42.5 g 11    FOLBEE 2.5-25-1 mg Tab TAKE 1 TABLET BY MOUTH EVERY DAY 90 tablet 3    FOLBEE 2.5-25-1 mg Tab TAKE 1 TABLET BY MOUTH EVERY DAY 90 tablet 3    gabapentin (NEURONTIN) 300 MG capsule Take 1 capsule (300 mg total) by mouth every evening. 30 capsule 4    hydroCHLOROthiazide (HYDRODIURIL) 12.5 MG Tab TAKE 1 TABLET BY MOUTH EVERY DAY 90 tablet 2    Lactobacillus acidophilus (PROBIOTIC ORAL) Take by mouth once daily.      levothyroxine (SYNTHROID) 100 MCG tablet TAKE 1 TABLET BY MOUTH  DAILY ON EMPTY STOMACH 90 tablet 3    meloxicam (MOBIC) 15 MG tablet Take 1 tablet (15 mg total) by mouth once daily. 30 tablet 0    NIFEdipine (PROCARDIA-XL) 90 MG (OSM) 24 hr tablet TAKE 1 TABLET BY MOUTH ONCE DAILY 90 tablet 3    psyllium (METAMUCIL) powder Take 1 packet by mouth once daily at 6am.      simvastatin (ZOCOR) 40 MG tablet TAKE 1 TABLET BY MOUTH IN  THE EVENING 90 tablet 3    timolol maleate 0.5% (TIMOPTIC-XE) 0.5 % SolG INSTILL 1 DROP INTO BOTH  EYES DAILY 15 mL 3    valsartan (DIOVAN) 320 MG tablet TAKE 1 TABLET BY MOUTH ONCE DAILY. 90 tablet 3     No current facility-administered medications for this visit.       ALLERGIES:  Review of patient's allergies indicates:   Allergen Reactions    Gabapentin Other (See Comments)     dizziness    Tramadol Nausea And Vomiting       FAMILY HISTORY:  Family History   Problem Relation Age of Onset    Diabetes Mother     Glaucoma Mother     Heart disease Mother     Blindness Mother     Heart attack Mother     No Known Problems Father     Glaucoma Brother     Stroke Brother     Liver  disease Brother     Heart disease Brother     Arthritis Brother     Alcohol abuse Brother     Drug abuse Brother     Glaucoma Brother     Diabetes Brother     Pemphigus vulgaris Brother     Blindness Brother     Heart disease Brother     Diabetes Brother         Bilateral Knee amputation    Heart disease Brother     Liver disease Brother     Gout Son     Heart disease Son 48        stent placement    Heart attack Son     Heart attacks under age 50 Son     No Known Problems Son     No Known Problems Maternal Aunt     No Known Problems Maternal Uncle     No Known Problems Paternal Aunt     No Known Problems Paternal Uncle     Hypertension Maternal Grandmother     Diabetes Maternal Grandmother     Blindness Maternal Grandmother     No Known Problems Maternal Grandfather     No Known Problems Paternal Grandmother     No Known Problems Paternal Grandfather     Amblyopia Neg Hx     Macular degeneration Neg Hx     Retinal detachment Neg Hx     Strabismus Neg Hx     Thyroid disease Neg Hx     Cataracts Neg Hx     Cancer Neg Hx        SOCIAL HISTORY:  Social History     Tobacco Use    Smoking status: Never    Smokeless tobacco: Never   Substance Use Topics    Alcohol use: Yes     Alcohol/week: 1.0 standard drink     Types: 1 Cans of beer per week     Comment: Occasionally; beer once a  week (socially)    Drug use: No       Review of Systems:  12 system review of systems is negative except for the symptoms mentioned in HPI.      Objective:   There were no vitals filed for this visit.  General: NAD, well nourished   Eyes: no tearing, discharge, no erythema   Neck: Supple, full range of motion  Cardiovascular: Warm and well perfused  Lungs: Normal work of breathing  Skin: No rash, lesions, or breakdown on exposed skin  Psychiatry: Mood and affect are appropriate     Physical Exam  Eyes:      Extraocular Movements: EOM normal.   Musculoskeletal:      Comments: SLR negative   Neurological:      Mental Status: She is oriented to  person, place, and time.      Coordination: Finger-Nose-Finger Test and Heel to Shin Test normal.      Gait: Gait is intact.      Deep Tendon Reflexes:      Reflex Scores:       Bicep reflexes are 2+ on the right side and 2+ on the left side.       Brachioradialis reflexes are 2+ on the right side and 2+ on the left side.       Patellar reflexes are 1+ on the right side and 1+ on the left side.       Achilles reflexes are 2+ on the right side and 2+ on the left side.  Psychiatric:         Speech: Speech normal.     NEUROLOGICAL EXAMINATION:     MENTAL STATUS   Oriented to person, place, and time.   Follows 2 step commands.   Speech: speech is normal   Level of consciousness: alert    CRANIAL NERVES     CN III, IV, VI   Extraocular motions are normal.   Ophthalmoparesis: none    CN V   Facial sensation intact.     CN VII   Facial expression full, symmetric.     CN XI   CN XI normal.     CN XII   CN XII normal.     MOTOR EXAM   Right arm pronator drift: absent  Left arm pronator drift: absent    Strength   Right deltoid: 5/5  Left deltoid: 5/5  Right biceps: 5/5  Left biceps: 5/5  Right triceps: 5/5  Left triceps: 5/5  Right wrist flexion: 5/5  Left wrist flexion: 5/5  Right wrist extension: 5/5  Left wrist extension: 5/5  Right interossei: 5/5  Left interossei: 5/5  Right iliopsoas: 5/5  Left iliopsoas: 5/5  Right quadriceps: 5/5  Left quadriceps: 5/5  Right hamstrin/5  Left hamstrin/5  Right glutei: 5/5  Left glutei: 5/5  Right anterior tibial: 5/5  Left anterior tibial: 5/5  Right posterior tibial: 5/5  Left posterior tibial: 5/5  Right peroneal: 5/5  Left peroneal: 5/5  Right gastroc: 5/5  Left gastroc: 5/5       EHL 5/5 bilaterally.     REFLEXES     Reflexes   Right brachioradialis: 2+  Left brachioradialis: 2+  Right biceps: 2+  Left biceps: 2+  Right patellar: 1+  Left patellar: 1+  Right achilles: 2+  Left achilles: 2+  Right plantar: normal  Left plantar: normal  Right Chavez: absent  Left Chavez:  absent  Right ankle clonus: absent  Left ankle clonus: absent    SENSORY EXAM   Light touch normal.     GAIT AND COORDINATION     Gait  Gait: normal     Coordination   Finger to nose coordination: normal  Heel to shin coordination: normal    Tremor   Intention tremor: absent              Forrest Morales MD  Department of Neurology  [facility]

## 2023-04-18 ENCOUNTER — OFFICE VISIT (OUTPATIENT)
Dept: NEUROLOGY | Facility: CLINIC | Age: 78
End: 2023-04-18
Payer: MEDICARE

## 2023-04-18 DIAGNOSIS — M54.16 LUMBAR RADICULOPATHY: Primary | ICD-10-CM

## 2023-04-18 DIAGNOSIS — R20.0 NUMBNESS AND TINGLING OF BOTH LEGS: ICD-10-CM

## 2023-04-18 DIAGNOSIS — R20.2 NUMBNESS AND TINGLING OF BOTH LEGS: ICD-10-CM

## 2023-04-18 PROCEDURE — 99999 PR PBB SHADOW E&M-EST. PATIENT-LVL II: ICD-10-PCS | Mod: PBBFAC,,, | Performed by: STUDENT IN AN ORGANIZED HEALTH CARE EDUCATION/TRAINING PROGRAM

## 2023-04-18 PROCEDURE — 99204 PR OFFICE/OUTPT VISIT, NEW, LEVL IV, 45-59 MIN: ICD-10-PCS | Mod: S$GLB,,, | Performed by: STUDENT IN AN ORGANIZED HEALTH CARE EDUCATION/TRAINING PROGRAM

## 2023-04-18 PROCEDURE — 99999 PR PBB SHADOW E&M-EST. PATIENT-LVL II: CPT | Mod: PBBFAC,,, | Performed by: STUDENT IN AN ORGANIZED HEALTH CARE EDUCATION/TRAINING PROGRAM

## 2023-04-18 PROCEDURE — 99204 OFFICE O/P NEW MOD 45 MIN: CPT | Mod: S$GLB,,, | Performed by: STUDENT IN AN ORGANIZED HEALTH CARE EDUCATION/TRAINING PROGRAM

## 2023-04-18 RX ORDER — GABAPENTIN 300 MG/1
300 CAPSULE ORAL NIGHTLY
Qty: 30 CAPSULE | Refills: 4 | Status: SHIPPED | OUTPATIENT
Start: 2023-04-18 | End: 2023-04-18 | Stop reason: SDUPTHER

## 2023-07-07 ENCOUNTER — TELEPHONE (OUTPATIENT)
Dept: INTERNAL MEDICINE | Facility: CLINIC | Age: 78
End: 2023-07-07
Payer: MEDICARE

## 2023-07-07 DIAGNOSIS — Z12.31 ENCOUNTER FOR SCREENING MAMMOGRAM FOR MALIGNANT NEOPLASM OF BREAST: Primary | ICD-10-CM

## 2023-07-07 NOTE — TELEPHONE ENCOUNTER
----- Message from Shani Diamond sent at 7/7/2023  7:56 AM CDT -----  Regarding: order  Contact: [phone]  Elena Block calling regarding Orders (message) for #[email or web address]. She would like to be scheduled for August. Please call

## 2023-07-20 ENCOUNTER — LAB VISIT (OUTPATIENT)
Dept: LAB | Facility: HOSPITAL | Age: 78
End: 2023-07-20
Attending: INTERNAL MEDICINE
Payer: MEDICARE

## 2023-07-20 DIAGNOSIS — E78.5 HYPERLIPIDEMIA, UNSPECIFIED HYPERLIPIDEMIA TYPE: ICD-10-CM

## 2023-07-20 DIAGNOSIS — I10 ESSENTIAL HYPERTENSION: ICD-10-CM

## 2023-07-20 DIAGNOSIS — E43 UNSPECIFIED SEVERE PROTEIN-CALORIE MALNUTRITION: ICD-10-CM

## 2023-07-20 DIAGNOSIS — G57.11 MERALGIA PARESTHETICA, RIGHT: ICD-10-CM

## 2023-07-20 LAB
ALBUMIN SERPL BCP-MCNC: 3.7 G/DL (ref 3.5–5.2)
ALP SERPL-CCNC: 93 U/L (ref 55–135)
ALT SERPL W/O P-5'-P-CCNC: 20 U/L (ref 10–44)
ANION GAP SERPL CALC-SCNC: 11 MMOL/L (ref 8–16)
AST SERPL-CCNC: 28 U/L (ref 10–40)
BASOPHILS # BLD AUTO: 0.03 K/UL (ref 0–0.2)
BASOPHILS NFR BLD: 0.6 % (ref 0–1.9)
BILIRUB SERPL-MCNC: 0.5 MG/DL (ref 0.1–1)
BUN SERPL-MCNC: 13 MG/DL (ref 8–23)
CALCIUM SERPL-MCNC: 9.6 MG/DL (ref 8.7–10.5)
CHLORIDE SERPL-SCNC: 104 MMOL/L (ref 95–110)
CHOLEST SERPL-MCNC: 144 MG/DL (ref 120–199)
CHOLEST/HDLC SERPL: 3.3 {RATIO} (ref 2–5)
CO2 SERPL-SCNC: 24 MMOL/L (ref 23–29)
CREAT SERPL-MCNC: 0.8 MG/DL (ref 0.5–1.4)
DIFFERENTIAL METHOD: ABNORMAL
EOSINOPHIL # BLD AUTO: 0.1 K/UL (ref 0–0.5)
EOSINOPHIL NFR BLD: 1.3 % (ref 0–8)
ERYTHROCYTE [DISTWIDTH] IN BLOOD BY AUTOMATED COUNT: 12.5 % (ref 11.5–14.5)
EST. GFR  (NO RACE VARIABLE): >60 ML/MIN/1.73 M^2
GLUCOSE SERPL-MCNC: 88 MG/DL (ref 70–110)
HCT VFR BLD AUTO: 41.6 % (ref 37–48.5)
HDLC SERPL-MCNC: 43 MG/DL (ref 40–75)
HDLC SERPL: 29.9 % (ref 20–50)
HGB BLD-MCNC: 14.1 G/DL (ref 12–16)
IMM GRANULOCYTES # BLD AUTO: 0.01 K/UL (ref 0–0.04)
IMM GRANULOCYTES NFR BLD AUTO: 0.2 % (ref 0–0.5)
LDLC SERPL CALC-MCNC: 80.6 MG/DL (ref 63–159)
LYMPHOCYTES # BLD AUTO: 2.2 K/UL (ref 1–4.8)
LYMPHOCYTES NFR BLD: 41.1 % (ref 18–48)
MCH RBC QN AUTO: 29.5 PG (ref 27–31)
MCHC RBC AUTO-ENTMCNC: 33.9 G/DL (ref 32–36)
MCV RBC AUTO: 87 FL (ref 82–98)
MONOCYTES # BLD AUTO: 0.5 K/UL (ref 0.3–1)
MONOCYTES NFR BLD: 9.2 % (ref 4–15)
NEUTROPHILS # BLD AUTO: 2.5 K/UL (ref 1.8–7.7)
NEUTROPHILS NFR BLD: 47.6 % (ref 38–73)
NONHDLC SERPL-MCNC: 101 MG/DL
NRBC BLD-RTO: 0 /100 WBC
PLATELET # BLD AUTO: 276 K/UL (ref 150–450)
PMV BLD AUTO: 8.8 FL (ref 9.2–12.9)
POTASSIUM SERPL-SCNC: 4 MMOL/L (ref 3.5–5.1)
PROT SERPL-MCNC: 7.8 G/DL (ref 6–8.4)
RBC # BLD AUTO: 4.78 M/UL (ref 4–5.4)
SODIUM SERPL-SCNC: 139 MMOL/L (ref 136–145)
TRIGL SERPL-MCNC: 102 MG/DL (ref 30–150)
TSH SERPL DL<=0.005 MIU/L-ACNC: 0.73 UIU/ML (ref 0.4–4)
VIT B12 SERPL-MCNC: >2000 PG/ML (ref 210–950)
WBC # BLD AUTO: 5.31 K/UL (ref 3.9–12.7)

## 2023-07-20 PROCEDURE — 80061 LIPID PANEL: CPT | Performed by: INTERNAL MEDICINE

## 2023-07-20 PROCEDURE — 36415 COLL VENOUS BLD VENIPUNCTURE: CPT | Performed by: INTERNAL MEDICINE

## 2023-07-20 PROCEDURE — 80053 COMPREHEN METABOLIC PANEL: CPT | Performed by: INTERNAL MEDICINE

## 2023-07-20 PROCEDURE — 84443 ASSAY THYROID STIM HORMONE: CPT | Performed by: INTERNAL MEDICINE

## 2023-07-20 PROCEDURE — 82607 VITAMIN B-12: CPT | Performed by: INTERNAL MEDICINE

## 2023-07-20 PROCEDURE — 85025 COMPLETE CBC W/AUTO DIFF WBC: CPT | Performed by: INTERNAL MEDICINE

## 2023-07-27 ENCOUNTER — OFFICE VISIT (OUTPATIENT)
Dept: INTERNAL MEDICINE | Facility: CLINIC | Age: 78
End: 2023-07-27
Payer: MEDICARE

## 2023-07-27 ENCOUNTER — LAB VISIT (OUTPATIENT)
Dept: LAB | Facility: HOSPITAL | Age: 78
End: 2023-07-27
Attending: INTERNAL MEDICINE
Payer: MEDICARE

## 2023-07-27 DIAGNOSIS — I10 ESSENTIAL HYPERTENSION: Primary | ICD-10-CM

## 2023-07-27 DIAGNOSIS — E78.5 HYPERLIPIDEMIA, UNSPECIFIED HYPERLIPIDEMIA TYPE: ICD-10-CM

## 2023-07-27 DIAGNOSIS — I70.0 ATHEROSCLEROSIS OF AORTA: ICD-10-CM

## 2023-07-27 DIAGNOSIS — R53.83 FATIGUE, UNSPECIFIED TYPE: ICD-10-CM

## 2023-07-27 DIAGNOSIS — E03.9 HYPOTHYROIDISM, UNSPECIFIED TYPE: ICD-10-CM

## 2023-07-27 DIAGNOSIS — M85.9 DISORDER OF BONE DENSITY AND STRUCTURE, UNSPECIFIED: ICD-10-CM

## 2023-07-27 DIAGNOSIS — D12.6 ADENOMATOUS POLYP OF COLON, UNSPECIFIED PART OF COLON: ICD-10-CM

## 2023-07-27 DIAGNOSIS — Z79.899 HIGH RISK MEDICATION USE: ICD-10-CM

## 2023-07-27 DIAGNOSIS — M51.37 DEGENERATION OF LUMBAR OR LUMBOSACRAL INTERVERTEBRAL DISC: ICD-10-CM

## 2023-07-27 DIAGNOSIS — Z29.9 PREVENTIVE MEASURE: ICD-10-CM

## 2023-07-27 LAB — 25(OH)D3+25(OH)D2 SERPL-MCNC: 21 NG/ML (ref 30–96)

## 2023-07-27 PROCEDURE — 3078F DIAST BP <80 MM HG: CPT | Mod: CPTII,S$GLB,, | Performed by: INTERNAL MEDICINE

## 2023-07-27 PROCEDURE — 3288F FALL RISK ASSESSMENT DOCD: CPT | Mod: CPTII,S$GLB,, | Performed by: INTERNAL MEDICINE

## 2023-07-27 PROCEDURE — 3288F PR FALLS RISK ASSESSMENT DOCUMENTED: ICD-10-PCS | Mod: CPTII,S$GLB,, | Performed by: INTERNAL MEDICINE

## 2023-07-27 PROCEDURE — 3074F PR MOST RECENT SYSTOLIC BLOOD PRESSURE < 130 MM HG: ICD-10-PCS | Mod: CPTII,S$GLB,, | Performed by: INTERNAL MEDICINE

## 2023-07-27 PROCEDURE — 1101F PT FALLS ASSESS-DOCD LE1/YR: CPT | Mod: CPTII,S$GLB,, | Performed by: INTERNAL MEDICINE

## 2023-07-27 PROCEDURE — 3074F SYST BP LT 130 MM HG: CPT | Mod: CPTII,S$GLB,, | Performed by: INTERNAL MEDICINE

## 2023-07-27 PROCEDURE — 1159F PR MEDICATION LIST DOCUMENTED IN MEDICAL RECORD: ICD-10-PCS | Mod: CPTII,S$GLB,, | Performed by: INTERNAL MEDICINE

## 2023-07-27 PROCEDURE — 3078F PR MOST RECENT DIASTOLIC BLOOD PRESSURE < 80 MM HG: ICD-10-PCS | Mod: CPTII,S$GLB,, | Performed by: INTERNAL MEDICINE

## 2023-07-27 PROCEDURE — 99999 PR PBB SHADOW E&M-EST. PATIENT-LVL IV: CPT | Mod: PBBFAC,,, | Performed by: INTERNAL MEDICINE

## 2023-07-27 PROCEDURE — 82306 VITAMIN D 25 HYDROXY: CPT | Performed by: INTERNAL MEDICINE

## 2023-07-27 PROCEDURE — 1126F AMNT PAIN NOTED NONE PRSNT: CPT | Mod: CPTII,S$GLB,, | Performed by: INTERNAL MEDICINE

## 2023-07-27 PROCEDURE — 99215 PR OFFICE/OUTPT VISIT, EST, LEVL V, 40-54 MIN: ICD-10-PCS | Mod: S$GLB,,, | Performed by: INTERNAL MEDICINE

## 2023-07-27 PROCEDURE — 1126F PR PAIN SEVERITY QUANTIFIED, NO PAIN PRESENT: ICD-10-PCS | Mod: CPTII,S$GLB,, | Performed by: INTERNAL MEDICINE

## 2023-07-27 PROCEDURE — 1159F MED LIST DOCD IN RCRD: CPT | Mod: CPTII,S$GLB,, | Performed by: INTERNAL MEDICINE

## 2023-07-27 PROCEDURE — 36415 COLL VENOUS BLD VENIPUNCTURE: CPT | Performed by: INTERNAL MEDICINE

## 2023-07-27 PROCEDURE — 99215 OFFICE O/P EST HI 40 MIN: CPT | Mod: S$GLB,,, | Performed by: INTERNAL MEDICINE

## 2023-07-27 PROCEDURE — 1101F PR PT FALLS ASSESS DOC 0-1 FALLS W/OUT INJ PAST YR: ICD-10-PCS | Mod: CPTII,S$GLB,, | Performed by: INTERNAL MEDICINE

## 2023-07-27 PROCEDURE — 99999 PR PBB SHADOW E&M-EST. PATIENT-LVL IV: ICD-10-PCS | Mod: PBBFAC,,, | Performed by: INTERNAL MEDICINE

## 2023-07-27 RX ORDER — CYANOCOBALAMIN/FOLIC AC/VIT B6 1-2.5-25MG
1 TABLET ORAL EVERY OTHER DAY
Qty: 45 TABLET | Refills: 2 | Status: SHIPPED | OUTPATIENT
Start: 2023-07-27 | End: 2023-07-27 | Stop reason: SDUPTHER

## 2023-07-27 RX ORDER — NIFEDIPINE 90 MG/1
90 TABLET, EXTENDED RELEASE ORAL DAILY
Qty: 90 TABLET | Refills: 2 | Status: SHIPPED | OUTPATIENT
Start: 2023-07-27

## 2023-07-27 RX ORDER — CYANOCOBALAMIN/FOLIC AC/VIT B6 1-2.5-25MG
1 TABLET ORAL EVERY OTHER DAY
Qty: 45 TABLET | Refills: 2 | Status: SHIPPED | OUTPATIENT
Start: 2023-07-27 | End: 2024-01-30

## 2023-07-27 NOTE — PATIENT INSTRUCTIONS
Notes:  For Fatigue:  Restart Daily Walking Program,      For Blood Pressure:  Checks BPs 2-3x/week and bring in

## 2023-07-27 NOTE — PROGRESS NOTES
Subjective:       Patient ID: Elena Block is a 77 y.o. female.    Chief Complaint:   Follow-up, Hypertension, and Hyperlipidemia    HPI: Mrs Block presents for follow up the above  She is been doing okay and notes that the gabapentin has allowed the lumbar pain to be tolerable.  She only takes the meloxicam as needed  HTN: Med Tx 1-Diovan 320mg QD, 2-HCTZ 12.5mg prn only(last years ago), 3-Coreg 6.25mg BID, 4-Procardia XL 90mg QD        Home BPs: 120-130/70s  HLD: Med Tx 1-Zocor 40mg QD  Lumbar OA/DDD: Med Tx 1- Gabapentin 300mg QHS, 2- Meloxicam 15mg  at 1/2 tab prn  -MRI L-spine(2/23):  + NFN at L4-5,L5-S1 with mild spinal stenosis at L4-5    Pulsatile Tinnitus: S/P ENT Baljinder/Dr Rader-Resolved now per pt report    Past Medical, Surgical, Social History: Please see as stated in Epic chart which has been reviewed.    Current Outpatient Medications   Medication Sig Dispense Refill    carvediloL (COREG) 6.25 MG tablet TAKE 1 TABLET BY MOUTH  TWICE DAILY WITH MEALS 180 tablet 3    cetirizine (ZYRTEC) 10 MG tablet Take 1 tablet (10 mg total) by mouth once daily. 30 tablet 6    docusate sodium (COLACE) 100 MG capsule 1-4 caps daily; gradually taper up over 2 weeks (Patient taking differently: twice a week. 1-4 caps daily; gradually taper up over 2 weeks) 60 capsule prn    docusate sodium (COLACE) 100 MG capsule Take 1 capsule (100 mg total) by mouth 2 (two) times daily. 30 capsule 1    estradioL (ESTRACE) 0.01 % (0.1 mg/gram) vaginal cream PLACE 1 G VAGINALLY EVERY MON, WED, FRI. 42.5 g 3    folic acid-vit B6-vit B12 (FOLBEE) 2.5-25-1 mg Tab Take 1 tablet by mouth every other day. 45 tablet 2    gabapentin (NEURONTIN) 300 MG capsule Take 1 capsule (300 mg total) by mouth every evening. 90 capsule 3    Lactobacillus acidophilus (PROBIOTIC ORAL) Take by mouth once daily.      levothyroxine (SYNTHROID) 100 MCG tablet TAKE 1 TABLET BY MOUTH  DAILY ON EMPTY STOMACH 90 tablet 3    meloxicam (MOBIC) 15 MG tablet Take 1  tablet (15 mg total) by mouth once daily. 30 tablet 0    NIFEdipine (PROCARDIA-XL) 90 MG (OSM) 24 hr tablet Take 1 tablet (90 mg total) by mouth once daily. 90 tablet 2    psyllium (METAMUCIL) powder Take 1 packet by mouth once daily at 6am.      simvastatin (ZOCOR) 40 MG tablet TAKE 1 TABLET BY MOUTH IN  THE EVENING 90 tablet 3    timolol maleate 0.5% (TIMOPTIC-XE) 0.5 % SolG INSTILL 1 DROP INTO BOTH  EYES DAILY 15 mL 3    valsartan (DIOVAN) 320 MG tablet TAKE 1 TABLET BY MOUTH ONCE DAILY. 90 tablet 3     No current facility-administered medications for this visit.       Review of Systems   Constitutional:  Positive for fatigue.        + Fatigue but no walking program in >3-4 months   Eyes:  Positive for visual disturbance.        Cataracts   Respiratory: Negative.     Cardiovascular: Negative.    Gastrointestinal:  Positive for constipation.   Musculoskeletal:  Positive for back pain.   Neurological:  Negative for tremors, syncope and speech difficulty.   Psychiatric/Behavioral:  Negative for sleep disturbance.        Objective:      Lab Results   Component Value Date    WBC 5.31 07/20/2023    HGB 14.1 07/20/2023    HCT 41.6 07/20/2023     07/20/2023    CHOL 144 07/20/2023    TRIG 102 07/20/2023    HDL 43 07/20/2023    ALT 20 07/20/2023    AST 28 07/20/2023     07/20/2023    K 4.0 07/20/2023     07/20/2023    CREATININE 0.8 07/20/2023    BUN 13 07/20/2023    CO2 24 07/20/2023    TSH 0.733 07/20/2023    HGBA1C 5.3 05/19/2006     Physical Exam  Vitals reviewed.   Constitutional:       Appearance: Normal appearance.   HENT:      Head: Normocephalic and atraumatic.      Mouth/Throat:      Mouth: Mucous membranes are dry.      Pharynx: No posterior oropharyngeal erythema.   Cardiovascular:      Rate and Rhythm: Normal rate and regular rhythm.   Pulmonary:      Effort: Pulmonary effort is normal.      Breath sounds: Normal breath sounds. No wheezing.   Chest:      Chest wall: No tenderness.  "  Abdominal:      General: Abdomen is flat. Bowel sounds are normal.      Palpations: Abdomen is soft. There is no mass.      Tenderness: There is no abdominal tenderness.   Musculoskeletal:         General: No swelling.      Cervical back: Normal range of motion and neck supple. No muscular tenderness.      Right lower leg: No edema.      Left lower leg: No edema.   Lymphadenopathy:      Cervical: No cervical adenopathy.   Skin:     General: Skin is warm and dry.   Neurological:      General: No focal deficit present.      Mental Status: She is alert.           Vital Signs  Pulse: 78  SpO2: 98 %  BP: 120/66  BP Location: Left arm  Patient Position: Sitting  Pain Score: 0-No pain  Height and Weight  Height: 5' 8" (172.7 cm)  Weight: 89.3 kg (196 lb 13.9 oz)  BSA (Calculated - sq m): 2.07 sq meters  BMI (Calculated): 29.9  Weight in (lb) to have BMI = 25: 164.1    Assessment:       1. Essential hypertension    2. Hyperlipidemia, unspecified hyperlipidemia type    3. Degeneration of lumbar or lumbosacral intervertebral disc    4. Hypothyroidism, unspecified type    5. Atherosclerosis of aorta    6. Fatigue, unspecified type    7. Disorder of bone density and structure, unspecified    8. Adenomatous polyp of colon, unspecified part of colon    9. Preventive measure    10. High risk medication use        Plan:     Health Maintenance   Topic Date Due    Mammogram  07/12/2023    Lipid Panel  07/20/2024    TETANUS VACCINE  10/01/2024    Colonoscopy  08/08/2029    Hepatitis C Screening  Completed    DEXA Scan  Discontinued        Elena was seen today for follow-up, hypertension and hyperlipidemia.    Diagnoses and all orders for this visit:    Essential hypertension controlled        -     Continue: Med Tx 1-Diovan 320mg QD, 2-Coreg 6.25mg BID, 3-Procardia XL 90mg QD  -     NIFEdipine (PROCARDIA-XL) 90 MG (OSM) 24 hr tablet; Take 1 tablet (90 mg total) by mouth once daily.  -     Comprehensive Metabolic Panel; Future  -   "   CBC Auto Differential; Future    Hyperlipidemia, unspecified hyperlipidemia type/controlled on Zocor 40 mg q.day  -     Comprehensive Metabolic Panel; Future  -     Lipid Panel; Future    Degeneration of lumbar or lumbosacral intervertebral disc /acceptable control on gabapentin 300 mg q.h.s. and meloxicam 15 mg p.r.n.    Hypothyroidism, unspecified type/controlled on Synthroid 100 mcg q.day  -     Vitamin D; Future    Atherosclerosis of aorta        -     will follow guidelines for control of cardiovascular risk factors including: Hypertension, hyperlipidemia, etcetera    Fatigue, unspecified type        -     ' have recommended patient restart her regular daily walking program and if no improvement to let us know for further evaluation        -     recommend home blood pressure monitoring documentation and message  to me for review    Disorder of bone density and structure, unspecified  -     Vitamin D; Future    Adenomatous polyp of colon, unspecified part of colon/status post colonoscopy August 2019         -     repeat colonoscopy due August 2024    Preventive measure         -     folic acid-vit B6-vit B12 (FOLBEE) 2.5-25-1 mg Tab; Take 1 tablet by mouth every other day.    High risk medication use  -     Vitamin B12; Future    Health maintenance        -     return to clinic by 6 months with one-week prior fasting lab or see patient sooner if needed

## 2023-07-29 VITALS
DIASTOLIC BLOOD PRESSURE: 66 MMHG | BODY MASS INDEX: 29.84 KG/M2 | WEIGHT: 196.88 LBS | OXYGEN SATURATION: 98 % | HEIGHT: 68 IN | SYSTOLIC BLOOD PRESSURE: 120 MMHG | HEART RATE: 78 BPM

## 2023-07-31 ENCOUNTER — TELEPHONE (OUTPATIENT)
Dept: INTERNAL MEDICINE | Facility: CLINIC | Age: 78
End: 2023-07-31
Payer: MEDICARE

## 2023-07-31 DIAGNOSIS — E55.9 VITAMIN D DEFICIENCY: Primary | ICD-10-CM

## 2023-07-31 RX ORDER — ACETAMINOPHEN 500 MG
5000 TABLET ORAL DAILY
Qty: 30 TABLET | Refills: 6 | Status: SHIPPED | OUTPATIENT
Start: 2023-07-31

## 2023-07-31 NOTE — TELEPHONE ENCOUNTER
Spoke with pt ie review of Lab(7/27) shows Vit D low at 21.  'Will start Vitamin D3 at 5000 units daily/erx'd in and can recheck such x 6 months.  Yue, would you please add a Vitamin D to pt's lab scheduled 1/23/24; I've placed the order.  Thanks

## 2023-08-01 ENCOUNTER — HOSPITAL ENCOUNTER (OUTPATIENT)
Dept: RADIOLOGY | Facility: HOSPITAL | Age: 78
Discharge: HOME | End: 2023-08-01
Attending: INTERNAL MEDICINE
Payer: MEDICARE

## 2023-08-01 DIAGNOSIS — Z12.31 ENCOUNTER FOR SCREENING MAMMOGRAM FOR MALIGNANT NEOPLASM OF BREAST: ICD-10-CM

## 2023-08-01 PROCEDURE — 77063 MAMMO DIGITAL SCREENING BILAT WITH TOMO: ICD-10-PCS | Mod: 26,,, | Performed by: RADIOLOGY

## 2023-08-01 PROCEDURE — 77067 SCR MAMMO BI INCL CAD: CPT | Mod: TC

## 2023-08-01 PROCEDURE — 77063 BREAST TOMOSYNTHESIS BI: CPT | Mod: 26,,, | Performed by: RADIOLOGY

## 2023-08-01 PROCEDURE — 77067 SCR MAMMO BI INCL CAD: CPT | Mod: 26,,, | Performed by: RADIOLOGY

## 2023-08-01 PROCEDURE — 77067 MAMMO DIGITAL SCREENING BILAT WITH TOMO: ICD-10-PCS | Mod: 26,,, | Performed by: RADIOLOGY

## 2023-08-07 ENCOUNTER — PATIENT MESSAGE (OUTPATIENT)
Dept: INTERNAL MEDICINE | Facility: CLINIC | Age: 78
End: 2023-08-07
Payer: MEDICARE

## 2023-08-21 ENCOUNTER — OFFICE VISIT (OUTPATIENT)
Dept: OPTOMETRY | Facility: CLINIC | Age: 78
End: 2023-08-21
Payer: MEDICARE

## 2023-08-21 DIAGNOSIS — H52.203 ASTIGMATISM OF BOTH EYES, UNSPECIFIED TYPE: ICD-10-CM

## 2023-08-21 DIAGNOSIS — H40.1122 PRIMARY OPEN ANGLE GLAUCOMA (POAG) OF LEFT EYE, MODERATE STAGE: ICD-10-CM

## 2023-08-21 DIAGNOSIS — H26.9 CORTICAL CATARACT OF BOTH EYES: ICD-10-CM

## 2023-08-21 DIAGNOSIS — H53.8 BLURRED VISION, BILATERAL: ICD-10-CM

## 2023-08-21 DIAGNOSIS — H25.13 NUCLEAR SCLEROSIS, BILATERAL: ICD-10-CM

## 2023-08-21 DIAGNOSIS — H40.001 GLAUCOMA SUSPECT, RIGHT EYE: ICD-10-CM

## 2023-08-21 DIAGNOSIS — H52.4 PRESBYOPIA OF BOTH EYES: ICD-10-CM

## 2023-08-21 DIAGNOSIS — H40.053 OCULAR HYPERTENSION, BILATERAL: Primary | ICD-10-CM

## 2023-08-21 PROCEDURE — 1159F MED LIST DOCD IN RCRD: CPT | Mod: CPTII,S$GLB,, | Performed by: OPTOMETRIST

## 2023-08-21 PROCEDURE — 92014 PR EYE EXAM, EST PATIENT,COMPREHESV: ICD-10-PCS | Mod: S$GLB,,, | Performed by: OPTOMETRIST

## 2023-08-21 PROCEDURE — 92014 COMPRE OPH EXAM EST PT 1/>: CPT | Mod: S$GLB,,, | Performed by: OPTOMETRIST

## 2023-08-21 PROCEDURE — 99999 PR PBB SHADOW E&M-EST. PATIENT-LVL III: ICD-10-PCS | Mod: PBBFAC,,, | Performed by: OPTOMETRIST

## 2023-08-21 PROCEDURE — 1159F PR MEDICATION LIST DOCUMENTED IN MEDICAL RECORD: ICD-10-PCS | Mod: CPTII,S$GLB,, | Performed by: OPTOMETRIST

## 2023-08-21 PROCEDURE — 99999 PR PBB SHADOW E&M-EST. PATIENT-LVL III: CPT | Mod: PBBFAC,,, | Performed by: OPTOMETRIST

## 2023-08-21 PROCEDURE — 1126F PR PAIN SEVERITY QUANTIFIED, NO PAIN PRESENT: ICD-10-PCS | Mod: CPTII,S$GLB,, | Performed by: OPTOMETRIST

## 2023-08-21 PROCEDURE — 92015 DETERMINE REFRACTIVE STATE: CPT | Mod: S$GLB,,, | Performed by: OPTOMETRIST

## 2023-08-21 PROCEDURE — 92015 PR REFRACTION: ICD-10-PCS | Mod: S$GLB,,, | Performed by: OPTOMETRIST

## 2023-08-21 PROCEDURE — 1126F AMNT PAIN NOTED NONE PRSNT: CPT | Mod: CPTII,S$GLB,, | Performed by: OPTOMETRIST
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
## 2023-08-22 NOTE — PATIENT INSTRUCTIONS
Bilateral nuclear scleotic/cortical cataract  Astigmatic refractive error in each eye, wirh best corrected VA of 20/50+4 in the right eye (reduced to 20/50 with Brightness Acuity Test) and 20/40(-) in the left eye (reduced to 20/50+4 with Brightness Acuity Test).    Prior diagnosis of bilateral ocular hypertension, and moderate stage primary open angle glaucoma in the left eye.  Currently using Timolol Malate 0.5% gel-forming ophthalmic solution once per day in both eyes for IOP control/reduction. IOP today of 16 mm Hg in the right eye and 17 mm Hg in the left eye.    Advised Ms. Block to consider cataract surgery, and she is agreeable.  Generate referral to glaucoma specialist/cataract surgeon for evaluation.  Return to me (Dr. Hamlin) when advised to do so for recheck of refraction following cataract surgery.  Suggest repeat Schreiber Visual Field (HVF) test (24-2) following cataract surgery, for use as new baseline.

## 2023-09-05 ENCOUNTER — OFFICE VISIT (OUTPATIENT)
Dept: URGENT CARE | Facility: CLINIC | Age: 78
End: 2023-09-05
Payer: MEDICARE

## 2023-09-05 ENCOUNTER — TELEPHONE (OUTPATIENT)
Dept: INTERNAL MEDICINE | Facility: CLINIC | Age: 78
End: 2023-09-05
Payer: MEDICARE

## 2023-09-05 VITALS
HEART RATE: 65 BPM | WEIGHT: 199.31 LBS | DIASTOLIC BLOOD PRESSURE: 79 MMHG | BODY MASS INDEX: 30.21 KG/M2 | RESPIRATION RATE: 15 BRPM | HEIGHT: 68 IN | OXYGEN SATURATION: 98 % | TEMPERATURE: 98 F | SYSTOLIC BLOOD PRESSURE: 173 MMHG

## 2023-09-05 DIAGNOSIS — L03.032 PARONYCHIA OF FIFTH TOE OF LEFT FOOT: Primary | ICD-10-CM

## 2023-09-05 DIAGNOSIS — L03.039 PARONYCHIA OF GREAT TOE: Primary | ICD-10-CM

## 2023-09-05 DIAGNOSIS — M70.61 TROCHANTERIC BURSITIS OF RIGHT HIP: ICD-10-CM

## 2023-09-05 PROCEDURE — 99499 UNLISTED E&M SERVICE: CPT | Mod: S$GLB,,, | Performed by: FAMILY MEDICINE

## 2023-09-05 PROCEDURE — 10060 I&D ABSCESS SIMPLE/SINGLE: CPT | Mod: S$GLB,,, | Performed by: FAMILY MEDICINE

## 2023-09-05 PROCEDURE — 10060 INCISION & DRAINAGE: ICD-10-PCS | Mod: S$GLB,,, | Performed by: FAMILY MEDICINE

## 2023-09-05 PROCEDURE — 99499 NO LOS: ICD-10-PCS | Mod: S$GLB,,, | Performed by: FAMILY MEDICINE

## 2023-09-05 RX ORDER — GABAPENTIN 100 MG/1
100 CAPSULE ORAL 3 TIMES DAILY
COMMUNITY
Start: 2023-03-21 | End: 2024-01-30

## 2023-09-05 RX ORDER — DOXYCYCLINE HYCLATE 100 MG
100 TABLET ORAL 2 TIMES DAILY
Qty: 14 TABLET | Refills: 0 | Status: SHIPPED | OUTPATIENT
Start: 2023-09-05 | End: 2024-01-30 | Stop reason: SDUPTHER

## 2023-09-05 RX ORDER — AMOXICILLIN AND CLAVULANATE POTASSIUM 875; 125 MG/1; MG/1
1 TABLET, FILM COATED ORAL 2 TIMES DAILY
Qty: 14 TABLET | Refills: 0 | Status: SHIPPED | OUTPATIENT
Start: 2023-09-05 | End: 2023-09-12

## 2023-09-05 RX ORDER — MELOXICAM 15 MG/1
15 TABLET ORAL DAILY
Qty: 30 TABLET | Refills: 0 | Status: SHIPPED | OUTPATIENT
Start: 2023-09-05 | End: 2024-01-22 | Stop reason: SDUPTHER

## 2023-09-05 NOTE — PROGRESS NOTES
"Subjective:      Patient ID: Elena Block is a 77 y.o. female.    Vitals:  height is 5' 8" (1.727 m) and weight is 90.4 kg (199 lb 4.7 oz). Her oral temperature is 98.1 °F (36.7 °C). Her blood pressure is 173/79 (abnormal) and her pulse is 65. Her respiration is 15 and oxygen saturation is 98%.     Chief Complaint: Toe Pain    Pt presents w/ abscess under lt big toe nailbed ; onset approx 3x days. Pt report she clipped her nails about a week ago and suspects she cut the nail wrong and it got infected. Pt c/o slight swelling but denies any pain or inability to bear weight . Pt tried triple antibiotic ointment; no relief.     Nail Problem  This is a new problem. The current episode started in the past 7 days. The problem occurs constantly. The problem has been unchanged. Pertinent negatives include no abdominal pain, anorexia, arthralgias, change in bowel habit, chest pain, chills, congestion, coughing, diaphoresis, fatigue, fever, headaches, joint swelling, myalgias, nausea, neck pain, numbness, rash, sore throat, swollen glands, urinary symptoms, vertigo, visual change, vomiting or weakness. Nothing aggravates the symptoms. Treatments tried: antibiotic ontiment.       Constitution: Negative for chills, sweating, fatigue and fever.   HENT:  Negative for congestion and sore throat.    Neck: Negative for neck pain.   Cardiovascular:  Negative for chest pain.   Respiratory:  Negative for cough.    Gastrointestinal:  Negative for abdominal pain, nausea and vomiting.   Musculoskeletal:  Negative for joint pain, joint swelling and muscle ache.   Skin:  Negative for rash.   Neurological:  Negative for history of vertigo, headaches and numbness.      Objective:     Physical Exam   Constitutional: She is oriented to person, place, and time.   HENT:   Head: Normocephalic.   Ears:   Right Ear: External ear normal.   Left Ear: External ear normal.   Nose: Nose normal.   Mouth/Throat: Mucous membranes are moist.   Eyes: " Conjunctivae are normal.   Cardiovascular: Normal rate.   Pulmonary/Chest: Effort normal.   Musculoskeletal: Normal range of motion.         General: Normal range of motion.   Neurological: She is alert and oriented to person, place, and time.   Skin: Skin is dry.         Comments: Left great big toe, inferior aspect with pus pocket visible.    Psychiatric: Her behavior is normal.       Assessment:     1. Paronychia of fifth toe of left foot        Plan:       Paronychia of fifth toe of left foot  -     amoxicillin-clavulanate 875-125mg (AUGMENTIN) 875-125 mg per tablet; Take 1 tablet by mouth 2 (two) times daily. for 7 days  Dispense: 14 tablet; Refill: 0

## 2023-09-05 NOTE — TELEPHONE ENCOUNTER
Pt states she has funugus on her left toe it look like it has puss edgw of great toe for two weeks she want to know if antibiotic can be sent

## 2023-09-05 NOTE — PROCEDURES
Incision & Drainage    Date/Time: 9/5/2023 2:30 PM    Performed by: Franny Mcdermott MD  Authorized by: Franny Mcdermott MD    Consent Done?:  Yes (Verbal)    Type:  Abscess  Body area:  Lower extremity  Location details:  Left big toe  Scalpel size:  11  Incision type:  Single straight  Incision depth: dermal    Complexity:  Simple  Drainage:  Pus  Wound treatment:  Incision, drainage and expression of material  Packing material:  None  Patient tolerance:  Patient tolerated the procedure well with no immediate complications  Pain Assessment: 0

## 2023-09-05 NOTE — TELEPHONE ENCOUNTER
Alexsandra, I sent in anbx/Doxycycline BID x 1 week.  If problems/no better, pt should come in for UC appt or go to Podiatry.  Thanks

## 2023-09-05 NOTE — TELEPHONE ENCOUNTER
----- Message from Jamila Parhambodaux sent at 9/5/2023  7:47 AM CDT -----  Contact: [phone] Patient  Patient would like to get medical advice.  Symptoms (please be specific):   Pt states she has a fungus on her left toe with puss with no redness  How long have you had these symptoms: 2 weeks ago and the puss for 3 days  Would you like a call back, or a response through your [facility] portal?:   call back  Pharmacy name and phone # (copy from chart):    [id number] - [place] - [address]  [address]  [place] [address]  Phone: [phone] Fax: [phone]  Comments:

## 2023-09-05 NOTE — PROGRESS NOTES
Subjective:       Patient ID: Elena Block is a 77 y.o. female.    Chief Complaint: Blurred Vision and Glaucoma     HPI    DLS: 08/21/2023  77 year old female   Ref by Dr. Hamlin  Complains of blurred vision left eye and daytime glare  Treated for OHT for years. Reports strong Fhx of glaucoma with brother and   mother blindness from glaucoma. Feels like the left eye has more vision   disturbance than the right eye.     Timolol GFS Q AM OU   Last edited by Delfina Cornell MD on 9/6/2023  1:41 PM.              Assessment & Plan   Primary open angle glaucoma (POAG) of left eye, severe stage  -     latanoprost (XALATAN) 0.005 % ophthalmic solution; Place 1 drop into both eyes once daily.  Dispense: 2.5 mL; Refill: 12    Primary open angle glaucoma (POAG) of right eye, moderate stage    Combined forms of age-related cataract of both eyes    Afferent pupillary defect, left    Nuclear sclerosis, bilateral  -     Ambulatory referral/consult to Ophthalmology  -     IOL Master - OD - Right Eye    Cortical cataract of both eyes  -     Ambulatory referral/consult to Ophthalmology    Blurred vision, bilateral  -     Ambulatory referral/consult to Ophthalmology    Posterior vitreous detachment of both eyes         POAG with phacomorphic narrowing --> Moderate // Severe OS with APD OS  -Fhx (+M, +2brothers with blindness), Steroids (),Trauma()  -Drops:  Timolol gel qAM x many years  -Drop intolerance/contraindication: -  -Laser: -  -Surgeries: -  -CCT: 586//587  -Gonio: TM/SS  IOP max: 20 // 20. Progression at IOP ~17-18  IOP goal: 12 and under     06/30/2020 HVF 24-2 full OD // tiny derringer scotoma VFI 92% OS  12/14/2022 HVF 24-2 superior paracentral scotoma VFI 97% OD // dense low SAD involving fixation VFI 69% --> +prog OD // ++prog OS    12/14/2022 RNFL dec G/TI OD // dec G/TS/TI B NS/T/NI OS    Rapid progression in left eye from 8461-9100. Need updated imaging and better IOP control       Combined forms of  age-related cataract OU  Visually significant and interfering with activities of daily living including driving/reading  Patient desires cataract surgery for visual rehabilitation.   Complete glaucoma workup prior to surgery   Will need for visual rehabilitation and for phacomorphic narrowing / glaucoma control     When ready    LEFT EYE then RIGHT EYE    LEFT EYE DIBOO +22.00 target -0.47  RIGHT EYE DIBOO +22.00 target -0.38    With or without MIGS           Hx CRVO OS 2013 - saw Dr. Paz  No VF defects apparent until 2020  Unlikely that her VF defects are from RVO    PLAN  Start latanoprost qHS OU  Continue Timolol gel qAM OU    RTC 6 weeks HVF 24-2, OCT-RNFL, IOP check     Delfina Cornell M.D., M.S.  Department of Ophthalmology   Division of Glaucoma Surgery  [facility]

## 2023-09-06 ENCOUNTER — OFFICE VISIT (OUTPATIENT)
Dept: OPHTHALMOLOGY | Facility: CLINIC | Age: 78
End: 2023-09-06
Payer: MEDICARE

## 2023-09-06 DIAGNOSIS — H25.13 NUCLEAR SCLEROSIS, BILATERAL: ICD-10-CM

## 2023-09-06 DIAGNOSIS — H53.8 BLURRED VISION, BILATERAL: ICD-10-CM

## 2023-09-06 DIAGNOSIS — H40.1112 PRIMARY OPEN ANGLE GLAUCOMA (POAG) OF RIGHT EYE, MODERATE STAGE: ICD-10-CM

## 2023-09-06 DIAGNOSIS — H25.813 COMBINED FORMS OF AGE-RELATED CATARACT OF BOTH EYES: ICD-10-CM

## 2023-09-06 DIAGNOSIS — H21.562 AFFERENT PUPILLARY DEFECT, LEFT: ICD-10-CM

## 2023-09-06 DIAGNOSIS — H43.813 POSTERIOR VITREOUS DETACHMENT OF BOTH EYES: ICD-10-CM

## 2023-09-06 DIAGNOSIS — H26.9 CORTICAL CATARACT OF BOTH EYES: ICD-10-CM

## 2023-09-06 DIAGNOSIS — H40.1123 PRIMARY OPEN ANGLE GLAUCOMA (POAG) OF LEFT EYE, SEVERE STAGE: Primary | ICD-10-CM

## 2023-09-06 PROCEDURE — 1101F PT FALLS ASSESS-DOCD LE1/YR: CPT | Mod: CPTII,S$GLB,, | Performed by: STUDENT IN AN ORGANIZED HEALTH CARE EDUCATION/TRAINING PROGRAM

## 2023-09-06 PROCEDURE — 1159F PR MEDICATION LIST DOCUMENTED IN MEDICAL RECORD: ICD-10-PCS | Mod: CPTII,S$GLB,, | Performed by: STUDENT IN AN ORGANIZED HEALTH CARE EDUCATION/TRAINING PROGRAM

## 2023-09-06 PROCEDURE — 1126F AMNT PAIN NOTED NONE PRSNT: CPT | Mod: CPTII,S$GLB,, | Performed by: STUDENT IN AN ORGANIZED HEALTH CARE EDUCATION/TRAINING PROGRAM

## 2023-09-06 PROCEDURE — 92020 GONIOSCOPY: CPT | Mod: S$GLB,,, | Performed by: STUDENT IN AN ORGANIZED HEALTH CARE EDUCATION/TRAINING PROGRAM

## 2023-09-06 PROCEDURE — 76514 PR  US, EYE, FOR CORNEAL THICKNESS: ICD-10-PCS | Mod: S$GLB,,, | Performed by: STUDENT IN AN ORGANIZED HEALTH CARE EDUCATION/TRAINING PROGRAM

## 2023-09-06 PROCEDURE — 92136 OPHTHALMIC BIOMETRY: CPT | Mod: RT,S$GLB,, | Performed by: STUDENT IN AN ORGANIZED HEALTH CARE EDUCATION/TRAINING PROGRAM

## 2023-09-06 PROCEDURE — 1126F PR PAIN SEVERITY QUANTIFIED, NO PAIN PRESENT: ICD-10-PCS | Mod: CPTII,S$GLB,, | Performed by: STUDENT IN AN ORGANIZED HEALTH CARE EDUCATION/TRAINING PROGRAM

## 2023-09-06 PROCEDURE — 1159F MED LIST DOCD IN RCRD: CPT | Mod: CPTII,S$GLB,, | Performed by: STUDENT IN AN ORGANIZED HEALTH CARE EDUCATION/TRAINING PROGRAM

## 2023-09-06 PROCEDURE — 92020 PR SPECIAL EYE EVAL,GONIOSCOPY: ICD-10-PCS | Mod: S$GLB,,, | Performed by: STUDENT IN AN ORGANIZED HEALTH CARE EDUCATION/TRAINING PROGRAM

## 2023-09-06 PROCEDURE — 1101F PR PT FALLS ASSESS DOC 0-1 FALLS W/OUT INJ PAST YR: ICD-10-PCS | Mod: CPTII,S$GLB,, | Performed by: STUDENT IN AN ORGANIZED HEALTH CARE EDUCATION/TRAINING PROGRAM

## 2023-09-06 PROCEDURE — 99999 PR PBB SHADOW E&M-EST. PATIENT-LVL III: CPT | Mod: PBBFAC,,, | Performed by: STUDENT IN AN ORGANIZED HEALTH CARE EDUCATION/TRAINING PROGRAM

## 2023-09-06 PROCEDURE — 92136 IOL MASTER - OD - RIGHT EYE: ICD-10-PCS | Mod: RT,S$GLB,, | Performed by: STUDENT IN AN ORGANIZED HEALTH CARE EDUCATION/TRAINING PROGRAM

## 2023-09-06 PROCEDURE — 3288F FALL RISK ASSESSMENT DOCD: CPT | Mod: CPTII,S$GLB,, | Performed by: STUDENT IN AN ORGANIZED HEALTH CARE EDUCATION/TRAINING PROGRAM

## 2023-09-06 PROCEDURE — 76514 ECHO EXAM OF EYE THICKNESS: CPT | Mod: S$GLB,,, | Performed by: STUDENT IN AN ORGANIZED HEALTH CARE EDUCATION/TRAINING PROGRAM

## 2023-09-06 PROCEDURE — 99999 PR PBB SHADOW E&M-EST. PATIENT-LVL III: ICD-10-PCS | Mod: PBBFAC,,, | Performed by: STUDENT IN AN ORGANIZED HEALTH CARE EDUCATION/TRAINING PROGRAM

## 2023-09-06 PROCEDURE — 1160F PR REVIEW ALL MEDS BY PRESCRIBER/CLIN PHARMACIST DOCUMENTED: ICD-10-PCS | Mod: CPTII,S$GLB,, | Performed by: STUDENT IN AN ORGANIZED HEALTH CARE EDUCATION/TRAINING PROGRAM

## 2023-09-06 PROCEDURE — 99214 OFFICE O/P EST MOD 30 MIN: CPT | Mod: S$GLB,,, | Performed by: STUDENT IN AN ORGANIZED HEALTH CARE EDUCATION/TRAINING PROGRAM

## 2023-09-06 PROCEDURE — 1160F RVW MEDS BY RX/DR IN RCRD: CPT | Mod: CPTII,S$GLB,, | Performed by: STUDENT IN AN ORGANIZED HEALTH CARE EDUCATION/TRAINING PROGRAM

## 2023-09-06 PROCEDURE — 3288F PR FALLS RISK ASSESSMENT DOCUMENTED: ICD-10-PCS | Mod: CPTII,S$GLB,, | Performed by: STUDENT IN AN ORGANIZED HEALTH CARE EDUCATION/TRAINING PROGRAM

## 2023-09-06 PROCEDURE — 99214 PR OFFICE/OUTPT VISIT, EST, LEVL IV, 30-39 MIN: ICD-10-PCS | Mod: S$GLB,,, | Performed by: STUDENT IN AN ORGANIZED HEALTH CARE EDUCATION/TRAINING PROGRAM

## 2023-09-06 RX ORDER — LATANOPROST 50 UG/ML
1 SOLUTION/ DROPS OPHTHALMIC DAILY
Qty: 2.5 ML | Refills: 12 | Status: SHIPPED | OUTPATIENT
Start: 2023-09-06 | End: 2024-02-07 | Stop reason: SDUPTHER

## 2023-09-13 ENCOUNTER — TELEPHONE (OUTPATIENT)
Dept: OPHTHALMOLOGY | Facility: CLINIC | Age: 78
End: 2023-09-13
Payer: MEDICARE

## 2023-09-13 NOTE — TELEPHONE ENCOUNTER
Phoned both numbers and left a message   Pt has 12 refills on her Latanoprost.   Pt should contact the pharmacy for refills.

## 2023-09-13 NOTE — TELEPHONE ENCOUNTER
----- Message from Nohelia Larsen sent at 9/13/2023  9:45 AM CDT -----  Contact: pt @ [phone]  Rx Refill/Request    Is this a Refill or New Rx:  refill    Rx Name and Strength:  latanoprost (XALATAN) 0.005 % ophthalmic solution    Preferred Pharmacy with phone number:[id number] - [place], [place] - [address]  [address] [place] [address]  Phone: [phone] Fax: [phone]      Communication Preference:Asking for call back   Additional Information

## 2023-09-14 DIAGNOSIS — E78.5 HYPERLIPIDEMIA, UNSPECIFIED HYPERLIPIDEMIA TYPE: ICD-10-CM

## 2023-09-14 RX ORDER — SIMVASTATIN 40 MG/1
TABLET, FILM COATED ORAL
Qty: 90 TABLET | Refills: 3 | Status: SHIPPED | OUTPATIENT
Start: 2023-09-14

## 2023-09-15 NOTE — TELEPHONE ENCOUNTER
Refill Decision Note   Elena Block  is requesting a refill authorization.  Brief Assessment and Rationale for Refill:  Approve     Medication Therapy Plan:         Comments:     Note composed:9:51 PM 09/14/2023

## 2023-09-15 NOTE — TELEPHONE ENCOUNTER
No care due was identified.  [facility] Embedded Care Due Messages. Reference number: [id number].   9/14/2023 9:44:06 PM CDT

## 2023-09-26 ENCOUNTER — IMMUNIZATION (OUTPATIENT)
Dept: INTERNAL MEDICINE | Facility: CLINIC | Age: 78
End: 2023-09-26
Payer: MEDICARE

## 2023-09-26 PROCEDURE — 90694 FLU VACCINE - QUADRIVALENT - ADJUVANTED: ICD-10-PCS | Mod: S$GLB,,, | Performed by: INTERNAL MEDICINE

## 2023-09-26 PROCEDURE — 90694 VACC AIIV4 NO PRSRV 0.5ML IM: CPT | Mod: S$GLB,,, | Performed by: INTERNAL MEDICINE

## 2023-09-26 PROCEDURE — G0008 ADMIN INFLUENZA VIRUS VAC: HCPCS | Mod: S$GLB,,, | Performed by: INTERNAL MEDICINE

## 2023-09-26 PROCEDURE — G0008 FLU VACCINE - QUADRIVALENT - ADJUVANTED: ICD-10-PCS | Mod: S$GLB,,, | Performed by: INTERNAL MEDICINE

## 2023-09-30 DIAGNOSIS — N99.3 PROLAPSE OF VAGINAL VAULT AFTER HYSTERECTOMY: ICD-10-CM

## 2023-09-30 DIAGNOSIS — N81.10 FEMALE CYSTOCELE: ICD-10-CM

## 2023-10-02 RX ORDER — ESTRADIOL 0.1 MG/G
1 CREAM VAGINAL
Qty: 42.5 G | Refills: 3 | Status: SHIPPED | OUTPATIENT
Start: 2023-10-02 | End: 2024-01-30 | Stop reason: SDUPTHER

## 2023-10-09 ENCOUNTER — TELEPHONE (OUTPATIENT)
Dept: PODIATRY | Facility: CLINIC | Age: 78
End: 2023-10-09
Payer: MEDICARE

## 2023-10-09 NOTE — TELEPHONE ENCOUNTER
Staff contacted and scheduled patient with Dr. Holley on October 11, 2023 at 9:30 am.      Patient verbalized understanding.

## 2023-10-09 NOTE — TELEPHONE ENCOUNTER
----- Message from Sona Fabian sent at 10/9/2023  8:21 AM CDT -----  Regarding: sooner appt  Contact: [phone]  Sooner Appt Request:  Caller requesting a sooner appt.  Caller declined 1st available appt and wait list.    Request message be sent to doctor.     Name of Caller: Elena Block     When is 1st available appt: none in epic     Symptoms: left big toe, nail issues.       Call back # [phone]  Additional Info: (optional): pt would like to be seen Wednesday. I attempted to schedule.

## 2023-10-10 ENCOUNTER — OFFICE VISIT (OUTPATIENT)
Dept: ORTHOPEDICS | Facility: CLINIC | Age: 78
End: 2023-10-10
Payer: MEDICARE

## 2023-10-10 ENCOUNTER — HOSPITAL ENCOUNTER (OUTPATIENT)
Dept: RADIOLOGY | Facility: HOSPITAL | Age: 78
Discharge: HOME | End: 2023-10-10
Attending: NURSE PRACTITIONER
Payer: MEDICARE

## 2023-10-10 VITALS — HEIGHT: 68 IN | BODY MASS INDEX: 30.21 KG/M2 | WEIGHT: 199.31 LBS

## 2023-10-10 DIAGNOSIS — M25.511 ACUTE PAIN OF RIGHT SHOULDER: Primary | ICD-10-CM

## 2023-10-10 DIAGNOSIS — M25.511 ACUTE PAIN OF RIGHT SHOULDER: ICD-10-CM

## 2023-10-10 DIAGNOSIS — M77.8 RIGHT SHOULDER TENDINITIS: Primary | ICD-10-CM

## 2023-10-10 PROCEDURE — 1101F PT FALLS ASSESS-DOCD LE1/YR: CPT | Mod: CPTII,S$GLB,, | Performed by: NURSE PRACTITIONER

## 2023-10-10 PROCEDURE — 1159F PR MEDICATION LIST DOCUMENTED IN MEDICAL RECORD: ICD-10-PCS | Mod: CPTII,S$GLB,, | Performed by: NURSE PRACTITIONER

## 2023-10-10 PROCEDURE — 3288F FALL RISK ASSESSMENT DOCD: CPT | Mod: CPTII,S$GLB,, | Performed by: NURSE PRACTITIONER

## 2023-10-10 PROCEDURE — 73030 X-RAY EXAM OF SHOULDER: CPT | Mod: TC,RT

## 2023-10-10 PROCEDURE — 3288F PR FALLS RISK ASSESSMENT DOCUMENTED: ICD-10-PCS | Mod: CPTII,S$GLB,, | Performed by: NURSE PRACTITIONER

## 2023-10-10 PROCEDURE — 1160F PR REVIEW ALL MEDS BY PRESCRIBER/CLIN PHARMACIST DOCUMENTED: ICD-10-PCS | Mod: CPTII,S$GLB,, | Performed by: NURSE PRACTITIONER

## 2023-10-10 PROCEDURE — 99213 OFFICE O/P EST LOW 20 MIN: CPT | Mod: 25,S$GLB,, | Performed by: NURSE PRACTITIONER

## 2023-10-10 PROCEDURE — 20610 LARGE JOINT ASPIRATION/INJECTION: R SUBACROMIAL BURSA: ICD-10-PCS | Mod: RT,S$GLB,, | Performed by: NURSE PRACTITIONER

## 2023-10-10 PROCEDURE — 99999 PR PBB SHADOW E&M-EST. PATIENT-LVL III: CPT | Mod: PBBFAC,,, | Performed by: NURSE PRACTITIONER

## 2023-10-10 PROCEDURE — 1159F MED LIST DOCD IN RCRD: CPT | Mod: CPTII,S$GLB,, | Performed by: NURSE PRACTITIONER

## 2023-10-10 PROCEDURE — 73030 XR SHOULDER COMPLETE 2 OR MORE VIEWS RIGHT: ICD-10-PCS | Mod: 26,RT,, | Performed by: RADIOLOGY

## 2023-10-10 PROCEDURE — 99999 PR PBB SHADOW E&M-EST. PATIENT-LVL III: ICD-10-PCS | Mod: PBBFAC,,, | Performed by: NURSE PRACTITIONER

## 2023-10-10 PROCEDURE — 99213 PR OFFICE/OUTPT VISIT, EST, LEVL III, 20-29 MIN: ICD-10-PCS | Mod: 25,S$GLB,, | Performed by: NURSE PRACTITIONER

## 2023-10-10 PROCEDURE — 1126F AMNT PAIN NOTED NONE PRSNT: CPT | Mod: CPTII,S$GLB,, | Performed by: NURSE PRACTITIONER

## 2023-10-10 PROCEDURE — 1126F PR PAIN SEVERITY QUANTIFIED, NO PAIN PRESENT: ICD-10-PCS | Mod: CPTII,S$GLB,, | Performed by: NURSE PRACTITIONER

## 2023-10-10 PROCEDURE — 1101F PR PT FALLS ASSESS DOC 0-1 FALLS W/OUT INJ PAST YR: ICD-10-PCS | Mod: CPTII,S$GLB,, | Performed by: NURSE PRACTITIONER

## 2023-10-10 PROCEDURE — 1160F RVW MEDS BY RX/DR IN RCRD: CPT | Mod: CPTII,S$GLB,, | Performed by: NURSE PRACTITIONER

## 2023-10-10 PROCEDURE — 20610 DRAIN/INJ JOINT/BURSA W/O US: CPT | Mod: RT,S$GLB,, | Performed by: NURSE PRACTITIONER

## 2023-10-10 PROCEDURE — 73030 X-RAY EXAM OF SHOULDER: CPT | Mod: 26,RT,, | Performed by: RADIOLOGY

## 2023-10-10 RX ORDER — LIDOCAINE HYDROCHLORIDE 10 MG/ML
4 INJECTION INFILTRATION; PERINEURAL
Status: DISCONTINUED | OUTPATIENT
Start: 2023-10-10 | End: 2023-10-10 | Stop reason: HOSPADM

## 2023-10-10 RX ORDER — TRIAMCINOLONE ACETONIDE 40 MG/ML
40 INJECTION, SUSPENSION INTRA-ARTICULAR; INTRAMUSCULAR
Status: DISCONTINUED | OUTPATIENT
Start: 2023-10-10 | End: 2023-10-10 | Stop reason: HOSPADM

## 2023-10-10 RX ADMIN — TRIAMCINOLONE ACETONIDE 40 MG: 40 INJECTION, SUSPENSION INTRA-ARTICULAR; INTRAMUSCULAR at 01:10

## 2023-10-10 RX ADMIN — LIDOCAINE HYDROCHLORIDE 4 ML: 10 INJECTION INFILTRATION; PERINEURAL at 01:10

## 2023-10-10 NOTE — PROGRESS NOTES
SUBJECTIVE:     Chief Complaint & History of Present Illness:  Elena Block is a Established 77 y.o. year old female patient presenting with intermittent right shoulder pain that started more than 4 years ago.  She is Left hand dominant.  There is not a history of injury.  The pain is located in the AC joint aspect of the shoulder.  The pain is described as sharp, 5-6/10.  It is aggravated by lifting.  Associated symptoms include pain radiating to arm/hand.  Previous treatments include nothing which have provided minimal relief.  There is not a history of previous injury or surgery to the shoulder.      Review of patient's allergies indicates:   Allergen Reactions    Tramadol Nausea And Vomiting         Current Outpatient Medications   Medication Sig Dispense Refill    carvediloL (COREG) 6.25 MG tablet TAKE 1 TABLET BY MOUTH  TWICE DAILY WITH MEALS 180 tablet 3    cholecalciferol, vitamin D3, (VITAMIN D3) 125 mcg (5,000 unit) Tab Take 1 tablet (5,000 Units total) by mouth once daily. 30 tablet 6    docusate sodium (COLACE) 100 MG capsule 1-4 caps daily; gradually taper up over 2 weeks (Patient taking differently: twice a week. 1-4 caps daily; gradually taper up over 2 weeks) 60 capsule prn    doxycycline (VIBRA-TABS) 100 MG tablet Take 1 tablet (100 mg total) by mouth 2 (two) times daily. 14 tablet 0    doxycycline (VIBRA-TABS) 100 MG tablet Take 1 tablet (100 mg total) by mouth 2 (two) times daily. Take with food 14 tablet 0    estradioL (ESTRACE) 0.01 % (0.1 mg/gram) vaginal cream PLACE 1 G VAGINALLY EVERY MON, WED, FRI. 42.5 g 3    folic acid-vit B6-vit B12 (FOLBEE) 2.5-25-1 mg Tab Take 1 tablet by mouth every other day. 45 tablet 2    gabapentin (NEURONTIN) 100 MG capsule Take 100 mg by mouth 3 (three) times daily.      gabapentin (NEURONTIN) 300 MG capsule Take 1 capsule (300 mg total) by mouth every evening. 90 capsule 3    Lactobacillus acidophilus (PROBIOTIC ORAL) Take by mouth once daily.       latanoprost (XALATAN) 0.005 % ophthalmic solution Place 1 drop into both eyes once daily. 2.5 mL 12    levothyroxine (SYNTHROID) 100 MCG tablet TAKE 1 TABLET BY MOUTH  DAILY ON EMPTY STOMACH 90 tablet 3    meloxicam (MOBIC) 15 MG tablet Take 1 tablet (15 mg total) by mouth once daily. 30 tablet 0    NIFEdipine (PROCARDIA-XL) 90 MG (OSM) 24 hr tablet Take 1 tablet (90 mg total) by mouth once daily. 90 tablet 2    simvastatin (ZOCOR) 40 MG tablet TAKE 1 TABLET BY MOUTH IN  THE EVENING 90 tablet 3    timolol maleate 0.5% (TIMOPTIC-XE) 0.5 % SolG INSTILL 1 DROP INTO BOTH  EYES DAILY 15 mL 3    valsartan (DIOVAN) 320 MG tablet TAKE 1 TABLET BY MOUTH ONCE DAILY. 90 tablet 3     No current facility-administered medications for this visit.       Past Medical History:   Diagnosis Date    Back pain     Cataract     Central retinal vein occlusion of left eye     Degeneration of lumbar or lumbosacral intervertebral disc 02/2023    MRI-+diffuse OA/DDD, +mild spinal stenosis at L4-5    High cholesterol     Hypertension     Hypothyroidism     Impingement syndrome of left shoulder 07/29/2013    Obesity     Pneumonia     Posterior vitreous detachment of both eyes 01/17/2013    S/P partial hysterectomy 01/23/2013    Vitamin D insufficiency 2016       Past Surgical History:   Procedure Laterality Date    COLONOSCOPY  01/22/2008    COLONOSCOPY N/A 8/8/2019    Procedure: COLONOSCOPY;  Surgeon: Allie Preston MD;  Location: [address];  Service: Endoscopy;  Laterality: N/A;    CYSTOSCOPY N/A 1/8/2021    Procedure: CYSTOSCOPY;  Surgeon: Mauricio Corea MD;  Location: [facility];  Service: OB/GYN;  Laterality: N/A;    HYSTERECTOMY      Partial     LAPAROSCOPIC LYSIS OF ADHESIONS N/A 1/8/2021    Procedure: LYSIS, ADHESIONS, LAPAROSCOPIC;  Surgeon: Mauricio Corea MD;  Location: [facility];  Service: OB/GYN;  Laterality: N/A;    PARTIAL HYSTERECTOMY      ROBOT-ASSISTED LAPAROSCOPIC ABDOMINAL SACROCOLPOPEXY USING DA PATSY XI N/A  1/8/2021    Procedure: XI ROBOTIC SACROCOLPOPEXY, ABDOMINAL;  Surgeon: Mauricio Corea MD;  Location: [facility] OR;  Service: OB/GYN;  Laterality: N/A;    ROBOT-ASSISTED LAPAROSCOPIC SURGICAL REMOVAL OF OVARY USING DA PATSY XI Right 1/8/2021    Procedure: XI ROBOTIC OOPHORECTOMY;  Surgeon: Mauricio Corea MD;  Location: [facility] OR;  Service: OB/GYN;  Laterality: Right;    THYROIDECTOMY, PARTIAL      TONSILLECTOMY         Family History   Problem Relation Age of Onset    Diabetes Mother     Glaucoma Mother     Heart disease Mother     Blindness Mother     Heart attack Mother     No Known Problems Father     Glaucoma Brother     Stroke Brother     Liver disease Brother     Heart disease Brother     Arthritis Brother     Alcohol abuse Brother     Drug abuse Brother     Glaucoma Brother     Diabetes Brother     Pemphigus vulgaris Brother     Blindness Brother     Heart disease Brother     Diabetes Brother         Bilateral Knee amputation    Heart disease Brother     Liver disease Brother     Gout Son     Heart disease Son 48        stent placement    Heart attack Son     Heart attacks under age 50 Son     No Known Problems Son     No Known Problems Maternal Aunt     No Known Problems Maternal Uncle     No Known Problems Paternal Aunt     No Known Problems Paternal Uncle     Hypertension Maternal Grandmother     Diabetes Maternal Grandmother     Blindness Maternal Grandmother     No Known Problems Maternal Grandfather     No Known Problems Paternal Grandmother     No Known Problems Paternal Grandfather     Amblyopia Neg Hx     Macular degeneration Neg Hx     Retinal detachment Neg Hx     Strabismus Neg Hx     Thyroid disease Neg Hx     Cataracts Neg Hx     Cancer Neg Hx            Review of Systems:  ROS:  Constitutional: no fever or chills  Eyes: no visual changes  ENT: no nasal congestion or sore throat  Respiratory: no cough or shortness of breath  Cardiovascular: no chest pain or palpitations  Gastrointestinal: no nausea  "or vomiting, tolerating diet  Genitourinary: no hematuria or dysuria  Integument/Breast: no rash or pruritis  Hematologic/Lymphatic: no easy bruising or lymphadenopathy  Musculoskeletal:  right shoulder pain  Neurological: no seizures or tremors  Behavioral/Psych: no auditory or visual hallucinations  Endocrine: no heat or cold intolerance      OBJECTIVE:     PHYSICAL EXAM:  Vital Signs (Most Recent)  There were no vitals filed for this visit.  Height: 5' 8" (172.7 cm) Weight: 90.4 kg (199 lb 4.7 oz),   Estimated body mass index is 30.3 kg/m² as calculated from the following:    Height as of this encounter: 5' 8" (1.727 m).    Weight as of this encounter: 90.4 kg (199 lb 4.7 oz).   General Appearance: Well nourished, well developed, in no acute distress.  HENT: Normal cephalic, oropharynx pink and moist  Eyes: PERRLA bilaterally and EOM x 4  Respiratory: Even and unlabored  Skin: Warm and Dry.   Psychiatric: AAO x 4, Mood & affect are normal.    Shoulder exam: right  Tenderness: AC joint  ROM: forward flexion 180/180, extension 45/45, full abduction 180/180, abduction-glenohumeral 90/90, external rotation 50/50  Shoulder Strength: biceps 5/5, triceps 5/5, abduction 5/5, adduction 5/5, external rotation 5/5 with shoulder at side, flexion 5/5, and extension 5/5  non-specific diffuse tenderness about the shoulder  Stability tests: normal    RADIOGRAPHS:  X-ray of the right shoulder was obtained, findings show no acute fracture.  All radiographs were personally reviewed by me.    ASSESSMENT/PLAN:       ICD-10-CM ICD-9-CM   1. Right shoulder tendinitis  M77.8 726.10         -Elena Block presents to clinic with c/c right shoulder pain for the past 4 years.  No recent trauma.  -X-ray as above.  -Recommend RICE therapy.    Patient reports that she has chronic right shoulder pain for the past 4 years.  Pain comes and goes but has been present over the last couple of weeks.  She does describe the pain as sharp shooting " pain that radiates down her arm.  Denies any neck pain.  Denies any recent trauma.  She has done PT in the past which helped alleviate her symptoms.    Offered to send the patient back to PT but she would rather do the home exercise program that she was previously given.    I discussed treatment options including steroid injection.  Patient would like to do a steroid injection.  Please see procedural note.  In the meantime patient can resume taking her Mobic and use over-the-counter Tylenol p.r.n..    Patient to follow up in the clinic in 3 months p.r.n., sooner for new or worsening symptoms.

## 2023-10-11 ENCOUNTER — OFFICE VISIT (OUTPATIENT)
Dept: PODIATRY | Facility: CLINIC | Age: 78
End: 2023-10-11
Payer: MEDICARE

## 2023-10-11 VITALS
DIASTOLIC BLOOD PRESSURE: 77 MMHG | BODY MASS INDEX: 30.21 KG/M2 | HEIGHT: 68 IN | HEART RATE: 76 BPM | WEIGHT: 199.31 LBS | SYSTOLIC BLOOD PRESSURE: 179 MMHG

## 2023-10-11 DIAGNOSIS — B35.1 ONYCHOMYCOSIS DUE TO DERMATOPHYTE: Primary | ICD-10-CM

## 2023-10-11 LAB — KOH PREP SPEC: NORMAL

## 2023-10-11 PROCEDURE — 3077F PR MOST RECENT SYSTOLIC BLOOD PRESSURE >= 140 MM HG: ICD-10-PCS | Mod: CPTII,S$GLB,, | Performed by: PODIATRIST

## 2023-10-11 PROCEDURE — 1101F PR PT FALLS ASSESS DOC 0-1 FALLS W/OUT INJ PAST YR: ICD-10-PCS | Mod: CPTII,S$GLB,, | Performed by: PODIATRIST

## 2023-10-11 PROCEDURE — 3078F PR MOST RECENT DIASTOLIC BLOOD PRESSURE < 80 MM HG: ICD-10-PCS | Mod: CPTII,S$GLB,, | Performed by: PODIATRIST

## 2023-10-11 PROCEDURE — 3288F PR FALLS RISK ASSESSMENT DOCUMENTED: ICD-10-PCS | Mod: CPTII,S$GLB,, | Performed by: PODIATRIST

## 2023-10-11 PROCEDURE — 99999 PR PBB SHADOW E&M-EST. PATIENT-LVL III: ICD-10-PCS | Mod: PBBFAC,,, | Performed by: PODIATRIST

## 2023-10-11 PROCEDURE — 99999 PR PBB SHADOW E&M-EST. PATIENT-LVL III: CPT | Mod: PBBFAC,,, | Performed by: PODIATRIST

## 2023-10-11 PROCEDURE — 3078F DIAST BP <80 MM HG: CPT | Mod: CPTII,S$GLB,, | Performed by: PODIATRIST

## 2023-10-11 PROCEDURE — 1159F MED LIST DOCD IN RCRD: CPT | Mod: CPTII,S$GLB,, | Performed by: PODIATRIST

## 2023-10-11 PROCEDURE — 3077F SYST BP >= 140 MM HG: CPT | Mod: CPTII,S$GLB,, | Performed by: PODIATRIST

## 2023-10-11 PROCEDURE — 99214 OFFICE O/P EST MOD 30 MIN: CPT | Mod: S$GLB,,, | Performed by: PODIATRIST

## 2023-10-11 PROCEDURE — 1126F AMNT PAIN NOTED NONE PRSNT: CPT | Mod: CPTII,S$GLB,, | Performed by: PODIATRIST

## 2023-10-11 PROCEDURE — 1159F PR MEDICATION LIST DOCUMENTED IN MEDICAL RECORD: ICD-10-PCS | Mod: CPTII,S$GLB,, | Performed by: PODIATRIST

## 2023-10-11 PROCEDURE — 1126F PR PAIN SEVERITY QUANTIFIED, NO PAIN PRESENT: ICD-10-PCS | Mod: CPTII,S$GLB,, | Performed by: PODIATRIST

## 2023-10-11 PROCEDURE — 87101 SKIN FUNGI CULTURE: CPT | Performed by: PODIATRIST

## 2023-10-11 PROCEDURE — 3288F FALL RISK ASSESSMENT DOCD: CPT | Mod: CPTII,S$GLB,, | Performed by: PODIATRIST

## 2023-10-11 PROCEDURE — 87210 SMEAR WET MOUNT SALINE/INK: CPT | Performed by: PODIATRIST

## 2023-10-11 PROCEDURE — 1101F PT FALLS ASSESS-DOCD LE1/YR: CPT | Mod: CPTII,S$GLB,, | Performed by: PODIATRIST

## 2023-10-11 PROCEDURE — 99214 PR OFFICE/OUTPT VISIT, EST, LEVL IV, 30-39 MIN: ICD-10-PCS | Mod: S$GLB,,, | Performed by: PODIATRIST

## 2023-10-11 RX ORDER — CICLOPIROX 80 MG/ML
SOLUTION TOPICAL NIGHTLY
Qty: 6.6 ML | Refills: 11 | Status: SHIPPED | OUTPATIENT
Start: 2023-10-11

## 2023-10-11 NOTE — PROGRESS NOTES
Subjective:     Patient ID: Elena Block is a 77 y.o. female.    Chief Complaint: Foot Problem (Left big toe)    Elena is a 77 y.o. female who presents to the clinic complaining of thick and discolored toenails on the left hallux nail. Patient complains of the nail being lifted from its nailbed proximally, stating that it snags in her socks and bedsheets. Patient is concerned about the possibility of infection. Patient otherwise attests that she has thick skin on the lateral aspects of her feet, which she uses her 's urea cream as treatment. Elena is inquiring about treatment options in regards to her nail. Patient otherwise has no other pedal complaints at this time.     Review of Systems   Constitutional: Negative for chills and fever.   HENT: Negative.     Eyes: Negative.    Respiratory:  Negative for cough and shortness of breath.    Endocrine: Negative.    Hematologic/Lymphatic: Negative.    Skin:  Positive for nail changes.        Left hallux nail onycholysis   Musculoskeletal:  Negative for arthritis, joint pain, muscle weakness and myalgias.   Gastrointestinal: Negative.  Negative for nausea and vomiting.   Genitourinary: Negative.    Neurological:  Negative for headaches.   Psychiatric/Behavioral: Negative.     Allergic/Immunologic: Negative.       Objective:     Physical Exam  Constitutional:       General: She is not in acute distress.     Appearance: Normal appearance. She is not ill-appearing.   Cardiovascular:      Pulses:           Dorsalis pedis pulses are 2+ on the right side and 2+ on the left side.        Posterior tibial pulses are 2+ on the right side and 2+ on the left side.   Musculoskeletal:      Right foot: Normal range of motion.      Left foot: Normal range of motion.   Feet:      Right foot:      Toenail Condition: Fungal disease present.     Left foot:      Toenail Condition: Fungal disease present.     Comments: Left hallux nail onycholysis  Right hallux nail  onychomycotic  All other nails not discernibly infected  Lateral sub-5th styloid processes presenting with hyperkeratotic skin B/L.     Neurological:      Mental Status: She is alert.       Assessment:      Encounter Diagnosis   Name Primary?    Onychomycosis due to dermatophyte Yes     Plan:     Elena was seen today for foot problem.    Diagnoses and all orders for this visit:    Onychomycosis due to dermatophyte  -     KOH prep  -     CULTURE, FUNGUS - SKIN, HAIR, OR NAILS    Other orders  -     ciclopirox (PENLAC) 8 % Soln; Apply topically nightly.      I counseled the patient on her conditions, their implications and medical management.    Time out performed prior to anesthetizing the surgical area and all patient identifiers, procedures, and site markings are in agreement.      Conservative Sharps Debridement:  Type of debridement: Non-Excisional  Instrument used: sterile double action nail nipper  Tissue removed: proximal nail margin from onycholytic left hallux nail  Tissue layer exposed: nail  Bleeding: none  Pain: No     - With patient's permission, left hallux nail (presenting as onycholysis proximally) was aggressively reduced at the proximal margin and debrided to its underlying nail bed attachment mechanically via double-action nail nipper removing all offending nail and debris. Patient relates relief following the procedure. Patient is instructed to file down sharp residual edges post-debridement in her home. She will continue to monitor the areas daily, inspect her feet, wear protective shoe gear when ambulatory, moisturizer to maintain skin integrity and follow in this office p.r.n.    Patient explained the etiology of onychomycosis, its treatment options, and indications for treatment. Patient opted to utilize cyclopirox soln to be picked up at her pharmacy and explained its instructions and use. Patient requested her nail debris be sent to pathology for further determination of speciation.  Patient to follow up in office with understanding that she will be called after status of pathology results are determined.

## 2023-10-18 ENCOUNTER — OFFICE VISIT (OUTPATIENT)
Dept: OPHTHALMOLOGY | Facility: CLINIC | Age: 78
End: 2023-10-18
Payer: MEDICARE

## 2023-10-18 ENCOUNTER — CLINICAL SUPPORT (OUTPATIENT)
Dept: OPHTHALMOLOGY | Facility: CLINIC | Age: 78
End: 2023-10-18
Payer: MEDICARE

## 2023-10-18 DIAGNOSIS — H40.1123 PRIMARY OPEN ANGLE GLAUCOMA (POAG) OF LEFT EYE, SEVERE STAGE: Primary | ICD-10-CM

## 2023-10-18 DIAGNOSIS — H40.1112 PRIMARY OPEN ANGLE GLAUCOMA (POAG) OF RIGHT EYE, MODERATE STAGE: ICD-10-CM

## 2023-10-18 PROCEDURE — 99214 OFFICE O/P EST MOD 30 MIN: CPT | Mod: S$GLB,,, | Performed by: STUDENT IN AN ORGANIZED HEALTH CARE EDUCATION/TRAINING PROGRAM

## 2023-10-18 PROCEDURE — 1101F PT FALLS ASSESS-DOCD LE1/YR: CPT | Mod: CPTII,S$GLB,, | Performed by: STUDENT IN AN ORGANIZED HEALTH CARE EDUCATION/TRAINING PROGRAM

## 2023-10-18 PROCEDURE — 3288F FALL RISK ASSESSMENT DOCD: CPT | Mod: CPTII,S$GLB,, | Performed by: STUDENT IN AN ORGANIZED HEALTH CARE EDUCATION/TRAINING PROGRAM

## 2023-10-18 PROCEDURE — 1159F PR MEDICATION LIST DOCUMENTED IN MEDICAL RECORD: ICD-10-PCS | Mod: CPTII,S$GLB,, | Performed by: STUDENT IN AN ORGANIZED HEALTH CARE EDUCATION/TRAINING PROGRAM

## 2023-10-18 PROCEDURE — 1126F PR PAIN SEVERITY QUANTIFIED, NO PAIN PRESENT: ICD-10-PCS | Mod: CPTII,S$GLB,, | Performed by: STUDENT IN AN ORGANIZED HEALTH CARE EDUCATION/TRAINING PROGRAM

## 2023-10-18 PROCEDURE — 99214 PR OFFICE/OUTPT VISIT, EST, LEVL IV, 30-39 MIN: ICD-10-PCS | Mod: S$GLB,,, | Performed by: STUDENT IN AN ORGANIZED HEALTH CARE EDUCATION/TRAINING PROGRAM

## 2023-10-18 PROCEDURE — 99999 PR PBB SHADOW E&M-EST. PATIENT-LVL III: ICD-10-PCS | Mod: PBBFAC,,, | Performed by: STUDENT IN AN ORGANIZED HEALTH CARE EDUCATION/TRAINING PROGRAM

## 2023-10-18 PROCEDURE — 92133 CPTRZD OPH DX IMG PST SGM ON: CPT | Mod: S$GLB,,, | Performed by: STUDENT IN AN ORGANIZED HEALTH CARE EDUCATION/TRAINING PROGRAM

## 2023-10-18 PROCEDURE — 92083 HUMPHREY VISUAL FIELD - OU - BOTH EYES: ICD-10-PCS | Mod: S$GLB,,, | Performed by: STUDENT IN AN ORGANIZED HEALTH CARE EDUCATION/TRAINING PROGRAM

## 2023-10-18 PROCEDURE — 1159F MED LIST DOCD IN RCRD: CPT | Mod: CPTII,S$GLB,, | Performed by: STUDENT IN AN ORGANIZED HEALTH CARE EDUCATION/TRAINING PROGRAM

## 2023-10-18 PROCEDURE — 1160F RVW MEDS BY RX/DR IN RCRD: CPT | Mod: CPTII,S$GLB,, | Performed by: STUDENT IN AN ORGANIZED HEALTH CARE EDUCATION/TRAINING PROGRAM

## 2023-10-18 PROCEDURE — 1101F PR PT FALLS ASSESS DOC 0-1 FALLS W/OUT INJ PAST YR: ICD-10-PCS | Mod: CPTII,S$GLB,, | Performed by: STUDENT IN AN ORGANIZED HEALTH CARE EDUCATION/TRAINING PROGRAM

## 2023-10-18 PROCEDURE — 1126F AMNT PAIN NOTED NONE PRSNT: CPT | Mod: CPTII,S$GLB,, | Performed by: STUDENT IN AN ORGANIZED HEALTH CARE EDUCATION/TRAINING PROGRAM

## 2023-10-18 PROCEDURE — 92083 EXTENDED VISUAL FIELD XM: CPT | Mod: S$GLB,,, | Performed by: STUDENT IN AN ORGANIZED HEALTH CARE EDUCATION/TRAINING PROGRAM

## 2023-10-18 PROCEDURE — 3288F PR FALLS RISK ASSESSMENT DOCUMENTED: ICD-10-PCS | Mod: CPTII,S$GLB,, | Performed by: STUDENT IN AN ORGANIZED HEALTH CARE EDUCATION/TRAINING PROGRAM

## 2023-10-18 PROCEDURE — 1160F PR REVIEW ALL MEDS BY PRESCRIBER/CLIN PHARMACIST DOCUMENTED: ICD-10-PCS | Mod: CPTII,S$GLB,, | Performed by: STUDENT IN AN ORGANIZED HEALTH CARE EDUCATION/TRAINING PROGRAM

## 2023-10-18 PROCEDURE — 99999 PR PBB SHADOW E&M-EST. PATIENT-LVL III: CPT | Mod: PBBFAC,,, | Performed by: STUDENT IN AN ORGANIZED HEALTH CARE EDUCATION/TRAINING PROGRAM

## 2023-10-18 PROCEDURE — 92133 POSTERIOR SEGMENT OCT OPTIC NERVE(OCULAR COHERENCE TOMOGRAPHY) - OU - BOTH EYES: ICD-10-PCS | Mod: S$GLB,,, | Performed by: STUDENT IN AN ORGANIZED HEALTH CARE EDUCATION/TRAINING PROGRAM

## 2023-10-18 NOTE — PROGRESS NOTES
Subjective:       Patient ID: Elena Block is a 77 y.o. female.    Chief Complaint: Glaucoma    HPI    DLS: 9/06/2023    Pt here for 6 week IOP check/HVF review/OCT;  Pt states no eye pain or discomfort. Pt states she doesn't see floaters   anymore and denies any vision changes.    Meds;  Latanoprost QHS OU  T 1/2 GFS QAM OU      Last edited by Selin Ramírez on 10/18/2023  8:40 AM.               Assessment & Plan   Primary open angle glaucoma (POAG) of left eye, severe stage  -     Posterior Segment OCT Optic Nerve- Both eyes  -     Schreiber Visual Field - OU - Extended - Both Eyes    Primary open angle glaucoma (POAG) of right eye, moderate stage  -     Posterior Segment OCT Optic Nerve- Both eyes  -     Schreiber Visual Field - OU - Extended - Both Eyes         POAG with phacomorphic narrowing --> Moderate // Severe OS with APD OS  -Fhx (+M, +2brothers with blindness), Steroids (),Trauma()  -Drops:  latanoprost qHS OU // Timolol gel qAM OU  -Drop intolerance/contraindication: -  -Laser: -  -Surgeries: -  -CCT: 586//587  -Gonio: TM/SS  IOP max: 20 // 20. Progression at IOP ~17-18  IOP goal: 12 and under     06/30/2020 HVF 24-2 full OD // tiny derringer scotoma VFI 92% OS  12/14/2022 HVF 24-2 superior paracentral scotoma VFI 97% OD // dense low SAD involving fixation VFI 69% --> +prog OD // ++prog OS    Rapid progression in left eye from 3426-1373. Need updated imaging and better IOP control       10/2023 HVF 24-2 Superior paracentral scotoma v early SAD VFI 96% OD // SALD VFI 66% OS --> very mild +prog from 2022  10/2023 RNFL dec G/TI B NS OD // dec G/TS/TI B NS/T/NI OS --> stable x 1 year        Combined forms of age-related cataract OU  Visually significant and interfering with activities of daily living including driving/reading  Patient desires cataract surgery for visual rehabilitation.   Complete glaucoma workup prior to surgery   Will need for visual rehabilitation and for phacomorphic narrowing /  glaucoma control     When ready    LEFT EYE then RIGHT EYE    LEFT EYE DIBOO +22.00 target -0.47  RIGHT EYE DIBOO +22.00 target -0.38    With or without MIGS         Hx CRVO OS 2013 - saw Dr. Paz  No VF defects apparent until 2020  Unlikely that her VF defects are from RVO    PLAN  Start latanoprost qHS OU  Continue Timolol gel qAM OU    RTC 3 months IOP check       Delfina Cornell M.D., M.S.  Department of Ophthalmology   Division of Glaucoma Surgery  [facility]

## 2023-10-18 NOTE — PROGRESS NOTES
VISUAL FIELD TEST 10-7TX-AZ-DONE/AD  OU-REL-FIX-COOP-GOOD/AD    PT HAS NO KNOWN ALLERGIES TO LATEX OR ADHESIVES./AD    MRX: OD -0.75 +1.25 X3            OS       0  +1.00 X176

## 2023-11-06 LAB — FUNGUS BLD CULT: NORMAL

## 2024-01-22 ENCOUNTER — TELEPHONE (OUTPATIENT)
Dept: INTERNAL MEDICINE | Facility: CLINIC | Age: 79
End: 2024-01-22
Payer: MEDICARE

## 2024-01-22 DIAGNOSIS — M70.61 TROCHANTERIC BURSITIS OF RIGHT HIP: ICD-10-CM

## 2024-01-22 DIAGNOSIS — S39.012A STRAIN OF LUMBAR REGION, INITIAL ENCOUNTER: Primary | ICD-10-CM

## 2024-01-22 RX ORDER — MELOXICAM 15 MG/1
15 TABLET ORAL DAILY
Qty: 30 TABLET | Refills: 0 | Status: SHIPPED | OUTPATIENT
Start: 2024-01-22 | End: 2024-02-23

## 2024-01-22 NOTE — TELEPHONE ENCOUNTER
----- Message from Charissa Small sent at 1/22/2024  1:31 PM CST -----  Contact: [email or web address]  [phone]  Patient said that she hurt her back and she would like to speak with you about this.

## 2024-01-22 NOTE — TELEPHONE ENCOUNTER
Spoke to pt and she states she hurt her back while covering plants. Pt has an appt with you next week she was just wondering if you wanted her to take an xray pain is still there. Pain level is 3. Please advise.

## 2024-01-22 NOTE — TELEPHONE ENCOUNTER
LVM and sent portal message letting pt know that she doesn't need an X-ray as we did an MRI 1 year ago.  I sent in a refill of her Meloxicam 15mg ab to her [facility] on [address].  If she doesn't get better, she should call back or RTC sooner.

## 2024-01-22 NOTE — TELEPHONE ENCOUNTER
Akiko/Patricia-please call pt and let her know that she doesn't need an X-ray as we did an MRI 1 year ago.  I sent in a refill of her Meloxicam 15mg ab to her [facility] on [address].  If she doesn't get better, she should call back or RTC sooner.  Thanks

## 2024-01-23 ENCOUNTER — LAB VISIT (OUTPATIENT)
Dept: LAB | Facility: HOSPITAL | Age: 79
End: 2024-01-23
Attending: INTERNAL MEDICINE
Payer: MEDICARE

## 2024-01-23 DIAGNOSIS — I10 ESSENTIAL HYPERTENSION: ICD-10-CM

## 2024-01-23 DIAGNOSIS — Z79.899 HIGH RISK MEDICATION USE: ICD-10-CM

## 2024-01-23 DIAGNOSIS — E55.9 VITAMIN D DEFICIENCY: ICD-10-CM

## 2024-01-23 DIAGNOSIS — E78.5 HYPERLIPIDEMIA, UNSPECIFIED HYPERLIPIDEMIA TYPE: ICD-10-CM

## 2024-01-23 LAB
25(OH)D3+25(OH)D2 SERPL-MCNC: 52 NG/ML (ref 30–96)
ALBUMIN SERPL BCP-MCNC: 3.6 G/DL (ref 3.5–5.2)
ALP SERPL-CCNC: 80 U/L (ref 55–135)
ALT SERPL W/O P-5'-P-CCNC: 14 U/L (ref 10–44)
ANION GAP SERPL CALC-SCNC: 10 MMOL/L (ref 8–16)
AST SERPL-CCNC: 25 U/L (ref 10–40)
BASOPHILS # BLD AUTO: 0.04 K/UL (ref 0–0.2)
BASOPHILS NFR BLD: 0.8 % (ref 0–1.9)
BILIRUB SERPL-MCNC: 0.5 MG/DL (ref 0.1–1)
BUN SERPL-MCNC: 11 MG/DL (ref 8–23)
CALCIUM SERPL-MCNC: 9.7 MG/DL (ref 8.7–10.5)
CHLORIDE SERPL-SCNC: 105 MMOL/L (ref 95–110)
CHOLEST SERPL-MCNC: 131 MG/DL (ref 120–199)
CHOLEST/HDLC SERPL: 2.8 {RATIO} (ref 2–5)
CO2 SERPL-SCNC: 25 MMOL/L (ref 23–29)
CREAT SERPL-MCNC: 0.9 MG/DL (ref 0.5–1.4)
DIFFERENTIAL METHOD BLD: ABNORMAL
EOSINOPHIL # BLD AUTO: 0.1 K/UL (ref 0–0.5)
EOSINOPHIL NFR BLD: 1.4 % (ref 0–8)
ERYTHROCYTE [DISTWIDTH] IN BLOOD BY AUTOMATED COUNT: 12.8 % (ref 11.5–14.5)
EST. GFR  (NO RACE VARIABLE): >60 ML/MIN/1.73 M^2
GLUCOSE SERPL-MCNC: 83 MG/DL (ref 70–110)
HCT VFR BLD AUTO: 43.3 % (ref 37–48.5)
HDLC SERPL-MCNC: 47 MG/DL (ref 40–75)
HDLC SERPL: 35.9 % (ref 20–50)
HGB BLD-MCNC: 13.9 G/DL (ref 12–16)
IMM GRANULOCYTES # BLD AUTO: 0.01 K/UL (ref 0–0.04)
IMM GRANULOCYTES NFR BLD AUTO: 0.2 % (ref 0–0.5)
LDLC SERPL CALC-MCNC: 70.8 MG/DL (ref 63–159)
LYMPHOCYTES # BLD AUTO: 2 K/UL (ref 1–4.8)
LYMPHOCYTES NFR BLD: 40 % (ref 18–48)
MCH RBC QN AUTO: 29 PG (ref 27–31)
MCHC RBC AUTO-ENTMCNC: 32.1 G/DL (ref 32–36)
MCV RBC AUTO: 90 FL (ref 82–98)
MONOCYTES # BLD AUTO: 0.5 K/UL (ref 0.3–1)
MONOCYTES NFR BLD: 9.1 % (ref 4–15)
NEUTROPHILS # BLD AUTO: 2.5 K/UL (ref 1.8–7.7)
NEUTROPHILS NFR BLD: 48.5 % (ref 38–73)
NONHDLC SERPL-MCNC: 84 MG/DL
NRBC BLD-RTO: 0 /100 WBC
PLATELET # BLD AUTO: 302 K/UL (ref 150–450)
PMV BLD AUTO: 9.1 FL (ref 9.2–12.9)
POTASSIUM SERPL-SCNC: 4.2 MMOL/L (ref 3.5–5.1)
PROT SERPL-MCNC: 7.6 G/DL (ref 6–8.4)
RBC # BLD AUTO: 4.79 M/UL (ref 4–5.4)
SODIUM SERPL-SCNC: 140 MMOL/L (ref 136–145)
TRIGL SERPL-MCNC: 66 MG/DL (ref 30–150)
VIT B12 SERPL-MCNC: 1673 PG/ML (ref 210–950)
WBC # BLD AUTO: 5.07 K/UL (ref 3.9–12.7)

## 2024-01-23 PROCEDURE — 36415 COLL VENOUS BLD VENIPUNCTURE: CPT | Performed by: INTERNAL MEDICINE

## 2024-01-23 PROCEDURE — 80061 LIPID PANEL: CPT | Performed by: INTERNAL MEDICINE

## 2024-01-23 PROCEDURE — 82607 VITAMIN B-12: CPT | Performed by: INTERNAL MEDICINE

## 2024-01-23 PROCEDURE — 85025 COMPLETE CBC W/AUTO DIFF WBC: CPT | Performed by: INTERNAL MEDICINE

## 2024-01-23 PROCEDURE — 80053 COMPREHEN METABOLIC PANEL: CPT | Performed by: INTERNAL MEDICINE

## 2024-01-23 PROCEDURE — 82306 VITAMIN D 25 HYDROXY: CPT | Performed by: INTERNAL MEDICINE

## 2024-01-29 NOTE — PROGRESS NOTES
Subjective:       Patient ID: Elena Block is a 78 y.o. female.    Chief Complaint:   Follow-up, Hypertension, Hyperlipidemia, and Hypothyroidism    HPI: Mrs Block presents for follow up HTN/HLD  She  c/o LBP lasting 2 days about a week ago when she bent over to  something heavy   She has gotten away exercise x last 1-2 months  HTN: Med Tx 1-Diovan 320mg QD, 2-HCTZ 12.5mg prn only(last years ago), 3-Coreg 6.25mg BID, 4-Procardia XL 90mg QD        Home BPs:118/75-Home BPs doing good   HLD: Med Tx 1-Zocor 40mg QD  Hypothyroidism: Med Tx 1- Synthroid 100ug QD  Lumbar OA/DDD: Med Tx 1- Gabapentin 300mg QHS, 2- Meloxicam 15mg  at 1/2 tab prn  -MRI L-spine(2/23):  + NFN at L4-5,L5-S1 with mild spinal stenosis at L4-5    Past Medical, Surgical, Social History: Please see as stated in Epic chart which has been reviewed.    Current Outpatient Medications   Medication Sig Dispense Refill    carvediloL (COREG) 6.25 MG tablet TAKE 1 TABLET BY MOUTH  TWICE DAILY WITH MEALS 180 tablet 3    cholecalciferol, vitamin D3, (VITAMIN D3) 125 mcg (5,000 unit) Tab Take 1 tablet (5,000 Units total) by mouth once daily. 30 tablet 6    ciclopirox (PENLAC) 8 % Soln Apply topically nightly. 6.6 mL 11    docusate sodium (COLACE) 100 MG capsule 1-4 caps daily; gradually taper up over 2 weeks (Patient taking differently: twice a week. 1-4 caps daily; gradually taper up over 2 weeks) 60 capsule prn    gabapentin (NEURONTIN) 300 MG capsule Take 1 capsule (300 mg total) by mouth every evening. 90 capsule 3    latanoprost (XALATAN) 0.005 % ophthalmic solution Place 1 drop into both eyes once daily. 2.5 mL 12    levothyroxine (SYNTHROID) 100 MCG tablet TAKE 1 TABLET BY MOUTH  DAILY ON AN EMPTY STOMACH 90 tablet 2    meloxicam (MOBIC) 15 MG tablet Take 1 tablet (15 mg total) by mouth once daily. 30 tablet 0    NIFEdipine (PROCARDIA-XL) 90 MG (OSM) 24 hr tablet Take 1 tablet (90 mg total) by mouth once daily. 90 tablet 2    simvastatin  (ZOCOR) 40 MG tablet TAKE 1 TABLET BY MOUTH IN  THE EVENING 90 tablet 3    timolol maleate 0.5% (TIMOPTIC-XE) 0.5 % SolG INSTILL 1 DROP INTO BOTH  EYES DAILY 15 mL 3    valsartan (DIOVAN) 320 MG tablet TAKE 1 TABLET BY MOUTH ONCE DAILY. 90 tablet 3    doxycycline (VIBRA-TABS) 100 MG tablet Take 1 tablet (100 mg total) by mouth 2 (two) times daily. 14 tablet 0    doxycycline (VIBRA-TABS) 100 MG tablet Take 1 tablet (100 mg total) by mouth 2 (two) times daily. Take with food 14 tablet 0    [START ON 1/31/2024] estradioL (ESTRACE) 0.01 % (0.1 mg/gram) vaginal cream Place 1 g vaginally every Mon, Wed, Fri. 42.5 g 3    folic acid-vit B6-vit B12 (FOLBEE) 2.5-25-1 mg Tab Take 1 tablet by mouth once a week. 30 tablet 0    gabapentin (NEURONTIN) 100 MG capsule Take 100 mg by mouth 3 (three) times daily.      Lactobacillus acidophilus (PROBIOTIC ORAL) Take by mouth once daily.       No current facility-administered medications for this visit.       Review of Systems   HENT:  Positive for tinnitus.         + Mild Tinnitus/less than in the past   Respiratory: Negative.     Cardiovascular: Negative.    Musculoskeletal:  Positive for back pain.   Neurological: Negative.        Objective:      Lab Results   Component Value Date    WBC 5.07 01/23/2024    HGB 13.9 01/23/2024    HCT 43.3 01/23/2024     01/23/2024    CHOL 131 01/23/2024    TRIG 66 01/23/2024    HDL 47 01/23/2024    ALT 14 01/23/2024    AST 25 01/23/2024     01/23/2024    K 4.2 01/23/2024     01/23/2024    CREATININE 0.9 01/23/2024    BUN 11 01/23/2024    CO2 25 01/23/2024    TSH 0.733 07/20/2023    HGBA1C 5.3 05/19/2006     Physical Exam  Vitals reviewed.   Constitutional:       Appearance: Normal appearance.   HENT:      Head: Normocephalic and atraumatic.      Mouth/Throat:      Mouth: Mucous membranes are dry.      Pharynx: No posterior oropharyngeal erythema.   Cardiovascular:      Rate and Rhythm: Normal rate and regular rhythm.   Pulmonary:     "  Effort: Pulmonary effort is normal.      Breath sounds: Normal breath sounds. No wheezing.   Chest:      Chest wall: No tenderness.   Abdominal:      General: Abdomen is flat. Bowel sounds are normal.      Palpations: Abdomen is soft. There is no mass.      Tenderness: There is no abdominal tenderness.   Musculoskeletal:         General: No swelling.      Cervical back: Normal range of motion and neck supple. No muscular tenderness.      Right lower leg: No edema.      Comments: Lumbar spine negative for point tenderness  Extremities negative for straight leg raise   Lymphadenopathy:      Cervical: No cervical adenopathy.   Skin:     General: Skin is warm and dry.   Neurological:      General: No focal deficit present.      Mental Status: She is alert.           Vital Signs  Temp: 97.4 °F (36.3 °C)  Temp Source: Other (see comments)  Pulse: 64  Resp: 18  SpO2: 98 %  BP: 120/60  BP Location: Left arm  Patient Position: Sitting  Pain Score: 0-No pain  Height and Weight  Height: 5' 8" (172.7 cm)  Weight: 89.3 kg (196 lb 12.2 oz)  BSA (Calculated - sq m): 2.07 sq meters  BMI (Calculated): 29.9  Weight in (lb) to have BMI = 25: 164.1    Assessment:       1. Essential hypertension    2. Hyperlipidemia, unspecified hyperlipidemia type    3. Hypothyroidism, unspecified type    4. Degeneration of lumbar or lumbosacral intervertebral disc    5. Vitamin D deficiency    6. Atherosclerosis of aorta    7. Preventive measure    8. Adenomatous polyp of colon, unspecified part of colon        Plan:     Health Maintenance   Topic Date Due    Shingles Vaccine (3 of 3) 03/25/2020    Mammogram  08/01/2024    TETANUS VACCINE  10/01/2024    Lipid Panel  01/23/2025    Colonoscopy  08/08/2029    Hepatitis C Screening  Completed    DEXA Scan  Demetrius Parker was seen today for follow-up, hypertension, hyperlipidemia and hypothyroidism.    Diagnoses and all orders for this visit:    Essential hypertension/controlled        -     " Continue: Med Tx 1-Diovan 320mg QD, 2-HCTZ 12.5mg prn only(last years ago), 3-Coreg 6.25mg BID, 4-Procardia XL 90mg QD  -     Comprehensive Metabolic Panel; Future    Hyperlipidemia, unspecified hyperlipidemia type/controlled on Zocor 40mg QD  -     Comprehensive Metabolic Panel; Future  -     Lipid Panel; Future    Hypothyroidism, unspecified type/controlled on Synthroid 100ug QD    Degeneration of lumbar or lumbosacral intervertebral disc  -     Ambulatory referral/consult to Physical/Occupational Therapy; Future    Vitamin D deficiency/corrected        -     Continue Vitamin D3 at 5000units daily    Atherosclerosis of aorta        -     continue to follow guidelines for control of cardiovascular risk factors including:  Hypertension, hyperlipidemia, diabetes mellitus, etcetera    Preventive measure  -     folic acid-vit B6-vit B12 (FOLBEE) 2.5-25-1 mg Tab; Take 1 tablet by mouth once a week.    Adenomatous polyp of colon, unspecified part of colon        -     repeat colonoscopy due August 2024 or later    Health maintenance        -     return to clinic x6 months with one-week prior fasting lab or see patient sooner if needed

## 2024-01-30 ENCOUNTER — OFFICE VISIT (OUTPATIENT)
Dept: INTERNAL MEDICINE | Facility: CLINIC | Age: 79
End: 2024-01-30
Payer: MEDICARE

## 2024-01-30 VITALS
SYSTOLIC BLOOD PRESSURE: 120 MMHG | WEIGHT: 196.75 LBS | HEART RATE: 64 BPM | TEMPERATURE: 97 F | RESPIRATION RATE: 18 BRPM | BODY MASS INDEX: 29.82 KG/M2 | OXYGEN SATURATION: 98 % | DIASTOLIC BLOOD PRESSURE: 60 MMHG | HEIGHT: 68 IN

## 2024-01-30 DIAGNOSIS — N99.3 PROLAPSE OF VAGINAL VAULT AFTER HYSTERECTOMY: ICD-10-CM

## 2024-01-30 DIAGNOSIS — I10 ESSENTIAL HYPERTENSION: Primary | ICD-10-CM

## 2024-01-30 DIAGNOSIS — E78.5 HYPERLIPIDEMIA, UNSPECIFIED HYPERLIPIDEMIA TYPE: ICD-10-CM

## 2024-01-30 DIAGNOSIS — M51.37 DEGENERATION OF LUMBAR OR LUMBOSACRAL INTERVERTEBRAL DISC: ICD-10-CM

## 2024-01-30 DIAGNOSIS — I70.0 ATHEROSCLEROSIS OF AORTA: ICD-10-CM

## 2024-01-30 DIAGNOSIS — Z29.9 PREVENTIVE MEASURE: ICD-10-CM

## 2024-01-30 DIAGNOSIS — E55.9 VITAMIN D DEFICIENCY: ICD-10-CM

## 2024-01-30 DIAGNOSIS — D12.6 ADENOMATOUS POLYP OF COLON, UNSPECIFIED PART OF COLON: ICD-10-CM

## 2024-01-30 DIAGNOSIS — E03.9 HYPOTHYROIDISM, UNSPECIFIED TYPE: ICD-10-CM

## 2024-01-30 PROCEDURE — 99214 OFFICE O/P EST MOD 30 MIN: CPT | Mod: S$GLB,,, | Performed by: INTERNAL MEDICINE

## 2024-01-30 PROCEDURE — 1126F AMNT PAIN NOTED NONE PRSNT: CPT | Mod: CPTII,S$GLB,, | Performed by: INTERNAL MEDICINE

## 2024-01-30 PROCEDURE — 3074F SYST BP LT 130 MM HG: CPT | Mod: CPTII,S$GLB,, | Performed by: INTERNAL MEDICINE

## 2024-01-30 PROCEDURE — 3288F FALL RISK ASSESSMENT DOCD: CPT | Mod: CPTII,S$GLB,, | Performed by: INTERNAL MEDICINE

## 2024-01-30 PROCEDURE — 99999 PR PBB SHADOW E&M-EST. PATIENT-LVL V: CPT | Mod: PBBFAC,,, | Performed by: INTERNAL MEDICINE

## 2024-01-30 PROCEDURE — 1101F PT FALLS ASSESS-DOCD LE1/YR: CPT | Mod: CPTII,S$GLB,, | Performed by: INTERNAL MEDICINE

## 2024-01-30 PROCEDURE — 1159F MED LIST DOCD IN RCRD: CPT | Mod: CPTII,S$GLB,, | Performed by: INTERNAL MEDICINE

## 2024-01-30 PROCEDURE — 3078F DIAST BP <80 MM HG: CPT | Mod: CPTII,S$GLB,, | Performed by: INTERNAL MEDICINE

## 2024-01-30 RX ORDER — ESTRADIOL 0.1 MG/G
1 CREAM VAGINAL
Qty: 42.5 G | Refills: 3 | Status: SHIPPED | OUTPATIENT
Start: 2024-01-31 | End: 2024-01-30 | Stop reason: SDUPTHER

## 2024-01-30 RX ORDER — ESTRADIOL 0.1 MG/G
1 CREAM VAGINAL
Qty: 42.5 G | Refills: 3 | Status: SHIPPED | OUTPATIENT
Start: 2024-01-31 | End: 2025-01-30

## 2024-01-30 RX ORDER — CYANOCOBALAMIN/FOLIC AC/VIT B6 1-2.5-25MG
1 TABLET ORAL WEEKLY
Qty: 30 TABLET | Refills: 0 | Status: SHIPPED | OUTPATIENT
Start: 2024-01-30

## 2024-01-30 NOTE — TELEPHONE ENCOUNTER
No care due was identified.  Health [facility] Embedded Care Due Messages. Reference number: [id number].   1/30/2024 10:57:55 AM CST

## 2024-01-30 NOTE — TELEPHONE ENCOUNTER
----- Message from Magali Vines sent at 1/30/2024 10:51 AM CST -----  Contact: [phone]  Requesting an RX refill or new RX.  Is this a refill or new RX:   RX name and strength :  estradioL (ESTRACE) 0.01 % (0.1 mg/gram) vaginal cream  Is this a 30 day or 90 day RX: 90  Pharmacy name and phone # (copy/paste from chart):        [facility]/pharmacy #[id number] - [place] - [address]  [address]  [place] [address]  Phone: [phone] Fax: [phone]    Pt state the rx should to the local pharmacy .

## 2024-02-06 NOTE — PROGRESS NOTES
Subjective:       Patient ID: Elena Block is a 78 y.o. female.    Chief Complaint: Glaucoma (3 month IOP ck and pt states no changes since last exam )     HPI     Glaucoma            Comments: 3 month IOP ck and pt states no changes since last exam           Comments    DLS: 10/18/23    1. POAG with Phacomorphic Narrowing OS>OD  2. APD OS  3. NS OU  4. Hx CRVO OS (2013)    MEDS:  Timolol GFS QAM OU  Latanoprost QHS OU             Last edited by Mara Potter MA on 2/7/2024  9:04 AM.              Assessment & Plan   Primary open angle glaucoma (POAG) of left eye, severe stage  -     latanoprost (XALATAN) 0.005 % ophthalmic solution; Place 1 drop into both eyes once daily.  Dispense: 7.5 mL; Refill: 4  -     dorzolamide-timolol 2-0.5% (COSOPT) 22.3-6.8 mg/mL ophthalmic solution; Place 1 drop into both eyes 2 (two) times daily.  Dispense: 30 mL; Refill: 6    Primary open angle glaucoma (POAG) of right eye, moderate stage  -     dorzolamide-timolol 2-0.5% (COSOPT) 22.3-6.8 mg/mL ophthalmic solution; Place 1 drop into both eyes 2 (two) times daily.  Dispense: 30 mL; Refill: 6    Combined forms of age-related cataract of both eyes    Afferent pupillary defect, left         POAG with phacomorphic narrowing --> Moderate // Severe OS with APD OS  -Fhx (+M, +2brothers with blindness), Steroids (),Trauma()  -Drops:  latanoprost qHS OU // Timolol gel qAM OU  -Drop intolerance/contraindication: -  -Laser: -  -Surgeries: -  -CCT: 586//587  -Gonio: TM/SS  IOP max: 20 // 20. Progression at IOP ~17-18  IOP goal: 12 and under     06/30/2020 HVF 24-2 full OD // tiny derringer scotoma VFI 92% OS  12/14/2022 HVF 24-2 superior paracentral scotoma VFI 97% OD // dense low SAD involving fixation VFI 69% --> +prog OD // ++prog OS    Rapid progression in left eye from 6498-6223.    10/2023 HVF 24-2 Superior paracentral scotoma v early SAD VFI 96% OD // SALD VFI 66% OS --> very mild +prog from 2022  10/2023 RNFL dec G/TI B NS OD // dec  G/TS/TI B NS/T/NI OS --> stable x 1 year    IOP too high  Change timolol to cosopt BID OU      Combined forms of age-related cataract OU  Visually significant and interfering with activities of daily living including driving/reading  Patient desires cataract surgery for visual rehabilitation.   Complete glaucoma workup prior to surgery   Will need for visual rehabilitation and for phacomorphic narrowing / glaucoma control     When ready    LEFT EYE then RIGHT EYE    LEFT EYE DIBOO +22.00 target -0.47  RIGHT EYE DIBOO +22.00 target -0.38    With or without MIGS         Hx CRVO OS 2013 - saw Dr. Paz  No VF defects apparent until 2020  Unlikely that her VF defects are from RVO    PLAN  Cont latanoprost qHS OU  Change timolol to cosopt BID OU    RTC 6 weeks IOP      Delfina Cornell M.D., M.S.  Department of Ophthalmology   Division of Glaucoma Surgery  [facility]

## 2024-02-07 ENCOUNTER — OFFICE VISIT (OUTPATIENT)
Dept: OPHTHALMOLOGY | Facility: CLINIC | Age: 79
End: 2024-02-07
Payer: MEDICARE

## 2024-02-07 DIAGNOSIS — H21.562 AFFERENT PUPILLARY DEFECT, LEFT: ICD-10-CM

## 2024-02-07 DIAGNOSIS — H40.1112 PRIMARY OPEN ANGLE GLAUCOMA (POAG) OF RIGHT EYE, MODERATE STAGE: ICD-10-CM

## 2024-02-07 DIAGNOSIS — H25.813 COMBINED FORMS OF AGE-RELATED CATARACT OF BOTH EYES: ICD-10-CM

## 2024-02-07 DIAGNOSIS — H40.1123 PRIMARY OPEN ANGLE GLAUCOMA (POAG) OF LEFT EYE, SEVERE STAGE: Primary | ICD-10-CM

## 2024-02-07 PROCEDURE — 3288F FALL RISK ASSESSMENT DOCD: CPT | Mod: CPTII,S$GLB,, | Performed by: STUDENT IN AN ORGANIZED HEALTH CARE EDUCATION/TRAINING PROGRAM

## 2024-02-07 PROCEDURE — 99214 OFFICE O/P EST MOD 30 MIN: CPT | Mod: S$GLB,,, | Performed by: STUDENT IN AN ORGANIZED HEALTH CARE EDUCATION/TRAINING PROGRAM

## 2024-02-07 PROCEDURE — 1160F RVW MEDS BY RX/DR IN RCRD: CPT | Mod: CPTII,S$GLB,, | Performed by: STUDENT IN AN ORGANIZED HEALTH CARE EDUCATION/TRAINING PROGRAM

## 2024-02-07 PROCEDURE — 99999 PR PBB SHADOW E&M-EST. PATIENT-LVL III: CPT | Mod: PBBFAC,,, | Performed by: STUDENT IN AN ORGANIZED HEALTH CARE EDUCATION/TRAINING PROGRAM

## 2024-02-07 PROCEDURE — 1101F PT FALLS ASSESS-DOCD LE1/YR: CPT | Mod: CPTII,S$GLB,, | Performed by: STUDENT IN AN ORGANIZED HEALTH CARE EDUCATION/TRAINING PROGRAM

## 2024-02-07 PROCEDURE — 1159F MED LIST DOCD IN RCRD: CPT | Mod: CPTII,S$GLB,, | Performed by: STUDENT IN AN ORGANIZED HEALTH CARE EDUCATION/TRAINING PROGRAM

## 2024-02-07 PROCEDURE — 1126F AMNT PAIN NOTED NONE PRSNT: CPT | Mod: CPTII,S$GLB,, | Performed by: STUDENT IN AN ORGANIZED HEALTH CARE EDUCATION/TRAINING PROGRAM

## 2024-02-07 RX ORDER — LATANOPROST 50 UG/ML
1 SOLUTION/ DROPS OPHTHALMIC DAILY
Qty: 7.5 ML | Refills: 4 | Status: SHIPPED | OUTPATIENT
Start: 2024-02-07 | End: 2025-02-06

## 2024-02-07 RX ORDER — DORZOLAMIDE HYDROCHLORIDE AND TIMOLOL MALEATE 20; 5 MG/ML; MG/ML
1 SOLUTION/ DROPS OPHTHALMIC 2 TIMES DAILY
Qty: 30 ML | Refills: 6 | Status: SHIPPED | OUTPATIENT
Start: 2024-02-07 | End: 2025-02-06

## 2024-02-09 ENCOUNTER — TELEPHONE (OUTPATIENT)
Dept: OPHTHALMOLOGY | Facility: CLINIC | Age: 79
End: 2024-02-09
Payer: MEDICARE

## 2024-02-09 NOTE — TELEPHONE ENCOUNTER
----- Message from Payton Diamond MA sent at 2/7/2024 10:36 AM CST -----  Regarding: FW: appt  Contact: pt at [phone]    ----- Message -----  From: Po Montanez MA  Sent: 2/7/2024  10:26 AM CST  To: Aneta Mathis Staff  Subject: appt                                             Pt is calling to speak with someone in provider office is asking for a return call regarding a  6 week follow appt  please call pt at [phone]

## 2024-02-19 ENCOUNTER — CLINICAL SUPPORT (OUTPATIENT)
Dept: REHABILITATION | Facility: HOSPITAL | Age: 79
End: 2024-02-19
Payer: MEDICARE

## 2024-02-19 DIAGNOSIS — M54.41 ACUTE MIDLINE LOW BACK PAIN WITH RIGHT-SIDED SCIATICA: Primary | ICD-10-CM

## 2024-02-19 DIAGNOSIS — M51.37 DEGENERATION OF LUMBAR OR LUMBOSACRAL INTERVERTEBRAL DISC: ICD-10-CM

## 2024-02-19 PROCEDURE — 97112 NEUROMUSCULAR REEDUCATION: CPT

## 2024-02-19 PROCEDURE — 97161 PT EVAL LOW COMPLEX 20 MIN: CPT

## 2024-02-19 NOTE — PLAN OF CARE
[facility]   Physical Therapy Initial Evaluation      Name: Elena Block  [facility] Number: [id number]    Therapy Diagnosis:   Encounter Diagnoses   Name Primary?    Degeneration of lumbar or lumbosacral intervertebral disc     Acute midline low back pain with right-sided sciatica Yes        Physician: Sandra Lyon MD    Physician Orders: PT Eval and Treat   Medical Diagnosis from Referral: M51.37 (ICD-10-CM) - Degeneration of lumbar or lumbosacral intervertebral disc   Evaluation Date: 2/19/2024  Authorization Period Expiration: 12/31/2024  Plan of Care Expiration: 4/19/2024  Progress Note Due: 4/19/2024  Visit # / Visits authorized: 1/ 1   FOTO: 1/3    Precautions: Standard     Time In: 0900  Time Out: 1005  Total Appointment Time (timed & untimed codes): 65 minutes    Subjective     Date of onset: ~4 weeks ago (LBP); ~1 year ago (sciatica)    History of current condition - Elena reports:     LBP came on during first freeze when she was bending over. This put her back out for 3 days; happened immediately. First event resolved with rest. Second event happened again 2 weeks later with similar bending activity, but has also resolved with rest.    Reports bending, stooping, and increased activity levels >20 min lead to aggravation of symptoms. Hasn't happened since last event. Currently feels like back will get tired with exertion. Resting and sitting down help relieve discomfort. She takes meloxicam when really aggravated.    Endorses sciatic pain about 1 year or more. This happens when she's laying down at night after a period of time. Reports she takes gabapentin for this ~300 mg at night. Changing positions could also help for a moment. Reports this has always been the R leg and laterally feeling like pins were sticking.     Has a home exercise program she does for R shoulder and low back currently that somewhat helps.    PMH abdominal surgery ~3 years ago.     Falls: 0      Imaging: MRI studies:     2/24/2023  Impression:     Lumbar spondylosis, contributing to mild spinal canal stenosis at L4-5 and moderate neural foraminal narrowing at L5-S1, as above.     L3-4 through L5-S1 facet joint arthropathy and prominent L5-S1 degenerative disc disease, as above.      Prior Therapy: none  Social History: lives with their family  Occupation: retired  Prior Level of Function: ADLs w/o complaint  Current Level of Function: Cautious about bending, lifting, regular activities; endurance for >20 minutes.    Pain:  Current 0/10, worst 7/10, best 0/10   Location: Midline L/S  Description: Aching, Dull, and Tight  Aggravating Factors: Standing, Laying, Bending, and Flexing  Easing Factors: pain medication, rest, and sitting    Patients goals: ADLs w/o complaint.     Medical History:   Past Medical History:   Diagnosis Date    Back pain     Cataract     Central retinal vein occlusion of left eye     Degeneration of lumbar or lumbosacral intervertebral disc 02/2023    MRI-+diffuse OA/DDD, +mild spinal stenosis at L4-5    Glaucoma     High cholesterol     Hypertension     Hypothyroidism     Impingement syndrome of left shoulder 07/29/2013    Obesity     Pneumonia     Posterior vitreous detachment of both eyes 01/17/2013    S/P partial hysterectomy 01/23/2013    Vitamin D insufficiency 2016       Surgical History:   Elena Block  has a past surgical history that includes Partial hysterectomy; Thyroidectomy, partial; Tonsillectomy; Colonoscopy (01/22/2008); Hysterectomy; Colonoscopy (N/A, 8/8/2019); Robot-assisted laparoscopic abdominal sacrocolpopexy using da Joesph Xi (N/A, 1/8/2021); Cystoscopy (N/A, 1/8/2021); Laparoscopic lysis of adhesions (N/A, 1/8/2021); and Robot-assisted laparoscopic surgical removal of ovary using da Joesph Xi (Right, 1/8/2021).    Medications:   Elena has a current medication list which includes the following prescription(s): carvedilol, cholecalciferol (vitamin d3),  ciclopirox, docusate sodium, dorzolamide-timolol 2-0.5%, estradiol, folbee, gabapentin, latanoprost, levothyroxine, meloxicam, nifedipine, simvastatin, and valsartan.    Allergies:   Review of patient's allergies indicates:   Allergen Reactions    Tramadol Nausea And Vomiting        Objective      Observation / Gait: increased L/S motion > hip extension    Posture:  APT mildly excessive    Palpation: hypomobile L/S on PA    Sensation: WNL    Lumbar Range of Motion:    Limitations Pain   Flexion Hand to mid shin; limited L/S motion   -        Extension Limited L/S motion   *doesn't hurt, but doesn't feel good        Left Side Bending Flexion compensation to lateral knee -        Right Side Bending Flexion compensation to lateral knee -        Left Rotation 25% -   Right Rotation 25% -     Hip Range of Motion:    Limitations Pain   Flexion WFL slight tightness in glutes   -        Extension R <10 deg; L > 10 deg   -        ER at 90 WNL -        IR at 90 WNL -          Lower Extremity Strength:  Right LE  Left LE    Quadriceps: 4/5 Quadriceps: 4/5   Hamstrings: 4/5 Hamstrings: 4+/5   Hip Flexion: 3+/5 Hip Flexion: 3+/5   Hip Extension:  3-/5 Hip Extension: 3-/5   Hip ABD: 3-/5 Hip ABD: 3-/5   Ankle Dorsiflexion: 5/5 Ankle Dorsiflexion: 4+/5   Ankle Plantarflexion: 5/5 Ankle Plantarflexion: 5/5     Joint Mobility: hypomobile PA central L/S    Flexibility:   Prone Knee Bend (RF): R = + ; L = +  90/90 Hamstrings: R = _+++ ; L = +++   Milagro's test: R = - ; L = -  Adductor: R = - ; L = +      Limitation/Restriction for FOTO Lumbar Survey    Therapist reviewed FOTO scores for Elena Block on 2/19/2024.   FOTO documents entered into EPIC - see Media section.    Limitation Score: see media%         Treatment     Total Treatment time (time-based codes) separate from Evaluation: 25 minutes     Elena received the treatments listed below:      neuromuscular re-education activities to improve: Balance, Coordination,  Kinesthetic, Sense, Proprioception, and Posture for 25 minutes. The following activities were included:    Patient education:  - impairments  - adjustments in form to current HEP  - NEW HEP with adjustments    SKTC 4x30 sec ea sawyer  OH wand (lat) 15x3 sec  Bridge RTB 3x10 - cue glute  LTR 15x3 sec ea sawyer    NEXT VISIT - adjust HEP; clam; lateral walks; SB flexion; supine marching    therapeutic activities to improve functional performance for 00  minutes, including:    NT    Patient Education and Home Exercises     Education provided:   - see above    Written Home Exercises Provided: yes. Exercises were reviewed and Elena was able to demonstrate them prior to the end of the session.  Elena demonstrated good  understanding of the education provided. See EMR under Patient Instructions for exercises provided during therapy sessions.    Assessment     Elena is a 78 y.o. female referred to outpatient Physical Therapy with a medical diagnosis of M51.37 (ICD-10-CM) - Degeneration of lumbar or lumbosacral intervertebral disc. Patient presents with pain, L/S joint hypomobility, hip flexibility deficits, and hip/core NM activation and strength deficits. Impairments contributing to symptoms affecting ADLs renetta > 20 min that involve bending, stooping, or lifting.    Patient prognosis is Good.   Patient will benefit from skilled outpatient Physical Therapy to address the deficits stated above and in the chart below, provide patient /family education, and to maximize patientt's level of independence.     Plan of care discussed with patient: Yes  Patient's spiritual, cultural and educational needs considered and patient is agreeable to the plan of care and goals as stated below:     Anticipated Barriers for therapy: none    Medical Necessity is demonstrated by the following  History  Co-morbidities and personal factors that may impact the plan of care [x] LOW: no personal factors / co-morbidities  [] MODERATE: 1-2 personal  factors / co-morbidities  [] HIGH: 3+ personal factors / co-morbidities    Moderate / High Support Documentation:   Co-morbidities affecting plan of care: none    Personal Factors:   age     Examination  Body Structures and Functions, activity limitations and participation restrictions that may impact the plan of care [] LOW: addressing 1-2 elements  [] MODERATE: 3+ elements  [x] HIGH: 4+ elements (please support below)    Moderate / High Support Documentation: pain, L/S joint hypomobility, hip flexibility deficits, and hip/core NM activation and strength deficits.     Clinical Presentation [x] LOW: stable  [] MODERATE: Evolving  [] HIGH: Unstable     Decision Making/ Complexity Score: low       GOALS: Short Term Goals:  0-3 weeks  1.Report decreased L/S pain  < / =  3/10 at worst  to increase tolerance for ADLs  2. Increase ROM by 5-10 degrees where limited in order to perform ADLs without difficulty.  3. Increase strength by 1/3 MMT grade in glutes  to increase tolerance for ADL and work activities.  4. Pt to tolerate HEP to improve ROM and independence with ADL's    Long Term Goals: 4-6 weeks  1.Report decreased L/S pain < / = 1/10 at worst to increase tolerance for ADLs  2.Patient goal: ADLs w/o complaint  3.Increase strength to 4/5 in  glutes  to increase tolerance for ADL and work activities.  4. Pt will report at [facility] on FOTO  to demonstrate increase in LE function with every day tasks.     Plan     Plan of care Certification: 2/19/2024 to 4/19/2024.    Outpatient Physical Therapy 1 times weekly for 6 weeks to include the following interventions: Manual Therapy, Moist Heat/ Ice, Neuromuscular Re-ed, Patient Education, Self Care, Therapeutic Activities, and Therapeutic Exercise.     SATNAM CAVANAUGH, PT, DPT, OCS

## 2024-02-23 DIAGNOSIS — M70.61 TROCHANTERIC BURSITIS OF RIGHT HIP: ICD-10-CM

## 2024-02-23 RX ORDER — MELOXICAM 15 MG/1
15 TABLET ORAL
Qty: 30 TABLET | Refills: 0 | Status: SHIPPED | OUTPATIENT
Start: 2024-02-23

## 2024-02-23 NOTE — TELEPHONE ENCOUNTER
No care due was identified.  Health [facility] Embedded Care Due Messages. Reference number: [id number].   2/23/2024 12:24:09 AM CST

## 2024-02-26 ENCOUNTER — CLINICAL SUPPORT (OUTPATIENT)
Dept: REHABILITATION | Facility: HOSPITAL | Age: 79
End: 2024-02-26
Payer: MEDICARE

## 2024-02-26 DIAGNOSIS — M54.41 ACUTE MIDLINE LOW BACK PAIN WITH RIGHT-SIDED SCIATICA: Primary | ICD-10-CM

## 2024-02-26 PROCEDURE — 97530 THERAPEUTIC ACTIVITIES: CPT

## 2024-02-26 PROCEDURE — 97112 NEUROMUSCULAR REEDUCATION: CPT

## 2024-02-26 NOTE — PROGRESS NOTES
[facility]   Physical Therapy Treatment Note      Name: Elena Block  [facility] Number: [id number]    Therapy Diagnosis:   Encounter Diagnosis   Name Primary?    Acute midline low back pain with right-sided sciatica Yes     Physician: Sandra Lyon MD    Visit Date: 2/26/2024    Physician Orders: PT Eval and Treat   Medical Diagnosis from Referral: M51.37 (ICD-10-CM) - Degeneration of lumbar or lumbosacral intervertebral disc   Evaluation Date: 2/19/2024  Authorization Period Expiration: 12/31/2024  Plan of Care Expiration: 4/19/2024  Progress Note Due: 4/19/2024  Visit # / Visits authorized: 1/ 1; 1/20  FOTO: 1/3     Precautions: Standard      Time In: 0855  Time Out: 0950  Total Appointment Time (timed & untimed codes): 55 minutes - 38 min one on one    Subjective     Pt reports: Reports no problems over the weekend. Did regular movement and nothing strenuous. Had no pain driving ~1.5 hours.    Did a little bit more of HEP, but not as much as prescribed.    She was compliant with home exercise program.  Response to previous treatment: HEP compliance  Functional change: ongoing    Pain: 0/10  Location: Midline L/S     Objective      Objective Measures updated at progress report unless specified.     Lower Extremity Strength:  Right LE   Left LE     Quadriceps: 4/5 Quadriceps: 4/5   Hamstrings: 4/5 Hamstrings: 4+/5   Hip Flexion: 3+/5 Hip Flexion: 3+/5   Hip Extension:  3-/5 Hip Extension: 3-/5   Hip ABD: 3-/5 Hip ABD: 3-/5   Ankle Dorsiflexion: 5/5 Ankle Dorsiflexion: 4+/5   Ankle Plantarflexion: 5/5 Ankle Plantarflexion: 5/5      Joint Mobility: hypomobile PA central L/S     Flexibility:   Prone Knee Bend (RF): R = + ; L = +  90/90 Hamstrings: R = +++ ; L = +++     Treatment     Elena received the treatments listed below:      neuromuscular re-education activities to improve: Balance, Coordination, Kinesthetic, Sense, Proprioception, and Posture for 45 minutes - 28 min one on one.  The following activities were included:    Patient education:  - impairments  - CONT HEP    SKTC 3x30 sec ea sawyer  OH wand (lat) 15x3 sec - REVIEW  Bridge RTB 3x10 - cue glute  LTR x3 min    Clam 3x10 ea sawyer - mild R hip irritation  SB flexion 2x3 min  Seated YTB looped marching 3x20 reps     NEXT VISIT - L/S mobility, hip stretching, hip strength including flexors (lateral walks)     therapeutic activities to improve functional performance for 10  minutes - 10 min one on one, including:     Functional Hip dominance training:  Sit to stand YTB and no YTB 4x5 reps [place]      Patient Education and Home Exercises       Education provided:   - see above    Written Home Exercises Provided: Patient instructed to cont prior HEP. Exercises were reviewed and Elena was able to demonstrate them prior to the end of the session.  Elena demonstrated good  understanding of the education provided. See EMR under Patient Instructions for exercises provided during therapy sessions    Assessment     Patient presents with pain, L/S joint hypomobility, hip flexibility deficits, and hip/core NM activation and strength deficits. Impairments contributing to symptoms affecting ADLs renetta > 20 min that involve bending, stooping, or lifting.     Good [place] to all interventions. HEP to remain the same for time being focused on hip stretching, L/S mobility, and glute NM activation.    Elena Is progressing well towards her goals.   Pt prognosis is Good.     Pt will continue to benefit from skilled outpatient physical therapy to address the deficits listed in the problem list box on initial evaluation, provide pt/family education and to maximize pt's level of independence in the home and community environment.     Pt's spiritual, cultural and educational needs considered and pt agreeable to plan of care and goals.     Anticipated barriers to physical therapy: none    GOALS: Short Term Goals:  0-3 weeks  1.Report decreased L/S pain  < / =  3/10 at  worst  to increase tolerance for ADLs  2. Increase ROM by 5-10 degrees where limited in order to perform ADLs without difficulty.  3. Increase strength by 1/3 MMT grade in glutes  to increase tolerance for ADL and work activities.  4. Pt to tolerate HEP to improve ROM and independence with ADL's     Long Term Goals: 4-6 weeks  1.Report decreased L/S pain < / = 1/10 at worst to increase tolerance for ADLs  2.Patient goal: ADLs w/o complaint  3.Increase strength to 4/5 in  glutes  to increase tolerance for ADL and work activities.    Plan     Plan of care Certification: 2/19/2024 to 4/19/2024.     Outpatient Physical Therapy 1 times weekly for 6 weeks to include the following interventions: Manual Therapy, Moist Heat/ Ice, Neuromuscular Re-ed, Patient Education, Self Care, Therapeutic Activities, and Therapeutic Exercise.     SATNAM CAVANAUGH, PT, DPT, OCS

## 2024-02-29 DIAGNOSIS — I10 ESSENTIAL HYPERTENSION: ICD-10-CM

## 2024-02-29 RX ORDER — CARVEDILOL 6.25 MG/1
TABLET ORAL
Qty: 180 TABLET | Refills: 3 | Status: SHIPPED | OUTPATIENT
Start: 2024-02-29

## 2024-03-01 NOTE — TELEPHONE ENCOUNTER
No care due was identified.  Health [facility] Embedded Care Due Messages. Reference number: [id number].   2/29/2024 9:12:40 PM CST

## 2024-03-01 NOTE — TELEPHONE ENCOUNTER
Refill Decision Note   Elena Block  is requesting a refill authorization.  Brief Assessment and Rationale for Refill:  Approve     Medication Therapy Plan:         Comments:     Note composed:9:16 PM 02/29/2024

## 2024-03-11 ENCOUNTER — CLINICAL SUPPORT (OUTPATIENT)
Dept: REHABILITATION | Facility: HOSPITAL | Age: 79
End: 2024-03-11
Payer: MEDICARE

## 2024-03-11 DIAGNOSIS — M54.41 ACUTE MIDLINE LOW BACK PAIN WITH RIGHT-SIDED SCIATICA: Primary | ICD-10-CM

## 2024-03-11 PROCEDURE — 97112 NEUROMUSCULAR REEDUCATION: CPT

## 2024-03-11 PROCEDURE — 97530 THERAPEUTIC ACTIVITIES: CPT

## 2024-03-21 ENCOUNTER — CLINICAL SUPPORT (OUTPATIENT)
Dept: REHABILITATION | Facility: HOSPITAL | Age: 79
End: 2024-03-21
Payer: MEDICARE

## 2024-03-21 DIAGNOSIS — M54.41 ACUTE MIDLINE LOW BACK PAIN WITH RIGHT-SIDED SCIATICA: Primary | ICD-10-CM

## 2024-03-21 PROCEDURE — 97530 THERAPEUTIC ACTIVITIES: CPT

## 2024-03-21 PROCEDURE — 97112 NEUROMUSCULAR REEDUCATION: CPT

## 2024-03-21 NOTE — PROGRESS NOTES
[facility]   Physical Therapy Treatment Note      Name: Elena Block  [facility] Number: [id number]    Therapy Diagnosis:   Encounter Diagnosis   Name Primary?    Acute midline low back pain with right-sided sciatica Yes     Physician: Sandra Lyon MD    Visit Date: 3/21/2024    Physician Orders: PT Eval and Treat   Medical Diagnosis from Referral: M51.37 (ICD-10-CM) - Degeneration of lumbar or lumbosacral intervertebral disc   Evaluation Date: 2/19/2024  Authorization Period Expiration: 12/31/2024  Plan of Care Expiration: 4/19/2024  Progress Note Due: 4/19/2024  Visit # / Visits authorized: 1/ 1; 3/20  FOTO: 1/3     Precautions: Standard      Time In: 0900  Time Out: 0955  Total Appointment Time (timed & untimed codes): 55 minutes - 55 min one on one    Subjective     Pt reports: Today a little discomfort after walking yesterday. Walked about 1.5 miles, no problems during. Feels ready for DC with HEP.    FOTO IE - 72%  FOTO 3/11/2024 - 72%  FOTO 3/21/2024 - 76%  FOTO Goal - 76%    She was compliant with home exercise program.  Response to previous treatment: HEP compliance  Functional change: ongoing    Pain: 0-1/10  Location: Midline L/S     Objective      Objective Measures updated at progress report unless specified.     Lower Extremity Strength:  Right LE   Left LE     Quadriceps: 4/5 Quadriceps: 4/5   Hamstrings: 4/5 Hamstrings: 4+/5   Hip Flexion: 3+/5 Hip Flexion: 3+/5   Hip Extension:  3-/5 Hip Extension: 3-/5   Hip ABD: 3-/5 Hip ABD: 3-/5   Ankle Dorsiflexion: 5/5 Ankle Dorsiflexion: 4+/5   Ankle Plantarflexion: 5/5 Ankle Plantarflexion: 5/5      Joint Mobility: hypomobile PA central L/S     Flexibility:   Prone Knee Bend (RF): R = + ; L = +  90/90 Hamstrings: R = +++ ; L = +++     Treatment     Elena received the treatments listed below:      neuromuscular re-education activities to improve: Balance, Coordination, Kinesthetic, Sense, Proprioception, and Posture for 15  minutes - 15 min one on one. The following activities were included:    Patient education:  - impairments  - CONT HEP  - hold times importance for stretching of hips    SKTC 3x35 sec ea sawyer - at start and end  LTR x3 min  HS inhibition stretching long sit 3x35 sec ea sawyer    REVIEW:  OH wand (lat) 15x3 sec   Bridge YTB 3x10 - cue glute    Held:  Supine YTB looped marching 3x20 reps  Clam 3x10 ea sawyer - mild R hip irritation  SB flexion 2x3 min        therapeutic activities to improve functional performance for 40 minutes - 40 min one on one, including:     Functional Hip dominance training:  Lateral walks YTB at knees x5 counter laps  Sit to stand YTB  2x5 reps  Sit to stand taps YTB  2x5 reps     Seated reaching L/S flexion 10x10 sec ea - w/ and w/o glute squeeze    Patient education;  - hip dom form renetta knees wide as it relates to sit to stands and reaching to  objects functionally      Patient Education and Home Exercises       Education provided:   - see above    Written Home Exercises Provided: Patient instructed to cont prior HEP. Exercises were reviewed and Elena was able to demonstrate them prior to the end of the session.  Elena demonstrated good  understanding of the education provided. See EMR under Patient Instructions for exercises provided during therapy sessions    Assessment     Patient has minimal functional complaints currently and demonstrates improvement in function per FOTO scores. She is performing her HEP, but has had extensive review of need for increased hold times for stretching, which she acknowledges. She also demonstrates improved hip dominant movement pattern and understanding of how to utilize hips since start of care.    Secondary to min functional complaints and HEP performance, patient is DC with HEP.    Elena Is progressing well towards her goals.   Pt prognosis is Good.     Pt will continue to benefit from skilled outpatient physical therapy to address the deficits  listed in the problem list box on initial evaluation, provide pt/family education and to maximize pt's level of independence in the home and community environment.     Pt's spiritual, cultural and educational needs considered and pt agreeable to plan of care and goals.     Anticipated barriers to physical therapy: none    GOALS: Short Term Goals:  0-3 weeks  1.Report decreased L/S pain  < / =  3/10 at worst  to increase tolerance for ADLs - MET  2. Increase ROM by 5-10 degrees where limited in order to perform ADLs without difficulty. - IN PROGRESS  3. Increase strength by 1/3 MMT grade in glutes  to increase tolerance for ADL and work activities.  4. Pt to tolerate HEP to improve ROM and independence with ADL's     Long Term Goals: 4-6 weeks  1.Report decreased L/S pain < / = 1/10 at worst to increase tolerance for ADLs  2.Patient goal: ADLs w/o complaint  3.Increase strength to 4/5 in  glutes  to increase tolerance for ADL and work activities.    Plan     Plan of care Certification: 2/19/2024 to 4/19/2024.     DC with HEP    SATNAM CAVANAUGH, PT, DPT, OCS

## 2024-04-04 NOTE — PROGRESS NOTES
Subjective:       Patient ID: Elena Block is a 78 y.o. female.    Chief Complaint: Glaucoma (2 mon iop chk)     HPI     Glaucoma            Comments: 2 mon iop chk          Comments    Pt feels like her va is more blurry/foggy in OU since her last visit.   She feels like her transitional lenses are becoming a problem when she   moves from outside to inside. They are still dark when coming inside and   not brightening up enough. Otherwise she feels like she is seeing well.     1. POAG with Phacomorphic Narrowing OS>OD  2. APD OS  3. NS OU  4. Hx CRVO OS (2013)    MEDS:  Cosopt BID OU  Latanoprost QHS OU             Last edited by Delfina Cornell MD on 4/5/2024 11:45 AM.              Assessment & Plan   Primary open angle glaucoma (POAG) of left eye, severe stage    Primary open angle glaucoma (POAG) of right eye, moderate stage    Combined forms of age-related cataract of both eyes    Afferent pupillary defect, left           POAG with phacomorphic narrowing --> Moderate // Severe OS with APD OS  -Fhx (+M, +2brothers with blindness), Steroids (),Trauma()  -Drops:  latanoprost qHS OU // Dorzolamide-Timolol BID OU  -Drop intolerance/contraindication: -  -Laser: -  -Surgeries: -  -CCT: 586//587  -Gonio: TM/SS  IOP max: 20 // 20. Progression at IOP ~17-18  IOP goal: 12 and under     06/30/2020 HVF 24-2 full OD // tiny derringer scotoma VFI 92% OS  12/14/2022 HVF 24-2 superior paracentral scotoma VFI 97% OD // dense low SAD involving fixation VFI 69% --> +prog OD // ++prog OS    Rapid progression in left eye from 5377-2883.    10/2023 HVF 24-2 Superior paracentral scotoma v early SAD VFI 96% OD // SALD VFI 66% OS --> very mild +prog from 2022  10/2023 RNFL dec G/TI B NS OD // dec G/TS/TI B NS/T/NI OS --> stable x 1 year    IOP too high --> Change timolol to cosopt BID OU --> IOP still a little bit above goal  Check 1 more time  Discussed may need CE/IOL --> she is not quite ready       Combined forms of age-related  cataract OU  Visually significant and interfering with activities of daily living including driving/reading  Patient desires cataract surgery for visual rehabilitation.   Complete glaucoma workup prior to surgery   Will need for visual rehabilitation and for phacomorphic narrowing / glaucoma control     When ready    LEFT EYE then RIGHT EYE    LEFT EYE DIBOO +22.00 target -0.47  RIGHT EYE DIBOO +22.00 target -0.38    With or without MIGS         Hx CRVO OS 2013 - saw Dr. Paz  No VF defects apparent until 2020      PLAN  Cont latanoprost qHS OU //  cosopt BID OU    RTC 3 months, IOP check, est care with KL - she will cont to consider CE/IOL OU        Delfina Cornell M.D., M.S.  Department of Ophthalmology   Division of Glaucoma Surgery  [facility]

## 2024-04-05 ENCOUNTER — OFFICE VISIT (OUTPATIENT)
Dept: OPHTHALMOLOGY | Facility: CLINIC | Age: 79
End: 2024-04-05
Payer: MEDICARE

## 2024-04-05 DIAGNOSIS — H21.562 AFFERENT PUPILLARY DEFECT, LEFT: ICD-10-CM

## 2024-04-05 DIAGNOSIS — H40.1112 PRIMARY OPEN ANGLE GLAUCOMA (POAG) OF RIGHT EYE, MODERATE STAGE: ICD-10-CM

## 2024-04-05 DIAGNOSIS — H25.813 COMBINED FORMS OF AGE-RELATED CATARACT OF BOTH EYES: ICD-10-CM

## 2024-04-05 DIAGNOSIS — H40.1123 PRIMARY OPEN ANGLE GLAUCOMA (POAG) OF LEFT EYE, SEVERE STAGE: Primary | ICD-10-CM

## 2024-04-05 PROCEDURE — 1159F MED LIST DOCD IN RCRD: CPT | Mod: CPTII,S$GLB,, | Performed by: STUDENT IN AN ORGANIZED HEALTH CARE EDUCATION/TRAINING PROGRAM

## 2024-04-05 PROCEDURE — 1160F RVW MEDS BY RX/DR IN RCRD: CPT | Mod: CPTII,S$GLB,, | Performed by: STUDENT IN AN ORGANIZED HEALTH CARE EDUCATION/TRAINING PROGRAM

## 2024-04-05 PROCEDURE — 3288F FALL RISK ASSESSMENT DOCD: CPT | Mod: CPTII,S$GLB,, | Performed by: STUDENT IN AN ORGANIZED HEALTH CARE EDUCATION/TRAINING PROGRAM

## 2024-04-05 PROCEDURE — 1126F AMNT PAIN NOTED NONE PRSNT: CPT | Mod: CPTII,S$GLB,, | Performed by: STUDENT IN AN ORGANIZED HEALTH CARE EDUCATION/TRAINING PROGRAM

## 2024-04-05 PROCEDURE — 99999 PR PBB SHADOW E&M-EST. PATIENT-LVL III: CPT | Mod: PBBFAC,,, | Performed by: STUDENT IN AN ORGANIZED HEALTH CARE EDUCATION/TRAINING PROGRAM

## 2024-04-05 PROCEDURE — 99214 OFFICE O/P EST MOD 30 MIN: CPT | Mod: S$GLB,,, | Performed by: STUDENT IN AN ORGANIZED HEALTH CARE EDUCATION/TRAINING PROGRAM

## 2024-04-05 PROCEDURE — 1101F PT FALLS ASSESS-DOCD LE1/YR: CPT | Mod: CPTII,S$GLB,, | Performed by: STUDENT IN AN ORGANIZED HEALTH CARE EDUCATION/TRAINING PROGRAM

## 2024-05-17 DIAGNOSIS — I10 ESSENTIAL HYPERTENSION: ICD-10-CM

## 2024-05-17 NOTE — TELEPHONE ENCOUNTER
No care due was identified.  [facility] Embedded Care Due Messages. Reference number: [id number].   5/17/2024 5:59:43 PM CDT

## 2024-05-19 RX ORDER — VALSARTAN 320 MG/1
320 TABLET ORAL
Qty: 90 TABLET | Refills: 2 | Status: SHIPPED | OUTPATIENT
Start: 2024-05-19

## 2024-05-19 NOTE — TELEPHONE ENCOUNTER
Refill Decision Note   Elena Block  is requesting a refill authorization.  Brief Assessment and Rationale for Refill:  Approve     Medication Therapy Plan:         Comments:     Note composed:3:48 AM 05/19/2024

## 2024-05-21 ENCOUNTER — TELEPHONE (OUTPATIENT)
Dept: NEUROLOGY | Facility: CLINIC | Age: 79
End: 2024-05-21
Payer: MEDICARE

## 2024-05-21 DIAGNOSIS — M54.16 LUMBAR RADICULOPATHY: ICD-10-CM

## 2024-05-21 RX ORDER — GABAPENTIN 300 MG/1
300 CAPSULE ORAL NIGHTLY
Qty: 90 CAPSULE | Refills: 1 | Status: SHIPPED | OUTPATIENT
Start: 2024-05-21 | End: 2024-05-22 | Stop reason: SDUPTHER

## 2024-05-21 NOTE — TELEPHONE ENCOUNTER
No care due was identified.  [facility] Embedded Care Due Messages. Reference number: [id number].   5/21/2024 10:10:38 AM CDT

## 2024-05-21 NOTE — TELEPHONE ENCOUNTER
----- Message from Kulwant Santizo sent at 5/21/2024  8:21 AM CDT -----  Contact: [phone]@patient  Requesting an RX refill or new RX.gabapentin (NEURONTIN) 300 MG capsule  Is this a refill or new RX: refill   RX name and strength (copy/paste from chart):    Is this a 30 day or 90 day RX: 90 day   Pharmacy name and phone #  [id number] - [place] [address]  The doctors have asked that we provide their patients with the following 2 reminders -- prescription refills can take up to 72 hours, and a friendly reminder that in the future you can use your [facility] account to request refills:       NOTE:Patient says she has apt with her  Neurology on 7/17 and is almost out of her med

## 2024-05-21 NOTE — TELEPHONE ENCOUNTER
----- Message from Geeta Tay sent at 5/21/2024  7:49 AM CDT -----  Contact: [phone]  [email or web address]     Patient is calling for Medical Advice regarding:out of medication     How long has patient had these symptoms:    Pharmacy name and phone#:    [id number] - [place] - [address]  [address]  [place] [address]  Phone: [phone] Fax: [phone]       Would like response via [employer]:  no     Comments:  Pt is calling she states she is out of her medicine and she states she has not seen you in a year and is needing to know if the dr can call this in and when does she need to be seen please advise    gabapentin (NEURONTIN) 300 MG capsule

## 2024-05-21 NOTE — TELEPHONE ENCOUNTER
Staff spoke with patient informing her an appointment is needed patient has been scheduled for next available  placed on waiting list if someone cancels and advise to contact PCP regarding refill of medication

## 2024-05-22 DIAGNOSIS — M54.16 LUMBAR RADICULOPATHY: ICD-10-CM

## 2024-05-22 RX ORDER — GABAPENTIN 300 MG/1
300 CAPSULE ORAL NIGHTLY
Qty: 90 CAPSULE | Refills: 1 | Status: SHIPPED | OUTPATIENT
Start: 2024-05-22 | End: 2024-05-22 | Stop reason: SDUPTHER

## 2024-05-22 NOTE — TELEPHONE ENCOUNTER
----- Message from Narda Fraser sent at 5/22/2024  7:28 AM CDT -----  Contact: pt [phone]  Requesting an RX refill or new RX.  Is this a refill or new RX: Refukk  RX name and strength gabapentin (NEURONTIN) 300 MG capsule  Is this a 30 day or 90 day RX:   Pharmacy name and phone # [id number] - [place], [place] - [facility] Phone: [phone] Fax: [phone]      Comments: she said this was sent to the wrong pharmacy, see chart notes

## 2024-05-22 NOTE — TELEPHONE ENCOUNTER
No care due was identified.  [facility] Embedded Care Due Messages. Reference number: [id number].   5/22/2024 5:46:34 PM CDT

## 2024-05-22 NOTE — TELEPHONE ENCOUNTER
No care due was identified.  [facility] Embedded Care Due Messages. Reference number: [id number].   5/22/2024 10:25:35 AM CDT

## 2024-05-22 NOTE — TELEPHONE ENCOUNTER
Refill Routing Note   Medication(s) are not appropriate for processing by [facility] for the following reason(s):        Outside of protocol    [facility] action(s):  Route             Appointments  past 12m or future 3m with PCP    Date Provider   Last Visit   1/30/2024 Sandra Lyon MD   Next Visit   7/31/2024 Sandra Lyon MD   ED visits in past 90 days: 0        Note composed:10:32 AM 05/22/2024

## 2024-05-23 RX ORDER — GABAPENTIN 300 MG/1
300 CAPSULE ORAL NIGHTLY
Qty: 90 CAPSULE | Refills: 1 | Status: SHIPPED | OUTPATIENT
Start: 2024-05-23 | End: 2025-05-23

## 2024-05-23 NOTE — TELEPHONE ENCOUNTER
Refill Routing Note   Medication(s) are not appropriate for processing by [facility] for the following reason(s):        Outside of protocol    [facility] action(s):  Route               Appointments  past 12m or future 3m with PCP    Date Provider   Last Visit   1/30/2024 Sandra Lyon MD   Next Visit   7/31/2024 Sandra Lyon MD   ED visits in past 90 days: 0        Note composed:9:34 AM 05/23/2024

## 2024-06-12 ENCOUNTER — OFFICE VISIT (OUTPATIENT)
Dept: ORTHOPEDICS | Facility: CLINIC | Age: 79
End: 2024-06-12
Payer: MEDICARE

## 2024-06-12 VITALS — BODY MASS INDEX: 29.84 KG/M2 | HEIGHT: 68 IN | WEIGHT: 196.88 LBS

## 2024-06-12 DIAGNOSIS — M75.51 BURSITIS OF RIGHT SHOULDER: ICD-10-CM

## 2024-06-12 DIAGNOSIS — M19.011 PRIMARY OSTEOARTHRITIS OF RIGHT SHOULDER: Primary | ICD-10-CM

## 2024-06-12 PROCEDURE — 1159F MED LIST DOCD IN RCRD: CPT | Mod: CPTII,S$GLB,, | Performed by: PHYSICIAN ASSISTANT

## 2024-06-12 PROCEDURE — 20610 DRAIN/INJ JOINT/BURSA W/O US: CPT | Mod: RT,S$GLB,, | Performed by: PHYSICIAN ASSISTANT

## 2024-06-12 PROCEDURE — 1101F PT FALLS ASSESS-DOCD LE1/YR: CPT | Mod: CPTII,S$GLB,, | Performed by: PHYSICIAN ASSISTANT

## 2024-06-12 PROCEDURE — 99999 PR PBB SHADOW E&M-EST. PATIENT-LVL IV: CPT | Mod: PBBFAC,,, | Performed by: PHYSICIAN ASSISTANT

## 2024-06-12 PROCEDURE — 3288F FALL RISK ASSESSMENT DOCD: CPT | Mod: CPTII,S$GLB,, | Performed by: PHYSICIAN ASSISTANT

## 2024-06-12 PROCEDURE — 1125F AMNT PAIN NOTED PAIN PRSNT: CPT | Mod: CPTII,S$GLB,, | Performed by: PHYSICIAN ASSISTANT

## 2024-06-12 PROCEDURE — 1160F RVW MEDS BY RX/DR IN RCRD: CPT | Mod: CPTII,S$GLB,, | Performed by: PHYSICIAN ASSISTANT

## 2024-06-12 PROCEDURE — 99214 OFFICE O/P EST MOD 30 MIN: CPT | Mod: 25,S$GLB,, | Performed by: PHYSICIAN ASSISTANT

## 2024-06-12 RX ORDER — BETAMETHASONE SODIUM PHOSPHATE AND BETAMETHASONE ACETATE 3; 3 MG/ML; MG/ML
6 INJECTION, SUSPENSION INTRA-ARTICULAR; INTRALESIONAL; INTRAMUSCULAR; SOFT TISSUE
Status: COMPLETED | OUTPATIENT
Start: 2024-06-12 | End: 2024-06-12

## 2024-06-12 RX ADMIN — BETAMETHASONE SODIUM PHOSPHATE AND BETAMETHASONE ACETATE 6 MG: 3; 3 INJECTION, SUSPENSION INTRA-ARTICULAR; INTRALESIONAL; INTRAMUSCULAR; SOFT TISSUE at 08:06

## 2024-06-12 NOTE — PROGRESS NOTES
SUBJECTIVE:     Chief Complaint & History of Present Illness:  Elena Block is a  Established  patient 78 y.o. female who is seen here today with a complaint of    Chief Complaint   Patient presents with    Right Shoulder - Pain    .  She is patient well-known to us was last seen treated in the clinic for this condition 10/10/2023.  At that time she would undergone cortisone injection of the right shoulder with good results.  She is begun to have return of some pain soreness in the area she has been treating with meloxicam with poor results.  She notices most of her pain with activities at shoulder height or greater and when reaching or lifting objects of weight.  She does have difficulty sleeping secondary to discomfort in the shoulder  On a scale of 1-10, with 10 being worst pain imaginable, he rates this pain as 4 on good days and 6 on bad days.  she describes the pain as sore.    Review of patient's allergies indicates:   Allergen Reactions    Tramadol Nausea And Vomiting         Current Outpatient Medications   Medication Sig Dispense Refill    carvediloL (COREG) 6.25 MG tablet TAKE 1 TABLET BY MOUTH TWICE  DAILY WITH MEALS 180 tablet 3    cholecalciferol, vitamin D3, (VITAMIN D3) 125 mcg (5,000 unit) Tab Take 1 tablet (5,000 Units total) by mouth once daily. 30 tablet 6    docusate sodium (COLACE) 100 MG capsule 1-4 caps daily; gradually taper up over 2 weeks 60 capsule prn    dorzolamide-timolol 2-0.5% (COSOPT) 22.3-6.8 mg/mL ophthalmic solution Place 1 drop into both eyes 2 (two) times daily. 30 mL 6    estradioL (ESTRACE) 0.01 % (0.1 mg/gram) vaginal cream Place 1 g vaginally every Mon, Wed, Fri. 42.5 g 3    folic acid-vit B6-vit B12 (FOLBEE) 2.5-25-1 mg Tab Take 1 tablet by mouth once a week. 30 tablet 0    gabapentin (NEURONTIN) 300 MG capsule Take 1 capsule (300 mg total) by mouth every evening. 90 capsule 1    latanoprost (XALATAN) 0.005 % ophthalmic solution Place 1 drop into both eyes once  daily. 7.5 mL 4    levothyroxine (SYNTHROID) 100 MCG tablet TAKE 1 TABLET BY MOUTH  DAILY ON AN EMPTY STOMACH 90 tablet 2    meloxicam (MOBIC) 15 MG tablet TAKE 1 TABLET BY MOUTH EVERY DAY 30 tablet 0    NIFEdipine (PROCARDIA-XL) 90 MG (OSM) 24 hr tablet Take 1 tablet (90 mg total) by mouth once daily. 90 tablet 2    simvastatin (ZOCOR) 40 MG tablet TAKE 1 TABLET BY MOUTH IN  THE EVENING 90 tablet 3    valsartan (DIOVAN) 320 MG tablet TAKE 1 TABLET BY MOUTH EVERY DAY 90 tablet 2    ciclopirox (PENLAC) 8 % Soln Apply topically nightly. (Patient not taking: Reported on 6/12/2024) 6.6 mL 11     No current facility-administered medications for this visit.       Past Medical History:   Diagnosis Date    Back pain     Cataract     Central retinal vein occlusion of left eye     Degeneration of lumbar or lumbosacral intervertebral disc 02/2023    MRI-+diffuse OA/DDD, +mild spinal stenosis at L4-5    Glaucoma     High cholesterol     Hypertension     Hypothyroidism     Impingement syndrome of left shoulder 07/29/2013    Obesity     Pneumonia     Posterior vitreous detachment of both eyes 01/17/2013    S/P partial hysterectomy 01/23/2013    Vitamin D insufficiency 2016       Past Surgical History:   Procedure Laterality Date    COLONOSCOPY  01/22/2008    COLONOSCOPY N/A 8/8/2019    Procedure: COLONOSCOPY;  Surgeon: Allie Preston MD;  Location: [address];  Service: Endoscopy;  Laterality: N/A;    CYSTOSCOPY N/A 1/8/2021    Procedure: CYSTOSCOPY;  Surgeon: Mauricio Corea MD;  Location: [facility];  Service: OB/GYN;  Laterality: N/A;    HYSTERECTOMY      Partial     LAPAROSCOPIC LYSIS OF ADHESIONS N/A 1/8/2021    Procedure: LYSIS, ADHESIONS, LAPAROSCOPIC;  Surgeon: Mauricio Corea MD;  Location: [facility] OR;  Service: OB/GYN;  Laterality: N/A;    PARTIAL HYSTERECTOMY      ROBOT-ASSISTED LAPAROSCOPIC ABDOMINAL SACROCOLPOPEXY USING DA PATSY XI N/A 1/8/2021    Procedure: XI ROBOTIC SACROCOLPOPEXY, ABDOMINAL;  Surgeon:  "Mauricio Corea MD;  Location: [facility];  Service: OB/GYN;  Laterality: N/A;    ROBOT-ASSISTED LAPAROSCOPIC SURGICAL REMOVAL OF OVARY USING DA PATSY XI Right 1/8/2021    Procedure: XI ROBOTIC OOPHORECTOMY;  Surgeon: Mauricio Corea MD;  Location: [facility];  Service: OB/GYN;  Laterality: Right;    THYROIDECTOMY, PARTIAL      TONSILLECTOMY         Vital Signs (Most Recent)  There were no vitals filed for this visit.    Review of Systems:  ROS:  Constitutional: no fever or chills  Eyes: no visual changes  ENT: no nasal congestion or sore throat  Respiratory: no cough or shortness of breath  Cardiovascular: no chest pain or palpitations, positive for hypertension, aortic atherosclerosis cirrhosis  Gastrointestinal: no nausea or vomiting, tolerating diet, positive adenomatous colon polyp  Genitourinary: no hematuria or dysuria  Integument/Breast: no rash or pruritis  Hematologic/Lymphatic: no easy bruising or lymphadenopathy  Musculoskeletal: no arthralgias or myalgias, history of low back pain, disorder of the bursa and tendons of the right shoulder  Neurological: no seizures or tremors  Behavioral/Psych: no auditory or visual hallucinations  Endocrine: no heat or cold intolerance, positive postsurgical hypothyroidism      OBJECTIVE:     PHYSICAL EXAM:  Height: 5' 8" (172.7 cm) Weight: 89.3 kg (196 lb 13.9 oz), General Appearance: Well nourished, well developed, in no acute distress.  Neurological: Mood & affect are normal.  Shoulder exam:  right  Tenderness: AC joint, posterior acromial  ROM: forward flexion 180/180, extension 45/45, full abduction 180/180, abduction-glenohumeral 90/90, external rotation 50/50, pain at the extremes of mobility  Shoulder Strength: biceps 5/5, triceps 5/5, abduction 5/5, adduction 5/5, external rotation 5/5 with shoulder at side, flexion 5/5, and extension 5/5  negative for tenderness about the glenohumeral joint, positive for tenderness over the acromioclavicular joint, and negative for " impingement sign  Stability tests: anterior apprehension test negative and posterior apprehension test negative  Special Tests:Cross-chest abduction: diffuse pain and Geneva's test: negative       Shoulder exam:  left  Tenderness: none  ROM: forward flexion 180/180, extension 45/45, full abduction 180/180, abduction-glenohumeral 90/90, external rotation 50/50  Shoulder Strength: biceps 5/5, triceps 5/5, abduction 5/5, adduction 5/5, external rotation 5/5 with shoulder at side, flexion 5/5, and extension 5/5  negative for tenderness about the glenohumeral joint, negative for tenderness over the acromioclavicular joint, and negative for impingement sign  Stability tests: anterior apprehension test negative and posterior apprehension test negative  Special Tests:Cross-chest abduction: negative and Geneva's test: negative                RADIOGRAPHS:  X-rays from previous visit reviewed by me today demonstrate mild arthritic changes throughout the shoulder with glenohumeral joint space narrowing and some early osteophytic spurring at the inferior portion of the AC joint no evidence of fracture dislocation or other bony abnormalities    ASSESSMENT/PLAN:       ICD-10-CM ICD-9-CM   1. Primary osteoarthritis of right shoulder  M19.011 715.11   2. Bursitis of right shoulder  M75.51 726.10       Plan: We discussed with the patient at length all the different treatment options available for his right shoulder including anti-inflammatories, acetaminophen, rest, ice, Physical therapy to include strengthening exercise, occasional cortisone injections for temporary relief, arthroscopic surgical repair, and finally shoulder arthroplasty.   Will proceed with therapeutic diagnostic cortisone injection of the right shoulder followed by course of physical therapy      The risks, benefits, pros, cons, and potential side effects of the procedure were discussed with the patient in detail all questions were answered.  The patient is  comfortable and willing to proceed with the procedure. Verbal consent was obtained and the proper joint was identified by the patient and provider      The injection site was identified and the skin was prepared with an ETOH solution. The    right  shoulder was injected with 1 ml of Celestone and 5 ml Lidocaine under sterile technique. Elena Block tolerated the procedure well, she was advised to rest the  shoulder  today, ice and support. she did receive immediate relief of the pain in and about her  shoulder  she was told this would be short lived and is secondary to the lidocaine. she may have an increase in her discomfort tonight followed by steady improvement over the next several days. It may take 1-3 weeks following the injection to get the full benefit of the medication.  I will see her back in 4-6 months. Sooner if she has any problems or concerns.

## 2024-06-25 DIAGNOSIS — M70.61 TROCHANTERIC BURSITIS OF RIGHT HIP: ICD-10-CM

## 2024-06-25 RX ORDER — MELOXICAM 15 MG/1
15 TABLET ORAL
Qty: 30 TABLET | Refills: 0 | Status: SHIPPED | OUTPATIENT
Start: 2024-06-25

## 2024-06-25 NOTE — TELEPHONE ENCOUNTER
No care due was identified.  [facility] Embedded Care Due Messages. Reference number: [id number].   6/25/2024 9:35:28 AM CDT

## 2024-07-14 NOTE — PROGRESS NOTES
Assessment /Plan     For exam results, see Encounter Report.    Primary open angle glaucoma (POAG) of left eye, severe stage    Primary open angle glaucoma (POAG) of right eye, moderate stage    Afferent pupillary defect, left    Combined forms of age-related cataract of both eyes    Nuclear sclerosis, bilateral    Cortical cataract of both eyes    Blurred vision, bilateral          JAE PT   / was seeing bhavana for years   + H/O inf HRVO os - 9/2009 (see photos) // resolved by 11/2009 (see photos )   Stable with full VF's x years   Never had an IOP > 21 in either eye now with SALT os (? If may have had an unobserved recurrent IHRVO os in 2020 or 2022  vs very assymetric LTG       POAG with phacomorphic narrowing --> Moderate // Severe OS with APD OS  -Fhx (+M, +2brothers with blindness), Steroids (),Trauma()  -Drops:  latanoprost qHS OU // Dorzolamide-Timolol BID OU  -Drop intolerance/contraindication: -  -Laser: -  -Surgeries: -  -CCT: 586//587  -Gonio: TM/SS  IOP max: 20 // 20. Progression at IOP ~17-18  IOP goal: 12 and under     06/30/2020 HVF 24-2 full OD // tiny derringer scotoma VFI 92% OS  12/14/2022 HVF 24-2 superior paracentral scotoma VFI 97% OD // dense low SAD involving fixation VFI 69% --> +prog OD // ++prog OS    Rapid progression in left eye from 4839-3136.    10/2023 HVF 24-2 Superior paracentral scotoma v early SAD VFI 96% OD // SALD VFI 66% OS --> very mild +prog from 2022  10/2023 RNFL dec G/TI B NS OD // dec G/TS/TI B NS/T/NI OS --> stable x 1 year         Glaucoma (type and duration)    // ?? LTG OS>OD  (( old bhavana pt - since 2008)    First HVF   2008   First photos   2008    Treatment / Drops started   ?           Family history    + mother / 2 brothers - w/ blindness)         Glaucoma meds    cosopt  ou / latanoprost ou         H/O adverse rxn to glaucoma drops    ?        LASERS    ?        GLAUCOMA SURGERIES    none        OTHER EYE SURGERIES    none        CDR    0.8/0.9  (per aylin)         Tbase    18-21 ou           Tmax    21/21    (( check to see about IOP's in legacy notes)         Ttarget    ~ 12 per aylin              HVF    15 test 2008 to  2022 - full  od // sup paracentral scot - spilt fix  os   2 test 2022 to 2023 - early SAD od // SALT w/ split fix ((++ prog os 2022 to 2023 to 2024)         Gonio    +3 S/N/T, +2 Inf - OU         CCT    586/587        OCT    2 old test 2014 to 2015 - nl od // nl os     2 newer  test 2022 to 2023-  RNFL dec G/TI od // dec G/TS/TI, bord NS/T/NI         Disc photos    stereo disc 8/2008 // fundus 9/2009 (IHRVO) - 11/2009 - 5/2010 //     - Ttoday    14/13 (good res to cosopt)   - Test done today    IOP w/ cosopt / gonio / estblish care / review old VF's/ OCT/s / exams     IOP too high --> Change timolol to cosopt BID OU --> IOP still a little bit above goal  Discussed may need CE/IOL --> she is not quite ready     Combined forms of age-related cataract OU  Visually significant and interfering with activities of daily living including driving/reading  Patient desires cataract surgery for visual rehabilitation.   Complete glaucoma workup prior to surgery   Will need for visual rehabilitation and for phacomorphic narrowing / glaucoma control     When ready    LEFT EYE then RIGHT EYE    LEFT EYE DIBOO +22.00 target -0.47 (re-check in future ) - IOL calc not in harmony    RIGHT EYE DIBOO +22.00 target -0.38    With or without MIGS     Hx CRVO vs inf HRVO OS 2013 - saw Dr. Paz  No VF defects apparent until 2020      PLAN  Cont latanoprost qHS OU //  cosopt BID OU     Change latanoprost to rocklatan - sample given and Rx sent   Cont cosopt     F/U with HVF / DFE / photos (? OCT as well) and IOP check with rocklatan

## 2024-07-16 ENCOUNTER — OFFICE VISIT (OUTPATIENT)
Dept: OPHTHALMOLOGY | Facility: CLINIC | Age: 79
End: 2024-07-16
Payer: MEDICARE

## 2024-07-16 DIAGNOSIS — H40.1112 PRIMARY OPEN ANGLE GLAUCOMA (POAG) OF RIGHT EYE, MODERATE STAGE: ICD-10-CM

## 2024-07-16 DIAGNOSIS — H21.562 AFFERENT PUPILLARY DEFECT, LEFT: ICD-10-CM

## 2024-07-16 DIAGNOSIS — H53.8 BLURRED VISION, BILATERAL: ICD-10-CM

## 2024-07-16 DIAGNOSIS — H40.1123 PRIMARY OPEN ANGLE GLAUCOMA (POAG) OF LEFT EYE, SEVERE STAGE: Primary | ICD-10-CM

## 2024-07-16 DIAGNOSIS — H25.13 NUCLEAR SCLEROSIS, BILATERAL: ICD-10-CM

## 2024-07-16 DIAGNOSIS — H25.813 COMBINED FORMS OF AGE-RELATED CATARACT OF BOTH EYES: ICD-10-CM

## 2024-07-16 DIAGNOSIS — H26.9 CORTICAL CATARACT OF BOTH EYES: ICD-10-CM

## 2024-07-16 PROCEDURE — 1159F MED LIST DOCD IN RCRD: CPT | Mod: CPTII,S$GLB,, | Performed by: OPHTHALMOLOGY

## 2024-07-16 PROCEDURE — 92020 GONIOSCOPY: CPT | Mod: S$GLB,,, | Performed by: OPHTHALMOLOGY

## 2024-07-16 PROCEDURE — 1126F AMNT PAIN NOTED NONE PRSNT: CPT | Mod: CPTII,S$GLB,, | Performed by: OPHTHALMOLOGY

## 2024-07-16 PROCEDURE — 1160F RVW MEDS BY RX/DR IN RCRD: CPT | Mod: CPTII,S$GLB,, | Performed by: OPHTHALMOLOGY

## 2024-07-16 PROCEDURE — 3288F FALL RISK ASSESSMENT DOCD: CPT | Mod: CPTII,S$GLB,, | Performed by: OPHTHALMOLOGY

## 2024-07-16 PROCEDURE — 99999 PR PBB SHADOW E&M-EST. PATIENT-LVL III: CPT | Mod: PBBFAC,,, | Performed by: OPHTHALMOLOGY

## 2024-07-16 PROCEDURE — 99214 OFFICE O/P EST MOD 30 MIN: CPT | Mod: S$GLB,,, | Performed by: OPHTHALMOLOGY

## 2024-07-16 PROCEDURE — 1101F PT FALLS ASSESS-DOCD LE1/YR: CPT | Mod: CPTII,S$GLB,, | Performed by: OPHTHALMOLOGY

## 2024-07-16 RX ORDER — NETARSUDIL AND LATANOPROST OPHTHALMIC SOLUTION, 0.02%/0.005% .2; .05 MG/ML; MG/ML
1 SOLUTION/ DROPS OPHTHALMIC; TOPICAL NIGHTLY
Qty: 2.5 ML | Refills: 6 | Status: SHIPPED | OUTPATIENT
Start: 2024-07-16

## 2024-07-17 ENCOUNTER — OFFICE VISIT (OUTPATIENT)
Dept: NEUROLOGY | Facility: CLINIC | Age: 79
End: 2024-07-17
Payer: MEDICARE

## 2024-07-17 VITALS
HEIGHT: 68 IN | DIASTOLIC BLOOD PRESSURE: 82 MMHG | SYSTOLIC BLOOD PRESSURE: 174 MMHG | HEART RATE: 77 BPM | WEIGHT: 194 LBS | BODY MASS INDEX: 29.4 KG/M2

## 2024-07-17 DIAGNOSIS — R20.2 NUMBNESS AND TINGLING OF BOTH LEGS: ICD-10-CM

## 2024-07-17 DIAGNOSIS — M54.16 LUMBAR RADICULOPATHY: Primary | ICD-10-CM

## 2024-07-17 DIAGNOSIS — R20.0 NUMBNESS AND TINGLING OF BOTH LEGS: ICD-10-CM

## 2024-07-17 PROCEDURE — 99999 PR PBB SHADOW E&M-EST. PATIENT-LVL III: CPT | Mod: PBBFAC,,, | Performed by: STUDENT IN AN ORGANIZED HEALTH CARE EDUCATION/TRAINING PROGRAM

## 2024-07-17 PROCEDURE — 1159F MED LIST DOCD IN RCRD: CPT | Mod: CPTII,S$GLB,, | Performed by: STUDENT IN AN ORGANIZED HEALTH CARE EDUCATION/TRAINING PROGRAM

## 2024-07-17 PROCEDURE — 3077F SYST BP >= 140 MM HG: CPT | Mod: CPTII,S$GLB,, | Performed by: STUDENT IN AN ORGANIZED HEALTH CARE EDUCATION/TRAINING PROGRAM

## 2024-07-17 PROCEDURE — 3079F DIAST BP 80-89 MM HG: CPT | Mod: CPTII,S$GLB,, | Performed by: STUDENT IN AN ORGANIZED HEALTH CARE EDUCATION/TRAINING PROGRAM

## 2024-07-17 PROCEDURE — 99213 OFFICE O/P EST LOW 20 MIN: CPT | Mod: S$GLB,,, | Performed by: STUDENT IN AN ORGANIZED HEALTH CARE EDUCATION/TRAINING PROGRAM

## 2024-07-17 RX ORDER — GABAPENTIN 300 MG/1
300 CAPSULE ORAL NIGHTLY
Qty: 90 CAPSULE | Refills: 11 | Status: SHIPPED | OUTPATIENT
Start: 2024-07-17 | End: 2025-07-17

## 2024-07-17 NOTE — PROGRESS NOTES
Neurology Clinic Note      Date: 7/17/24  Patient Name: Elena Block   MRN: [id number]   PCP: Sandra Lyon  Referring Provider: No ref. provider found    Assessment and Plan:   Elena Block is a 78 y.o. female with a history of lumbar radiculopathy who presents for follow up.  Doing well on gabapentin.      Continue gabapentin 300 mg nightly.    RTC as needed.      Problem List Items Addressed This Visit          Neuro    Lumbar radiculopathy - Primary    Relevant Medications    gabapentin (NEURONTIN) 300 MG capsule    Numbness and tingling of both legs         Subjective:            Interval History (7/17/24):    Doing well.  Gabapentin has resulted in significant improvement in numbness and radiculopathy.  Sleep has also improved.  Has chronic shoulder pain for which she sees orthopedics.  On meloxicam.    HPI (4/18/2023):   Ms. Elena Block is a 77 y.o. female with a history of HTN, HLD, hypothyroidism presenting for evaluation of numbness and tingling in both her legs.      Symptoms began around 5 months ago with left thigh pain.  A few weeks later she started to experience pain in her right lower extremity, radiating from the right hip down to the knee. Pain in the left thigh has resolved. She denies any gait instability, weakness or falls. Pain is mostly at night on laying down. She denies any pain during the day or while walking around.  Has chronic incontinence, which has not gotten worse.     She was started on Gabapentin which has helped. Currently on 100mg TID.      She does state that prior to the onset of symptoms, she was helping her  move a heavy mattress.     Reviewed her MRI lumbar spine which showed mild bilateral neural foraminal narrowing at L4-L5 and moderate left and mild-moderate right neural foraminal narrowing at L5-S1 with mild spinal canal stenosis at L4-L5.       PAST MEDICAL HISTORY:  Past Medical History:   Diagnosis Date    Back pain     Cataract     Central  retinal vein occlusion of left eye     Degeneration of lumbar or lumbosacral intervertebral disc 02/2023    MRI-+diffuse OA/DDD, +mild spinal stenosis at L4-5    Glaucoma     High cholesterol     Hypertension     Hypothyroidism     Impingement syndrome of left shoulder 07/29/2013    Obesity     Pneumonia     Posterior vitreous detachment of both eyes 01/17/2013    S/P partial hysterectomy 01/23/2013    Vitamin D insufficiency 2016       PAST SURGICAL HISTORY:  Past Surgical History:   Procedure Laterality Date    COLONOSCOPY  01/22/2008    COLONOSCOPY N/A 8/8/2019    Procedure: COLONOSCOPY;  Surgeon: Allie Preston MD;  Location: [address]);  Service: Endoscopy;  Laterality: N/A;    CYSTOSCOPY N/A 1/8/2021    Procedure: CYSTOSCOPY;  Surgeon: Mauricio Corea MD;  Location: [facility];  Service: OB/GYN;  Laterality: N/A;    HYSTERECTOMY      Partial     LAPAROSCOPIC LYSIS OF ADHESIONS N/A 1/8/2021    Procedure: LYSIS, ADHESIONS, LAPAROSCOPIC;  Surgeon: Mauricio Corea MD;  Location: [facility];  Service: OB/GYN;  Laterality: N/A;    PARTIAL HYSTERECTOMY      ROBOT-ASSISTED LAPAROSCOPIC ABDOMINAL SACROCOLPOPEXY USING DA PATSY XI N/A 1/8/2021    Procedure: XI ROBOTIC SACROCOLPOPEXY, ABDOMINAL;  Surgeon: Mauricio Corea MD;  Location: [facility];  Service: OB/GYN;  Laterality: N/A;    ROBOT-ASSISTED LAPAROSCOPIC SURGICAL REMOVAL OF OVARY USING DA PATSY XI Right 1/8/2021    Procedure: XI ROBOTIC OOPHORECTOMY;  Surgeon: Mauricio Corea MD;  Location: [facility];  Service: OB/GYN;  Laterality: Right;    THYROIDECTOMY, PARTIAL      TONSILLECTOMY         CURRENT MEDS:  Current Outpatient Medications   Medication Sig Dispense Refill    carvediloL (COREG) 6.25 MG tablet TAKE 1 TABLET BY MOUTH TWICE  DAILY WITH MEALS 180 tablet 3    cholecalciferol, vitamin D3, (VITAMIN D3) 125 mcg (5,000 unit) Tab Take 1 tablet (5,000 Units total) by mouth once daily. 30 tablet 6    ciclopirox (PENLAC) 8 % Soln Apply topically nightly.  6.6 mL 11    docusate sodium (COLACE) 100 MG capsule 1-4 caps daily; gradually taper up over 2 weeks 60 capsule prn    dorzolamide-timolol 2-0.5% (COSOPT) 22.3-6.8 mg/mL ophthalmic solution Place 1 drop into both eyes 2 (two) times daily. 30 mL 6    estradioL (ESTRACE) 0.01 % (0.1 mg/gram) vaginal cream Place 1 g vaginally every Mon, Wed, Fri. 42.5 g 3    folic acid-vit B6-vit B12 (FOLBEE) 2.5-25-1 mg Tab Take 1 tablet by mouth once a week. 30 tablet 0    gabapentin (NEURONTIN) 300 MG capsule Take 1 capsule (300 mg total) by mouth every evening. 90 capsule 11    levothyroxine (SYNTHROID) 100 MCG tablet TAKE 1 TABLET BY MOUTH  DAILY ON AN EMPTY STOMACH 90 tablet 2    meloxicam (MOBIC) 15 MG tablet TAKE 1 TABLET BY MOUTH EVERY DAY 30 tablet 0    netarsudiL-latanoprost (ROCKLATAN) 0.02-0.005 % Drop Place 1 drop into both eyes every evening. 2.5 mL 6    NIFEdipine (PROCARDIA-XL) 90 MG (OSM) 24 hr tablet Take 1 tablet (90 mg total) by mouth once daily. 90 tablet 2    simvastatin (ZOCOR) 40 MG tablet TAKE 1 TABLET BY MOUTH IN  THE EVENING 90 tablet 3    valsartan (DIOVAN) 320 MG tablet TAKE 1 TABLET BY MOUTH EVERY DAY 90 tablet 2     No current facility-administered medications for this visit.       ALLERGIES:  Review of patient's allergies indicates:   Allergen Reactions    Tramadol Nausea And Vomiting       FAMILY HISTORY:  Family History   Problem Relation Name Age of Onset    Diabetes Mother      Glaucoma Mother      Heart disease Mother      Blindness Mother      Heart attack Mother      No Known Problems Father      Glaucoma Brother Gerry     Stroke Brother Gerry     Liver disease Brother Gerry     Heart disease Brother Gerry     Arthritis Brother Gerry     Alcohol abuse Brother Gerry     Drug abuse Brother Gerry     Glaucoma Brother Zackarya     Diabetes Brother Chriss     Pemphigus vulgaris Brother Jima     Blindness Brother Jima     Heart disease Brother Rey     Diabetes Brother Rey         Bilateral  "Knee amputation    Heart disease Brother Darrell     Liver disease Brother Johann     Gout Son Olivares     Heart disease Son Olivares 48        stent placement    Heart attack Son Olivares     Heart attacks under age 50 Son Olivares     No Known Problems Son Aditya     No Known Problems Maternal Aunt      No Known Problems Maternal Uncle      No Known Problems Paternal Aunt      No Known Problems Paternal Uncle      Hypertension Maternal Grandmother      Diabetes Maternal Grandmother      Blindness Maternal Grandmother      No Known Problems Maternal Grandfather      No Known Problems Paternal Grandmother      No Known Problems Paternal Grandfather      Amblyopia Neg Hx      Macular degeneration Neg Hx      Retinal detachment Neg Hx      Strabismus Neg Hx      Thyroid disease Neg Hx      Cataracts Neg Hx      Cancer Neg Hx         SOCIAL HISTORY:  Social History     Tobacco Use    Smoking status: Never    Smokeless tobacco: Never   Substance Use Topics    Alcohol use: Yes     Alcohol/week: 1.0 standard drink of alcohol     Types: 1 Cans of beer per week     Comment: Occasionally; beer once a  week (socially)    Drug use: No       Review of Systems:  12 system review of systems is negative except for the symptoms mentioned in HPI.      Objective:     Vitals:    07/17/24 1443   BP: (!) 174/82   Pulse: 77   Weight: 88 kg (194 lb 0.1 oz)   Height: 5' 8" (1.727 m)         General: Well-developed, well-groomed. No apparent distress  Eyes: Anicteric, noninjected.  ENT: Normocephalic, atraumatic.    Respiratory: No respiratory distress.    Cardiovascular: Deferred - televideo visit  Abdomen: Deferred - televideo visit  Skin: No rashes, or lesions, nodules on exposed areas                   Images:    Other Studies:          Forrest Morales MD  Department of Neurology  [facility]      "

## 2024-07-24 ENCOUNTER — LAB VISIT (OUTPATIENT)
Dept: LAB | Facility: HOSPITAL | Age: 79
End: 2024-07-24
Attending: INTERNAL MEDICINE
Payer: MEDICARE

## 2024-07-24 DIAGNOSIS — I10 ESSENTIAL HYPERTENSION: ICD-10-CM

## 2024-07-24 DIAGNOSIS — E78.5 HYPERLIPIDEMIA, UNSPECIFIED HYPERLIPIDEMIA TYPE: ICD-10-CM

## 2024-07-24 LAB
ALBUMIN SERPL BCP-MCNC: 3.6 G/DL (ref 3.5–5.2)
ALP SERPL-CCNC: 78 U/L (ref 55–135)
ALT SERPL W/O P-5'-P-CCNC: 14 U/L (ref 10–44)
ANION GAP SERPL CALC-SCNC: 8 MMOL/L (ref 8–16)
AST SERPL-CCNC: 24 U/L (ref 10–40)
BILIRUB SERPL-MCNC: 0.4 MG/DL (ref 0.1–1)
BUN SERPL-MCNC: 17 MG/DL (ref 8–23)
CALCIUM SERPL-MCNC: 9.8 MG/DL (ref 8.7–10.5)
CHLORIDE SERPL-SCNC: 106 MMOL/L (ref 95–110)
CHOLEST SERPL-MCNC: 128 MG/DL (ref 120–199)
CHOLEST/HDLC SERPL: 2.9 {RATIO} (ref 2–5)
CO2 SERPL-SCNC: 25 MMOL/L (ref 23–29)
CREAT SERPL-MCNC: 0.9 MG/DL (ref 0.5–1.4)
EST. GFR  (NO RACE VARIABLE): >60 ML/MIN/1.73 M^2
GLUCOSE SERPL-MCNC: 83 MG/DL (ref 70–110)
HDLC SERPL-MCNC: 44 MG/DL (ref 40–75)
HDLC SERPL: 34.4 % (ref 20–50)
LDLC SERPL CALC-MCNC: 69 MG/DL (ref 63–159)
NONHDLC SERPL-MCNC: 84 MG/DL
POTASSIUM SERPL-SCNC: 4.2 MMOL/L (ref 3.5–5.1)
PROT SERPL-MCNC: 7.6 G/DL (ref 6–8.4)
SODIUM SERPL-SCNC: 139 MMOL/L (ref 136–145)
TRIGL SERPL-MCNC: 75 MG/DL (ref 30–150)

## 2024-07-24 PROCEDURE — 80061 LIPID PANEL: CPT | Performed by: INTERNAL MEDICINE

## 2024-07-24 PROCEDURE — 80053 COMPREHEN METABOLIC PANEL: CPT | Performed by: INTERNAL MEDICINE

## 2024-07-24 PROCEDURE — 36415 COLL VENOUS BLD VENIPUNCTURE: CPT | Performed by: INTERNAL MEDICINE

## 2024-07-31 ENCOUNTER — LAB VISIT (OUTPATIENT)
Dept: LAB | Facility: HOSPITAL | Age: 79
End: 2024-07-31
Attending: INTERNAL MEDICINE
Payer: MEDICARE

## 2024-07-31 ENCOUNTER — OFFICE VISIT (OUTPATIENT)
Dept: INTERNAL MEDICINE | Facility: CLINIC | Age: 79
End: 2024-07-31
Payer: MEDICARE

## 2024-07-31 VITALS
OXYGEN SATURATION: 100 % | SYSTOLIC BLOOD PRESSURE: 128 MMHG | TEMPERATURE: 97 F | HEIGHT: 68 IN | BODY MASS INDEX: 29.62 KG/M2 | HEART RATE: 85 BPM | DIASTOLIC BLOOD PRESSURE: 60 MMHG | RESPIRATION RATE: 18 BRPM | WEIGHT: 195.44 LBS

## 2024-07-31 DIAGNOSIS — M25.511 BILATERAL SHOULDER PAIN, UNSPECIFIED CHRONICITY: ICD-10-CM

## 2024-07-31 DIAGNOSIS — E03.4 HYPOTHYROIDISM DUE TO ACQUIRED ATROPHY OF THYROID: ICD-10-CM

## 2024-07-31 DIAGNOSIS — M25.512 BILATERAL SHOULDER PAIN, UNSPECIFIED CHRONICITY: ICD-10-CM

## 2024-07-31 DIAGNOSIS — D12.6 ADENOMATOUS POLYP OF COLON, UNSPECIFIED PART OF COLON: ICD-10-CM

## 2024-07-31 DIAGNOSIS — Z12.31 ENCOUNTER FOR SCREENING MAMMOGRAM FOR MALIGNANT NEOPLASM OF BREAST: ICD-10-CM

## 2024-07-31 DIAGNOSIS — E78.5 HYPERLIPIDEMIA, UNSPECIFIED HYPERLIPIDEMIA TYPE: ICD-10-CM

## 2024-07-31 DIAGNOSIS — I10 ESSENTIAL HYPERTENSION: Primary | ICD-10-CM

## 2024-07-31 DIAGNOSIS — Z79.899 HIGH RISK MEDICATION USE: ICD-10-CM

## 2024-07-31 DIAGNOSIS — M51.37 DEGENERATION OF LUMBAR OR LUMBOSACRAL INTERVERTEBRAL DISC: ICD-10-CM

## 2024-07-31 LAB
CK SERPL-CCNC: 68 U/L (ref 20–180)
CRP SERPL-MCNC: 19.7 MG/L (ref 0–8.2)
ERYTHROCYTE [SEDIMENTATION RATE] IN BLOOD BY PHOTOMETRIC METHOD: 50 MM/HR (ref 0–36)
RHEUMATOID FACT SERPL-ACNC: <13 IU/ML (ref 0–15)

## 2024-07-31 PROCEDURE — 36415 COLL VENOUS BLD VENIPUNCTURE: CPT | Mod: PO | Performed by: INTERNAL MEDICINE

## 2024-07-31 PROCEDURE — 1126F AMNT PAIN NOTED NONE PRSNT: CPT | Mod: CPTII,S$GLB,, | Performed by: INTERNAL MEDICINE

## 2024-07-31 PROCEDURE — 99999 PR PBB SHADOW E&M-EST. PATIENT-LVL V: CPT | Mod: PBBFAC,,, | Performed by: INTERNAL MEDICINE

## 2024-07-31 PROCEDURE — 84443 ASSAY THYROID STIM HORMONE: CPT | Performed by: INTERNAL MEDICINE

## 2024-07-31 PROCEDURE — 86431 RHEUMATOID FACTOR QUANT: CPT | Performed by: INTERNAL MEDICINE

## 2024-07-31 PROCEDURE — 3074F SYST BP LT 130 MM HG: CPT | Mod: CPTII,S$GLB,, | Performed by: INTERNAL MEDICINE

## 2024-07-31 PROCEDURE — 1159F MED LIST DOCD IN RCRD: CPT | Mod: CPTII,S$GLB,, | Performed by: INTERNAL MEDICINE

## 2024-07-31 PROCEDURE — 3078F DIAST BP <80 MM HG: CPT | Mod: CPTII,S$GLB,, | Performed by: INTERNAL MEDICINE

## 2024-07-31 PROCEDURE — G2211 COMPLEX E/M VISIT ADD ON: HCPCS | Mod: S$GLB,,, | Performed by: INTERNAL MEDICINE

## 2024-07-31 PROCEDURE — 86140 C-REACTIVE PROTEIN: CPT | Performed by: INTERNAL MEDICINE

## 2024-07-31 PROCEDURE — 85652 RBC SED RATE AUTOMATED: CPT | Performed by: INTERNAL MEDICINE

## 2024-07-31 PROCEDURE — 3288F FALL RISK ASSESSMENT DOCD: CPT | Mod: CPTII,S$GLB,, | Performed by: INTERNAL MEDICINE

## 2024-07-31 PROCEDURE — 99215 OFFICE O/P EST HI 40 MIN: CPT | Mod: S$GLB,,, | Performed by: INTERNAL MEDICINE

## 2024-07-31 PROCEDURE — 86038 ANTINUCLEAR ANTIBODIES: CPT | Performed by: INTERNAL MEDICINE

## 2024-07-31 PROCEDURE — 82550 ASSAY OF CK (CPK): CPT | Performed by: INTERNAL MEDICINE

## 2024-07-31 PROCEDURE — 1101F PT FALLS ASSESS-DOCD LE1/YR: CPT | Mod: CPTII,S$GLB,, | Performed by: INTERNAL MEDICINE

## 2024-07-31 RX ORDER — NIFEDIPINE 90 MG/1
90 TABLET, EXTENDED RELEASE ORAL DAILY
Qty: 90 TABLET | Refills: 2 | Status: SHIPPED | OUTPATIENT
Start: 2024-07-31

## 2024-07-31 RX ORDER — PREDNISONE 20 MG/1
TABLET ORAL
Qty: 10 TABLET | Refills: 0 | Status: SHIPPED | OUTPATIENT
Start: 2024-07-31

## 2024-07-31 RX ORDER — LEVOTHYROXINE SODIUM 100 UG/1
TABLET ORAL
Qty: 90 TABLET | Refills: 0 | Status: SHIPPED | OUTPATIENT
Start: 2024-07-31

## 2024-07-31 RX ORDER — SIMVASTATIN 40 MG/1
40 TABLET, FILM COATED ORAL NIGHTLY
Qty: 90 TABLET | Refills: 2 | Status: SHIPPED | OUTPATIENT
Start: 2024-07-31

## 2024-07-31 RX ORDER — PREDNISONE 20 MG/1
TABLET ORAL
Qty: 10 TABLET | Refills: 0 | Status: SHIPPED | OUTPATIENT
Start: 2024-07-31 | End: 2024-07-31

## 2024-07-31 RX ORDER — CARVEDILOL 6.25 MG/1
6.25 TABLET ORAL 2 TIMES DAILY WITH MEALS
Qty: 180 TABLET | Refills: 2 | Status: SHIPPED | OUTPATIENT
Start: 2024-07-31

## 2024-07-31 NOTE — PROGRESS NOTES
Subjective:       Patient ID: Elena Block is a 78 y.o. female.    Chief Complaint:   Follow-up, Hyperlipidemia, Hypertension, Obesity, and Back Pain    HPI: Mrs Block presents for follow up HTN/HLD and Chronic Issues  C/O Bilateral shoulder pain: 1st on Right/started 10/23 and then the Left shoulder started x 1 month, + decreased ROM, + Relief with Mobic 15mg  at WHOLE tab daily  C/O Right Hip pain/pain with standing from sitting    HTN: Med Tx 1-Diovan 320mg QD, 2-HCTZ 12.5mg prn only(last years ago), 3-Coreg 6.25mg BID, 4-Procardia XL 90mg QD        Home BPs:120/70, 120/60    HLD: Med Tx 1-Zocor 40mg QD  Hypothyroidism: Med Tx 1- Synthroid 100ug QD    Lumbar OA/DDD: Med Tx 1- Gabapentin 300mg QHS, 2- Meloxicam 15mg  at 1/2 tab prn?  -MRI L-spine(2/23):  + NFN at L4-5,L5-S1 with mild spinal stenosis at L4-5    Past Medical, Surgical, Social History: Please see as stated in Epic chart which has been reviewed.    Current Outpatient Medications   Medication Sig Dispense Refill    cholecalciferol, vitamin D3, (VITAMIN D3) 125 mcg (5,000 unit) Tab Take 1 tablet (5,000 Units total) by mouth once daily. 30 tablet 6    docusate sodium (COLACE) 100 MG capsule 1-4 caps daily; gradually taper up over 2 weeks 60 capsule prn    dorzolamide-timolol 2-0.5% (COSOPT) 22.3-6.8 mg/mL ophthalmic solution Place 1 drop into both eyes 2 (two) times daily. 30 mL 6    estradioL (ESTRACE) 0.01 % (0.1 mg/gram) vaginal cream Place 1 g vaginally every Mon, Wed, Fri. 42.5 g 3    folic acid-vit B6-vit B12 (FOLBEE) 2.5-25-1 mg Tab Take 1 tablet by mouth once a week. 30 tablet 0    gabapentin (NEURONTIN) 300 MG capsule Take 1 capsule (300 mg total) by mouth every evening. 90 capsule 11    meloxicam (MOBIC) 15 MG tablet TAKE 1 TABLET BY MOUTH EVERY DAY 30 tablet 0    netarsudiL-latanoprost (ROCKLATAN) 0.02-0.005 % Drop Place 1 drop into both eyes every evening. 2.5 mL 6    valsartan (DIOVAN) 320 MG tablet TAKE 1 TABLET BY MOUTH EVERY DAY 90  tablet 2    carvediloL (COREG) 6.25 MG tablet Take 1 tablet (6.25 mg total) by mouth 2 (two) times daily with meals. 180 tablet 2    ciclopirox (PENLAC) 8 % Soln Apply topically nightly. 6.6 mL 11    levothyroxine (SYNTHROID) 100 MCG tablet TAKE 1 TABLET BY MOUTH  DAILY ON AN EMPTY STOMACH 90 tablet 0    NIFEdipine (PROCARDIA-XL) 90 MG (OSM) 24 hr tablet Take 1 tablet (90 mg total) by mouth once daily. 90 tablet 2    predniSONE (DELTASONE) 20 MG tablet 2 tabs with food daily x 5 days 10 tablet 0    simvastatin (ZOCOR) 40 MG tablet Take 1 tablet (40 mg total) by mouth every evening. 90 tablet 2     No current facility-administered medications for this visit.       Review of Systems   Musculoskeletal:  Positive for arthralgias and myalgias.   All other systems reviewed and are negative.      Objective:      Lab Results   Component Value Date    WBC 5.07 01/23/2024    HGB 13.9 01/23/2024    HCT 43.3 01/23/2024     01/23/2024    CHOL 128 07/24/2024    TRIG 75 07/24/2024    HDL 44 07/24/2024    ALT 14 07/24/2024    AST 24 07/24/2024     07/24/2024    K 4.2 07/24/2024     07/24/2024    CREATININE 0.9 07/24/2024    BUN 17 07/24/2024    CO2 25 07/24/2024    TSH 0.733 07/20/2023    HGBA1C 5.3 05/19/2006     Physical Exam  Vitals reviewed.   Constitutional:       Appearance: Normal appearance.   HENT:      Head: Normocephalic and atraumatic.      Mouth/Throat:      Mouth: Mucous membranes are dry.      Pharynx: No posterior oropharyngeal erythema.   Cardiovascular:      Rate and Rhythm: Normal rate and regular rhythm.   Pulmonary:      Effort: Pulmonary effort is normal.      Breath sounds: Normal breath sounds. No wheezing.   Chest:      Chest wall: No tenderness.   Abdominal:      General: Abdomen is flat. Bowel sounds are normal.      Palpations: Abdomen is soft. There is no mass.      Tenderness: There is no abdominal tenderness.   Musculoskeletal:         General: Tenderness present. No swelling.       "Cervical back: Normal range of motion and neck supple. No muscular tenderness.      Right lower leg: No edema.      Left lower leg: No edema.      Comments: Bilateral shoulders show decreased ROM that improves with repetition,  Upper Extremities with decreased strength/discomfort with internal rotation  Lower extremities negative for straight leg raise   Lymphadenopathy:      Cervical: No cervical adenopathy.   Skin:     General: Skin is warm and dry.   Neurological:      General: No focal deficit present.      Mental Status: She is alert.      Motor: Weakness present.   Psychiatric:         Mood and Affect: Mood normal.       Breasts:  On gross visualization, normal; on palpation no masses tenderness or nipple discharge    Health Maintenance:  Last PAP: No result found    Last Colonoscopy: Last Colonoscopy completed on 8/8/2019    Last Mammogram: 8/1/23- Negative    Last Density: 8/2022-Normal      Vital Signs  Temp: 97.3 °F (36.3 °C)  Temp Source: Other (see comments)  Pulse: 85  Resp: 18  SpO2: 100 %  BP: 128/60  BP Location: Left arm  Patient Position: Sitting  Pain Score: 0-No pain  Height and Weight  Height: 5' 8" (172.7 cm)  Weight: 88.6 kg (195 lb 7 oz)  BSA (Calculated - sq m): 2.06 sq meters  BMI (Calculated): 29.7  Weight in (lb) to have BMI = 25: 164.1    Assessment:       1. Essential hypertension    2. Hyperlipidemia, unspecified hyperlipidemia type    3. Bilateral shoulder pain, unspecified chronicity    4. Hypothyroidism due to acquired atrophy of thyroid    5. Degeneration of lumbar or lumbosacral intervertebral disc    6. Encounter for screening mammogram for malignant neoplasm of breast    7. Adenomatous polyp of colon, unspecified part of colon    8. High risk medication use        Plan:     Health Maintenance   Topic Date Due    Shingles Vaccine (3 of 3) 03/25/2020    Mammogram  08/01/2024    TETANUS VACCINE  10/01/2024    Lipid Panel  07/24/2025    Colonoscopy  08/08/2029    Hepatitis C " Screening  Completed    DEXA Scan  Discontinued        Elena was seen today for follow-up, hyperlipidemia, hypertension, obesity and back pain.    Diagnoses and all orders for this visit:    Essential hypertension/controlled        -     Continue: Med Tx 1-Diovan 320mg QD, 2-HCTZ 12.5mg prn only(last years ago), 3-Coreg 6.25mg BID, 4-Procardia XL 90mg QD  -     NIFEdipine (PROCARDIA-XL) 90 MG (OSM) 24 hr tablet; Take 1 tablet (90 mg total) by mouth once daily.  -     carvediloL (COREG) 6.25 MG tablet; Take 1 tablet (6.25 mg total) by mouth 2 (two) times daily with meals.  -     CBC Auto Differential; Future  -     Comprehensive Metabolic Panel; Future    Hyperlipidemia, unspecified hyperlipidemia type/controlled  -     Continue simvastatin (ZOCOR) 40 MG tablet; Take 1 tablet (40 mg total) by mouth every evening.  -     Comprehensive Metabolic Panel; Future  -     Lipid Panel; Future    Bilateral shoulder pain, unspecified chronicity:  Symptoms seem consistent with bilateral rotator cuff tendinitis but concerned with muscular weakness that statin induced myopathy or polymyalgia rheumatica could be possible diagnoses in the situation  -     C-Reactive Protein; Future  -     Sedimentation rate; Future  -     Rheumatoid Factor; Future  -     OLIMPIA; Future  -     CK; Future  -     X-ray Arthritis Survey; Future  -     PredniSONE (DELTASONE) 20 MG tablet; 2 tabs with food daily x 5 days  -     Ambulatory referral/consult to Physical/Occupational Therapy; Future    Hypothyroidism due to acquired atrophy of thyroid  -     levothyroxine (SYNTHROID) 100 MCG tablet; TAKE 1 TABLET BY MOUTH  DAILY ON AN EMPTY STOMACH  -     TSH; Future    Degeneration of lumbar or lumbosacral intervertebral disc        -     PredniSONE (DELTASONE) 20 MG tablet; 2 tabs with food daily x 5 days        -     If no better, will have to refill Meloxicam    Encounter for screening mammogram for malignant neoplasm of breast  -     Mammo Digital  Screening Bilat w/ Guille; Future    Adenomatous polyp of colon, unspecified part of colon  -     Ambulatory referral/consult to Endo Procedure ; Future    High risk medication use  -     Hemoglobin A1C; Future

## 2024-08-01 ENCOUNTER — HOSPITAL ENCOUNTER (OUTPATIENT)
Dept: RADIOLOGY | Facility: HOSPITAL | Age: 79
Discharge: HOME | End: 2024-08-01
Attending: INTERNAL MEDICINE
Payer: MEDICARE

## 2024-08-01 DIAGNOSIS — M25.511 BILATERAL SHOULDER PAIN, UNSPECIFIED CHRONICITY: ICD-10-CM

## 2024-08-01 DIAGNOSIS — M25.512 BILATERAL SHOULDER PAIN, UNSPECIFIED CHRONICITY: ICD-10-CM

## 2024-08-01 LAB
ANA SER QL IF: NORMAL
TSH SERPL DL<=0.005 MIU/L-ACNC: 1.66 UIU/ML (ref 0.4–4)

## 2024-08-01 PROCEDURE — 77077 JOINT SURVEY SINGLE VIEW: CPT | Mod: 26,,, | Performed by: RADIOLOGY

## 2024-08-01 PROCEDURE — 77077 JOINT SURVEY SINGLE VIEW: CPT | Mod: TC

## 2024-08-05 DIAGNOSIS — M19.90 OSTEOARTHRITIS, UNSPECIFIED OSTEOARTHRITIS TYPE, UNSPECIFIED SITE: ICD-10-CM

## 2024-08-05 DIAGNOSIS — M70.61 TROCHANTERIC BURSITIS OF RIGHT HIP: ICD-10-CM

## 2024-08-05 DIAGNOSIS — M35.3 POLYMYALGIA RHEUMATICA: Primary | ICD-10-CM

## 2024-08-06 RX ORDER — MELOXICAM 15 MG/1
TABLET ORAL
Qty: 30 TABLET | Refills: 0 | Status: SHIPPED | OUTPATIENT
Start: 2024-08-06

## 2024-08-14 ENCOUNTER — CLINICAL SUPPORT (OUTPATIENT)
Dept: REHABILITATION | Facility: HOSPITAL | Age: 79
End: 2024-08-14
Payer: MEDICARE

## 2024-08-14 DIAGNOSIS — R29.898 DECREASED STRENGTH OF UPPER EXTREMITY: Primary | ICD-10-CM

## 2024-08-14 DIAGNOSIS — M25.511 BILATERAL SHOULDER PAIN, UNSPECIFIED CHRONICITY: ICD-10-CM

## 2024-08-14 DIAGNOSIS — M25.611 DECREASED RANGE OF MOTION OF RIGHT SHOULDER: ICD-10-CM

## 2024-08-14 DIAGNOSIS — M25.512 BILATERAL SHOULDER PAIN, UNSPECIFIED CHRONICITY: ICD-10-CM

## 2024-08-14 PROCEDURE — 97162 PT EVAL MOD COMPLEX 30 MIN: CPT

## 2024-08-14 PROCEDURE — 97112 NEUROMUSCULAR REEDUCATION: CPT

## 2024-08-14 NOTE — PLAN OF CARE
[facility]   Physical Therapy Initial Evaluation      Name: Elena Block  [facility] Number: [id number]    Therapy Diagnosis:   Encounter Diagnoses   Name Primary?    Bilateral shoulder pain, unspecified chronicity     Decreased strength of upper extremity Yes    Decreased range of motion of right shoulder         Physician: Sandra Lyon MD    Physician Orders: PT Eval and Treat   Medical Diagnosis from Referral: M25.511,M25.512 (ICD-10-CM) - Bilateral shoulder pain, unspecified chronicity   Evaluation Date: 8/14/2024  Authorization Period Expiration: 12/31/2024  Plan of Care Expiration: 10/25/2024  Progress Note Due: 10th visit  Visit # / Visits authorized: 1/ 1   FOTO: 1/ 3    Precautions: Standard     Time In: 8:05 am  Time Out: 8:59 am  Total Billable Time: 54 minutes    Subjective     Date of onset: chronic    History of current condition - Elena reports: she has intermittent bilateral shoulder pain over several years, however this most recent episode of shoulder pain began about 1 month ago and received a steroid injection. Patient reports her MD thinks she might have RA and has an appointment in October to address this. Patient reports her right shoulder bothers her this time, but has seen improvement since receiving an steroid injection. Notes her left shoulder still bothers her, but not nearly as much as the right. Denies numbness and tingling; no pain past elbow. Patient reports her shoulders feel sore. Notes the majority of her pain occurs when she first wakes up in the AM and for about an hour, and then imporves throughout the day as she starts to move.    Falls: denies any recent falls    Imaging: please see imaging     Prior Therapy: yes, for B shoulders and low back   Social History:  lives with their spouse  Occupation: retired from    Prior Level of Function: independent   Current Level of Function: pain and difficulty when reaching, unable to  brush her hair the way she wants    Pain:  Current 5/10, worst 8/10, best 4/10   Location: bilateral shoulders    Description: aching and sore  Aggravating Factors: reaching overhead, brushing hair  Easing Factors: pain medication and rest    Patients goals: decrease pain with activity     Medical History:   Past Medical History:   Diagnosis Date    Back pain     Cataract     Central retinal vein occlusion of left eye     Degeneration of lumbar or lumbosacral intervertebral disc 02/2023    MRI-+diffuse OA/DDD, +mild spinal stenosis at L4-5    Glaucoma     High cholesterol     Hypertension     Hypothyroidism     Impingement syndrome of left shoulder 07/29/2013    Obesity     Pneumonia     Posterior vitreous detachment of both eyes 01/17/2013    S/P partial hysterectomy 01/23/2013    Vitamin D insufficiency 2016       Surgical History:   Elena Block  has a past surgical history that includes Partial hysterectomy; Thyroidectomy, partial; Tonsillectomy; Colonoscopy (01/22/2008); Hysterectomy; Colonoscopy (N/A, 8/8/2019); Robot-assisted laparoscopic abdominal sacrocolpopexy using da Joesph Xi (N/A, 1/8/2021); Cystoscopy (N/A, 1/8/2021); Laparoscopic lysis of adhesions (N/A, 1/8/2021); and Robot-assisted laparoscopic surgical removal of ovary using da Joesph Xi (Right, 1/8/2021).    Medications:   Elena has a current medication list which includes the following prescription(s): carvedilol, cholecalciferol (vitamin d3), ciclopirox, docusate sodium, dorzolamide-timolol 2-0.5%, estradiol, folbee, gabapentin, levothyroxine, meloxicam, rocklatan, nifedipine, prednisone, simvastatin, and valsartan.    Allergies:   Review of patient's allergies indicates:   Allergen Reactions    Tramadol Nausea And Vomiting        Objective      Observation/ posture: rounded shoulders and forward head    Palpation: non tender to palpation throughout shoulder girdle or gh joint bony landmarks or RTC insertion sites    Joint mobility: poor  inferior and posterior joint GH joint mobility; limited ER, ABD and IR    Sensation: upper extremity dermatomes intact bilaterally    Cervical Range of Motion:  CERVICAL AROM Pain/Dysfunction with Movement   Flexion 50 Denies pain   Extension 40 Denies pain   Right side bending 10 Denies pain   Left side bending 10 Denies pain   Right rotation 65 Denies pain   Left rotation 40 Denies pain     Shoulder Range of Motion:   R Active (Passive) L Active (Passive)   Flexion 110 (120) 130   Abduction 90 (95) 115   ER 30 at 45 degrees abd WFL   IR 40 W\FL     Functional assessment:  ER: R: top of R shoulder with pain;  L C7 no pain  IR: R: R buttock; L T12    Upper Extremity Strength: manual muscle grades below   Right  Left   Shoulder FLEX 3+/5 Shoulder FLEX 4-/5   Shoulder ABD 3+/5 Shoulder ABD 4-/5   Shoulder ER 3+/5 Shoulder ER 3+/5   Shoulder IR 3+/5 Shoulder IR 3+/5   Elbow FLEX 4+/5 Elbow Flex 4+/5   Elbow EXT 4+/5 Elbow EXT 4+/5   Wrist FLEX 4/5 Wrist FLEX 4/5   Wrist EXT 4/5 Wrist EXT 4/5       Shoulder Special Test:  Lift off: unable to tolerate testing position  Painful Arc: +   Neer's: +  ERLS: -    Intake Outcome Measure for FOTO Shoulder Survey    Therapist reviewed FOTO scores for Elena Block on 8/14/2024.   FOTO report - see Media section or FOTO account episode details.    Intake Score: see media section         Treatment     Total Treatment time (time-based codes) separate from Evaluation: 25 minutes     Elena received the treatments listed below:      manual therapy techniques: Joint mobilizations, Myofacial release, and Soft tissue Mobilization were applied to the: right shoulder for 5 minutes, including:  Grade II-III GH joint mobilizations inferior and posterior  Grade II oscillations    neuromuscular re-education activities to improve: Proprioception, Posture, and motor control and patient education for 25 minutes. The following activities were included:  Patient education:  - HEP  - plan of  care  -prognosis  -importance of range of motion for UE functional mobility  - treatment options    Tables slides: 10 seconds, 20x each; scaption and abduction   Scapular retractions: 10 seconds, 10x - heavy tactile cues        Patient Education and Home Exercises     Education provided: see above    Written Home Exercises Provided: Yes. Exercises were reviewed and Elena was able to demonstrate them prior to the end of the session.  Elena demonstrated good  understanding of the education provided. See EMR under Patient Instructions for exercises provided during therapy sessions.    Assessment     Elena is a 78 y.o. female referred to outpatient Physical Therapy with a medical diagnosis of Bilateral shoulder pain, unspecified chronicity. Patient presents with s/s consistent with GH OA, however demonstrates capsular pattern at this time suggesting possible adhesive capsulitis. Pain pattern appears mor consistent with OA at this time. Decreased pain with passive range of motion compared to active range of motion. Patient demonstrates poor posture, decreased motor control of scapulothoracic mm, and poor scapular mobility during GH joint mobility. Pt demonstrates decreased tolerance for OH mobility secondary to pain and poor shoulder functional mobility. Pt would benefit from RTC strengthening, and scapulothoracic strengthening and motor control activities to reduce R shoulder pain and improve posture.     Patient prognosis is Good.   Patient will benefit from skilled outpatient Physical Therapy to address the deficits stated above and in the chart below, provide patient /family education, and to maximize patientt's level of independence.     Plan of care discussed with patient: Yes  Patient's spiritual, cultural and educational needs considered and patient is agreeable to the plan of care and goals as stated below:     Anticipated Barriers for therapy: chronicity, age    Medical Necessity is demonstrated by the  following  History  Co-morbidities and personal factors that may impact the plan of care [] LOW: no personal factors / co-morbidities  [] MODERATE: 1-2 personal factors / co-morbidities  [x] HIGH: 3+ personal factors / co-morbidities    Moderate / High Support Documentation:   Co-morbidities affecting plan of care: see medical chart    Personal Factors:   age     Examination  Body Structures and Functions, activity limitations and participation restrictions that may impact the plan of care [] LOW: addressing 1-2 elements  [] MODERATE: 3+ elements  [x] HIGH: 4+ elements (please support below)    Moderate / High Support Documentation: Limitations with OH mobility  difficulty with ADLs, decreased range of motion, decreased strength, neuromuscular re-education, and pain.       Clinical Presentation [] LOW: stable  [x] MODERATE: Evolving  [] HIGH: Unstable     Decision Making/ Complexity Score: moderate       Goals:  Short Term Goals(8 weeks)  1. Pt will be independent with HEP to assist PT treatment in restoring pain free motion of the right shoulder.   2. Pt will improve impaired shoulder MMTs 1/2 grade B to improve strength for functional tasks.   3. Pt will demonstrate improved postural awareness requiring less than 3 VC during therapeutic activity.      Long Term Goals (10 Weeks):   1. Pt will improve FOTO score to </= 30% to demonstrate improvements in functional mobility including carrying, moving, and handling objects   2. Pt will improve impaired shoulder MMTs 1 grade B to improve strength for household duties.   3. Pt will demonstrate improved perscapular strength and endurance to show improved OH mobility and activity tolerance.      Plan     Plan of care Certification: 8/14/2024 to 10/25/2024.    Outpatient Physical Therapy 2 times weekly for 10 weeks to include the following interventions: Aquatic Therapy, Cervical/Lumbar Traction, Electrical Stimulation TENS, Gait Training, Manual Therapy, Moist Heat/ Ice,  Neuromuscular Re-ed, Orthotic Management and Training, Patient Education, Self Care, Therapeutic Activities, Therapeutic Exercise, and functional dry needling  .     Meli Allen, PT, DPT, OCS        Physician's Signature: _________________________________________ Date: ________________

## 2024-08-20 ENCOUNTER — CLINICAL SUPPORT (OUTPATIENT)
Dept: REHABILITATION | Facility: HOSPITAL | Age: 79
End: 2024-08-20
Payer: MEDICARE

## 2024-08-20 DIAGNOSIS — R29.898 DECREASED STRENGTH OF UPPER EXTREMITY: Primary | ICD-10-CM

## 2024-08-20 DIAGNOSIS — M25.611 DECREASED RANGE OF MOTION OF RIGHT SHOULDER: ICD-10-CM

## 2024-08-20 PROCEDURE — 97112 NEUROMUSCULAR REEDUCATION: CPT | Mod: KX

## 2024-08-20 NOTE — PROGRESS NOTES
"[facility]   Physical Therapy Treatment Note      Name: Elena Block  [facility] Number: [id number]    Therapy Diagnosis:   Encounter Diagnoses   Name Primary?    Decreased strength of upper extremity Yes    Decreased range of motion of right shoulder      Physician: Sandra Lyon MD    Visit Date: 8/20/2024    Physician Orders: PT Eval and Treat   Medical Diagnosis from Referral: M25.511,M25.512 (ICD-10-CM) - Bilateral shoulder pain, unspecified chronicity   Evaluation Date: 8/14/2024  Authorization Period Expiration: 12/31/2024  Plan of Care Expiration: 10/25/2024  Progress Note Due: 10th visit  Visit # / Visits authorized: 1/ 1   FOTO: 1/ 3     Precautions: Standard      Time In: 8:00 am  Time Out: 8:45 am  Total Billable Time: 24 minutes    PTA Visit #: 0/5       Subjective     Patient reports: both of her shoulders are "bad." Reports she did not do her HEP.   She was not compliant with home exercise program.  Response to previous treatment: no adverse effects  Functional change: nothing new to note    Pain: 5/10  Location: right shoulder      Objective      Objective Measures updated at progress report unless specified.     Treatment     Elena received the treatments listed below:        manual therapy techniques: Joint mobilizations, Myofacial release, and Soft tissue Mobilization were applied to the: right shoulder for 00 minutes, including:  Grade II-III GH joint mobilizations inferior and posterior  Grade II oscillations     neuromuscular re-education activities to improve: Proprioception, Posture, and motor control and patient education for 45 minutes. The following activities were included:  Patient education:  - HEP  - plan of care  -prognosis  -importance of range of motion for UE functional mobility  - treatment options     Tables slides: 10 seconds, 20x each; scaption and abduction   Scapular retractions: 10 seconds, 10x - heavy tactile cues  Sidelying scapular " "retraction: 10 second, 10x each   Supine shoulder flexion: 3x10  Chest press: 3x10   Supine UE circles CW and CCW 20x each B  Supine ER with cane on R: 10 seconds, 10x       Patient Education and Home Exercises       Education provided:   - see above    Written Home Exercises Provided: Pt instructed to continue prior HEP. Exercises were reviewed and Elena was able to demonstrate them prior to the end of the session.  Elena demonstrated good  understanding of the education provided. See Electronic Medical Record under Patient Instructions for exercises provided during therapy sessions    Assessment     Patient elected to end session early as she reported "had enough." Patient demonstrated fair tolerance for exercise, however able to complete all activities denying pain throughout. Will progress as tolerated and as patient willing.     Elena Is progressing well towards her goals.   Patient prognosis is Good.     Patient will continue to benefit from skilled outpatient physical therapy to address the deficits listed in the problem list box on initial evaluation, provide pt/family education and to maximize pt's level of independence in the home and community environment.     Patient's spiritual, cultural and educational needs considered and pt agreeable to plan of care and goals.     Anticipated barriers to physical therapy: chronicity, age    Goals:   Short Term Goals(8 weeks)  1. Pt will be independent with HEP to assist PT treatment in restoring pain free motion of the right shoulder.   2. Pt will improve impaired shoulder MMTs 1/2 grade B to improve strength for functional tasks.   3. Pt will demonstrate improved postural awareness requiring less than 3 VC during therapeutic activity.      Long Term Goals (10 Weeks):   1. Pt will improve FOTO score to </= 30% to demonstrate improvements in functional mobility including carrying, moving, and handling objects   2. Pt will improve impaired shoulder MMTs 1 grade B " to improve strength for household duties.   3. Pt will demonstrate improved perscapular strength and endurance to show improved OH mobility and activity tolerance.      Plan     Plan of care Certification: 8/14/2024 to 10/25/2024.     Outpatient Physical Therapy 2 times weekly for 10 weeks to include the following interventions: Aquatic Therapy, Cervical/Lumbar Traction, Electrical Stimulation TENS, Gait Training, Manual Therapy, Moist Heat/ Ice, Neuromuscular Re-ed, Orthotic Management and Training, Patient Education, Self Care, Therapeutic Activities, Therapeutic Exercise, and functional dry needling    Meli Allen, PT, DPT, OCS

## 2024-08-22 DIAGNOSIS — M54.16 LUMBAR RADICULOPATHY: ICD-10-CM

## 2024-08-22 RX ORDER — GABAPENTIN 300 MG/1
300 CAPSULE ORAL NIGHTLY
Qty: 90 CAPSULE | Refills: 3 | OUTPATIENT
Start: 2024-08-22

## 2024-08-22 NOTE — TELEPHONE ENCOUNTER
----- Message from Karen Stoddard sent at 8/22/2024  9:00 AM CDT -----  Name of Who is calling :ROSIE DUNLAP [[id number]]        What is the request in detail:Please send gabapenton to   [id number] - [place], [place] - [address] Medication needs to be a 90 day supply. Pt only has one ppill lefr . Please assist       Can the clinic reply by [facility]:  no          What number to [facility] back [facility] not [facility]: [phone]

## 2024-08-22 NOTE — TELEPHONE ENCOUNTER
Patient has medication at another pharmacy location already she doesn't need a refill medication needs to go to another pharmacy

## 2024-08-27 ENCOUNTER — CLINICAL SUPPORT (OUTPATIENT)
Dept: REHABILITATION | Facility: HOSPITAL | Age: 79
End: 2024-08-27
Payer: MEDICARE

## 2024-08-27 DIAGNOSIS — R29.898 DECREASED STRENGTH OF UPPER EXTREMITY: Primary | ICD-10-CM

## 2024-08-27 DIAGNOSIS — M25.611 DECREASED RANGE OF MOTION OF RIGHT SHOULDER: ICD-10-CM

## 2024-08-27 PROCEDURE — 97112 NEUROMUSCULAR REEDUCATION: CPT | Mod: KX,CQ

## 2024-08-27 NOTE — PROGRESS NOTES
[facility]   Physical Therapy Treatment Note      Name: Elena BRUCE Umair  [facility] Number: [id number]    Therapy Diagnosis:   Encounter Diagnoses   Name Primary?    Decreased strength of upper extremity Yes    Decreased range of motion of right shoulder      Physician: Sandra Lyon MD    Visit Date: 8/27/2024    Physician Orders: PT Eval and Treat   Medical Diagnosis from Referral: M25.511,M25.512 (ICD-10-CM) - Bilateral shoulder pain, unspecified chronicity   Evaluation Date: 8/14/2024  Authorization Period Expiration: 12/31/2024  Plan of Care Expiration: 10/25/2024  Progress Note Due: 10th visit  Visit # / Visits authorized: 5 / 20 + eval   Episode Visit: 3  FOTO: 1/3     Precautions: Standard      Time In: 0800  Time Out: 0855  Total Billable Time: 55 minutes (4 NMR)    PTA Visit #: 1 / 5     Subjective     Patient reports: she has no pain today and feels she is doing good. Noticing improvements in her range of motion.  She was not compliant with home exercise program.  Response to previous treatment: appropriate muscle response  Functional change: ongoing; reports improvement in ability to reach for something that is higher    Pain: 0/10, currently  Location: Right shoulder      Objective      Objective Measures updated at progress report unless specified.     Treatment     Elena received the treatments listed below:      manual therapy techniques: Joint mobilizations, Myofacial release, and Soft tissue Mobilization were applied to the: Right shoulder for 00 minutes, including:  Grade II-III GH joint mobilizations inferior and posterior  Grade II oscillations     neuromuscular re-education activities to improve: Proprioception, Posture, and motor control and patient education for 55 minutes. The following activities were included:  Patient education:  - compliance with HEP  - plan of care  - prognosis  - importance of range of motion for UE functional mobility  - treatment  options    Supine:  - Shoulder flexion with cane; 3 x 10 reps, 5 second hold  - Chest press with cane + SA reach; 3 x 10 reps  - UE circles CW/CCW holding non-weighted ball; 20 reps each UE  - ER with cane; 10 second hold, 10 reps RUE only - pain free range    SL scapular retraction; 10 second hold, 10 reps each - NP  Table slides (Scaption and Abduction); 10 second hold, 20 reps each  Scapular retractions; 10 second hold, 10 reps - heavy tactile cues  [place] in sitting; 2 x 10 reps  Scapular retractions with Red TC; 2 x 10 reps  Shoulder extensions with Yellow TC; 2 x 10 reps  UBE for postural endurance; consider next visit    Patient Education and Home Exercises       Education provided:   - see above    Written Home Exercises Provided: Pt instructed to continue prior HEP. Exercises were reviewed and Elena was able to demonstrate them prior to the end of the session.  Elena demonstrated good  understanding of the education provided. See Electronic Medical Record under Patient Instructions for exercises provided during therapy sessions    Assessment     Overall good tolerance to therapeutic interventions noting adequate muscle response throughout. Visible rounded shoulders and forward head posture. She requires mod to max cueing for appropriate muscle activation during periscapular interventions. Will continue per POC towards treatment goals.  Elena Is progressing well towards her goals.   Patient prognosis is Good.     Patient will continue to benefit from skilled outpatient physical therapy to address the deficits listed in the problem list box on initial evaluation, provide pt/family education and to maximize pt's level of independence in the home and community environment.     Patient's spiritual, cultural and educational needs considered and pt agreeable to plan of care and goals.     Anticipated barriers to physical therapy: chronicity, age    Goals:   Short Term Goals(8 weeks)  1. Pt will be  independent with HEP to assist PT treatment in restoring pain free motion of the right shoulder.   2. Pt will improve impaired shoulder MMTs 1/2 grade B to improve strength for functional tasks.   3. Pt will demonstrate improved postural awareness requiring less than 3 VC during therapeutic activity.      Long Term Goals (10 Weeks):   1. Pt will improve FOTO score to </= 30% to demonstrate improvements in functional mobility including carrying, moving, and handling objects   2. Pt will improve impaired shoulder MMTs 1 grade B to improve strength for household duties.   3. Pt will demonstrate improved perscapular strength and endurance to show improved OH mobility and activity tolerance.      Plan     Plan of Care Certification: 8/14/2024 to 10/25/2024.     Outpatient Physical Therapy 2 times weekly for 10 weeks to include the following interventions: Aquatic Therapy, Cervical/Lumbar Traction, Electrical Stimulation TENS, Gait Training, Manual Therapy, Moist Heat/ Ice, Neuromuscular Re-ed, Orthotic Management and Training, Patient Education, Self Care, Therapeutic Activities, Therapeutic Exercise, and functional dry needling    Allie Mckeon, PTA

## 2024-08-28 ENCOUNTER — HOSPITAL ENCOUNTER (OUTPATIENT)
Dept: RADIOLOGY | Facility: HOSPITAL | Age: 79
Discharge: HOME | End: 2024-08-28
Attending: INTERNAL MEDICINE
Payer: MEDICARE

## 2024-08-28 DIAGNOSIS — Z12.31 ENCOUNTER FOR SCREENING MAMMOGRAM FOR MALIGNANT NEOPLASM OF BREAST: ICD-10-CM

## 2024-08-28 PROCEDURE — 77067 SCR MAMMO BI INCL CAD: CPT | Mod: TC

## 2024-09-08 DIAGNOSIS — M19.90 OSTEOARTHRITIS, UNSPECIFIED OSTEOARTHRITIS TYPE, UNSPECIFIED SITE: ICD-10-CM

## 2024-09-08 NOTE — TELEPHONE ENCOUNTER
No care due was identified.  [facility] Embedded Care Due Messages. Reference number: [id number].   9/08/2024 7:33:29 AM CDT

## 2024-09-09 RX ORDER — MELOXICAM 15 MG/1
TABLET ORAL
Qty: 90 TABLET | Refills: 0 | Status: SHIPPED | OUTPATIENT
Start: 2024-09-09

## 2024-09-10 ENCOUNTER — CLINICAL SUPPORT (OUTPATIENT)
Dept: REHABILITATION | Facility: HOSPITAL | Age: 79
End: 2024-09-10
Payer: MEDICARE

## 2024-09-10 DIAGNOSIS — R29.898 DECREASED STRENGTH OF UPPER EXTREMITY: Primary | ICD-10-CM

## 2024-09-10 DIAGNOSIS — M25.611 DECREASED RANGE OF MOTION OF RIGHT SHOULDER: ICD-10-CM

## 2024-09-10 PROCEDURE — 97112 NEUROMUSCULAR REEDUCATION: CPT | Mod: KX

## 2024-09-10 NOTE — PROGRESS NOTES
[facility]   Physical Therapy Treatment Note      Name: Elena BRUCE Umair  [facility] Number: [id number]    Therapy Diagnosis:   Encounter Diagnoses   Name Primary?    Decreased strength of upper extremity Yes    Decreased range of motion of right shoulder      Physician: Sandra Lyon MD    Visit Date: 9/10/2024    Physician Orders: PT Eval and Treat   Medical Diagnosis from Referral: M25.511,M25.512 (ICD-10-CM) - Bilateral shoulder pain, unspecified chronicity   Evaluation Date: 8/14/2024  Authorization Period Expiration: 12/31/2024  Plan of Care Expiration: 10/25/2024  Progress Note Due: 10th visit  Visit # / Visits authorized: 6 / 20 + eval   Episode Visit: 3  FOTO: 1/3     Precautions: Standard      Time In: 8:05 am  Time Out: 8:55 am  Total Billable Time: 23 minutes (2 NMR)    PTA Visit #: 0 / 5     Subjective     Patient reports: she has been feeling good. Reports she is doing her HEP in a way that works for her.  She was not compliant with home exercise program.  Response to previous treatment: appropriate muscle response  Functional change: ongoing; reports improvement in ability to reach for something that is higher    Pain: 0/10, currently  Location: Right shoulder      Objective      Objective Measures updated at progress report unless specified.     Treatment     Elena received the treatments listed below:      manual therapy techniques: Joint mobilizations, Myofacial release, and Soft tissue Mobilization were applied to the: Right shoulder for 00 minutes, including:  Grade II-III GH joint mobilizations inferior and posterior  Grade II oscillations     neuromuscular re-education activities to improve: Proprioception, Posture, and motor control and patient education for 50 minutes. The following activities were included:  Patient education:  - compliance with HEP  - plan of care  - prognosis  - importance of range of motion for UE functional mobility  - treatment  options    Supine:  - Shoulder flexion with cane; 4 x 10 reps, 5 second hold, 2 lbs  - Chest press with cane + SA reach; 4 x 10 reps 2 lbs  - UE circles CW/CCW holding non-weighted ball; 20 reps each UE  - ER with cane; 10 second hold, 10 reps RUE only - pain free range- held      Table slides (Scaption and Abduction); 10 second hold, 20 reps each - d/c HEP  Scapular retractions; 5 second hold, 3x10 yellow theraband  [place] in sitting; 2 x 10 reps - held  Scapular retractions with Red TC; 2 x 10 reps  Shoulder extensions with Yellow TC; 3 x 10 reps  UBE for postural endurance 6 minutes:     Patient Education and Home Exercises       Education provided:   - see above    Written Home Exercises Provided: Pt instructed to continue prior HEP. Exercises were reviewed and Elena was able to demonstrate them prior to the end of the session.  Elena demonstrated good  understanding of the education provided. See Electronic Medical Record under Patient Instructions for exercises provided during therapy sessions    Assessment     Good tolerance for NMR activities. Patient and PT discussed discharge planning.       Elena Is progressing well towards her goals.   Patient prognosis is Good.     Patient will continue to benefit from skilled outpatient physical therapy to address the deficits listed in the problem list box on initial evaluation, provide pt/family education and to maximize pt's level of independence in the home and community environment.     Patient's spiritual, cultural and educational needs considered and pt agreeable to plan of care and goals.     Anticipated barriers to physical therapy: chronicity, age    Goals:   Short Term Goals(8 weeks)  1. Pt will be independent with HEP to assist PT treatment in restoring pain free motion of the right shoulder.   2. Pt will improve impaired shoulder MMTs 1/2 grade B to improve strength for functional tasks.   3. Pt will demonstrate improved postural awareness  requiring less than 3 VC during therapeutic activity.      Long Term Goals (10 Weeks):   1. Pt will improve FOTO score to </= 30% to demonstrate improvements in functional mobility including carrying, moving, and handling objects   2. Pt will improve impaired shoulder MMTs 1 grade B to improve strength for household duties.   3. Pt will demonstrate improved perscapular strength and endurance to show improved OH mobility and activity tolerance.      Plan     Plan of Care Certification: 8/14/2024 to 10/25/2024.     Outpatient Physical Therapy 2 times weekly for 10 weeks to include the following interventions: Aquatic Therapy, Cervical/Lumbar Traction, Electrical Stimulation TENS, Gait Training, Manual Therapy, Moist Heat/ Ice, Neuromuscular Re-ed, Orthotic Management and Training, Patient Education, Self Care, Therapeutic Activities, Therapeutic Exercise, and functional dry needling    Meli Allen, PT, DPT, OCS

## 2024-09-24 ENCOUNTER — TELEPHONE (OUTPATIENT)
Dept: ENDOSCOPY | Facility: HOSPITAL | Age: 79
End: 2024-09-24

## 2024-09-24 ENCOUNTER — CLINICAL SUPPORT (OUTPATIENT)
Dept: ENDOSCOPY | Facility: HOSPITAL | Age: 79
End: 2024-09-24
Attending: INTERNAL MEDICINE
Payer: MEDICARE

## 2024-09-24 DIAGNOSIS — D12.6 ADENOMATOUS POLYP OF COLON, UNSPECIFIED PART OF COLON: ICD-10-CM

## 2024-09-24 DIAGNOSIS — Z12.11 SPECIAL SCREENING FOR MALIGNANT NEOPLASMS, COLON: Primary | ICD-10-CM

## 2024-09-24 NOTE — TELEPHONE ENCOUNTER
Spoke to patient to schedule procedure(s) Colonoscopy       Physician to perform procedure(s) Dr. CARYN Suazo  Date of Procedure (s) 9/27/24  Arrival Time 12:45 PM  Time of Procedure(s) 1:45 PM   Location of Procedure(s) [place] 4th Floor  Type of Rx Prep sent to patient: PEG  Instructions provided to patient via [facility]    Patient was informed on the following information and verbalized understanding. Screening questionnaire reviewed with patient and complete. If procedure requires anesthesia, a responsible adult needs to be present to accompany the patient home, patient cannot drive after receiving anesthesia. Appointment details are tentative, especially check-in time. Patient will receive a prep-op call 7 days prior to confirm check-in time for procedure. If applicable the patient should contact their pharmacy to verify Rx for procedure prep is ready for pick-up. Patient was advised to call the scheduling department at [phone] if pharmacy states no Rx is available. Patient was advised to call the endoscopy scheduling department if any questions or concerns arise.      [facility] Scheduling Department

## 2024-09-27 ENCOUNTER — ANESTHESIA EVENT (OUTPATIENT)
Dept: ENDOSCOPY | Facility: HOSPITAL | Age: 79
End: 2024-09-27
Payer: MEDICARE

## 2024-09-27 ENCOUNTER — ANESTHESIA (OUTPATIENT)
Dept: ENDOSCOPY | Facility: HOSPITAL | Age: 79
End: 2024-09-27
Payer: MEDICARE

## 2024-09-27 ENCOUNTER — HOSPITAL ENCOUNTER (OUTPATIENT)
Facility: HOSPITAL | Age: 79
Discharge: HOME | End: 2024-09-27
Attending: INTERNAL MEDICINE | Admitting: INTERNAL MEDICINE
Payer: MEDICARE

## 2024-09-27 VITALS
WEIGHT: 196 LBS | RESPIRATION RATE: 17 BRPM | BODY MASS INDEX: 29.7 KG/M2 | HEIGHT: 68 IN | HEART RATE: 64 BPM | DIASTOLIC BLOOD PRESSURE: 70 MMHG | OXYGEN SATURATION: 99 % | TEMPERATURE: 98 F | SYSTOLIC BLOOD PRESSURE: 158 MMHG

## 2024-09-27 DIAGNOSIS — K63.5 COLON POLYP: ICD-10-CM

## 2024-09-27 PROCEDURE — G0105 COLORECTAL SCRN; HI RISK IND: HCPCS | Performed by: INTERNAL MEDICINE

## 2024-09-27 PROCEDURE — G0105 COLORECTAL SCRN; HI RISK IND: HCPCS | Mod: GC,,, | Performed by: INTERNAL MEDICINE

## 2024-09-27 PROCEDURE — 37000009 HC ANESTHESIA EA ADD 15 MINS: Performed by: INTERNAL MEDICINE

## 2024-09-27 PROCEDURE — 25000003 PHARM REV CODE 250: Performed by: INTERNAL MEDICINE

## 2024-09-27 PROCEDURE — 37000008 HC ANESTHESIA 1ST 15 MINUTES: Performed by: INTERNAL MEDICINE

## 2024-09-27 PROCEDURE — 25000003 PHARM REV CODE 250: Performed by: NURSE ANESTHETIST, CERTIFIED REGISTERED

## 2024-09-27 PROCEDURE — 63600175 PHARM REV CODE 636 W HCPCS: Performed by: NURSE ANESTHETIST, CERTIFIED REGISTERED

## 2024-09-27 PROCEDURE — E9220 PRA ENDO ANESTHESIA: HCPCS | Mod: ,,, | Performed by: NURSE ANESTHETIST, CERTIFIED REGISTERED

## 2024-09-27 RX ORDER — SODIUM CHLORIDE 9 MG/ML
INJECTION, SOLUTION INTRAVENOUS CONTINUOUS
Status: DISCONTINUED | OUTPATIENT
Start: 2024-09-27 | End: 2024-09-27 | Stop reason: HOSPADM

## 2024-09-27 RX ORDER — LIDOCAINE HYDROCHLORIDE 20 MG/ML
INJECTION INTRAVENOUS
Status: DISCONTINUED | OUTPATIENT
Start: 2024-09-27 | End: 2024-09-27

## 2024-09-27 RX ORDER — PROPOFOL 10 MG/ML
VIAL (ML) INTRAVENOUS
Status: DISCONTINUED | OUTPATIENT
Start: 2024-09-27 | End: 2024-09-27

## 2024-09-27 RX ORDER — PROPOFOL 10 MG/ML
VIAL (ML) INTRAVENOUS CONTINUOUS PRN
Status: DISCONTINUED | OUTPATIENT
Start: 2024-09-27 | End: 2024-09-27

## 2024-09-27 RX ADMIN — SODIUM CHLORIDE: 0.9 INJECTION, SOLUTION INTRAVENOUS at 12:09

## 2024-09-27 RX ADMIN — PROPOFOL 150 MCG/KG/MIN: 10 INJECTION, EMULSION INTRAVENOUS at 01:09

## 2024-09-27 RX ADMIN — PROPOFOL 60 MG: 10 INJECTION, EMULSION INTRAVENOUS at 01:09

## 2024-09-27 RX ADMIN — LIDOCAINE HYDROCHLORIDE 40 MG: 20 INJECTION INTRAVENOUS at 01:09

## 2024-09-27 NOTE — TRANSFER OF CARE
"Anesthesia Transfer of Care Note    Patient: Elena Block    Procedure(s) Performed: Procedure(s) (LRB):  COLONOSCOPY (N/A)    Patient location: GI    Anesthesia Type: general    Transport from OR: Transported from OR on room air with adequate spontaneous ventilation    Post pain: adequate analgesia    Post assessment: no apparent anesthetic complications and tolerated procedure well    Post vital signs: stable    Level of consciousness: awake, alert and oriented    Nausea/Vomiting: no nausea/vomiting    Complications: none    Transfer of care protocol was followed    Last vitals: Visit Vitals  BP (!) 145/67 (BP Location: Left arm, Patient Position: Lying)   Pulse 73   Temp 36.7 °C (98 °F) (Temporal)   Resp 16   Ht 5' 8" (1.727 m)   Wt 88.9 kg (196 lb)   SpO2 97%   Breastfeeding No   BMI 29.80 kg/m²     "

## 2024-09-27 NOTE — PROVATION PATIENT INSTRUCTIONS
Discharge Summary/Instructions after an Endoscopic Procedure  Patient Name: Elena Block  Patient MRN: [id number]  Patient YOB: 1945 Friday, September 27, 2024  Janes Suazo MD  Dear patient,  As a result of recent federal legislation (The Federal Cures Act), you may   receive lab or pathology results from your procedure in your [facility]   account before your physician is able to contact you. Your physician or   their representative will relay the results to you with their   recommendations at their soonest availability.  Thank you,  RESTRICTIONS:  During your procedure today, you received medications for sedation.  These   medications may affect your judgment, balance and coordination.  Therefore,   for 24 hours, you have the following restrictions:   - DO NOT drive a car, operate machinery, make legal/financial decisions,   sign important papers or drink alcohol.    ACTIVITY:  Today: no heavy lifting, straining or running due to procedural   sedation/anesthesia.  The following day: return to full activity including work.  DIET:  Eat and drink normally unless instructed otherwise.     TREATMENT FOR COMMON SIDE EFFECTS:  - Mild abdominal pain, nausea, belching, bloating or excessive gas:  rest,   eat lightly and use a heating pad.  - Sore Throat: treat with throat lozenges and/or gargle with warm salt   water.  - Because air was used during the procedure, expelling large amounts of air   from your rectum or belching is normal.  - If a bowel prep was taken, you may not have a bowel movement for 1-3 days.    This is normal.  SYMPTOMS TO WATCH FOR AND REPORT TO YOUR PHYSICIAN:  1. Abdominal pain or bloating, other than gas cramps.  2. Chest pain.  3. Back pain.  4. Signs of infection such as: chills or fever occurring within 24 hours   after the procedure.  5. Rectal bleeding, which would show as bright red, maroon, or black stools.   (A tablespoon of blood from the rectum is not serious, especially if    hemorrhoids are present.)  6. Vomiting.  7. Weakness or dizziness.  GO DIRECTLY TO THE NEAREST EMERGENCY ROOM IF YOU HAVE ANY OF THE FOLLOWING:      Difficulty breathing              Chills and/or fever over 101 F   Persistent vomiting and/or vomiting blood   Severe abdominal pain   Severe chest pain   Black, tarry stools   Bleeding- more than one tablespoon   Any other symptom or condition that you feel may need urgent attention  Your doctor recommends these additional instructions:  If any biopsies were taken, your doctors clinic will contact you in 1 to 2   weeks with any results.  - Discharge patient to home (ambulatory).   - Patient has a contact number available for emergencies.  The signs and   symptoms of potential delayed complications were discussed with the   patient.  Return to normal activities tomorrow.  Written discharge   instructions were provided to the patient.   - Resume previous diet.   - Continue present medications.   - Return to primary care physician as previously scheduled.   - Repeat colonoscopy is not recommended due to current age (66 years or   older) for surveillance.  For questions, problems or results please call your physician - Janes Suazo MD at Work:  [phone].  [facility], EMERGENCY ROOM PHONE NUMBER: [phone]  IF A COMPLICATION OR EMERGENCY SITUATION ARISES AND YOU ARE UNABLE TO REACH   YOUR PHYSICIAN - GO DIRECTLY TO THE EMERGENCY ROOM.  Janes Suazo MD  9/27/2024 1:53:35 PM  This report has been verified and signed electronically.  Dear patient,  As a result of recent federal legislation (The Federal Cures Act), you may   receive lab or pathology results from your procedure in your [facility]   account before your physician is able to contact you. Your physician or   their representative will relay the results to you with their   recommendations at their soonest availability.  Thank you,  PROVATION

## 2024-09-27 NOTE — H&P
Short Stay Endoscopy History and Physical    PCP - Sandra Lyon MD    Procedure - Colonoscopy  ASA - per anesthesia  Mallampati - per anesthesia  Plan of anesthesia - MAC    HPI:  This is a 78 y.o. female here for evaluation of : personal history of colon polyps    ROS:  Constitutional: No fevers, chills  CV: No chest pain  Pulm: No cough  Ophtho: No vision changes  GI: see HPI  Derm: No rash    Medical History:  has a past medical history of Back pain, Cataract, Central retinal vein occlusion of left eye, Degeneration of lumbar or lumbosacral intervertebral disc (02/2023), Glaucoma, High cholesterol, Hypertension, Hypothyroidism, Impingement syndrome of left shoulder (07/29/2013), Obesity, Pneumonia, Posterior vitreous detachment of both eyes (01/17/2013), S/P partial hysterectomy (01/23/2013), and Vitamin D insufficiency (2016).    Surgical History:  has a past surgical history that includes Partial hysterectomy; Thyroidectomy, partial; Tonsillectomy; Colonoscopy (01/22/2008); Hysterectomy; Colonoscopy (N/A, 8/8/2019); Robot-assisted laparoscopic abdominal sacrocolpopexy using da Joesph Xi (N/A, 1/8/2021); Cystoscopy (N/A, 1/8/2021); Laparoscopic lysis of adhesions (N/A, 1/8/2021); and Robot-assisted laparoscopic surgical removal of ovary using da Joesph Xi (Right, 1/8/2021).    Family History: family history includes Alcohol abuse in her brother; Arthritis in her brother; Blindness in her brother, maternal grandmother, and mother; Diabetes in her brother, brother, maternal grandmother, and mother; Drug abuse in her brother; Glaucoma in her brother, brother, and mother; Gout in her son; Heart attack in her mother and son; Heart attacks under age 50 in her son; Heart disease in her brother, brother, brother, and mother; Heart disease (age of onset: 48) in her son; Hypertension in her maternal grandmother; Liver disease in her brother and brother; No Known Problems in her father, maternal aunt, maternal  grandfather, maternal uncle, paternal aunt, paternal grandfather, paternal grandmother, paternal uncle, and son; Pemphigus vulgaris in her brother; Stroke in her brother.. Otherwise no colon cancer, inflammatory bowel disease, or GI malignancies.    Social History:  reports that she has never smoked. She has never used smokeless tobacco. She reports current alcohol use of about 1.0 standard drink of alcohol per week. She reports that she does not use drugs.    Review of patient's allergies indicates:   Allergen Reactions    Tramadol Nausea And Vomiting       Medications:   Medications Prior to Admission   Medication Sig Dispense Refill Last Dose    carvediloL (COREG) 6.25 MG tablet Take 1 tablet (6.25 mg total) by mouth 2 (two) times daily with meals. 180 tablet 2 9/26/2024    cholecalciferol, vitamin D3, (VITAMIN D3) 125 mcg (5,000 unit) Tab Take 1 tablet (5,000 Units total) by mouth once daily. 30 tablet 6 9/26/2024    folic acid-vit B6-vit B12 (FOLBEE) 2.5-25-1 mg Tab Take 1 tablet by mouth once a week. 30 tablet 0 9/26/2024    gabapentin (NEURONTIN) 300 MG capsule Take 1 capsule (300 mg total) by mouth every evening. 90 capsule 11 9/26/2024    levothyroxine (SYNTHROID) 100 MCG tablet TAKE 1 TABLET BY MOUTH  DAILY ON AN EMPTY STOMACH 90 tablet 0 9/27/2024    meloxicam (MOBIC) 15 MG tablet TAKE 1 TABLET BY MOUTH EVERY DAY WITH FOOD FOR 3 TO 5 DAYS AS NEEDED FOR JOINT PAIN 90 tablet 0 9/26/2024    NIFEdipine (PROCARDIA-XL) 90 MG (OSM) 24 hr tablet Take 1 tablet (90 mg total) by mouth once daily. 90 tablet 2 9/26/2024    simvastatin (ZOCOR) 40 MG tablet Take 1 tablet (40 mg total) by mouth every evening. 90 tablet 2 9/26/2024    valsartan (DIOVAN) 320 MG tablet TAKE 1 TABLET BY MOUTH EVERY DAY 90 tablet 2 9/27/2024    ciclopirox (PENLAC) 8 % Soln Apply topically nightly. 6.6 mL 11     docusate sodium (COLACE) 100 MG capsule 1-4 caps daily; gradually taper up over 2 weeks 60 capsule prn     dorzolamide-timolol 2-0.5%  (COSOPT) 22.3-6.8 mg/mL ophthalmic solution Place 1 drop into both eyes 2 (two) times daily. 30 mL 6     estradioL (ESTRACE) 0.01 % (0.1 mg/gram) vaginal cream Place 1 g vaginally every Mon, Wed, Fri. 42.5 g 3     netarsudiL-latanoprost (ROCKLATAN) 0.02-0.005 % Drop Place 1 drop into both eyes every evening. 2.5 mL 6     predniSONE (DELTASONE) 20 MG tablet 2 tabs with food daily x 5 days 10 tablet 0 Unknown         Vital Signs:   Vitals:    09/27/24 1241   BP: (!) 182/83   Pulse: 84   Resp: 16   Temp: 98.2 °F (36.8 °C)       Labs:  Lab Results   Component Value Date    WBC 5.07 01/23/2024    HGB 13.9 01/23/2024    HCT 43.3 01/23/2024     01/23/2024    CHOL 128 07/24/2024    TRIG 75 07/24/2024    HDL 44 07/24/2024    ALT 14 07/24/2024    AST 24 07/24/2024     07/24/2024    K 4.2 07/24/2024     07/24/2024    CREATININE 0.9 07/24/2024    BUN 17 07/24/2024    CO2 25 07/24/2024    TSH 1.663 07/31/2024    HGBA1C 5.3 05/19/2006       I have explained the risks and benefits of endoscopy procedures to the patient/their POA including but not limited to bleeding, perforation, infection, and death.  The patient/their POA was asked if they understand and allowed to ask any further questions to their satisfaction.    Colten Gonzalez MD

## 2024-09-27 NOTE — ANESTHESIA PREPROCEDURE EVALUATION
09/27/2024  Elena Block is a 78 y.o., female.    Active Problem List with Overview Notes    Diagnosis Date Noted    Decreased strength of upper extremity 08/14/2024    Decreased range of motion of right shoulder 08/14/2024    Lumbar radiculopathy 07/17/2024    Numbness and tingling of both legs 07/17/2024    Adenomatous colon polyp 03/14/2021    Vaginal prolapse 01/08/2021    Atherosclerosis of aorta 05/15/2018     Noted on imaging 7/12/2017.      Overweight (BMI 25.0-29.9) 05/15/2017    Disorder of bursae and tendons in right shoulder region 06/20/2016    Post-surgical hypothyroidism 05/18/2015    Hyperlipidemia 03/28/2014    Acute midline low back pain with right-sided sciatica 03/22/2013    Combined forms of age-related cataract of both eyes 01/17/2013    Posterior vitreous detachment of both eyes 01/17/2013    Essential hypertension 11/14/2010     Past Surgical History:   Procedure Laterality Date    COLONOSCOPY  01/22/2008    COLONOSCOPY N/A 8/8/2019    Procedure: COLONOSCOPY;  Surgeon: Allie Preston MD;  Location: [address]);  Service: Endoscopy;  Laterality: N/A;    CYSTOSCOPY N/A 1/8/2021    Procedure: CYSTOSCOPY;  Surgeon: Mauricio Corea MD;  Location[facility];  Service: OB/GYN;  Laterality: N/A;    HYSTERECTOMY      Partial     LAPAROSCOPIC LYSIS OF ADHESIONS N/A 1/8/2021    Procedure: LYSIS, ADHESIONS, LAPAROSCOPIC;  Surgeon: Mauricio Corea MD;  Location: [facility] OR;  Service: OB/GYN;  Laterality: N/A;    PARTIAL HYSTERECTOMY      ROBOT-ASSISTED LAPAROSCOPIC ABDOMINAL SACROCOLPOPEXY USING DA PATSY XI N/A 1/8/2021    Procedure: XI ROBOTIC SACROCOLPOPEXY, ABDOMINAL;  Surgeon: Mauricio Corea MD;  Location: [facility] OR;  Service: OB/GYN;  Laterality: N/A;    ROBOT-ASSISTED LAPAROSCOPIC SURGICAL REMOVAL OF OVARY USING DA PATSY XI Right 1/8/2021    Procedure: XI  ROBOTIC OOPHORECTOMY;  Surgeon: Mauricio Corea MD;  [facility];  Service: OB/GYN;  Laterality: Right;    THYROIDECTOMY, PARTIAL      TONSILLECTOMY       Results for orders placed or performed during the hospital encounter of 12/17/20   EKG 12-lead    Collection Time: 12/17/20  8:58 AM    Narrative    Test Reason : Z01.818,    Vent. Rate : 067 BPM     Atrial Rate : 067 BPM     P-R Int : 128 ms          QRS Dur : 082 ms      QT Int : 382 ms       P-R-T Axes : 065 025 034 degrees     QTc Int : 403 ms    Normal sinus rhythm  Nonspecific T wave abnormality  Abnormal ECG  When compared with ECG of 05-DEC-2018 14:40,  No significant change was found  Confirmed by Allison Andersen MD ([id number]) on 12/17/2020 10:37:21 AM    Referred By: ABIGAIL LEMUS           Confirmed By:Allison Andersen MD     Social History     Socioeconomic History    Marital status:     Number of children: 2   Tobacco Use    Smoking status: Never    Smokeless tobacco: Never   Substance and Sexual Activity    Alcohol use: Yes     Alcohol/week: 1.0 standard drink of alcohol     Types: 1 Cans of beer per week     Comment: Occasionally; beer once a  week (socially)    Drug use: No    Sexual activity: Not Currently     Partners: Male     Comment: maybe once month   Social History Narrative    Mrs Block is  with 2 grown sons and 7 Grandkids/5 Great Grandkids     Social Determinants of Health     Financial Resource Strain: Low Risk  (3/22/2021)    Overall Financial Resource Strain (CARDIA)     Difficulty of Paying Living Expenses: Not hard at all   Food Insecurity: No Food Insecurity (3/22/2021)    Hunger Vital Sign     Worried About Running Out of Food in the Last Year: Never true     Ran Out of Food in the Last Year: Never true   Transportation Needs: No Transportation Needs (3/22/2021)    PRAPARE - Transportation     Lack of Transportation (Medical): No     Lack of Transportation (Non-Medical): No   Housing Stability: Unknown  "(3/22/2021)    Housing Stability Vital Sign     Unable to Pay for Housing in the Last Year: No     Unstable Housing in the Last Year: No     Exercise stress echo w/ color flow doppler    Height: 5' 8" (1.727 m)   Weight: 93.4 kg (206 lb)   Blood Pressure: Not recorded    Date of Study: 12/5/18   Ordering Provider: Sandra Lyon MD   Clinical Indications: Chest pain, unspecified type [R07.9 (ICD-10-CM)]       [facility]  Performing Staff   Cardiology: Jeffrey Guardado MD    Tech: Franny Escudero   Support Staff: Carmelina Huntley Gayle C, RN        Reason for Exam  Priority: Routine  Dx: Chest pain, unspecified type [R07.9 (ICD-10-CM)]     Summary    The patient reported no symptoms during the stress test.  The test was stopped secondary to fatigue.  There were no arrhythmias during stress.  Normal right ventricular systolic function.  There is 1 mm of depression in the leads.  The EKG portion of this study is abnormal but not diagnostic.  The stress echo portion of this study is negative for myocardial ischemia.  Pre-op Assessment    I have reviewed the Patient Summary Reports.    I have reviewed the NPO Status.   I have reviewed the Medications.     Review of Systems  Anesthesia Hx:  No problems with previous Anesthesia                Social:  Non-Smoker       Hematology/Oncology:  Hematology Normal   Oncology Normal                                   EENT/Dental:  EENT/Dental Normal           Cardiovascular:     Hypertension              ECG has been reviewed.                          Pulmonary:  Pulmonary Normal                       Renal/:  Renal/ Normal                 Hepatic/GI:  Hepatic/GI Normal                 Musculoskeletal:  Arthritis               Neurological:    Neuromuscular Disease,                                   Endocrine:   Hypothyroidism          Dermatological:  Skin Normal    Psych:  Psychiatric Normal                  Physical Exam  General: Well " nourished, Cooperative, Alert and Oriented    Airway:  Mallampati: II   Mouth Opening: Normal  TM Distance: Normal  Tongue: Normal  Neck ROM: Normal ROM    Dental:  Intact    Chest/Lungs:  Normal Respiratory Rate    Anesthesia Plan  Type of Anesthesia, risks & benefits discussed:    Anesthesia Type: Gen Natural Airway  Intra-op Monitoring Plan: Standard ASA Monitors  Post Op Pain Control Plan: multimodal analgesia  Induction:  IV  Informed Consent: Informed consent signed with the Patient and all parties understand the risks and agree with anesthesia plan.  All questions answered.   ASA Score: 2  Day of Surgery Review of History & Physical: H&P Update referred to the surgeon/provider.    Ready For Surgery From Anesthesia Perspective.   .

## 2024-09-30 NOTE — ANESTHESIA POSTPROCEDURE EVALUATION
Anesthesia Post Evaluation    Patient: Elena Block    Procedure(s) Performed: Procedure(s) (LRB):  COLONOSCOPY (N/A)    Final Anesthesia Type: general      Patient location during evaluation: PACU  Patient participation: Yes- Able to Participate  Level of consciousness: awake and alert and oriented  Pain management: adequate  Airway patency: patent    PONV status at discharge: No PONV  Anesthetic complications: no      Cardiovascular status: blood pressure returned to baseline and hemodynamically stable  Respiratory status: unassisted  Hydration status: euvolemic  Follow-up not needed.              Vitals Value Taken Time   /70 09/27/24 1428   Temp 36.7 °C (98 °F) 09/27/24 1358   Pulse 64 09/27/24 1428   Resp 17 09/27/24 1428   SpO2 99 % 09/27/24 1428         Event Time   Out of Recovery 14:29:18         Pain/Chari Score: No data recorded

## 2024-10-12 NOTE — PROGRESS NOTES
HPI     Glaucoma            Comments: HVF and OCT review today and pt states she is having more   difficulty focusing up close          Comments    DLS: 7/16/24    1. POAG with Phacomorphic Narrowing OS>OD  2. APD OS  3. NS OU  4. Hx CRVO OS (2013)    MEDS:  Cosopt BID OU  Rocklatan QHS OU             Last edited by Mara Potter MA on 10/15/2024  9:31 AM.            Assessment /Plan     For exam results, see Encounter Report.    Primary open angle glaucoma (POAG) of left eye, severe stage  -     brimonidine 0.2% (ALPHAGAN) 0.2 % Drop; Place 1 drop into both eyes 3 (three) times daily.  Dispense: 30 mL; Refill: 3    Primary open angle glaucoma (POAG) of right eye, moderate stage  -     brimonidine 0.2% (ALPHAGAN) 0.2 % Drop; Place 1 drop into both eyes 3 (three) times daily.  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 If may have had an unobserved recurrent IHRVO os in 2020 or 2022  vs very assymetric LTG       POAG with phacomorphic narrowing --> Moderate // Severe OS with APD OS  -Fhx (+M, +2brothers with blindness), Steroids (),Trauma()  -Drops:  latanoprost qHS OU // Dorzolamide-Timolol BID OU  -Drop intolerance/contraindication: -  -Laser: -  -Surgeries: -  -CCT: 586//587  -Gonio: TM/SS  IOP max: 20 // 20. Progression at IOP ~17-18  IOP goal: 12 and under     HVF - 22 test 2008 to 2024 - full to near full until 2020    4 test 2020 to 2024 - + development of a SAD od //  development  SALT  os     Rapid progression in left eye from  5301-7486.    OCT - New base  - 2024 - RNFL- dec G/TI, bord TS/T/NI od // dec G/TS/TI/NI, bord T/N os    Similar to OCT done  10/18/2023 (on a different machine )         Glaucoma (type and duration)    // ?? LTG OS>OD  (( old bhavana pt - since 2008)    First HVF   2008   First photos   2008    Treatment / Drops started   ?           Family history    + mother / 2 brothers - w/ blindness)         Glaucoma meds    cosopt  ou / latanoprost ou         H/O adverse rxn to glaucoma drops    ?        LASERS    ?        GLAUCOMA SURGERIES    none        OTHER EYE SURGERIES    none        CDR    0.8/0.9 (per aylin)         Tbase    18-21 ou           Tmax    21/21    (( check to see about IOP's in legacy notes)         Ttarget    ~ 12 per aylin              HVF    15 test 2008 to  2022 - full  od // sup paracentral scot - spilt fix  os   2 test 2022 to 2023 - early SAD od // SALT w/ split fix ((++ prog os 2022 to 2023 to 2024)         Gonio    +3 S/N/T, +2 Inf - OU         CCT    586/587        OCT    2 old test 2014 to 2015 - nl od // nl os     2 newer  test 2022 to 2023-  RNFL dec G/TI od // dec G/TS/TI, bord NS/T/NI         Disc photos    stereo disc 8/2008 // fundus 9/2009 (IHRVO) - 11/2009 - 5/2010 //     - Ttoday    15/15 - ? Minimal resp to rocklatan  (good intiial  res to cosopt)   - Test done today    IOP // HVF / DFE / OCT     Discussed may need CE/IOL --> she is not quite ready     Combined forms of age-related cataract OU  Visually significant and interfering with activities of daily living including driving/reading  Patient desires cataract surgery for visual rehabilitation.   Complete glaucoma workup prior to surgery   Will need for visual rehabilitation and for phacomorphic narrowing / glaucoma control     When ready    LEFT EYE then RIGHT EYE    LEFT EYE DIBOO +22.00 target -0.47 (re-check in future ) - IOL calc not in harmony    RIGHT EYE DIBOO +22.00 target -0.38    With or without MIGS     Hx CRVO vs  inf HRVO OS 2013 - saw Dr. Paz  No VF defects apparent until 2020      PLAN  IOP higher than ideal / + VF prog ou   Cont rocklatan -  Rx sent   Cont cosopt   add brimonidine     Still needs disc photos - when next dilated - NONE in harmony     F/U with 2-3 months with IOP / gonio - ? Candidate for slt's (may need PI's first )    If needs any glaucoma surgery / can do CE at same time

## 2024-10-15 ENCOUNTER — OFFICE VISIT (OUTPATIENT)
Dept: OPHTHALMOLOGY | Facility: CLINIC | Age: 79
End: 2024-10-15
Payer: MEDICARE

## 2024-10-15 ENCOUNTER — CLINICAL SUPPORT (OUTPATIENT)
Dept: OPHTHALMOLOGY | Facility: CLINIC | Age: 79
End: 2024-10-15
Payer: MEDICARE

## 2024-10-15 DIAGNOSIS — H40.1112 PRIMARY OPEN ANGLE GLAUCOMA (POAG) OF RIGHT EYE, MODERATE STAGE: ICD-10-CM

## 2024-10-15 DIAGNOSIS — H25.813 COMBINED FORMS OF AGE-RELATED CATARACT OF BOTH EYES: ICD-10-CM

## 2024-10-15 DIAGNOSIS — H40.1123 PRIMARY OPEN ANGLE GLAUCOMA (POAG) OF LEFT EYE, SEVERE STAGE: Primary | ICD-10-CM

## 2024-10-15 DIAGNOSIS — H34.8122 HEMISPHERIC RETINAL VEIN OCCLUSION OF LEFT EYE: ICD-10-CM

## 2024-10-15 DIAGNOSIS — H53.8 BLURRED VISION, BILATERAL: ICD-10-CM

## 2024-10-15 DIAGNOSIS — H26.9 CORTICAL CATARACT OF BOTH EYES: ICD-10-CM

## 2024-10-15 DIAGNOSIS — H43.813 POSTERIOR VITREOUS DETACHMENT OF BOTH EYES: ICD-10-CM

## 2024-10-15 DIAGNOSIS — H21.562 AFFERENT PUPILLARY DEFECT, LEFT: ICD-10-CM

## 2024-10-15 DIAGNOSIS — H25.13 NUCLEAR SCLEROSIS, BILATERAL: ICD-10-CM

## 2024-10-15 DIAGNOSIS — H40.1123 PRIMARY OPEN ANGLE GLAUCOMA (POAG) OF LEFT EYE, SEVERE STAGE: ICD-10-CM

## 2024-10-15 PROCEDURE — 1159F MED LIST DOCD IN RCRD: CPT | Mod: CPTII,S$GLB,, | Performed by: OPHTHALMOLOGY

## 2024-10-15 PROCEDURE — 1160F RVW MEDS BY RX/DR IN RCRD: CPT | Mod: CPTII,S$GLB,, | Performed by: OPHTHALMOLOGY

## 2024-10-15 PROCEDURE — 3288F FALL RISK ASSESSMENT DOCD: CPT | Mod: CPTII,S$GLB,, | Performed by: OPHTHALMOLOGY

## 2024-10-15 PROCEDURE — 99999 PR PBB SHADOW E&M-EST. PATIENT-LVL III: CPT | Mod: PBBFAC,,, | Performed by: OPHTHALMOLOGY

## 2024-10-15 PROCEDURE — 99214 OFFICE O/P EST MOD 30 MIN: CPT | Mod: S$GLB,,, | Performed by: OPHTHALMOLOGY

## 2024-10-15 PROCEDURE — 1126F AMNT PAIN NOTED NONE PRSNT: CPT | Mod: CPTII,S$GLB,, | Performed by: OPHTHALMOLOGY

## 2024-10-15 PROCEDURE — 1101F PT FALLS ASSESS-DOCD LE1/YR: CPT | Mod: CPTII,S$GLB,, | Performed by: OPHTHALMOLOGY

## 2024-10-15 RX ORDER — BRIMONIDINE TARTRATE 2 MG/ML
1 SOLUTION/ DROPS OPHTHALMIC 3 TIMES DAILY
Qty: 30 ML | Refills: 3 | Status: SHIPPED | OUTPATIENT
Start: 2024-10-15

## 2024-10-15 NOTE — PROGRESS NOTES
Assessment /Plan     For exam results, see Encounter Report.    Primary open angle glaucoma (POAG) of left eye, severe stage  -     Schreiber Visual Field - OU - Extended - Both Eyes  -     Posterior Segment OCT Optic Nerve- Both eyes    Primary open angle glaucoma (POAG) of right eye, moderate stage  -     Schreiber Visual Field - OU - Extended - Both Eyes  -     Posterior Segment OCT Optic Nerve- Both eyes      Oct done ou   1st bmo scan done today   Previously was rnfl scan     24-2  ss done ou     Rel & Fix=  good ou     Coop=    good     Patient has no allergies to latex or adhesives at this time    Jthomas    Mrx    -.75 + 1.50 x 002  od    Pl + 1.00 x 180  os

## 2024-10-16 ENCOUNTER — OFFICE VISIT (OUTPATIENT)
Dept: OPTOMETRY | Facility: CLINIC | Age: 79
End: 2024-10-16
Payer: MEDICARE

## 2024-10-16 DIAGNOSIS — H53.8 BLURRED VISION, BILATERAL: Primary | ICD-10-CM

## 2024-10-16 DIAGNOSIS — H52.4 PRESBYOPIA OF BOTH EYES: ICD-10-CM

## 2024-10-16 PROCEDURE — 1101F PT FALLS ASSESS-DOCD LE1/YR: CPT | Mod: CPTII,S$GLB,, | Performed by: OPTOMETRIST

## 2024-10-16 PROCEDURE — 99999 PR PBB SHADOW E&M-EST. PATIENT-LVL III: CPT | Mod: PBBFAC,,, | Performed by: OPTOMETRIST

## 2024-10-16 PROCEDURE — 92015 DETERMINE REFRACTIVE STATE: CPT | Mod: S$GLB,,, | Performed by: OPTOMETRIST

## 2024-10-16 PROCEDURE — 1126F AMNT PAIN NOTED NONE PRSNT: CPT | Mod: CPTII,S$GLB,, | Performed by: OPTOMETRIST

## 2024-10-16 PROCEDURE — 3288F FALL RISK ASSESSMENT DOCD: CPT | Mod: CPTII,S$GLB,, | Performed by: OPTOMETRIST

## 2024-10-16 PROCEDURE — 99212 OFFICE O/P EST SF 10 MIN: CPT | Mod: S$GLB,,, | Performed by: OPTOMETRIST

## 2024-10-16 PROCEDURE — 1159F MED LIST DOCD IN RCRD: CPT | Mod: CPTII,S$GLB,, | Performed by: OPTOMETRIST

## 2024-10-16 NOTE — PROGRESS NOTES
HPI    GRIS: 10/15/2024 Dr. Padilla  Last DFE: 10/15/2024  Chief complaint (CC): 78 yr old female in today for Refraction  Glasses? Yes  Contacts? No  H/o eye surgery, injections or laser: No  H/o eye injury: No  Known eye conditions?   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 Maternal Immediate Family(Cataract and   Glaucoma)  Eye gtts? Dorzolamide Timolol, Cosopt, and Rocklatan drops       (-) Flashes (-)  Floaters (-) Mucous   (-)  Tearing (-) Itching (-) Burning   (-) Headaches (-) Eye Pain/discomfort (-) Irritation   (-)  Redness (-) Double vision (+) Blurry vision    CL Exam:No  Current CL Brand: N/A  Rx OD     OS               Wears full-time or part-time:  Full time/Part Time     Sleeps with contact lenses:  Yes/No     CL Solution used:     How often replace CLs:      Any problem with VA with CLs?  Yes/No     Diabetic? No  A1c? Lab Results       Component                Value               Date                       HGBA1C                   5.3                 05/19/2006              Last edited by Jada Milligan on 10/16/2024 10:52 AM.            Assessment /Plan     For exam results, see Encounter Report.    Blurred vision, bilateral    Presbyopia of both eyes      MONITOR. ED PT ON ALL EXAM FINDINGS  RX FINAL SPECS   OCULAR HEALTH STABLE OD, OS   CONTINUE W/ OPHTH FOR ROUTINE CARE; MONITOR.   RTC 1 YR//PRN FOR REE/DFE

## 2024-10-29 DIAGNOSIS — Z29.9 PREVENTIVE MEASURE: ICD-10-CM

## 2024-11-02 RX ORDER — CYANOCOBALAMIN/FOLIC AC/VIT B6 1-2.5-25MG
1 TABLET ORAL WEEKLY
Qty: 30 TABLET | Refills: 12 | Status: SHIPPED | OUTPATIENT
Start: 2024-11-02

## 2024-11-04 DIAGNOSIS — G62.9 NEUROPATHY: Primary | ICD-10-CM

## 2024-11-04 DIAGNOSIS — Z29.9 PREVENTIVE MEASURE: ICD-10-CM

## 2024-11-04 DIAGNOSIS — H40.1123 PRIMARY OPEN ANGLE GLAUCOMA (POAG) OF LEFT EYE, SEVERE STAGE: ICD-10-CM

## 2024-11-04 DIAGNOSIS — H40.1112 PRIMARY OPEN ANGLE GLAUCOMA (POAG) OF RIGHT EYE, MODERATE STAGE: ICD-10-CM

## 2024-11-04 RX ORDER — NETARSUDIL AND LATANOPROST OPHTHALMIC SOLUTION, 0.02%/0.005% .2; .05 MG/ML; MG/ML
1 SOLUTION/ DROPS OPHTHALMIC; TOPICAL DAILY
Qty: 2.5 ML | Refills: 6 | Status: SHIPPED | OUTPATIENT
Start: 2024-11-04

## 2024-11-04 RX ORDER — CYANOCOBALAMIN/FOLIC AC/VIT B6 1-2.5-25MG
1 TABLET ORAL WEEKLY
Qty: 30 TABLET | Refills: 12 | Status: SHIPPED | OUTPATIENT
Start: 2024-11-04

## 2024-11-04 NOTE — TELEPHONE ENCOUNTER
Refill Routing Note   Medication(s) are not appropriate for processing by [facility] for the following reason(s):        Outside of protocol    [facility] action(s):  Route               Appointments  past 12m or future 3m with PCP    Date Provider   Last Visit   7/31/2024 Sandra Lyon MD   Next Visit   2/6/2025 Sandra Lyon MD   ED visits in past 90 days: 0        Note composed:11:16 AM 11/04/2024

## 2024-11-19 ENCOUNTER — PATIENT MESSAGE (OUTPATIENT)
Dept: INTERNAL MEDICINE | Facility: CLINIC | Age: 79
End: 2024-11-19
Payer: MEDICARE

## 2024-11-19 DIAGNOSIS — M19.90 OSTEOARTHRITIS, UNSPECIFIED OSTEOARTHRITIS TYPE, UNSPECIFIED SITE: ICD-10-CM

## 2024-11-19 DIAGNOSIS — N99.3 PROLAPSE OF VAGINAL VAULT AFTER HYSTERECTOMY: ICD-10-CM

## 2024-11-19 RX ORDER — ESTRADIOL 0.1 MG/G
1 CREAM VAGINAL
Qty: 42.5 G | Refills: 2 | Status: SHIPPED | OUTPATIENT
Start: 2024-11-20

## 2024-11-19 RX ORDER — MELOXICAM 15 MG/1
TABLET ORAL
Qty: 90 TABLET | Refills: 0 | OUTPATIENT
Start: 2024-11-19

## 2024-11-19 NOTE — TELEPHONE ENCOUNTER
No care due was identified.  Health [facility] Embedded Care Due Messages. Reference number: [id number].   11/19/2024 9:39:58 AM CST

## 2024-11-19 NOTE — TELEPHONE ENCOUNTER
No care due was identified.  Health [facility] Embedded Care Due Messages. Reference number: [id number].   11/19/2024 5:40:55 PM CST

## 2024-11-19 NOTE — TELEPHONE ENCOUNTER
Refill Routing Note   Medication(s) are not appropriate for processing by [facility] for the following reason(s):        Outside of protocol    [facility] action(s):  Route  Approve               Appointments  past 12m or future 3m with PCP    Date Provider   Last Visit   7/31/2024 Sandra Lyon MD   Next Visit   2/6/2025 Sandra Lyon MD   ED visits in past 90 days: 0        Note composed:11:50 AM 11/19/2024

## 2024-11-20 NOTE — TELEPHONE ENCOUNTER
Refill Routing Note   Medication(s) are not appropriate for processing by [facility] for the following reason(s):        Outside of protocol    [facility] action(s):  Route             Appointments  past 12m or future 3m with PCP    Date Provider   Last Visit   7/31/2024 Sandra Lyon MD   Next Visit   2/6/2025 Sandra Lyon MD   ED visits in past 90 days: 0        Note composed:8:40 AM 11/20/2024

## 2024-11-22 DIAGNOSIS — M19.90 OSTEOARTHRITIS, UNSPECIFIED OSTEOARTHRITIS TYPE, UNSPECIFIED SITE: ICD-10-CM

## 2024-11-22 RX ORDER — MELOXICAM 15 MG/1
TABLET ORAL
Qty: 90 TABLET | Refills: 0 | OUTPATIENT
Start: 2024-11-22

## 2024-11-22 NOTE — TELEPHONE ENCOUNTER
Refill Decision Note   Elena Block  is requesting a refill authorization.  Brief Assessment and Rationale for Refill:  Quick Discontinue     Medication Therapy Plan: Script pending provider's review in separate request       Comments:     Note composed:1:39 PM 11/22/2024

## 2024-11-22 NOTE — TELEPHONE ENCOUNTER
Spoke with Mrs Block  who is taking the Meloxican 15mg daily and since filled 9/9 won't need it refilled till 1st week in December.  She will call back then.

## 2024-11-22 NOTE — TELEPHONE ENCOUNTER
No care due was identified.  Health [facility] Embedded Care Due Messages. Reference number: [id number].   11/22/2024 11:03:31 AM CST

## 2025-01-06 DIAGNOSIS — I10 ESSENTIAL HYPERTENSION: ICD-10-CM

## 2025-01-06 DIAGNOSIS — M19.90 OSTEOARTHRITIS, UNSPECIFIED OSTEOARTHRITIS TYPE, UNSPECIFIED SITE: ICD-10-CM

## 2025-01-06 NOTE — TELEPHONE ENCOUNTER
Care Due:                  Date            Visit Type   Department     Provider  --------------------------------------------------------------------------------                                EP -                              PRIMARY      [facility] INTERNAL  Last Visit: 07-      CARE ([facility])   [facility]       Sandra   [place]                              EP -                              PRIMARY      [facility] INTERNAL  Next Visit: 02-      [facility] ([facility])   [facility]                                                            Last  Test          Frequency    Reason                     Performed    Due Date  --------------------------------------------------------------------------------    CBC.........  12 months..  meloxicam................  01- 01-    Vitamin D...  12 months..  cholecalciferol,.........  01- 01-    Health [facility] Embedded Care Due Messages. Reference number: [id number].   1/06/2025 9:37:23 AM CST

## 2025-01-07 RX ORDER — MELOXICAM 15 MG/1
TABLET ORAL
Qty: 90 TABLET | Refills: 0 | Status: SHIPPED | OUTPATIENT
Start: 2025-01-07

## 2025-01-07 RX ORDER — VALSARTAN 320 MG/1
320 TABLET ORAL
Qty: 90 TABLET | Refills: 2 | Status: SHIPPED | OUTPATIENT
Start: 2025-01-07

## 2025-01-07 NOTE — TELEPHONE ENCOUNTER
Refill Routing Note   Medication(s) are not appropriate for processing by [facility] for the following reason(s):        Required vitals abnormal  Outside of protocol    [facility] action(s):  Defer  Route     Requires labs : Yes             Appointments  past 12m or future 3m with PCP    Date Provider   Last Visit   7/31/2024 Sandra Lyon MD   Next Visit   2/6/2025 Sandra Lyon MD   ED visits in past 90 days: 0        Note composed:9:03 PM 01/06/2025

## 2025-01-30 ENCOUNTER — LAB VISIT (OUTPATIENT)
Dept: LAB | Facility: HOSPITAL | Age: 80
End: 2025-01-30
Attending: INTERNAL MEDICINE
Payer: MEDICARE

## 2025-01-30 DIAGNOSIS — E78.5 HYPERLIPIDEMIA, UNSPECIFIED HYPERLIPIDEMIA TYPE: ICD-10-CM

## 2025-01-30 DIAGNOSIS — Z79.899 HIGH RISK MEDICATION USE: ICD-10-CM

## 2025-01-30 DIAGNOSIS — I10 ESSENTIAL HYPERTENSION: ICD-10-CM

## 2025-01-30 LAB
ALBUMIN SERPL BCP-MCNC: 3.6 G/DL (ref 3.5–5.2)
ALP SERPL-CCNC: 79 U/L (ref 40–150)
ALT SERPL W/O P-5'-P-CCNC: 14 U/L (ref 10–44)
ANION GAP SERPL CALC-SCNC: 7 MMOL/L (ref 8–16)
AST SERPL-CCNC: 21 U/L (ref 10–40)
BASOPHILS # BLD AUTO: 0.04 K/UL (ref 0–0.2)
BASOPHILS NFR BLD: 0.7 % (ref 0–1.9)
BILIRUB SERPL-MCNC: 0.4 MG/DL (ref 0.1–1)
BUN SERPL-MCNC: 13 MG/DL (ref 8–23)
CALCIUM SERPL-MCNC: 9.2 MG/DL (ref 8.7–10.5)
CHLORIDE SERPL-SCNC: 108 MMOL/L (ref 95–110)
CHOLEST SERPL-MCNC: 150 MG/DL (ref 120–199)
CHOLEST/HDLC SERPL: 3.3 {RATIO} (ref 2–5)
CO2 SERPL-SCNC: 24 MMOL/L (ref 23–29)
CREAT SERPL-MCNC: 0.8 MG/DL (ref 0.5–1.4)
DIFFERENTIAL METHOD BLD: NORMAL
EOSINOPHIL # BLD AUTO: 0.1 K/UL (ref 0–0.5)
EOSINOPHIL NFR BLD: 1.6 % (ref 0–8)
ERYTHROCYTE [DISTWIDTH] IN BLOOD BY AUTOMATED COUNT: 12.9 % (ref 11.5–14.5)
EST. GFR  (NO RACE VARIABLE): >60 ML/MIN/1.73 M^2
ESTIMATED AVG GLUCOSE: 105 MG/DL (ref 68–131)
GLUCOSE SERPL-MCNC: 85 MG/DL (ref 70–110)
HBA1C MFR BLD: 5.3 % (ref 4–5.6)
HCT VFR BLD AUTO: 41.5 % (ref 37–48.5)
HDLC SERPL-MCNC: 46 MG/DL (ref 40–75)
HDLC SERPL: 30.7 % (ref 20–50)
HGB BLD-MCNC: 14 G/DL (ref 12–16)
IMM GRANULOCYTES # BLD AUTO: 0.01 K/UL (ref 0–0.04)
IMM GRANULOCYTES NFR BLD AUTO: 0.2 % (ref 0–0.5)
LDLC SERPL CALC-MCNC: 86 MG/DL (ref 63–159)
LYMPHOCYTES # BLD AUTO: 2 K/UL (ref 1–4.8)
LYMPHOCYTES NFR BLD: 36.2 % (ref 18–48)
MCH RBC QN AUTO: 29.3 PG (ref 27–31)
MCHC RBC AUTO-ENTMCNC: 33.7 G/DL (ref 32–36)
MCV RBC AUTO: 87 FL (ref 82–98)
MONOCYTES # BLD AUTO: 0.5 K/UL (ref 0.3–1)
MONOCYTES NFR BLD: 8.4 % (ref 4–15)
NEUTROPHILS # BLD AUTO: 3 K/UL (ref 1.8–7.7)
NEUTROPHILS NFR BLD: 52.9 % (ref 38–73)
NONHDLC SERPL-MCNC: 104 MG/DL
NRBC BLD-RTO: 0 /100 WBC
PLATELET # BLD AUTO: 305 K/UL (ref 150–450)
PMV BLD AUTO: 9.2 FL (ref 9.2–12.9)
POTASSIUM SERPL-SCNC: 4.1 MMOL/L (ref 3.5–5.1)
PROT SERPL-MCNC: 8 G/DL (ref 6–8.4)
RBC # BLD AUTO: 4.78 M/UL (ref 4–5.4)
SODIUM SERPL-SCNC: 139 MMOL/L (ref 136–145)
TRIGL SERPL-MCNC: 90 MG/DL (ref 30–150)
WBC # BLD AUTO: 5.58 K/UL (ref 3.9–12.7)

## 2025-01-30 PROCEDURE — 85025 COMPLETE CBC W/AUTO DIFF WBC: CPT | Performed by: INTERNAL MEDICINE

## 2025-01-30 PROCEDURE — 83036 HEMOGLOBIN GLYCOSYLATED A1C: CPT | Performed by: INTERNAL MEDICINE

## 2025-01-30 PROCEDURE — 36415 COLL VENOUS BLD VENIPUNCTURE: CPT | Performed by: INTERNAL MEDICINE

## 2025-01-30 PROCEDURE — 80053 COMPREHEN METABOLIC PANEL: CPT | Performed by: INTERNAL MEDICINE

## 2025-01-30 PROCEDURE — 80061 LIPID PANEL: CPT | Performed by: INTERNAL MEDICINE

## 2025-02-02 NOTE — PROGRESS NOTES
HPI    DLS: 10/15/24    Patient here for 3 month IOP ck/gonio. Patient states vision is getting   more cloudy but thinks she is still adjusting to new glasses.     1. POAG with Phacomorphic Narrowing OS>OD  2. APD OS  3. NS OU  4. Hx CRVO OS (2013)    MEDS:  Cosopt BID OU  Rocklatan QHS OU  Brimonidine TID OU     Last edited by Verna Zavala MA on 2/4/2025  8:18 AM.            Assessment /Plan     For exam results, see Encounter Report.    Primary open angle glaucoma (POAG) of left eye, severe stage  -     dorzolamide-timolol 2-0.5% (COSOPT) 22.3-6.8 mg/mL ophthalmic solution; Place 1 drop into both eyes 2 (two) times daily.  Dispense: 30 mL; Refill: 6    Primary open angle glaucoma (POAG) of right eye, moderate stage  -     dorzolamide-timolol 2-0.5% (COSOPT) 22.3-6.8 mg/mL ophthalmic solution; Place 1 drop into both eyes 2 (two) times daily.  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 If may have had an unobserved recurrent IHRVO os in 2020 or 2022  vs very assymetric LTG       POAG with phacomorphic narrowing --> Moderate // Severe OS with APD OS  -Fhx (+M, +2brothers with blindness), Steroids (),Trauma()  -Drops:  latanoprost qHS OU // Dorzolamide-Timolol BID OU  -Drop intolerance/contraindication: -  -Laser: -  -Surgeries: -  -CCT: 586//587  -Gonio: TM/SS  IOP max: 20 // 20. Progression at IOP ~17-18  IOP goal: 12 and under     HVF - 22 test 2008 to 2024 - full to near full until 2020    4 test 2020 to 2024 - + development of a  SAD od //  development  SALT  os     Rapid progression in left eye from 4372-9260.    OCT - New base  - 2024 - RNFL- dec G/TI, bord TS/T/NI od // dec G/TS/TI/NI, bord T/N os    Similar to OCT done  10/18/2023 (on a different machine )         Glaucoma (type and duration)    // ?? LTG OS>OD  (( old bhavana pt - since 2008)    First HVF   2008   First photos   2008    Treatment / Drops started   ?           Family history    + mother / 2 brothers - w/ blindness)         Glaucoma meds    cosopt  ou / latanoprost ou         H/O adverse rxn to glaucoma drops    ?        LASERS    ?        GLAUCOMA SURGERIES    none        OTHER EYE SURGERIES    none        CDR    0.8/0.9 (per aylin)         Tbase    18-21 ou           Tmax    21/21    (( check to see about IOP's in legacy notes)         Ttarget    ~ 14              HVF    15 test 2008 to  2022 - full  od // sup paracentral scot - spilt fix  os   2 test 2022 to 2023 - early SAD od // SALT w/ split fix ((++ prog os 2022 to 2023 to 2024)         Gonio    +3 S/N/T, +2 Inf - OU         CCT    586/587        OCT    2 old test 2014 to 2015 - nl od // nl os     2 newer  test 2022 to 2023-  RNFL dec G/TI od // dec G/TS/TI, bord NS/T/NI         Disc photos    stereo disc 8/2008 // fundus 9/2009 (IHRVO) - 11/2009 - 5/2010 //     - Ttoday    11/10 - ? Minimal resp to rocklatan  (good intiial  res to cosopt)   - Test done today    IOP // gonio    Discussed may need CE/IOL --> she is not quite ready - 2/2025     Combined forms of age-related cataract OU  Visually significant and interfering with activities of daily living including driving/reading  Patient desires cataract surgery for visual rehabilitation.   Complete glaucoma workup prior to surgery   Will need for visual rehabilitation and for phacomorphic narrowing / glaucoma control     When ready    LEFT EYE then RIGHT EYE    LEFT EYE DIBOO +22.00 target -0.47 (re-check in future ) - IOL calc not in harmony    RIGHT EYE DIBOO  +22.00 target -0.38    With or without MIGS od // with ? ALESSANDRA's / xen os     Hx CRVO vs inf HRVO OS 2013 - saw Dr. Paz  No VF defects apparent until 2020      PLAN  IOP  - ok (below target of 14 ou   Cont rocklatan - - sample given - (cost up again at first of year - ? Needs to meet deductable)   Cont cosopt   Cont  brimonidine   Consider SLT's prn (may need PI's first )     Still needs disc photos - when next dilated - NONE in harmony     F/U with 4  months with IOP  // HVF // DFE / DISC PHOTOS   ? Candidate for slt's (may need PI's first )    If needs any glaucoma surgery / can do CE at same time - iop OK AND WANTS TO WAIT ON CE FOR NOW

## 2025-02-04 ENCOUNTER — OFFICE VISIT (OUTPATIENT)
Dept: OPHTHALMOLOGY | Facility: CLINIC | Age: 80
End: 2025-02-04
Payer: MEDICARE

## 2025-02-04 DIAGNOSIS — H40.1112 PRIMARY OPEN ANGLE GLAUCOMA (POAG) OF RIGHT EYE, MODERATE STAGE: ICD-10-CM

## 2025-02-04 DIAGNOSIS — H34.8122 HEMISPHERIC RETINAL VEIN OCCLUSION OF LEFT EYE: ICD-10-CM

## 2025-02-04 DIAGNOSIS — H43.813 POSTERIOR VITREOUS DETACHMENT OF BOTH EYES: ICD-10-CM

## 2025-02-04 DIAGNOSIS — H26.9 CORTICAL CATARACT OF BOTH EYES: ICD-10-CM

## 2025-02-04 DIAGNOSIS — H40.1123 PRIMARY OPEN ANGLE GLAUCOMA (POAG) OF LEFT EYE, SEVERE STAGE: Primary | ICD-10-CM

## 2025-02-04 DIAGNOSIS — H21.562 AFFERENT PUPILLARY DEFECT, LEFT: ICD-10-CM

## 2025-02-04 DIAGNOSIS — H25.813 COMBINED FORMS OF AGE-RELATED CATARACT OF BOTH EYES: ICD-10-CM

## 2025-02-04 DIAGNOSIS — H25.13 NUCLEAR SCLEROSIS, BILATERAL: ICD-10-CM

## 2025-02-04 DIAGNOSIS — H53.8 BLURRED VISION, BILATERAL: ICD-10-CM

## 2025-02-04 PROCEDURE — 3288F FALL RISK ASSESSMENT DOCD: CPT | Mod: CPTII,S$GLB,, | Performed by: OPHTHALMOLOGY

## 2025-02-04 PROCEDURE — 1101F PT FALLS ASSESS-DOCD LE1/YR: CPT | Mod: CPTII,S$GLB,, | Performed by: OPHTHALMOLOGY

## 2025-02-04 PROCEDURE — 1159F MED LIST DOCD IN RCRD: CPT | Mod: CPTII,S$GLB,, | Performed by: OPHTHALMOLOGY

## 2025-02-04 PROCEDURE — 99999 PR PBB SHADOW E&M-EST. PATIENT-LVL III: CPT | Mod: PBBFAC,,, | Performed by: OPHTHALMOLOGY

## 2025-02-04 PROCEDURE — 1160F RVW MEDS BY RX/DR IN RCRD: CPT | Mod: CPTII,S$GLB,, | Performed by: OPHTHALMOLOGY

## 2025-02-04 PROCEDURE — 1126F AMNT PAIN NOTED NONE PRSNT: CPT | Mod: CPTII,S$GLB,, | Performed by: OPHTHALMOLOGY

## 2025-02-04 PROCEDURE — 99214 OFFICE O/P EST MOD 30 MIN: CPT | Mod: S$GLB,,, | Performed by: OPHTHALMOLOGY

## 2025-02-04 RX ORDER — DORZOLAMIDE HYDROCHLORIDE AND TIMOLOL MALEATE 20; 5 MG/ML; MG/ML
1 SOLUTION/ DROPS OPHTHALMIC 2 TIMES DAILY
Qty: 30 ML | Refills: 6 | Status: SHIPPED | OUTPATIENT
Start: 2025-02-04 | End: 2025-02-08

## 2025-02-05 PROBLEM — D36.9 MULTIPLE ADENOMATOUS POLYPS: Status: ACTIVE | Noted: 2025-02-05

## 2025-02-06 ENCOUNTER — OFFICE VISIT (OUTPATIENT)
Dept: INTERNAL MEDICINE | Facility: CLINIC | Age: 80
End: 2025-02-06
Attending: INTERNAL MEDICINE
Payer: MEDICARE

## 2025-02-06 ENCOUNTER — LAB VISIT (OUTPATIENT)
Dept: LAB | Facility: HOSPITAL | Age: 80
End: 2025-02-06
Attending: INTERNAL MEDICINE
Payer: MEDICARE

## 2025-02-06 VITALS
HEIGHT: 68 IN | RESPIRATION RATE: 18 BRPM | OXYGEN SATURATION: 98 % | SYSTOLIC BLOOD PRESSURE: 100 MMHG | BODY MASS INDEX: 29.16 KG/M2 | WEIGHT: 192.38 LBS | HEART RATE: 67 BPM | TEMPERATURE: 97 F | DIASTOLIC BLOOD PRESSURE: 50 MMHG

## 2025-02-06 DIAGNOSIS — M51.379 DEGENERATION OF INTERVERTEBRAL DISC OF LUMBOSACRAL REGION, UNSPECIFIED WHETHER PAIN PRESENT: ICD-10-CM

## 2025-02-06 DIAGNOSIS — D64.9 ANEMIA, UNSPECIFIED TYPE: ICD-10-CM

## 2025-02-06 DIAGNOSIS — M15.0 PRIMARY OSTEOARTHRITIS INVOLVING MULTIPLE JOINTS: ICD-10-CM

## 2025-02-06 DIAGNOSIS — E03.4 HYPOTHYROIDISM DUE TO ACQUIRED ATROPHY OF THYROID: ICD-10-CM

## 2025-02-06 DIAGNOSIS — D36.9 MULTIPLE ADENOMATOUS POLYPS: ICD-10-CM

## 2025-02-06 DIAGNOSIS — E46 PROTEIN-CALORIE MALNUTRITION, UNSPECIFIED SEVERITY: ICD-10-CM

## 2025-02-06 DIAGNOSIS — E78.5 HYPERLIPIDEMIA, UNSPECIFIED HYPERLIPIDEMIA TYPE: ICD-10-CM

## 2025-02-06 DIAGNOSIS — I10 ESSENTIAL HYPERTENSION: Primary | ICD-10-CM

## 2025-02-06 LAB
CRP SERPL-MCNC: 8.7 MG/L (ref 0–8.2)
ERYTHROCYTE [SEDIMENTATION RATE] IN BLOOD BY PHOTOMETRIC METHOD: 21 MM/HR (ref 0–36)
FOLATE SERPL-MCNC: 13.7 NG/ML (ref 4–24)
VIT B12 SERPL-MCNC: 1104 PG/ML (ref 210–950)

## 2025-02-06 PROCEDURE — 99214 OFFICE O/P EST MOD 30 MIN: CPT | Mod: S$GLB,,, | Performed by: INTERNAL MEDICINE

## 2025-02-06 PROCEDURE — 3288F FALL RISK ASSESSMENT DOCD: CPT | Mod: CPTII,S$GLB,, | Performed by: INTERNAL MEDICINE

## 2025-02-06 PROCEDURE — 1126F AMNT PAIN NOTED NONE PRSNT: CPT | Mod: CPTII,S$GLB,, | Performed by: INTERNAL MEDICINE

## 2025-02-06 PROCEDURE — 3078F DIAST BP <80 MM HG: CPT | Mod: CPTII,S$GLB,, | Performed by: INTERNAL MEDICINE

## 2025-02-06 PROCEDURE — 82607 VITAMIN B-12: CPT | Performed by: INTERNAL MEDICINE

## 2025-02-06 PROCEDURE — 1159F MED LIST DOCD IN RCRD: CPT | Mod: CPTII,S$GLB,, | Performed by: INTERNAL MEDICINE

## 2025-02-06 PROCEDURE — 36415 COLL VENOUS BLD VENIPUNCTURE: CPT | Mod: PO | Performed by: INTERNAL MEDICINE

## 2025-02-06 PROCEDURE — 85652 RBC SED RATE AUTOMATED: CPT | Performed by: INTERNAL MEDICINE

## 2025-02-06 PROCEDURE — 1101F PT FALLS ASSESS-DOCD LE1/YR: CPT | Mod: CPTII,S$GLB,, | Performed by: INTERNAL MEDICINE

## 2025-02-06 PROCEDURE — 82746 ASSAY OF FOLIC ACID SERUM: CPT | Performed by: INTERNAL MEDICINE

## 2025-02-06 PROCEDURE — 3074F SYST BP LT 130 MM HG: CPT | Mod: CPTII,S$GLB,, | Performed by: INTERNAL MEDICINE

## 2025-02-06 PROCEDURE — 86140 C-REACTIVE PROTEIN: CPT | Performed by: INTERNAL MEDICINE

## 2025-02-06 PROCEDURE — 99999 PR PBB SHADOW E&M-EST. PATIENT-LVL IV: CPT | Mod: PBBFAC,,, | Performed by: INTERNAL MEDICINE

## 2025-02-06 RX ORDER — NIFEDIPINE 60 MG/1
60 TABLET, EXTENDED RELEASE ORAL DAILY
Qty: 90 TABLET | Refills: 2 | Status: SHIPPED | OUTPATIENT
Start: 2025-02-06 | End: 2025-02-07 | Stop reason: SDUPTHER

## 2025-02-06 NOTE — PROGRESS NOTES
Drawn eyelashes out Subjective:       Patient ID: Elena Block is a 79 y.o. female.    Chief Complaint:   Follow-up, Hyperlipidemia, Hypertension, and Low-back Pain    HPI: Mrs Parker presents for follow up HTN,HLD, and Chronic Issues  Pt feels good-joints much better-takes Meloxicam 15mg prn -about 2-3 days/week only  She does question why she is taking the Folbe and questions if needed    HTN: Med Tx 1-Diovan 320mg QD, 2-HCTZ 12.5mg prn only(last years ago), 3-Coreg 6.25mg BID, 4-Procardia XL 90mg QD        Home BPs: None recently     HLD: Med Tx 1-Zocor 40mg QD    Hypothyroidism: Med Tx 1- Synthroid 100ug QD     Lumbar OA/DDD: Med Tx 1- Gabapentin 300mg QHS/Takes only as needed, 2- Meloxicam 15mg 1 tab 2-3 days/week    MRI L-spine(2/23):  + 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 Block presents today for follow-up on various health concerns.    OCULAR:  She has glaucoma in the left eye and cataracts in the right eye, though cataracts are not severe enough to warrant removal. She uses eye drops regularly, which have helped reduce eye pressure according to her ophthalmologist. She experiences watery eyes, possibly related to frequent eye drop use. She notes excessive eyelash growth on the upper eyelid as a side effect of Raclatan eye drops.    ENT:  She reports coughing up clear phlegm with occasional postnasal drip. She denies sinus pain and sneezing.    MUSCULOSKELETAL:  She reports occasional shoulder pain triggered by heavy lifting activities, not chronic in nature. She uses meloxicam for 3-5 days when pain occurs. She has been performing shoulder exercises which help significantly with symptoms. She discontinued gabapentin 2 months ago but maintains prescription for potential flare-ups. She denies nighttime pain or  tremors on the right side.    MEDICATIONS:  She takes Carvedilol twice daily, Diltiazem 90 mg daily, and Valsartan 320 mg daily for blood pressure management. She has not needed her diuretic medication. She takes Folby (B-complex and folic acid) once weekly and vitamin D daily.    DIET:  She consumes minimal red meat and primarily obtains B12 from dairy products, including milk with cereal.    IMMUNIZATIONS:  She is up to date with all recommended vaccinations including flu vaccine this year, RSV vaccine last year, shingles vaccine, and most recent COVID vaccine in September.      ROS:  Eyes: -eye pain, -eye discharge  ENT: -nasal congestion  Musculoskeletal: +joint pain  Allergic: -frequent sneezing           Past Medical, Surgical, Social History: Please see as stated in Epic chart which has been reviewed.    Current Outpatient Medications   Medication Sig Dispense Refill    brimonidine 0.2% (ALPHAGAN) 0.2 % Drop Place 1 drop into both eyes 3 (three) times daily. 30 mL 3    carvediloL (COREG) 6.25 MG tablet Take 1 tablet (6.25 mg total) by mouth 2 (two) times daily with meals. 180 tablet 2    cholecalciferol, vitamin D3, (VITAMIN D3) 125 mcg (5,000 unit) Tab Take 1 tablet (5,000 Units total) by mouth once daily. 30 tablet 6    ciclopirox (PENLAC) 8 % Soln Apply topically nightly. 6.6 mL 11    docusate sodium (COLACE) 100 MG capsule 1-4 caps daily; gradually taper up over 2 weeks 60 capsule prn    estradioL (ESTRACE) 0.01 % (0.1 mg/gram) vaginal cream Place 1 g vaginally every Mon, Wed, Fri. 42.5 g 2    folic acid-vit B6-vit B12 (FOLBEE) 2.5-25-1 mg Tab Take 1 tablet by mouth once a week. 30 tablet 12    gabapentin (NEURONTIN) 300 MG capsule Take 1 capsule (300 mg total) by mouth every evening. 90 capsule 11    levothyroxine (SYNTHROID) 100 MCG tablet TAKE 1 TABLET BY MOUTH DAILY ON  AN EMPTY STOMACH 90 tablet 3    meloxicam (MOBIC) 15 MG tablet TAKE 1 TABLET BY MOUTH EVERY DAY WITH FOOD FOR 3 TO 5 DAYS AS NEEDED  FOR JOINT PAIN 90 tablet 0    ROCKLATAN 0.02-0.005 % Drop Place 1 drop into both eyes Daily. 2.5 mL 6    simvastatin (ZOCOR) 40 MG tablet Take 1 tablet (40 mg total) by mouth every evening. 90 tablet 2    valsartan (DIOVAN) 320 MG tablet TAKE 1 TABLET BY MOUTH EVERY DAY 90 tablet 2    dorzolamide-timolol 2-0.5% (COSOPT) 22.3-6.8 mg/mL ophthalmic solution Place 1 drop into both eyes 2 (two) times daily. 30 mL 6    NIFEdipine (PROCARDIA-XL) 60 MG (OSM) 24 hr tablet Take 1 tablet (60 mg total) by mouth once daily. 90 tablet 2     No current facility-administered medications for this visit.       Review of Systems   HENT:  Positive for postnasal drip.         +Clear mucus/+PND   Eyes:         +Watery eyes   Respiratory:  Positive for cough.    Musculoskeletal:  Positive for arthralgias and back pain.        Joints better with Meloxicam 2-3 days/week  Shoulders better   All other systems reviewed and are negative.      Objective:      Lab Results   Component Value Date    WBC 5.58 01/30/2025    HGB 14.0 01/30/2025    HCT 41.5 01/30/2025     01/30/2025    CHOL 150 01/30/2025    TRIG 90 01/30/2025    HDL 46 01/30/2025    ALT 14 01/30/2025    AST 21 01/30/2025     01/30/2025    K 4.1 01/30/2025     01/30/2025    CREATININE 0.8 01/30/2025    BUN 13 01/30/2025    CO2 24 01/30/2025    TSH 1.663 07/31/2024    HGBA1C 5.3 01/30/2025     Physical Exam  Vitals reviewed.   Constitutional:       Appearance: Normal appearance.   HENT:      Head: Normocephalic and atraumatic.      Mouth/Throat:      Comments: + Mild Cobblestoning/No exudates  Cardiovascular:      Rate and Rhythm: Normal rate and regular rhythm.      Heart sounds: Normal heart sounds.   Pulmonary:      Breath sounds: Normal breath sounds.   Abdominal:      General: Bowel sounds are normal.      Palpations: Abdomen is soft. There is no mass.      Tenderness: There is no abdominal tenderness.   Musculoskeletal:         General: No tenderness.      Right  "lower leg: No edema.      Left lower leg: No edema.   Neurological:      General: No focal deficit present.      Mental Status: She is alert.   Psychiatric:         Mood and Affect: Mood normal.       Breasts:  On gross visualization, normal; on palpation no masses tenderness or nipple discharge    Vitals:    02/06/25 0950   BP: (!) 100/50   Pulse:    Resp:    Temp:          Health Maintenance:  Last PAP: No result found    Last Colonoscopy: Last Colonoscopy completed on 9/27/2024    Last Mammogram: 8/2024- Negative    Last Density: 8/2022- Normal      Vital Signs  Temp: 97 °F (36.1 °C)  Temp Source: Other (see comments)  Pulse: 67  Resp: 18  SpO2: 98 %  BP: (!) 100/50  BP Location: Left arm  Patient Position: Sitting  Pain Score: 0-No pain  Height and Weight  Height: 5' 8" (172.7 cm)  Weight: 87.2 kg (192 lb 5.6 oz)  BSA (Calculated - sq m): 2.05 sq meters  BMI (Calculated): 29.3  Weight in (lb) to have BMI = 25: 164.1    Assessment:       1. Essential hypertension    2. Hyperlipidemia, unspecified hyperlipidemia type    3. Hypothyroidism due to acquired atrophy of thyroid    4. Degeneration of intervertebral disc of lumbosacral region, unspecified whether pain present    5. Primary osteoarthritis involving multiple joints    6. Anemia, unspecified type    7. Protein-calorie malnutrition, unspecified severity    8. Multiple adenomatous polyps        Plan:     Health Maintenance   Topic Date Due    Shingles Vaccine (3 of 3) 03/25/2020    TETANUS VACCINE  10/01/2024    Mammogram  08/28/2025    Lipid Panel  01/30/2026    Colonoscopy  09/27/2034    Hepatitis C Screening  Completed    Influenza Vaccine  Completed    COVID-19 Vaccine  Completed    RSV Vaccine (Age 60+ and Pregnant patients)  Completed    Pneumococcal Vaccines (Age 50+)  Completed    DEXA Scan  Discontinued        Elena was seen today for follow-up, hyperlipidemia, hypertension and low-back pain.    Diagnoses and all orders for this visit:    Essential " hypertension/controlled possibly excessively  -     decrease NIFEdipine (PROCARDIA-XL) 60 MG (OSM) 24 hr tablet; Take 1 tablet (60 mg total) by mouth once daily.  -     Continue:  Med Tx 1-Diovan 320mg QD, 2-HCTZ 12.5mg prn only(last years ago), 3-Coreg 6.25mg BID    Hyperlipidemia, unspecified hyperlipidemia type/controlled on simvastatin 40 mg q.day    Hypothyroidism due to acquired atrophy of thyroid/controlled on Synthroid 100 mcg q.day    Degeneration of intervertebral disc of lumbosacral region, unspecified whether pain present    Primary osteoarthritis involving multiple joints/much improved status post steroid taper and on meloxicam 15 mg p.r.n.  -     C-Reactive Protein; Future  -     Sedimentation rate; Future    Anemia, unspecified type  -     Vitamin B12; Future  -     FOLATE; Future    Protein-calorie malnutrition, unspecified severity  -     Vitamin B12; Future  -     FOLATE; Future    Multiple adenomatous polyps        -     repeat colonoscopy due September 2024 or sooner as needed and this will be a joint making decision process due to patient's age of 79 at that time    Health maintenance        -     return to clinic for nurse blood pressure check in approximately 3-4 weeks        -     return to clinic x6 months with one-week prior fasting lab or see patient sooner if needed    Assessment & Plan    HYPERTENSION:   Decreased Nifedipine (Procardia) from 90mg to 60mg extended release daily.   Continued Carvedilol and Valsartan 320mg daily for blood pressure management.   Scheduled follow up in 1 month for blood pressure check with nurse.   Instructed the patient to contact the office if any issues arise with decreased blood pressure medication.   Noted that the patient is on three antihypertensive medications: Carvedilol, Valsartan, and Nifedipine (Procardia).   Observed that the patient has not checked blood pressure at home recently.   Measured blood pressure during visit, which was low at  100/50.   Noted no reported episodes of dizziness.   Assessed that the patient may be on excessive antihypertensive medications, leading to the decision to decrease the Nifedipine dose.    GLAUCOMA:   Discussed that eyelash growth is a known side effect of some glaucoma medications.   Noted that the patient has glaucoma in the left eye and is using ophthalmic drops.   Reviewed report from ophthalmologist indicating reduced intraocular pressure with the use of ophthalmic drops.   Confirmed that the patient is using Raclatan, which is causing eyelash growth.   Confirmed that the patient is using multiple ophthalmic drops.    CATARACTS:   Noted that the patient has cataracts in one eye, but not severe enough for surgical intervention at this time.    EPIPHORA:   Noted that the patient reports epiphora, possibly due to ophthalmic drops or sinus problems.    ALLERGIES:   Noted that the patient reports expectorating clear mucus and occasional postnasal drip.   Confirmed that the patient denies sinus pain.   Assessed that symptoms could be due to allergies, particularly in light of changing weather and blooming trees.   Noted that the patient reports expectorating clear mucus, but the cough itself is not severe.   Confirmed that the patient denies sneezing.    MEDICATIONS/SUPPLEMENTS:   Continued Folby (B-complex and folic acid) once weekly, pending lab results.   Continued Vitamin D daily.   Continued Meloxicam as needed for joint pain, 1 whole tablet for 2-3 days when symptoms occur.   Continued Gabapentin as needed for nerve pain.    LABS:   Ordered labs including vitamin B12, inflammatory markers for arthritis, and other previously elevated markers.    RSV VACCINATION:   Explained that RSV vaccine is only needed once in a lifetime.    FOLLOW UP:   Follow up in 6 months.   Clarified the difference between eyelashes and eyelids.           This note was generated with the assistance of ambient listening technology. Verbal  consent was obtained by the patient and accompanying visitor(s) for the recording of patient appointment to facilitate this note. I attest to having reviewed and edited the generated note for accuracy, though some syntax or spelling errors may persist. Please contact the author of this note for any clarification.

## 2025-02-07 DIAGNOSIS — I10 ESSENTIAL HYPERTENSION: ICD-10-CM

## 2025-02-07 RX ORDER — NIFEDIPINE 60 MG/1
60 TABLET, EXTENDED RELEASE ORAL DAILY
Qty: 30 TABLET | Refills: 1 | Status: SHIPPED | OUTPATIENT
Start: 2025-02-07

## 2025-02-07 NOTE — TELEPHONE ENCOUNTER
----- Message from Briana sent at 2/7/2025 10:42 AM CST -----  Contact: [phone]  Requesting an RX refill or new RX.    Is this a refill or new RX: new    RX name and strength (copy/paste from chart):  NIFEdipine (PROCARDIA-XL) 60 MG (OSM) 24 hr tablet 90 tablet    Is this a 30 day or 90 day RX: 90        [facility] #[id number] - [place] - [address]  [address]  [facility] [place] [address]  Phone: [phone] Fax: [phone]    Pt needs this sooner than the optium can delivery.  She canceled that one but needs a new prescription sent to [facility].

## 2025-02-07 NOTE — TELEPHONE ENCOUNTER
No care due was identified.  [facility] Embedded Care Due Messages. Reference number: [id number].   2/07/2025 11:14:44 AM CST

## 2025-02-08 PROBLEM — R20.0 NUMBNESS AND TINGLING OF BOTH LEGS: Status: RESOLVED | Noted: 2024-07-17 | Resolved: 2025-02-08

## 2025-02-08 PROBLEM — R20.2 NUMBNESS AND TINGLING OF BOTH LEGS: Status: RESOLVED | Noted: 2024-07-17 | Resolved: 2025-02-08

## 2025-02-13 ENCOUNTER — TELEPHONE (OUTPATIENT)
Dept: INTERNAL MEDICINE | Facility: CLINIC | Age: 80
End: 2025-02-13
Payer: MEDICARE

## 2025-02-14 NOTE — TELEPHONE ENCOUNTER
Patricia/Dangelo-would you let pt know that her ESR/CRP lab from lest week was much better: ESR normal at 21, and CRP just about back to normal at 8.7.  All looked good.  Thanks

## 2025-03-02 DIAGNOSIS — I10 ESSENTIAL HYPERTENSION: ICD-10-CM

## 2025-03-02 NOTE — TELEPHONE ENCOUNTER
No care due was identified.  [facility] Embedded Care Due Messages. Reference number: [id number].   3/02/2025 7:12:44 AM CST

## 2025-03-05 RX ORDER — NIFEDIPINE 60 MG/1
60 TABLET, EXTENDED RELEASE ORAL
Qty: 90 TABLET | Refills: 1 | Status: SHIPPED | OUTPATIENT
Start: 2025-03-05

## 2025-03-13 ENCOUNTER — PATIENT MESSAGE (OUTPATIENT)
Dept: INTERNAL MEDICINE | Facility: CLINIC | Age: 80
End: 2025-03-13
Payer: MEDICARE

## 2025-03-14 ENCOUNTER — OFFICE VISIT (OUTPATIENT)
Dept: INTERNAL MEDICINE | Facility: CLINIC | Age: 80
End: 2025-03-14
Payer: MEDICARE

## 2025-03-14 ENCOUNTER — RESULTS FOLLOW-UP (OUTPATIENT)
Dept: INTERNAL MEDICINE | Facility: CLINIC | Age: 80
End: 2025-03-14

## 2025-03-14 VITALS
HEART RATE: 72 BPM | TEMPERATURE: 98 F | OXYGEN SATURATION: 97 % | BODY MASS INDEX: 29.07 KG/M2 | RESPIRATION RATE: 16 BRPM | DIASTOLIC BLOOD PRESSURE: 64 MMHG | SYSTOLIC BLOOD PRESSURE: 122 MMHG | HEIGHT: 68 IN | WEIGHT: 191.81 LBS

## 2025-03-14 DIAGNOSIS — R09.81 SINUS CONGESTION: ICD-10-CM

## 2025-03-14 DIAGNOSIS — R05.9 COUGH, UNSPECIFIED TYPE: ICD-10-CM

## 2025-03-14 DIAGNOSIS — R09.82 POST-NASAL DRIP: Primary | ICD-10-CM

## 2025-03-14 LAB
CTP QC/QA: YES
INFLUENZA A, MOLECULAR: NEGATIVE
INFLUENZA B, MOLECULAR: NEGATIVE
SARS CORONAVIRUS 2 ANTIGEN: NEGATIVE
SPECIMEN SOURCE: NORMAL

## 2025-03-14 PROCEDURE — 87502 INFLUENZA DNA AMP PROBE: CPT | Performed by: NURSE PRACTITIONER

## 2025-03-14 PROCEDURE — 99999 PR PBB SHADOW E&M-EST. PATIENT-LVL IV: CPT | Mod: PBBFAC,,, | Performed by: NURSE PRACTITIONER

## 2025-03-14 RX ORDER — FLUTICASONE PROPIONATE 50 MCG
1 SPRAY, SUSPENSION (ML) NASAL DAILY
Qty: 16 G | Refills: 2 | Status: SHIPPED | OUTPATIENT
Start: 2025-03-14

## 2025-03-14 NOTE — PROGRESS NOTES
Subjective:       Patient ID: Elena Block is a 79 y.o. female.    Chief Complaint: Cough, Sinus Problem, and Nasal Congestion    Cough    Sinus Problem  Associated symptoms include coughing.     History of Present Illness    CHIEF COMPLAINT:  Patient presents today for worsening sinus congestion and cough    HISTORY OF PRESENT ILLNESS:  She reports symptoms started 3 days ago with severe congestion, productive cough with significant mucus production, sore throat, and chills. Her cough worsens when lying flat at night. She denies fever and any known sick contacts.    MEDICAL HISTORY:  She has a history of chronic sinus problems and chronic runny eyes.    MEDICATIONS:  She has Zyrtec at home but is not currently taking it due to experiencing significant drowsiness with use.      ROS:  Constitutional: -fevers, +chills  Eyes: +eye discharge  ENT: -ear pain, +nasal congestion, -sinus pressure, +sore throat  Respiratory: +productive cough, +waking at night coughing       Objective:      Physical Exam  Vitals reviewed.   Constitutional:       Appearance: Normal appearance.   HENT:      Head: Normocephalic and atraumatic.      Nose: Congestion and rhinorrhea present.      Mouth/Throat:      Pharynx: Oropharynx is clear. Posterior oropharyngeal erythema present.   Eyes:      Conjunctiva/sclera: Conjunctivae normal.      Pupils: Pupils are equal, round, and reactive to light.   Cardiovascular:      Rate and Rhythm: Normal rate and regular rhythm.   Pulmonary:      Effort: Pulmonary effort is normal.      Breath sounds: Normal breath sounds.   Abdominal:      General: Bowel sounds are normal.      Palpations: Abdomen is soft.   Musculoskeletal:         General: Normal range of motion.      Cervical back: Normal range of motion and neck supple.   Skin:     General: Skin is warm.   Neurological:      General: No focal deficit present.   Psychiatric:         Mood and Affect: Mood normal.         Assessment:       1.  Post-nasal drip  - fluticasone propionate (FLONASE) 50 mcg/actuation nasal spray; 1 spray (50 mcg total) by Each Nostril route once daily.  Dispense: 16 g; Refill: 2  - Influenza A & B by Molecular  - SARS Coronavirus 2 Antigen, POCT Manual Read    2. Sinus congestion  - fluticasone propionate (FLONASE) 50 mcg/actuation nasal spray; 1 spray (50 mcg total) by Each Nostril route once daily.  Dispense: 16 g; Refill: 2  - Influenza A & B by Molecular  - SARS Coronavirus 2 Antigen, POCT Manual Read    3. Cough, unspecified type  - fluticasone propionate (FLONASE) 50 mcg/actuation nasal spray; 1 spray (50 mcg total) by Each Nostril route once daily.  Dispense: 16 g; Refill: 2  - Influenza A & B by Molecular  - SARS Coronavirus 2 Antigen, POCT Manual Read      Plan:       Assessment & Plan    IMPRESSION:  Assessed symptoms as likely viral upper respiratory infection or sinus problems, not warranting antibiotics at this time.  Determined Tamiflu not indicated due to symptom onset >48 hours ago is positive for flu.   Focused on symptomatic treatment with antihistamine and nasal steroid spray.  Will consider antibiotics if symptoms persist beyond 10 days from onset.    ACUTE RECURRENT SINUSITIS:  - Noted the patient's reports of sinus problems and congestion for the past 3 days, along with coughing up significant mucus.  - Explained post-nasal drip as the cause of nighttime coughing.  - Discussed the mechanism of action for Flonase in reducing sinus congestion and preventing mucus buildup.  - Prescribed Flonase nasal spray: 1 spray in each nostril twice daily (morning and night).  - Prescribed Zyrtec to be taken at night.  - Prescribed Flonase nasal spray: 1 spray in each nostril twice daily (morning and night).  - Recommend elevating head with extra pillows when lying down to reduce post-nasal drip.    EPIPHORA (EXCESSIVE TEARING):  - Noted the patient's reports of runny eyes.  - Continue with Alphagan and  Rocklatan.    ACUTE PHARYNGITIS:  - Examined the patient's throat and noted redness.  - Informed the patient about the typical 7-10 day duration for viral infections.  - Recommend cough drops as needed for cough and sore throat relief.  - Ordered COVID, influenza, and RSV testing via laboratory swab.  - Instructed the patient to check the patient portal for lab test results within 24 hours.    ADVERSE EFFECT OF ANTIALLERGIC DRUGS:  - Noted the patient's reports of drowsiness from Zyrtec.  - Acknowledged the patient's sensitivity to Zyrtec.  - Prescribed Zyrtec to be taken at bedtime to help dry secretions and reduce nighttime coughing.  - Advised taking Zyrtec at night to avoid daytime drowsiness.    GENERAL RECOMMENDATIONS:  - Patient to stay hydrated and wash hands frequently.  - Advised the patient to contact the office if symptoms do not improve after 10 days from symptom onset.     This note was generated with the assistance of ambient listening technology. Verbal consent was obtained by the patient and accompanying visitor(s) for the recording of patient appointment to facilitate this note. I attest to having reviewed and edited the generated note for accuracy, though some syntax or spelling errors may persist. Please contact the author of this note for any clarification.

## 2025-03-16 ENCOUNTER — OFFICE VISIT (OUTPATIENT)
Dept: URGENT CARE | Facility: CLINIC | Age: 80
End: 2025-03-16
Payer: MEDICARE

## 2025-03-16 ENCOUNTER — RESULTS FOLLOW-UP (OUTPATIENT)
Dept: URGENT CARE | Facility: CLINIC | Age: 80
End: 2025-03-16

## 2025-03-16 VITALS
TEMPERATURE: 99 F | DIASTOLIC BLOOD PRESSURE: 62 MMHG | HEART RATE: 80 BPM | SYSTOLIC BLOOD PRESSURE: 143 MMHG | BODY MASS INDEX: 28.57 KG/M2 | OXYGEN SATURATION: 98 % | WEIGHT: 188.5 LBS | HEIGHT: 68 IN | RESPIRATION RATE: 19 BRPM

## 2025-03-16 DIAGNOSIS — R05.1 ACUTE COUGH: ICD-10-CM

## 2025-03-16 DIAGNOSIS — J22 ACUTE LOWER RESPIRATORY INFECTION: Primary | ICD-10-CM

## 2025-03-16 PROCEDURE — 99214 OFFICE O/P EST MOD 30 MIN: CPT | Mod: S$GLB,,, | Performed by: FAMILY MEDICINE

## 2025-03-16 PROCEDURE — 71046 X-RAY EXAM CHEST 2 VIEWS: CPT | Mod: S$GLB,,, | Performed by: RADIOLOGY

## 2025-03-16 RX ORDER — PROMETHAZINE HYDROCHLORIDE AND DEXTROMETHORPHAN HYDROBROMIDE 6.25; 15 MG/5ML; MG/5ML
5 SYRUP ORAL NIGHTLY PRN
Qty: 118 ML | Refills: 0 | Status: SHIPPED | OUTPATIENT
Start: 2025-03-16 | End: 2025-03-26

## 2025-03-16 RX ORDER — BENZONATATE 200 MG/1
200 CAPSULE ORAL 3 TIMES DAILY PRN
Qty: 30 CAPSULE | Refills: 0 | Status: SHIPPED | OUTPATIENT
Start: 2025-03-16 | End: 2025-03-26

## 2025-03-16 RX ORDER — AZITHROMYCIN 250 MG/1
TABLET, FILM COATED ORAL
Qty: 6 TABLET | Refills: 0 | Status: SHIPPED | OUTPATIENT
Start: 2025-03-16 | End: 2025-03-21

## 2025-03-16 RX ORDER — AMOXICILLIN AND CLAVULANATE POTASSIUM 875; 125 MG/1; MG/1
1 TABLET, FILM COATED ORAL EVERY 12 HOURS
Qty: 10 TABLET | Refills: 0 | Status: SHIPPED | OUTPATIENT
Start: 2025-03-16 | End: 2025-03-21

## 2025-03-16 NOTE — PROGRESS NOTES
"Subjective:      Patient ID: Elena Block is a 79 y.o. female.    Vitals:  height is 5' 8" (1.727 m) and weight is 85.5 kg (188 lb 7.9 oz). Her oral temperature is 99.4 °F (37.4 °C). Her blood pressure is 143/62 (abnormal) and her pulse is 80. Her respiration is 19 and oxygen saturation is 98%.     Chief Complaint: Sinus Problem    Pt presents today w/ productive cough (green sputum) accompanied w/ nasal congestion and sore throat ; onset approx 5x days ago. Pt c/o chills, hoarse voice, sinus pressure and sneezing. Pt denies fever and emesis. Pt has hx pneumonia , HTN and hypothyroidism. Pt tried zyrtec, flonase and theraflu;mild relief.      Sinus Problem  This is a new problem. The current episode started in the past 7 days. The problem is unchanged. There has been no fever. Her pain is at a severity of 6/10. The pain is moderate. Associated symptoms include chills, congestion, coughing, a hoarse voice, sinus pressure, sneezing and a sore throat. Pertinent negatives include no diaphoresis, ear pain, headaches, neck pain, shortness of breath or swollen glands. Treatments tried: zyrtec , theraflu and flonase. The treatment provided mild relief.       Constitution: Positive for chills. Negative for sweating.   HENT:  Positive for congestion, sinus pressure and sore throat. Negative for ear pain.    Neck: Negative for neck pain.   Respiratory:  Positive for cough. Negative for shortness of breath.    Allergic/Immunologic: Positive for sneezing.   Neurological:  Negative for headaches.      Objective:     Physical Exam  Constitutional: Pt oriented to person, place, and time.  Non-toxic appearance.   Patient does not appear ill. No distress. normal  HENT: No icterus or facial swelling appreciated  Head: Normocephalic and atraumatic.   Nose: + congestion.   Pulmonary/Chest: Effort normal. No stridor. No respiratory distress. + productive cough during exam  Abdominal: Normal appearance. Abdomen exhibits no distension. "   Musculoskeletal:         General: No swelling.   Neurological: no focal deficit. Patient is alert and oriented to person, place, and time.   Skin: Skin is not diaphoretic and not pale. no jaundice  Psychiatric: Patients behavior is normal. Mood, judgment and thought content normal.     XR CHEST PA AND LATERAL  Result Date: 3/16/2025  EXAMINATION: XR CHEST PA AND LATERAL CLINICAL HISTORY: Acute cough TECHNIQUE: PA and lateral views of the chest were performed. COMPARISON: Chest radiograph 12/22/2020, CT abdomen and pelvis 05/16/2020 FINDINGS: Patient is slightly rotated.  Trachea is relatively midline and patent.The lungs are clear, with normal appearance of pulmonary vasculature and no pleural effusion or pneumothorax. The cardiac silhouette is normal in size. The hilar and mediastinal contours are unremarkable. Similar mild dextrocurvature with minimal degenerative change of the thoracic spine.  No acute or destructive osseous process seen.     No radiographic evidence of pneumonia or other source of acute cough, noting that early/mild viral pneumonia or nonspecific bronchitis may be radiographically occult. Electronically signed by: Tod Nunez MD Date:    03/16/2025 Time:    13:32      Assessment:     1. Acute lower respiratory infection    2. Acute cough        Plan:       Acute lower respiratory infection  -     amoxicillin-clavulanate 875-125mg (AUGMENTIN) 875-125 mg per tablet; Take 1 tablet by mouth every 12 (twelve) hours. for 5 days  Dispense: 10 tablet; Refill: 0  -     azithromycin (Z-DEONNA) 250 MG tablet; Take 2 tablets by mouth on day 1; Take 1 tablet by mouth on days 2-5  Dispense: 6 tablet; Refill: 0    Acute cough  -     XR CHEST PA AND LATERAL; Future; Expected date: 03/16/2025  -     promethazine-dextromethorphan (PROMETHAZINE-DM) 6.25-15 mg/5 mL Syrp; Take 5 mLs by mouth nightly as needed (cough).  Dispense: 118 mL; Refill: 0  -     benzonatate (TESSALON) 200 MG capsule; Take 1 capsule (200 mg  total) by mouth 3 (three) times daily as needed for Cough.  Dispense: 30 capsule; Refill: 0      Pt instructions through portal;    There was no significant pneumonia on xray but the area on the right lower lung area looked a little hazy to me so lets continue with antibiotics to cover for a bacterial infection and have you follow up with your primary care doctor or return to our clinic for recheck as discussed

## 2025-03-17 ENCOUNTER — TELEPHONE (OUTPATIENT)
Dept: INTERNAL MEDICINE | Facility: CLINIC | Age: 80
End: 2025-03-17
Payer: MEDICARE

## 2025-03-17 NOTE — TELEPHONE ENCOUNTER
----- Message from Kulwant sent at 3/17/2025  8:04 AM CDT -----  Contact: [phone]@[email or web address]  Patient wants to be seen today by their PCP only. Caller will not accept being placed on the wait list and is requesting a message be sent to the provider.When is the next available appointment:  none shown Symptoms:  urgent care follow up Would you prefer an answer via [employer]?: NO Comments:

## 2025-03-17 NOTE — TELEPHONE ENCOUNTER
Spoke to pt and she was seen in urgent care on Sunday and was told to f/u with PCP or go back to  on Tuesday    I told her that dr Lyon doesn't have any available apt's. I offered her an apt with Beth today and she declined stating that she isn't feeling well enough to come today  I then offered her an apt with Trevor and she declined.  I then offered an apt with Randall Batres and she declined.   She said she would go back to  tomorrow to f/u

## 2025-03-18 ENCOUNTER — OFFICE VISIT (OUTPATIENT)
Dept: URGENT CARE | Facility: CLINIC | Age: 80
End: 2025-03-18
Payer: MEDICARE

## 2025-03-18 VITALS
BODY MASS INDEX: 28.49 KG/M2 | HEIGHT: 68 IN | OXYGEN SATURATION: 99 % | DIASTOLIC BLOOD PRESSURE: 65 MMHG | RESPIRATION RATE: 18 BRPM | SYSTOLIC BLOOD PRESSURE: 137 MMHG | WEIGHT: 188 LBS | TEMPERATURE: 98 F | HEART RATE: 75 BPM

## 2025-03-18 DIAGNOSIS — Z09 FOLLOW-UP EXAM: Primary | ICD-10-CM

## 2025-03-18 PROCEDURE — 99499 UNLISTED E&M SERVICE: CPT | Mod: S$GLB,,,

## 2025-03-18 NOTE — PROGRESS NOTES
"Subjective:      Patient ID: Elena Block is a 79 y.o. female.    Vitals:  height is 5' 8" (1.727 m) and weight is 85.3 kg (188 lb). Her oral temperature is 98.2 °F (36.8 °C). Her blood pressure is 137/65 and her pulse is 75. Her respiration is 18 and oxygen saturation is 99%.     Chief Complaint: Follow-up    80yo Female f/u visit from 3/16, told to come back 2 days later for recheck,  pt is still coughing up green sputum. No fevers. Pt reports feeling better.     Follow-up  This is a recurrent problem. The current episode started 1 to 4 weeks ago. The problem occurs constantly. The problem has been gradually worsening. Associated symptoms include congestion and coughing. Pertinent negatives include no abdominal pain, chest pain, chills, fatigue, fever, myalgias, nausea or vomiting. The symptoms are aggravated by coughing. The treatment provided mild relief.       Constitution: Negative for chills, fatigue and fever.   HENT:  Positive for congestion. Negative for sinus pressure.    Cardiovascular:  Negative for chest pain.   Respiratory:  Positive for cough. Negative for shortness of breath and wheezing.    Gastrointestinal:  Negative for abdominal pain, nausea, vomiting and diarrhea.   Musculoskeletal:  Negative for muscle ache.      Objective:     Physical Exam   Constitutional: She is oriented to person, place, and time. She appears well-developed. She is cooperative.  Non-toxic appearance. She does not appear ill. No distress.   HENT:   Head: Normocephalic and atraumatic.   Ears:   Right Ear: Hearing, tympanic membrane, external ear and ear canal normal.   Left Ear: Hearing, tympanic membrane, external ear and ear canal normal.   Nose: Nose normal. No mucosal edema, rhinorrhea or nasal deformity. No epistaxis. Right sinus exhibits no maxillary sinus tenderness and no frontal sinus tenderness. Left sinus exhibits no maxillary sinus tenderness and no frontal sinus tenderness.   Mouth/Throat: Uvula is midline, " oropharynx is clear and moist and mucous membranes are normal. No trismus in the jaw. Normal dentition. No uvula swelling. No oropharyngeal exudate, posterior oropharyngeal edema or posterior oropharyngeal erythema.   Eyes: Conjunctivae and lids are normal. No scleral icterus.   Neck: Trachea normal and phonation normal. Neck supple. No edema present. No erythema present. No neck rigidity present.   Cardiovascular: Normal rate, regular rhythm, normal heart sounds and normal pulses.   Pulmonary/Chest: Effort normal and breath sounds normal. No stridor. No respiratory distress. She has no decreased breath sounds. She has no wheezes. She has no rhonchi. She has no rales.   Abdominal: Normal appearance.   Musculoskeletal: Normal range of motion.         General: No deformity. Normal range of motion.   Lymphadenopathy:     She has no cervical adenopathy.   Neurological: She is alert and oriented to person, place, and time. She exhibits normal muscle tone. Coordination normal.   Skin: Skin is warm, dry, intact, not diaphoretic and not pale.   Psychiatric: Her speech is normal and behavior is normal. Judgment and thought content normal.   Nursing note and vitals reviewed.      Assessment:     1. Follow-up exam        Plan:       Follow-up exam            Discussed results/diagnosis/plan with patient in clinic. Strict precautions given to patient to monitor for worsening signs and symptoms. Advised to follow up with PCP or specialist.  Explained side effects of medications prescribed with patient and informed him/her to discontinue use if he/she has any side effects and to inform UC or PCP if this occurs. All questions answered. Strict ED verses clinic return precautions stressed and given in depth. Advised if symptoms worsens of fail to improve he/she should go to the Emergency Room. Discharge and follow-up instructions given verbally/printed with the patient who expressed understanding and willingness to comply with my  recommendations. Patient voiced understanding and in agreement with current treatment plan. Patient exits the exam room in no acute distress. Conversant and engaged during discharge discussion, verbalized understanding.

## 2025-03-25 ENCOUNTER — TELEPHONE (OUTPATIENT)
Dept: INTERNAL MEDICINE | Facility: CLINIC | Age: 80
End: 2025-03-25
Payer: MEDICARE

## 2025-03-25 NOTE — TELEPHONE ENCOUNTER
Spoke with Mrs Block who stopped the Augmentin 1.5-2 days ago due to the diarrhea.  She completed 5+ days of augmentin in addition to the Z-max.  She had 5 BMs last night  but has had no BMs today within the last 8 hours.  She feels that the bronchitis is resolved- no F/C,cough,etc   She will quit the augmentin for good/dispose of the last 3 tabs, and will call in the AM with report on status of diarrhea.  If diarrhea has recurred/significant frequency, will check Stool for C Diff.

## 2025-03-25 NOTE — TELEPHONE ENCOUNTER
Spoke to pt and she states that she was on 2 antibiotics for a cold. She finished the zpak but still has 3 doses of the augmentin. She has had diarrhea for a week now. She has tried immodium and probiotic and the diarrhea is getting better, but wants to know if there is anything you can recommend. I offered her an apt with Beth and she declined until you recommend

## 2025-04-09 DIAGNOSIS — M19.90 OSTEOARTHRITIS, UNSPECIFIED OSTEOARTHRITIS TYPE, UNSPECIFIED SITE: ICD-10-CM

## 2025-04-09 RX ORDER — MELOXICAM 15 MG/1
TABLET ORAL
Qty: 90 TABLET | Refills: 0 | Status: SHIPPED | OUTPATIENT
Start: 2025-04-09

## 2025-04-09 NOTE — TELEPHONE ENCOUNTER
Care Due:                  Date            Visit Type   Department     Provider  --------------------------------------------------------------------------------                                EP -                              PRIMARY      [facility] INTERNAL  Last Visit: 02-      CARE ([facility])   [facility]       Sandra  [address] [place]                              [facility]                              PRIMARY      [facility] INTERNAL  Next Visit: 08-      CARE ([facility])   [facility]  Test          Frequency    Reason                     Performed    Due Date  --------------------------------------------------------------------------------    Vitamin D...  12 months..  cholecalciferol,.........  01- 01-    Health [facility] Embedded Care Due Messages. Reference number: [id number].   4/09/2025 12:18:22 AM CDT

## 2025-05-03 DIAGNOSIS — I10 ESSENTIAL HYPERTENSION: ICD-10-CM

## 2025-05-03 DIAGNOSIS — E78.5 HYPERLIPIDEMIA, UNSPECIFIED HYPERLIPIDEMIA TYPE: ICD-10-CM

## 2025-05-03 RX ORDER — SIMVASTATIN 40 MG/1
40 TABLET, FILM COATED ORAL NIGHTLY
Qty: 90 TABLET | Refills: 2 | Status: SHIPPED | OUTPATIENT
Start: 2025-05-03

## 2025-05-03 RX ORDER — CARVEDILOL 6.25 MG/1
6.25 TABLET ORAL 2 TIMES DAILY WITH MEALS
Qty: 180 TABLET | Refills: 3 | Status: SHIPPED | OUTPATIENT
Start: 2025-05-03

## 2025-05-03 NOTE — TELEPHONE ENCOUNTER
Refill Decision Note   Elena Block  is requesting a refill authorization.  Brief Assessment and Rationale for Refill:  Approve     Medication Therapy Plan:         Comments:     Note composed:11:55 AM 05/03/2025

## 2025-05-03 NOTE — TELEPHONE ENCOUNTER
No care due was identified.  [facility] Embedded Care Due Messages. Reference number: [id number].   5/03/2025 4:10:50 AM CDT

## 2025-05-13 DIAGNOSIS — N99.3 PROLAPSE OF VAGINAL VAULT AFTER HYSTERECTOMY: ICD-10-CM

## 2025-05-13 RX ORDER — ESTRADIOL 0.1 MG/G
1 CREAM VAGINAL
Qty: 42.5 G | Refills: 2 | Status: SHIPPED | OUTPATIENT
Start: 2025-05-14

## 2025-05-13 NOTE — TELEPHONE ENCOUNTER
Refill Decision Note   Elena Block  is requesting a refill authorization.  Brief Assessment and Rationale for Refill:  Approve     Medication Therapy Plan:         Comments:     Note composed:9:50 AM 05/13/2025

## 2025-05-13 NOTE — TELEPHONE ENCOUNTER
No care due was identified.  [facility] Embedded Care Due Messages. Reference number: [id number].   5/13/2025 12:22:04 AM CDT

## 2025-06-02 ENCOUNTER — CLINICAL SUPPORT (OUTPATIENT)
Dept: OPHTHALMOLOGY | Facility: CLINIC | Age: 80
End: 2025-06-02
Payer: MEDICARE

## 2025-06-02 ENCOUNTER — OFFICE VISIT (OUTPATIENT)
Dept: OPHTHALMOLOGY | Facility: CLINIC | Age: 80
End: 2025-06-02
Payer: MEDICARE

## 2025-06-02 DIAGNOSIS — H25.13 NUCLEAR SCLEROSIS, BILATERAL: ICD-10-CM

## 2025-06-02 DIAGNOSIS — H21.562 AFFERENT PUPILLARY DEFECT, LEFT: ICD-10-CM

## 2025-06-02 DIAGNOSIS — H26.9 CORTICAL CATARACT OF BOTH EYES: ICD-10-CM

## 2025-06-02 DIAGNOSIS — H25.813 COMBINED FORMS OF AGE-RELATED CATARACT OF BOTH EYES: ICD-10-CM

## 2025-06-02 DIAGNOSIS — H34.8122 HEMISPHERIC RETINAL VEIN OCCLUSION OF LEFT EYE: ICD-10-CM

## 2025-06-02 DIAGNOSIS — H40.1112 PRIMARY OPEN ANGLE GLAUCOMA (POAG) OF RIGHT EYE, MODERATE STAGE: ICD-10-CM

## 2025-06-02 DIAGNOSIS — H40.1123 PRIMARY OPEN ANGLE GLAUCOMA (POAG) OF LEFT EYE, SEVERE STAGE: Primary | ICD-10-CM

## 2025-06-02 DIAGNOSIS — H43.813 POSTERIOR VITREOUS DETACHMENT OF BOTH EYES: ICD-10-CM

## 2025-06-02 DIAGNOSIS — H53.8 BLURRED VISION, BILATERAL: ICD-10-CM

## 2025-06-02 PROCEDURE — 92083 EXTENDED VISUAL FIELD XM: CPT | Mod: S$GLB,,, | Performed by: OPHTHALMOLOGY

## 2025-06-02 PROCEDURE — 1101F PT FALLS ASSESS-DOCD LE1/YR: CPT | Mod: CPTII,S$GLB,, | Performed by: OPHTHALMOLOGY

## 2025-06-02 PROCEDURE — 99214 OFFICE O/P EST MOD 30 MIN: CPT | Mod: S$GLB,,, | Performed by: OPHTHALMOLOGY

## 2025-06-02 PROCEDURE — 1126F AMNT PAIN NOTED NONE PRSNT: CPT | Mod: CPTII,S$GLB,, | Performed by: OPHTHALMOLOGY

## 2025-06-02 PROCEDURE — 1160F RVW MEDS BY RX/DR IN RCRD: CPT | Mod: CPTII,S$GLB,, | Performed by: OPHTHALMOLOGY

## 2025-06-02 PROCEDURE — 1159F MED LIST DOCD IN RCRD: CPT | Mod: CPTII,S$GLB,, | Performed by: OPHTHALMOLOGY

## 2025-06-02 PROCEDURE — 92250 FUNDUS PHOTOGRAPHY W/I&R: CPT | Mod: S$GLB,,, | Performed by: OPHTHALMOLOGY

## 2025-06-02 PROCEDURE — 99999 PR PBB SHADOW E&M-EST. PATIENT-LVL III: CPT | Mod: PBBFAC,,, | Performed by: OPHTHALMOLOGY

## 2025-06-02 PROCEDURE — 3288F FALL RISK ASSESSMENT DOCD: CPT | Mod: CPTII,S$GLB,, | Performed by: OPHTHALMOLOGY

## 2025-06-02 RX ORDER — DORZOLAMIDE HYDROCHLORIDE AND TIMOLOL MALEATE 20; 5 MG/ML; MG/ML
1 SOLUTION/ DROPS OPHTHALMIC 2 TIMES DAILY
Qty: 20 ML | Refills: 4 | Status: SHIPPED | OUTPATIENT
Start: 2025-06-02

## 2025-06-02 RX ORDER — BRIMONIDINE TARTRATE 2 MG/ML
1 SOLUTION/ DROPS OPHTHALMIC 3 TIMES DAILY
Qty: 30 ML | Refills: 3 | Status: SHIPPED | OUTPATIENT
Start: 2025-06-02

## 2025-06-21 DIAGNOSIS — H40.1112 PRIMARY OPEN ANGLE GLAUCOMA (POAG) OF RIGHT EYE, MODERATE STAGE: ICD-10-CM

## 2025-06-21 DIAGNOSIS — H40.1123 PRIMARY OPEN ANGLE GLAUCOMA (POAG) OF LEFT EYE, SEVERE STAGE: ICD-10-CM

## 2025-06-23 RX ORDER — NETARSUDIL AND LATANOPROST OPHTHALMIC SOLUTION, 0.02%/0.005% .2; .05 MG/ML; MG/ML
1 SOLUTION/ DROPS OPHTHALMIC; TOPICAL DAILY
Qty: 2.5 ML | Refills: 12 | Status: SHIPPED | OUTPATIENT
Start: 2025-06-23

## 2025-07-11 DIAGNOSIS — H40.1112 PRIMARY OPEN ANGLE GLAUCOMA (POAG) OF RIGHT EYE, MODERATE STAGE: ICD-10-CM

## 2025-07-11 DIAGNOSIS — H40.1123 PRIMARY OPEN ANGLE GLAUCOMA (POAG) OF LEFT EYE, SEVERE STAGE: ICD-10-CM

## 2025-07-13 DIAGNOSIS — M19.90 OSTEOARTHRITIS, UNSPECIFIED OSTEOARTHRITIS TYPE, UNSPECIFIED SITE: ICD-10-CM

## 2025-07-13 NOTE — TELEPHONE ENCOUNTER
Care Due:                  Date            Visit Type   Department     Provider  --------------------------------------------------------------------------------                                EP -                              PRIMARY      [facility] INTERNAL  Last Visit: 02-      CARE ([facility])   [facility]       S[facility]                              PRIMARY      [facility] INTERNAL  Next Visit: 08-      CARE ([facility])   [facility]  Test          Frequency    Reason                     Performed    Due Date  --------------------------------------------------------------------------------    Vitamin D...  12 months..  cholecalciferol,.........  01- 01-    Health [facility] Embedded Care Due Messages. Reference number: [id number].   7/13/2025 7:46:46 AM CDT

## 2025-07-14 RX ORDER — MELOXICAM 15 MG/1
TABLET ORAL
Qty: 90 TABLET | Refills: 0 | OUTPATIENT
Start: 2025-07-14

## 2025-07-14 RX ORDER — BRIMONIDINE TARTRATE 2 MG/ML
SOLUTION/ DROPS OPHTHALMIC
Qty: 10 ML | Refills: 2 | Status: SHIPPED | OUTPATIENT
Start: 2025-07-14

## 2025-07-14 NOTE — TELEPHONE ENCOUNTER
Refill Routing Note   Medication(s) are not appropriate for processing by [facility] for the following reason(s):        Outside of protocol    [facility] action(s):  Route             Appointments  past 12m or future 3m with PCP    Date Provider   Last Visit   2/6/2025 Sandra Lyon MD   Next Visit   8/8/2025 Sandra Lyon MD   ED visits in past 90 days: 0        Note composed:8:13 AM 07/14/2025                 Detail Level: Generalized

## 2025-07-15 NOTE — TELEPHONE ENCOUNTER
Provider Staff:  Please note Refusal of medication.     Action required for this patient.      Requested Prescriptions     Refused Prescriptions Disp Refills    meloxicam (MOBIC) 15 MG tablet [Pharmacy Med Name: MELOXICAM 15 MG TABLET] 90 tablet 0     Sig: TAKE 1 TABLET BY MOUTH EVERY DAY WITH FOOD FOR 3 TO 5 DAYS AS NEEDED FOR JOINT PAIN     Refused By: ABIGAIL LEMUS     Reason for Refusal: Patient should contact provider first      Thanks!  [facility]   Note composed: 07/15/2025 1:18 PM

## 2025-07-28 ENCOUNTER — TELEPHONE (OUTPATIENT)
Dept: INTERNAL MEDICINE | Facility: CLINIC | Age: 80
End: 2025-07-28
Payer: MEDICARE

## 2025-07-28 DIAGNOSIS — I10 ESSENTIAL HYPERTENSION: ICD-10-CM

## 2025-07-28 DIAGNOSIS — E78.5 HYPERLIPIDEMIA, UNSPECIFIED HYPERLIPIDEMIA TYPE: Primary | ICD-10-CM

## 2025-07-28 DIAGNOSIS — E03.4 HYPOTHYROIDISM DUE TO ACQUIRED ATROPHY OF THYROID: ICD-10-CM

## 2025-08-04 ENCOUNTER — LAB VISIT (OUTPATIENT)
Dept: LAB | Facility: HOSPITAL | Age: 80
End: 2025-08-04
Attending: INTERNAL MEDICINE
Payer: MEDICARE

## 2025-08-04 DIAGNOSIS — E78.5 HYPERLIPIDEMIA, UNSPECIFIED HYPERLIPIDEMIA TYPE: ICD-10-CM

## 2025-08-04 DIAGNOSIS — I10 ESSENTIAL HYPERTENSION: ICD-10-CM

## 2025-08-04 DIAGNOSIS — E03.4 HYPOTHYROIDISM DUE TO ACQUIRED ATROPHY OF THYROID: ICD-10-CM

## 2025-08-04 LAB
ABSOLUTE EOSINOPHIL (OHS): 0.07 K/UL
ABSOLUTE MONOCYTE (OHS): 0.53 K/UL (ref 0.3–1)
ABSOLUTE NEUTROPHIL COUNT (OHS): 1.95 K/UL (ref 1.8–7.7)
ALBUMIN SERPL BCP-MCNC: 3.7 G/DL (ref 3.5–5.2)
ALP SERPL-CCNC: 74 UNIT/L (ref 40–150)
ALT SERPL W/O P-5'-P-CCNC: 9 UNIT/L (ref 0–55)
ANION GAP (OHS): 9 MMOL/L (ref 8–16)
AST SERPL-CCNC: 26 UNIT/L (ref 0–50)
BASOPHILS # BLD AUTO: 0.05 K/UL
BASOPHILS NFR BLD AUTO: 0.9 %
BILIRUB SERPL-MCNC: 0.5 MG/DL (ref 0.1–1)
BUN SERPL-MCNC: 14 MG/DL (ref 8–23)
CALCIUM SERPL-MCNC: 9.3 MG/DL (ref 8.7–10.5)
CHLORIDE SERPL-SCNC: 109 MMOL/L (ref 95–110)
CHOLEST SERPL-MCNC: 141 MG/DL (ref 120–199)
CHOLEST/HDLC SERPL: 3.3 {RATIO} (ref 2–5)
CO2 SERPL-SCNC: 22 MMOL/L (ref 23–29)
CREAT SERPL-MCNC: 0.9 MG/DL (ref 0.5–1.4)
ERYTHROCYTE [DISTWIDTH] IN BLOOD BY AUTOMATED COUNT: 13.2 % (ref 11.5–14.5)
GFR SERPLBLD CREATININE-BSD FMLA CKD-EPI: >60 ML/MIN/1.73/M2
GLUCOSE SERPL-MCNC: 92 MG/DL (ref 70–110)
HCT VFR BLD AUTO: 41.4 % (ref 37–48.5)
HDLC SERPL-MCNC: 43 MG/DL (ref 40–75)
HDLC SERPL: 30.5 % (ref 20–50)
HGB BLD-MCNC: 13.5 GM/DL (ref 12–16)
IMM GRANULOCYTES # BLD AUTO: 0.01 K/UL (ref 0–0.04)
IMM GRANULOCYTES NFR BLD AUTO: 0.2 % (ref 0–0.5)
LDLC SERPL CALC-MCNC: 77 MG/DL (ref 63–159)
LYMPHOCYTES # BLD AUTO: 2.67 K/UL (ref 1–4.8)
MCH RBC QN AUTO: 28.2 PG (ref 27–31)
MCHC RBC AUTO-ENTMCNC: 32.6 G/DL (ref 32–36)
MCV RBC AUTO: 86 FL (ref 82–98)
NONHDLC SERPL-MCNC: 98 MG/DL
NUCLEATED RBC (/100WBC) (OHS): 0 /100 WBC
PLATELET # BLD AUTO: 294 K/UL (ref 150–450)
PMV BLD AUTO: 9.9 FL (ref 9.2–12.9)
POTASSIUM SERPL-SCNC: 4.1 MMOL/L (ref 3.5–5.1)
PROT SERPL-MCNC: 7.4 GM/DL (ref 6–8.4)
RBC # BLD AUTO: 4.79 M/UL (ref 4–5.4)
RELATIVE EOSINOPHIL (OHS): 1.3 %
RELATIVE LYMPHOCYTE (OHS): 50.6 % (ref 18–48)
RELATIVE MONOCYTE (OHS): 10 % (ref 4–15)
RELATIVE NEUTROPHIL (OHS): 37 % (ref 38–73)
SODIUM SERPL-SCNC: 140 MMOL/L (ref 136–145)
TRIGL SERPL-MCNC: 105 MG/DL (ref 30–150)
TSH SERPL-ACNC: 0.51 UIU/ML (ref 0.4–4)
WBC # BLD AUTO: 5.28 K/UL (ref 3.9–12.7)

## 2025-08-04 PROCEDURE — 84443 ASSAY THYROID STIM HORMONE: CPT

## 2025-08-04 PROCEDURE — 36415 COLL VENOUS BLD VENIPUNCTURE: CPT

## 2025-08-04 PROCEDURE — 85025 COMPLETE CBC W/AUTO DIFF WBC: CPT

## 2025-08-04 PROCEDURE — 80061 LIPID PANEL: CPT

## 2025-08-04 PROCEDURE — 80053 COMPREHEN METABOLIC PANEL: CPT

## 2025-09-04 ENCOUNTER — TELEPHONE (OUTPATIENT)
Dept: INTERNAL MEDICINE | Facility: CLINIC | Age: 80
End: 2025-09-04
Payer: MEDICARE

## 2025-09-04 DIAGNOSIS — Z12.31 SCREENING MAMMOGRAM FOR BREAST CANCER: Primary | ICD-10-CM

## (undated) DEVICE — BANDAGE GAUZE 6PLY FLUFF 2X3

## (undated) DEVICE — OBTURATOR 8MM BLUNT XI

## (undated) DEVICE — SCISSOR 5MMX35CM DIRECT DRIVE

## (undated) DEVICE — SET TRI-LUMEN FILTERED TUBE

## (undated) DEVICE — COVER TIP CURVED SCISSORS XI

## (undated) DEVICE — BLADE SURG STAINLESS STEEL #15

## (undated) DEVICE — SUT ETHIBOND EXCEL 0 CT2 30

## (undated) DEVICE — NDL HYPO REG 25G X 1 1/2

## (undated) DEVICE — SUT VICRYL CTD 2-0 GI 27 SH

## (undated) DEVICE — DRAPE STERI INSTRUMENT 1018

## (undated) DEVICE — GLOVE BIOGEL SKINSENSE PI 7.5

## (undated) DEVICE — KIT WING PAD POSITIONING

## (undated) DEVICE — SEE MEDLINE ITEM 156930

## (undated) DEVICE — SEAL UNIVERSAL 5MM-8MM XI

## (undated) DEVICE — SYR B-D DISP CONTROL 10CC100/C

## (undated) DEVICE — DEVICE ANC SW STAT FOLEY 6-24

## (undated) DEVICE — UNDERGLOVE BIOGEL PI SZ 6.5 LF

## (undated) DEVICE — BAG TISSUE RETRIEVAL 5MM

## (undated) DEVICE — UNDERGLOVES BIOGEL PI SIZE 7.5

## (undated) DEVICE — SOL NS 1000CC

## (undated) DEVICE — SYR 10CC LUER LOCK

## (undated) DEVICE — SUT ABS CLIP LAPRA-TY CTD

## (undated) DEVICE — SEE MEDLINE ITEM 146347

## (undated) DEVICE — GLOVE BIOGEL SKINSENSE PI 6.5

## (undated) DEVICE — TRAY FOLEY 16FR INFECTION CONT

## (undated) DEVICE — TROCAR ENDOPATH XCEL 5X100MM

## (undated) DEVICE — SUT GORETEX CV-4 TH-26 36IN

## (undated) DEVICE — ADHESIVE DERMABOND ADVANCED

## (undated) DEVICE — NDL 18GA X1 1/2 REG BEVEL

## (undated) DEVICE — CARTRIDGE BABCOCK GRASPER 5X38

## (undated) DEVICE — SUT MCRYL PLUS 4-0 PS2 27IN

## (undated) DEVICE — SUT VICRYL+ 27 UR-6 VIOL

## (undated) DEVICE — SYR 50CC LL

## (undated) DEVICE — SOL WATER STRL IRR 1000ML

## (undated) DEVICE — SOL 9P NACL IRR PIC IL

## (undated) DEVICE — KIT PROCEDURE STER INLET CLOSU

## (undated) DEVICE — SOL ELECTROLUBE ANTI-STIC

## (undated) DEVICE — PAD ABD 8X10 STERILE

## (undated) DEVICE — PAD PREP 50/CA

## (undated) DEVICE — KIT SURGIFLO HEMOSTATIC MATRIX

## (undated) DEVICE — DRAPE COLUMN DAVINCI XI

## (undated) DEVICE — PORT AIRSEAL 12/120MM LPI

## (undated) DEVICE — SOL STRL WATER INJ 1000ML BG

## (undated) DEVICE — SUT MONOCRYL 0 CT-1 UND MON

## (undated) DEVICE — DRAPE ARM DAVINCI XI

## (undated) DEVICE — PORT ACCESS 8MM W/120MM LOW

## (undated) DEVICE — JELLY SURGILUBE 5GR

## (undated) DEVICE — IRRIGATOR ENDOSCOPY DISP.

## (undated) DEVICE — APPLICATOR ENDOSCOPIC

## (undated) DEVICE — SEE MEDLINE ITEM 157131

## (undated) DEVICE — ELECTRODE REM PLYHSV RETURN 9

## (undated) DEVICE — SET CYSTO IRRIGATION UNIV SPIK

## (undated) DEVICE — SEE MEDLINE ITEM 156923